# Patient Record
Sex: FEMALE | Race: WHITE | NOT HISPANIC OR LATINO | URBAN - METROPOLITAN AREA
[De-identification: names, ages, dates, MRNs, and addresses within clinical notes are randomized per-mention and may not be internally consistent; named-entity substitution may affect disease eponyms.]

---

## 2021-12-16 ENCOUNTER — INPATIENT HOSPITAL (OUTPATIENT)
Dept: URBAN - METROPOLITAN AREA HOSPITAL 76 | Facility: HOSPITAL | Age: 61
End: 2021-12-16
Payer: COMMERCIAL

## 2021-12-16 DIAGNOSIS — K80.50 CALCULUS OF BILE DUCT WITHOUT CHOLANGITIS OR CHOLECYSTITIS W: ICD-10-CM

## 2021-12-16 DIAGNOSIS — K59.00 CONSTIPATION, UNSPECIFIED: ICD-10-CM

## 2021-12-16 DIAGNOSIS — R10.9 UNSPECIFIED ABDOMINAL PAIN: ICD-10-CM

## 2021-12-16 DIAGNOSIS — R94.5 ABNORMAL RESULTS OF LIVER FUNCTION STUDIES: ICD-10-CM

## 2021-12-16 DIAGNOSIS — R11.2 NAUSEA WITH VOMITING, UNSPECIFIED: ICD-10-CM

## 2021-12-16 PROCEDURE — 99254 IP/OBS CNSLTJ NEW/EST MOD 60: CPT | Mod: 25 | Performed by: NURSE PRACTITIONER

## 2021-12-16 PROCEDURE — 43274 ERCP DUCT STENT PLACEMENT: CPT | Performed by: INTERNAL MEDICINE

## 2021-12-16 PROCEDURE — 43264 ERCP REMOVE DUCT CALCULI: CPT | Mod: 59 | Performed by: INTERNAL MEDICINE

## 2021-12-17 ENCOUNTER — INPATIENT HOSPITAL (OUTPATIENT)
Dept: URBAN - METROPOLITAN AREA HOSPITAL 76 | Facility: HOSPITAL | Age: 61
End: 2021-12-17
Payer: COMMERCIAL

## 2021-12-17 DIAGNOSIS — K80.50 CALCULUS OF BILE DUCT WITHOUT CHOLANGITIS OR CHOLECYSTITIS W: ICD-10-CM

## 2021-12-17 DIAGNOSIS — K85.10 BILIARY ACUTE PANCREATITIS WITHOUT NECROSIS OR INFECTION: ICD-10-CM

## 2021-12-17 DIAGNOSIS — R10.9 UNSPECIFIED ABDOMINAL PAIN: ICD-10-CM

## 2021-12-17 DIAGNOSIS — K59.00 CONSTIPATION, UNSPECIFIED: ICD-10-CM

## 2021-12-17 DIAGNOSIS — R11.2 NAUSEA WITH VOMITING, UNSPECIFIED: ICD-10-CM

## 2021-12-17 DIAGNOSIS — R94.5 ABNORMAL RESULTS OF LIVER FUNCTION STUDIES: ICD-10-CM

## 2021-12-17 PROCEDURE — 99232 SBSQ HOSP IP/OBS MODERATE 35: CPT | Performed by: NURSE PRACTITIONER

## 2022-04-27 ENCOUNTER — ON CAMPUS - OUTPATIENT (OUTPATIENT)
Dept: URBAN - METROPOLITAN AREA HOSPITAL 77 | Facility: HOSPITAL | Age: 62
End: 2022-04-27
Payer: COMMERCIAL

## 2022-04-27 DIAGNOSIS — K80.50 CALCULUS OF BILE DUCT WITHOUT CHOLANGITIS OR CHOLECYSTITIS W: ICD-10-CM

## 2022-04-27 DIAGNOSIS — Z46.89 ENCOUNTER FOR FITTING AND ADJUSTMENT OF OTHER SPECIFIED DEVI: ICD-10-CM

## 2022-04-27 DIAGNOSIS — Z96.89 PRESENCE OF OTHER SPECIFIED FUNCTIONAL IMPLANTS: ICD-10-CM

## 2022-04-27 PROCEDURE — 43264 ERCP REMOVE DUCT CALCULI: CPT | Performed by: INTERNAL MEDICINE

## 2022-04-27 PROCEDURE — 43275 ERCP REMOVE FORGN BODY DUCT: CPT | Performed by: INTERNAL MEDICINE

## 2024-09-04 ENCOUNTER — TRANSCRIBE ORDERS (OUTPATIENT)
Dept: ADMINISTRATIVE | Facility: HOSPITAL | Age: 64
End: 2024-09-04
Payer: COMMERCIAL

## 2024-09-04 DIAGNOSIS — R91.8 LUNG MASS: Primary | ICD-10-CM

## 2024-09-12 DIAGNOSIS — R91.1 LUNG NODULE SEEN ON IMAGING STUDY: Primary | ICD-10-CM

## 2024-09-12 DIAGNOSIS — R59.0 HILAR LYMPHADENOPATHY: ICD-10-CM

## 2024-09-17 ENCOUNTER — TELEPHONE (OUTPATIENT)
Dept: SURGERY | Facility: CLINIC | Age: 64
End: 2024-09-17
Payer: COMMERCIAL

## 2024-09-24 ENCOUNTER — PATIENT OUTREACH (OUTPATIENT)
Dept: ONCOLOGY | Facility: CLINIC | Age: 64
End: 2024-09-24
Payer: COMMERCIAL

## 2024-09-24 ENCOUNTER — PREP FOR SURGERY (OUTPATIENT)
Dept: OTHER | Facility: HOSPITAL | Age: 64
End: 2024-09-24
Payer: COMMERCIAL

## 2024-09-24 ENCOUNTER — OFFICE VISIT (OUTPATIENT)
Dept: SURGERY | Facility: CLINIC | Age: 64
End: 2024-09-24
Payer: COMMERCIAL

## 2024-09-24 VITALS
BODY MASS INDEX: 20.73 KG/M2 | SYSTOLIC BLOOD PRESSURE: 131 MMHG | HEART RATE: 78 BPM | OXYGEN SATURATION: 96 % | WEIGHT: 140 LBS | HEIGHT: 69 IN | DIASTOLIC BLOOD PRESSURE: 74 MMHG

## 2024-09-24 DIAGNOSIS — R91.8 LUNG MASS: Primary | ICD-10-CM

## 2024-09-24 RX ORDER — ATORVASTATIN CALCIUM 40 MG/1
40 TABLET, FILM COATED ORAL DAILY
COMMUNITY
Start: 2024-09-17 | End: 2024-10-17

## 2024-09-24 RX ORDER — CLOPIDOGREL BISULFATE 75 MG/1
75 TABLET ORAL DAILY
COMMUNITY
Start: 2024-09-17 | End: 2024-10-17

## 2024-09-24 RX ORDER — CARVEDILOL 3.12 MG/1
3.12 TABLET ORAL
COMMUNITY
Start: 2024-08-06

## 2024-09-24 RX ORDER — ISOSORBIDE MONONITRATE 30 MG/1
30 TABLET, EXTENDED RELEASE ORAL DAILY
COMMUNITY
Start: 2024-09-17 | End: 2024-10-17

## 2024-09-24 RX ORDER — NITROGLYCERIN 0.4 MG/1
0.4 TABLET SUBLINGUAL
COMMUNITY

## 2024-09-24 RX ORDER — PSEUDOEPHEDRINE HCL 30 MG
100 TABLET ORAL
COMMUNITY

## 2024-09-24 RX ORDER — ASPIRIN 81 MG/1
81 TABLET, CHEWABLE ORAL DAILY
COMMUNITY
Start: 2024-09-17 | End: 2024-10-17

## 2024-09-24 RX ORDER — ALBUTEROL SULFATE 90 UG/1
2 INHALANT RESPIRATORY (INHALATION)
COMMUNITY

## 2024-09-24 NOTE — H&P (VIEW-ONLY)
THORACIC SURGERY CLINIC CONSULT NOTE    REASON FOR CONSULT: Right upper lobe lung mass, multiple PET avid upper lobe nodules and hilar lymphadenopathy concerning for malignancy    REFERRING PROVIDER: Dr. Delgado    Subjective   HISTORY OF PRESENTING ILLNESS:   Kandi Fuentes is a 64 y.o. female who has significant medical problems as mentioned in the medical chart.     History of Present Illness  She had a CT scan of the chest in July 2024 which noted right upper lobe mass concerning for malignancy.  Subsequent PET CT scan on 8/16/2024 reported lobulated mass in the posterior segment of the right upper lobe with maximal SUV of 16.4.  There was focal increased uptake in the right hilum with SUV of 4.2 concerning for malignant right hilar lymphadenopathy.  Unfortunately, she had an MI that required PCI to the RCA.  She was initiated on dual antiplatelet therapy.  Due to financial issues, navigational bronchoscopy and biopsy could not be performed.  She was referred to thoracic surgery for further evaluation.    FAMILY HISTORY  Her sister and brother both passed away at the age of 59 from lung cancer. They were both smokers. Her mother passed away from a massive heart attack and she had breast cancer.    No past medical history on file.    Past Surgical History:   Procedure Laterality Date    CHOLECYSTECTOMY         No family history on file.    Social History     Socioeconomic History    Marital status:    Tobacco Use    Smoking status: Every Day     Current packs/day: 1.00     Types: Cigarettes   Substance and Sexual Activity    Alcohol use: Not Currently    Drug use: Never       No current outpatient medications on file.     Allergies   Allergen Reactions    Codeine Hives    Sulfa Antibiotics Hives             Objective    OBJECTIVE:     VITAL SIGNS:  There were no vitals taken for this visit.    PHYSICAL EXAM:  Normal appearance.   Head is normocephalic.   Nose appears normal.   No obvious deformity of  the mouth and throat.  Conjunctivae normal.   Heart rate and rhythm is normal.  Pulmonary effort is normal.   Moving all 4 extremities.  Extremities warm.  No focal deficit present.   He is alert and oriented to person, place, and time.     LAB RESULTS:  I have reviewed all the available laboratory results in the chart.    RESULTS REVIEW:  I have reviewed the patient's all relevant laboratory and imaging findings.     Results  Imaging  Tumor in the right upper lobe of the lung. Multiple lymph nodes along the windpipe, one of which is enlarged.    ASSESSMENT & PLAN:  Kandi Fuentes is a 64 y.o. female with significant medical conditions as mentioned above presented to my clinic.    Diagnosis: Right upper lobe mass with hilar lymphadenopathy concerning for primary lung malignancy with cami metastasis.    Assessment & Plan  1. Stage III Lung Cancer.  The presence of a tumor in the right upper lobe of the lung, along with multiple nodules in the right upper lobe and lymph node involvement, suggests a diagnosis of at least stage III lung cancer. A brain MRI will be conducted to evaluate for brain metastasis.  A biopsy of the lung tumor is planned to confirm the diagnosis and guide treatment. Given her recent heart attack and fragile lung condition, surgery is not a viable option. She will likely receive definitive chemoradiotherapy followed by immunotherapy. A referral to medical and radiation oncology has been made for further management.     2. Medication Management.  She has been advised to continue taking aspirin but to discontinue Plavix (clopidogrel) to reduce the risk of bleeding during the biopsy. A consultation with her cardiologist will be arranged to confirm this plan.    I discussed the patients findings and my recommendations with the patient. The patient was given adequate time to ask questions and all questions were answered to patient satisfaction. Thank you for this consult and allowing us to  participate in the care of your patient.      Serafin Choudhary MD  Thoracic Surgeon  [facility]        Dictated utilizing Dragon dictation    I spent 60 minutes caring for Kandi on this date of service. This time includes time spent by me in the following activities:preparing for the visit, reviewing tests, obtaining and/or reviewing a separately obtained history, performing a medically appropriate examination and/or evaluation , counseling and educating the patient/family/caregiver, ordering medications, tests, or procedures, referring and communicating with other health care professionals , documenting information in the medical record, independently interpreting results and communicating that information with the patient/family/caregiver, and care coordination and more than half the time was spent in direct face to face evaluation and decision making.     Patient or patient representative verbalized consent for the use of Ambient Listening during the visit with  Serafin Choudhary MD for chart documentation. 9/27/2024  10:20 EDT

## 2024-09-24 NOTE — H&P (VIEW-ONLY)
THORACIC SURGERY CLINIC CONSULT NOTE    REASON FOR CONSULT: Right upper lobe lung mass, multiple PET avid upper lobe nodules and hilar lymphadenopathy concerning for malignancy    REFERRING PROVIDER: Dr. Delgado    Subjective   HISTORY OF PRESENTING ILLNESS:   Kandi Fuentes is a 64 y.o. female who has significant medical problems as mentioned in the medical chart.     History of Present Illness  She had a CT scan of the chest in July 2024 which noted right upper lobe mass concerning for malignancy.  Subsequent PET CT scan on 8/16/2024 reported lobulated mass in the posterior segment of the right upper lobe with maximal SUV of 16.4.  There was focal increased uptake in the right hilum with SUV of 4.2 concerning for malignant right hilar lymphadenopathy.  Unfortunately, she had an MI that required PCI to the RCA.  She was initiated on dual antiplatelet therapy.  Due to financial issues, navigational bronchoscopy and biopsy could not be performed.  She was referred to thoracic surgery for further evaluation.    FAMILY HISTORY  Her sister and brother both passed away at the age of 59 from lung cancer. They were both smokers. Her mother passed away from a massive heart attack and she had breast cancer.    No past medical history on file.    Past Surgical History:   Procedure Laterality Date    CHOLECYSTECTOMY         No family history on file.    Social History     Socioeconomic History    Marital status:    Tobacco Use    Smoking status: Every Day     Current packs/day: 1.00     Types: Cigarettes   Substance and Sexual Activity    Alcohol use: Not Currently    Drug use: Never       No current outpatient medications on file.     Allergies   Allergen Reactions    Codeine Hives    Sulfa Antibiotics Hives             Objective    OBJECTIVE:     VITAL SIGNS:  There were no vitals taken for this visit.    PHYSICAL EXAM:  Normal appearance.   Head is normocephalic.   Nose appears normal.   No obvious deformity of  the mouth and throat.  Conjunctivae normal.   Heart rate and rhythm is normal.  Pulmonary effort is normal.   Moving all 4 extremities.  Extremities warm.  No focal deficit present.   He is alert and oriented to person, place, and time.     LAB RESULTS:  I have reviewed all the available laboratory results in the chart.    RESULTS REVIEW:  I have reviewed the patient's all relevant laboratory and imaging findings.     Results  Imaging  Tumor in the right upper lobe of the lung. Multiple lymph nodes along the windpipe, one of which is enlarged.    ASSESSMENT & PLAN:  Kandi Fuentes is a 64 y.o. female with significant medical conditions as mentioned above presented to my clinic.    Diagnosis: Right upper lobe mass with hilar lymphadenopathy concerning for primary lung malignancy with cami metastasis.    Assessment & Plan  1. Stage III Lung Cancer.  The presence of a tumor in the right upper lobe of the lung, along with multiple nodules in the right upper lobe and lymph node involvement, suggests a diagnosis of at least stage III lung cancer. A brain MRI will be conducted to evaluate for brain metastasis.  A biopsy of the lung tumor is planned to confirm the diagnosis and guide treatment. Given her recent heart attack and fragile lung condition, surgery is not a viable option. She will likely receive definitive chemoradiotherapy followed by immunotherapy. A referral to medical and radiation oncology has been made for further management.     2. Medication Management.  She has been advised to continue taking aspirin but to discontinue Plavix (clopidogrel) to reduce the risk of bleeding during the biopsy. A consultation with her cardiologist will be arranged to confirm this plan.    I discussed the patients findings and my recommendations with the patient. The patient was given adequate time to ask questions and all questions were answered to patient satisfaction. Thank you for this consult and allowing us to  participate in the care of your patient.      Serafin Choudhary MD  Thoracic Surgeon  [facility] [place]        Dictated utilizing Dragon dictation    I spent 60 minutes caring for Kandi on this date of service. This time includes time spent by me in the following activities:preparing for the visit, reviewing tests, obtaining and/or reviewing a separately obtained history, performing a medically appropriate examination and/or evaluation , counseling and educating the patient/family/caregiver, ordering medications, tests, or procedures, referring and communicating with other health care professionals , documenting information in the medical record, independently interpreting results and communicating that information with the patient/family/caregiver, and care coordination and more than half the time was spent in direct face to face evaluation and decision making.     Patient or patient representative verbalized consent for the use of Ambient Listening during the visit with  Serafin Choudhary MD for chart documentation. 9/27/2024  10:20 EDT

## 2024-09-25 ENCOUNTER — PATIENT OUTREACH (OUTPATIENT)
Dept: ONCOLOGY | Facility: CLINIC | Age: 64
End: 2024-09-25
Payer: COMMERCIAL

## 2024-09-25 ENCOUNTER — TELEPHONE (OUTPATIENT)
Dept: SURGERY | Facility: CLINIC | Age: 64
End: 2024-09-25
Payer: COMMERCIAL

## 2024-09-26 NOTE — PROGRESS NOTES
"                         HEMATOLOGY ONCOLOGY OUTPATIENT CONSULTATION       Patient name: Kandi Fuentes  : 1960  MRN: [id number]  Primary Care Physician: Provider, No Known  Referring Physician: Provider, No Known  Reason For Consult: Limited stage small cell lung cancer.    History of Present Illness:  Patient is a 64 y.o.     10/2/2024: In the office for the first time to stage and treat small cell lung cancer of the right lung.  She reported that between July and 2024 she was seen at the hospital with dyspnea and some chest pains.  She was found to have evidence of coronary artery disease and underwent percutaneous angioplasty and stenting of the affected coronary vessels.  In the process, however, a nodule in the right lung was identified.  Further investigations led to the identification of a 4.4 cm lobulated tumor in the posterior right upper lobe communicating with the right middle lobe concerning for malignancy.  Evidence of severe emphysema was present, as well.  Eventually she had a navigational bronchoscopy and pathology reported small cell carcinoma on the biopsies.  A PET scan and MRI did not reveal any suggestion of metastatic disease.  At the time of this visit she is feeling somewhat better.  Her breathing has improved.  She still has some precordial discomfort that she describes is related to the stents \"that I can still feel\".  She has been eating reasonably well and has not lost much weight.  She is also been afebrile.  No diarrhea or dysuria.  On exam she appears chronically ill but is not in distress.  No jaundice.  The lungs are diminished bilaterally in a significant fashion.  The heart is regular.  The abdomen is flat and soft.  No palpable tumors.  No edema.  Independently reviewed all the imaging studies and discussed with her.  Treatment with concurrent chemotherapy and radiation followed by immunotherapy is reasonable.  Discussed with her the regimen of concurrent " chemotherapy and radiation and in detail discussed with her side effects and potential complications of both treatments.  Ideally we should begin on October 7 if it is at all possible.  I have communicated with Dr. Choudhary and with Dr. Abarca.    Past Medical History:   Diagnosis Date    COPD (chronic obstructive pulmonary disease)     Coronary artery disease     Elevated cholesterol     Heart attack     Hypertension      Past Surgical History:   Procedure Laterality Date    BACK SURGERY  2004    bone spur on sciatica    BRONCHOSCOPY WITH ION ROBOTIC ASSIST N/A 9/27/2024    Procedure: BRONCHOSCOPY WITH ION ROBOT, fine needle aspiration and tissue biopsy right upper lobe mass, endobronchial ultrasound with fine needle aspiration lymph nodes (Level 10R);  Surgeon: Serafin Choudhary MD;  Location: [facility] ENDOSCOPY;  Service: Robotics - Pulmonary;  Laterality: N/A;  post: lung mass with lympadenopathy    CHOLECYSTECTOMY  2021    CORONARY ANGIOPLASTY WITH STENT PLACEMENT  07/2024    MOLE REMOVAL  1994    forehead    SKIN LESION EXCISION Right 1994    breast       Current Outpatient Medications:     Acetaminophen (Tylenol) 325 MG capsule, Take  by mouth., Disp: , Rfl:     albuterol sulfate  (90 Base) MCG/ACT inhaler, Inhale 2 puffs., Disp: , Rfl:     aspirin 81 MG chewable tablet, Chew 1 tablet Daily., Disp: , Rfl:     atorvastatin (LIPITOR) 40 MG tablet, Take 1 tablet by mouth Daily., Disp: , Rfl:     carvedilol (COREG) 3.125 MG tablet, Take 1 tablet by mouth., Disp: , Rfl:     clopidogrel (PLAVIX) 75 MG tablet, Take 1 tablet by mouth Daily., Disp: , Rfl:     isosorbide mononitrate (IMDUR) 30 MG 24 hr tablet, Take 1 tablet by mouth Daily., Disp: , Rfl:     docusate sodium 100 MG capsule, Take 1 capsule by mouth. (Patient not taking: Reported on 10/2/2024), Disp: , Rfl:     nitroglycerin (NITROSTAT) 0.4 MG SL tablet, Place 1 tablet under the tongue. (Patient not taking: Reported on 10/2/2024), Disp: , Rfl:     Allergies    Allergen Reactions    Morphine Hives and Shortness Of Breath    Codeine Hives    Sulfa Antibiotics Hives     Family History   Problem Relation Age of Onset    Breast cancer Mother 62    Heart attack Mother     Lung cancer Sister     Throat cancer Sister     Lung cancer Brother      Cancer-related family history includes Breast cancer (age of onset: 62) in her mother; Lung cancer in her brother and sister; Throat cancer in her sister.    Social History     Tobacco Use    Smoking status: Every Day     Current packs/day: 1.00     Average packs/day: 1 pack/day for 54.8 years (54.8 ttl pk-yrs)     Types: Cigarettes     Start date: 1970     Passive exposure: Current    Smokeless tobacco: Never   Vaping Use    Vaping status: Never Used   Substance Use Topics    Alcohol use: Never    Drug use: Never     Social History     Social History Narrative    Not on file     ROS:   Review of Systems   Constitutional:  Positive for fatigue. Negative for activity change, appetite change, chills, diaphoresis, fever and unexpected weight change.   HENT:  Negative for congestion, dental problem, drooling, ear discharge, ear pain, facial swelling, hearing loss, mouth sores, nosebleeds, postnasal drip, rhinorrhea, sinus pressure, sinus pain, sneezing, sore throat, tinnitus, trouble swallowing and voice change.    Eyes:  Negative for photophobia, pain, discharge, redness, itching and visual disturbance.   Respiratory:  Positive for cough and shortness of breath. Negative for apnea, choking, chest tightness, wheezing and stridor.    Cardiovascular:  Positive for chest pain. Negative for palpitations and leg swelling.   Gastrointestinal:  Negative for abdominal distention, abdominal pain, anal bleeding, blood in stool, constipation, diarrhea, nausea, rectal pain and vomiting.   Endocrine: Negative for cold intolerance, heat intolerance, polydipsia and polyuria.   Genitourinary:  Negative for decreased urine volume, difficulty urinating,  "dysuria, flank pain, frequency, genital sores, hematuria and urgency.   Musculoskeletal:  Negative for arthralgias, back pain, gait problem, joint swelling, myalgias, neck pain and neck stiffness.   Skin:  Negative for color change, pallor and rash.   Neurological:  Negative for dizziness, tremors, seizures, syncope, facial asymmetry, speech difficulty, weakness, light-headedness, numbness and headaches.   Hematological:  Negative for adenopathy. Does not bruise/bleed easily.   Psychiatric/Behavioral:  Negative for agitation, behavioral problems, confusion, decreased concentration, hallucinations, self-injury, sleep disturbance and suicidal ideas. The patient is not nervous/anxious.        Objective:    Vital Signs:  Vitals:    10/02/24 0918   BP: 137/79   Pulse: 70   SpO2: 96%   Weight: 65.2 kg (143 lb 12.8 oz)   Height: 170.2 cm (67\")   PainSc: 0-No pain     Body mass index is 22.52 kg/m².    ECOG  (1) Restricted in physically strenuous activity, ambulatory and able to do work of light nature    Physical Exam:   Physical Exam  Constitutional:       General: She is not in acute distress.     Appearance: She is ill-appearing. She is not toxic-appearing or diaphoretic.      Comments: Well-built, slender and well oriented woman.  She appears chronically ill but is not in acute distress.   HENT:      Head: Normocephalic and atraumatic.      Right Ear: External ear normal.      Left Ear: External ear normal.      Nose: Nose normal.      Mouth/Throat:      Mouth: Mucous membranes are moist.      Pharynx: Oropharynx is clear. No oropharyngeal exudate or posterior oropharyngeal erythema.   Eyes:      General: No scleral icterus.        Right eye: No discharge.         Left eye: No discharge.      Conjunctiva/sclera: Conjunctivae normal.      Pupils: Pupils are equal, round, and reactive to light.   Cardiovascular:      Rate and Rhythm: Normal rate and regular rhythm.      Pulses: Normal pulses.      Heart sounds: No murmur " heard.     No friction rub. No gallop.   Pulmonary:      Effort: No respiratory distress.      Breath sounds: No stridor. No wheezing, rhonchi or rales.      Comments: Breath sounds markedly diminished bilaterally.  Abdominal:      General: Abdomen is flat. Bowel sounds are normal. There is no distension.      Palpations: Abdomen is soft. There is no mass.      Tenderness: There is no abdominal tenderness. There is no right CVA tenderness, left CVA tenderness, guarding or rebound.      Hernia: No hernia is present.   Musculoskeletal:         General: No tenderness, deformity or signs of injury.      Cervical back: No rigidity.      Right lower leg: No edema.      Left lower leg: No edema.   Lymphadenopathy:      Cervical: No cervical adenopathy.   Skin:     Coloration: Skin is not jaundiced or pale.      Findings: No bruising, lesion or rash.   Neurological:      General: No focal deficit present.      Mental Status: She is alert and oriented to person, place, and time.      Cranial Nerves: No cranial nerve deficit.   Psychiatric:         Mood and Affect: Mood normal.         Behavior: Behavior normal.         Thought Content: Thought content normal.         Judgment: Judgment normal.     NATHALIE Delgado MD performed the physical exam on 10/2/2024 as documented above.    Lab Results - Last 18 Months   Lab Units 09/27/24  0940   WBC 10*3/mm3 10.13   HEMOGLOBIN g/dL 14.1   HEMATOCRIT % 44.7   PLATELETS 10*3/mm3 218   MCV fL 102.1*     Lab Results - Last 18 Months   Lab Units 09/27/24  0940   INR  1.02   APTT seconds 25.5     Lab Results   Component Value Date    PTT 25.5 09/27/2024    INR 1.02 09/27/2024     Assessment & Plan     1.  Limited stage small cell lung cancer: I have reviewed independently the images and the reports of the CT scans, PET scan and MRI of the brain.  Discussed with her.  Discussed with her the nature of small cell lung cancer and its cause.  Discussed with her the importance of smoking  cessation and the treatment.  Offered treatment with concurrent chemotherapy and radiation and discussed side effects and potential complications.  Discussed the role in this setting of immunotherapy and explained the difference between chemotherapy and immunotherapy.  Communicated with Dr. Whiting and with Dr. Abarca, her surgeon and radiation oncologist.  2.  Coronary artery disease: Has recently undergone percutaneous angioplasty.  On antiplatelet agents at this time.  3.  Chronic obstructive pulmonary disease  4.  Cigarette smoker: Discussed with her the importance of cessation.  The role of cigarette smoke in the ignacio of lung cancer with described.  The importance of cessation and the benefit of cessation was discussed as well.  5.  Reviewed all records including notes from surgery and from cardiology.  Reviewed the records from [facility] including all imaging studies and the reports.  Reviewed all laboratory exams.  6.  See me in approximately 3 weeks.  Ideally begin treatment on October 7, 2024.

## 2024-09-27 ENCOUNTER — ANESTHESIA EVENT (OUTPATIENT)
Dept: GASTROENTEROLOGY | Facility: HOSPITAL | Age: 64
End: 2024-09-27
Payer: COMMERCIAL

## 2024-09-27 ENCOUNTER — APPOINTMENT (OUTPATIENT)
Dept: GENERAL RADIOLOGY | Facility: HOSPITAL | Age: 64
End: 2024-09-27
Payer: COMMERCIAL

## 2024-09-27 ENCOUNTER — HOSPITAL ENCOUNTER (OUTPATIENT)
Dept: CT IMAGING | Facility: HOSPITAL | Age: 64
Discharge: HOME | End: 2024-09-27
Payer: COMMERCIAL

## 2024-09-27 ENCOUNTER — HOSPITAL ENCOUNTER (OUTPATIENT)
Dept: PET IMAGING | Facility: HOSPITAL | Age: 64
Discharge: HOME | End: 2024-09-27
Payer: COMMERCIAL

## 2024-09-27 ENCOUNTER — PATIENT ROUNDING (BHMG ONLY) (OUTPATIENT)
Dept: SURGERY | Facility: CLINIC | Age: 64
End: 2024-09-27
Payer: COMMERCIAL

## 2024-09-27 ENCOUNTER — HOSPITAL ENCOUNTER (OUTPATIENT)
Facility: HOSPITAL | Age: 64
Setting detail: HOSPITAL OUTPATIENT SURGERY
Discharge: HOME | End: 2024-09-27
Attending: SURGERY | Admitting: SURGERY
Payer: COMMERCIAL

## 2024-09-27 ENCOUNTER — ANESTHESIA (OUTPATIENT)
Dept: GASTROENTEROLOGY | Facility: HOSPITAL | Age: 64
End: 2024-09-27
Payer: COMMERCIAL

## 2024-09-27 ENCOUNTER — LAB (OUTPATIENT)
Dept: LAB | Facility: HOSPITAL | Age: 64
End: 2024-09-27
Payer: COMMERCIAL

## 2024-09-27 VITALS
OXYGEN SATURATION: 96 % | HEART RATE: 75 BPM | HEIGHT: 67 IN | WEIGHT: 142.8 LBS | BODY MASS INDEX: 22.41 KG/M2 | DIASTOLIC BLOOD PRESSURE: 60 MMHG | SYSTOLIC BLOOD PRESSURE: 119 MMHG | TEMPERATURE: 97.6 F | RESPIRATION RATE: 14 BRPM

## 2024-09-27 DIAGNOSIS — R91.8 LUNG MASS: ICD-10-CM

## 2024-09-27 LAB
APTT PPP: 25.5 SECONDS (ref 24–31)
BASOPHILS # BLD AUTO: 0.03 10*3/MM3 (ref 0–0.2)
BASOPHILS NFR BLD AUTO: 0.3 % (ref 0–1.5)
DEPRECATED RDW RBC AUTO: 47.5 FL (ref 37–54)
EOSINOPHIL # BLD AUTO: 0.03 10*3/MM3 (ref 0–0.4)
EOSINOPHIL NFR BLD AUTO: 0.3 % (ref 0.3–6.2)
ERYTHROCYTE [DISTWIDTH] IN BLOOD BY AUTOMATED COUNT: 12.7 % (ref 12.3–15.4)
HCT VFR BLD AUTO: 44.7 % (ref 34–46.6)
HGB BLD-MCNC: 14.1 G/DL (ref 12–15.9)
IMM GRANULOCYTES # BLD AUTO: 0.01 10*3/MM3 (ref 0–0.05)
IMM GRANULOCYTES NFR BLD AUTO: 0.1 % (ref 0–0.5)
INR PPP: 1.02 (ref 0.93–1.1)
LYMPHOCYTES # BLD AUTO: 5.15 10*3/MM3 (ref 0.7–3.1)
LYMPHOCYTES NFR BLD AUTO: 50.8 % (ref 19.6–45.3)
MCH RBC QN AUTO: 32.2 PG (ref 26.6–33)
MCHC RBC AUTO-ENTMCNC: 31.5 G/DL (ref 31.5–35.7)
MCV RBC AUTO: 102.1 FL (ref 79–97)
MONOCYTES # BLD AUTO: 0.74 10*3/MM3 (ref 0.1–0.9)
MONOCYTES NFR BLD AUTO: 7.3 % (ref 5–12)
NEUTROPHILS NFR BLD AUTO: 4.17 10*3/MM3 (ref 1.7–7)
NEUTROPHILS NFR BLD AUTO: 41.2 % (ref 42.7–76)
NRBC BLD AUTO-RTO: 0 /100 WBC (ref 0–0.2)
PLATELET # BLD AUTO: 218 10*3/MM3 (ref 140–450)
PMV BLD AUTO: 9.6 FL (ref 6–12)
PROTHROMBIN TIME: 11.1 SECONDS (ref 9.6–11.7)
RBC # BLD AUTO: 4.38 10*6/MM3 (ref 3.77–5.28)
WBC NRBC COR # BLD AUTO: 10.13 10*3/MM3 (ref 3.4–10.8)

## 2024-09-27 PROCEDURE — 88342 IMHCHEM/IMCYTCHM 1ST ANTB: CPT | Performed by: SURGERY

## 2024-09-27 PROCEDURE — 88108 CYTOPATH CONCENTRATE TECH: CPT | Performed by: SURGERY

## 2024-09-27 PROCEDURE — 94761 N-INVAS EAR/PLS OXIMETRY MLT: CPT

## 2024-09-27 PROCEDURE — 85730 THROMBOPLASTIN TIME PARTIAL: CPT

## 2024-09-27 PROCEDURE — 94664 DEMO&/EVAL PT USE INHALER: CPT

## 2024-09-27 PROCEDURE — 71045 X-RAY EXAM CHEST 1 VIEW: CPT

## 2024-09-27 PROCEDURE — 25010000002 DEXAMETHASONE PER 1 MG: Performed by: NURSE ANESTHETIST, CERTIFIED REGISTERED

## 2024-09-27 PROCEDURE — 94799 UNLISTED PULMONARY SVC/PX: CPT

## 2024-09-27 PROCEDURE — 94640 AIRWAY INHALATION TREATMENT: CPT

## 2024-09-27 PROCEDURE — 25810000003 SODIUM CHLORIDE 0.9 % SOLUTION 250 ML FLEX CONT: Performed by: NURSE ANESTHETIST, CERTIFIED REGISTERED

## 2024-09-27 PROCEDURE — 85025 COMPLETE CBC W/AUTO DIFF WBC: CPT | Performed by: INTERNAL MEDICINE

## 2024-09-27 PROCEDURE — 25010000002 SUGAMMADEX 200 MG/2ML SOLUTION: Performed by: NURSE ANESTHETIST, CERTIFIED REGISTERED

## 2024-09-27 PROCEDURE — 25010000002 ONDANSETRON PER 1 MG: Performed by: NURSE ANESTHETIST, CERTIFIED REGISTERED

## 2024-09-27 PROCEDURE — 25010000002 SUCCINYLCHOLINE PER 20 MG: Performed by: NURSE ANESTHETIST, CERTIFIED REGISTERED

## 2024-09-27 PROCEDURE — 25810000003 SODIUM CHLORIDE 0.9 % SOLUTION: Performed by: SURGERY

## 2024-09-27 PROCEDURE — 85610 PROTHROMBIN TIME: CPT

## 2024-09-27 PROCEDURE — 25010000002 PROPOFOL 1000 MG/100ML EMULSION: Performed by: NURSE ANESTHETIST, CERTIFIED REGISTERED

## 2024-09-27 PROCEDURE — 36415 COLL VENOUS BLD VENIPUNCTURE: CPT | Performed by: INTERNAL MEDICINE

## 2024-09-27 PROCEDURE — 25010000002 PHENYLEPHRINE 10 MG/ML SOLUTION 5 ML VIAL: Performed by: NURSE ANESTHETIST, CERTIFIED REGISTERED

## 2024-09-27 PROCEDURE — 76000 FLUOROSCOPY <1 HR PHYS/QHP: CPT

## 2024-09-27 PROCEDURE — 88333 PATH CONSLTJ SURG CYTO XM 1: CPT | Performed by: SURGERY

## 2024-09-27 PROCEDURE — 88341 IMHCHEM/IMCYTCHM EA ADD ANTB: CPT | Performed by: SURGERY

## 2024-09-27 PROCEDURE — 88305 TISSUE EXAM BY PATHOLOGIST: CPT | Performed by: SURGERY

## 2024-09-27 PROCEDURE — 71250 CT THORAX DX C-: CPT

## 2024-09-27 RX ORDER — EPHEDRINE SULFATE 5 MG/ML
INJECTION INTRAVENOUS AS NEEDED
Status: DISCONTINUED | OUTPATIENT
Start: 2024-09-27 | End: 2024-09-27 | Stop reason: SURG

## 2024-09-27 RX ORDER — SODIUM CHLORIDE 9 MG/ML
50 INJECTION, SOLUTION INTRAVENOUS CONTINUOUS
Status: DISCONTINUED | OUTPATIENT
Start: 2024-09-27 | End: 2024-09-27 | Stop reason: HOSPADM

## 2024-09-27 RX ORDER — LABETALOL HYDROCHLORIDE 5 MG/ML
5 INJECTION, SOLUTION INTRAVENOUS
Status: DISCONTINUED | OUTPATIENT
Start: 2024-09-27 | End: 2024-09-27 | Stop reason: HOSPADM

## 2024-09-27 RX ORDER — IPRATROPIUM BROMIDE AND ALBUTEROL SULFATE 2.5; .5 MG/3ML; MG/3ML
3 SOLUTION RESPIRATORY (INHALATION) ONCE AS NEEDED
Status: DISCONTINUED | OUTPATIENT
Start: 2024-09-27 | End: 2024-09-27 | Stop reason: HOSPADM

## 2024-09-27 RX ORDER — SUCCINYLCHOLINE CHLORIDE 20 MG/ML
INJECTION INTRAMUSCULAR; INTRAVENOUS AS NEEDED
Status: DISCONTINUED | OUTPATIENT
Start: 2024-09-27 | End: 2024-09-27 | Stop reason: SURG

## 2024-09-27 RX ORDER — PHENYLEPHRINE HCL IN 0.9% NACL 1 MG/10 ML
SYRINGE (ML) INTRAVENOUS AS NEEDED
Status: DISCONTINUED | OUTPATIENT
Start: 2024-09-27 | End: 2024-09-27 | Stop reason: SURG

## 2024-09-27 RX ORDER — ROCURONIUM BROMIDE 10 MG/ML
INJECTION, SOLUTION INTRAVENOUS AS NEEDED
Status: DISCONTINUED | OUTPATIENT
Start: 2024-09-27 | End: 2024-09-27 | Stop reason: SURG

## 2024-09-27 RX ORDER — DIPHENHYDRAMINE HYDROCHLORIDE 50 MG/ML
12.5 INJECTION INTRAMUSCULAR; INTRAVENOUS
Status: DISCONTINUED | OUTPATIENT
Start: 2024-09-27 | End: 2024-09-27 | Stop reason: HOSPADM

## 2024-09-27 RX ORDER — MEPERIDINE HYDROCHLORIDE 25 MG/ML
12.5 INJECTION INTRAMUSCULAR; INTRAVENOUS; SUBCUTANEOUS
Status: DISCONTINUED | OUTPATIENT
Start: 2024-09-27 | End: 2024-09-27 | Stop reason: HOSPADM

## 2024-09-27 RX ORDER — LIDOCAINE 50 MG/G
OINTMENT TOPICAL AS NEEDED
Status: DISCONTINUED | OUTPATIENT
Start: 2024-09-27 | End: 2024-09-27 | Stop reason: HOSPADM

## 2024-09-27 RX ORDER — ONDANSETRON 2 MG/ML
INJECTION INTRAMUSCULAR; INTRAVENOUS AS NEEDED
Status: DISCONTINUED | OUTPATIENT
Start: 2024-09-27 | End: 2024-09-27 | Stop reason: SURG

## 2024-09-27 RX ORDER — DEXAMETHASONE SODIUM PHOSPHATE 4 MG/ML
INJECTION, SOLUTION INTRA-ARTICULAR; INTRALESIONAL; INTRAMUSCULAR; INTRAVENOUS; SOFT TISSUE AS NEEDED
Status: DISCONTINUED | OUTPATIENT
Start: 2024-09-27 | End: 2024-09-27 | Stop reason: SURG

## 2024-09-27 RX ORDER — LIDOCAINE HYDROCHLORIDE 20 MG/ML
INJECTION, SOLUTION INFILTRATION; PERINEURAL AS NEEDED
Status: DISCONTINUED | OUTPATIENT
Start: 2024-09-27 | End: 2024-09-27 | Stop reason: SURG

## 2024-09-27 RX ORDER — HYDRALAZINE HYDROCHLORIDE 20 MG/ML
5 INJECTION INTRAMUSCULAR; INTRAVENOUS
Status: DISCONTINUED | OUTPATIENT
Start: 2024-09-27 | End: 2024-09-27 | Stop reason: HOSPADM

## 2024-09-27 RX ORDER — IPRATROPIUM BROMIDE AND ALBUTEROL SULFATE 2.5; .5 MG/3ML; MG/3ML
3 SOLUTION RESPIRATORY (INHALATION)
Status: DISCONTINUED | OUTPATIENT
Start: 2024-09-27 | End: 2024-09-27 | Stop reason: HOSPADM

## 2024-09-27 RX ORDER — ONDANSETRON 2 MG/ML
4 INJECTION INTRAMUSCULAR; INTRAVENOUS ONCE AS NEEDED
Status: DISCONTINUED | OUTPATIENT
Start: 2024-09-27 | End: 2024-09-27 | Stop reason: HOSPADM

## 2024-09-27 RX ORDER — PROPOFOL 10 MG/ML
INJECTION, EMULSION INTRAVENOUS AS NEEDED
Status: DISCONTINUED | OUTPATIENT
Start: 2024-09-27 | End: 2024-09-27 | Stop reason: SURG

## 2024-09-27 RX ADMIN — EPHEDRINE SULFATE 10 MG: 5 INJECTION INTRAVENOUS at 14:27

## 2024-09-27 RX ADMIN — Medication 150 MCG: at 14:27

## 2024-09-27 RX ADMIN — LIDOCAINE HYDROCHLORIDE 100 MG: 20 INJECTION, SOLUTION INFILTRATION; PERINEURAL at 14:24

## 2024-09-27 RX ADMIN — Medication 200 MCG: at 14:30

## 2024-09-27 RX ADMIN — EPHEDRINE SULFATE 15 MG: 5 INJECTION INTRAVENOUS at 14:31

## 2024-09-27 RX ADMIN — SUGAMMADEX 200 MG: 100 INJECTION, SOLUTION INTRAVENOUS at 15:11

## 2024-09-27 RX ADMIN — ROCURONIUM BROMIDE 35 MG: 10 INJECTION, SOLUTION INTRAVENOUS at 14:29

## 2024-09-27 RX ADMIN — SUGAMMADEX 200 MG: 100 INJECTION, SOLUTION INTRAVENOUS at 15:27

## 2024-09-27 RX ADMIN — DEXAMETHASONE SODIUM PHOSPHATE 4 MG: 4 INJECTION, SOLUTION INTRAMUSCULAR; INTRAVENOUS at 14:35

## 2024-09-27 RX ADMIN — SUGAMMADEX 200 MG: 100 INJECTION, SOLUTION INTRAVENOUS at 15:15

## 2024-09-27 RX ADMIN — PHENYLEPHRINE HYDROCHLORIDE 0.5 MCG/KG/MIN: 10 INJECTION INTRAVENOUS at 14:23

## 2024-09-27 RX ADMIN — SODIUM CHLORIDE 50 ML/HR: 9 INJECTION, SOLUTION INTRAVENOUS at 11:25

## 2024-09-27 RX ADMIN — PROPOFOL INJECTABLE EMULSION 140 MCG/KG/MIN: 10 INJECTION, EMULSION INTRAVENOUS at 14:28

## 2024-09-27 RX ADMIN — ROCURONIUM BROMIDE 5 MG: 10 INJECTION, SOLUTION INTRAVENOUS at 14:25

## 2024-09-27 RX ADMIN — ONDANSETRON 4 MG: 2 INJECTION INTRAMUSCULAR; INTRAVENOUS at 14:35

## 2024-09-27 RX ADMIN — IPRATROPIUM BROMIDE AND ALBUTEROL SULFATE 3 ML: .5; 3 SOLUTION RESPIRATORY (INHALATION) at 10:56

## 2024-09-27 RX ADMIN — PROPOFOL INJECTABLE EMULSION 170 MG: 10 INJECTION, EMULSION INTRAVENOUS at 14:24

## 2024-09-27 RX ADMIN — SUCCINYLCHOLINE CHLORIDE 140 MG: 20 INJECTION, SOLUTION INTRAMUSCULAR; INTRAVENOUS at 14:25

## 2024-09-27 NOTE — DISCHARGE INSTRUCTIONS
Endoscopic ultrasound and Robotic bronchoscopy with fine needle aspiration    Samples (biopsies) of the lymph nodes are taken from inside the lungs and sent for diagnostic testing.    Final results of your biopsy take up to 5 business days to process; your endoscopic physician will contact you with your results.    EBUS and robotic bronchoscopy is recommended in the following instances:  To diagnose different types of lung disorders (such as sarcoidosis or tuberculosis)  To diagnose or 'stage' cancer   To investigate enlarged lymph  nodes in the chest    Due to effects of sedation, do not drive or operate heavy machinery for 24 hours.    Avoid heavy lifting (>10 lbs.) or strenuous activity for 48 hours.    Continue to avoid non-steroidal anti-inflammatory medications (NSAIDS) for 5-7 days after the procedure unless told otherwise by your endoscopic and primary care physicians.    Some patients have a temporary sore throat after the procedure; this is a normal finding and over-the-counter lozenges help soothe symptoms.    You may resume normal diet once you are discharged.     Avoid red-colored foods for 24 hours.     You may resume your blood thinner medications (if applicable) as directed by your endoscopic and primary care physicians.    It is normal to have a very small amount of blood-tinged sputum immediately after the procedure. This should resolve within 3-4 days.    Although complications are rare, there is a small risk of pain, collapsed lung, bleeding and infection. Contact your endoscopic physician if you have these signs or symptoms (Dr. Choudhary):  Temperature greater than 102°F  Worsening pain not relieved by medications  Go to your nearest emergency room is you experience:  Shaking, chills or a temperature over 102°F.    New, sudden difficulty breathing.    New pain when taking a deep breath.    New, sudden chest pain.  Worsening cough that produces large amounts of blood.    Resume Plavix on 9/29/2024

## 2024-09-30 ENCOUNTER — HOSPITAL ENCOUNTER (OUTPATIENT)
Dept: MRI IMAGING | Facility: HOSPITAL | Age: 64
Discharge: HOME | End: 2024-09-30
Admitting: SURGERY
Payer: COMMERCIAL

## 2024-09-30 DIAGNOSIS — R91.8 LUNG MASS: ICD-10-CM

## 2024-09-30 PROCEDURE — A9579 GAD-BASE MR CONTRAST NOS,1ML: HCPCS | Performed by: SURGERY

## 2024-09-30 PROCEDURE — 25010000002 GADOTERIDOL PER 1 ML: Performed by: SURGERY

## 2024-09-30 PROCEDURE — 70553 MRI BRAIN STEM W/O & W/DYE: CPT

## 2024-09-30 RX ADMIN — GADOTERIDOL 13 ML: 279.3 INJECTION, SOLUTION INTRAVENOUS at 15:24

## 2024-09-30 NOTE — OP NOTE
Operative Note     Date of procedure: 9/29/2024     Patient name: Kandi Fuentes  MRN: [id number]    Pre OP diagnosis:    Lung mass    Post OP diagnosis:  Patient Active Problem List   Diagnosis    Lung mass       Procedure performed:   Flexible bronchoscopy.  Navigational bronchoscopy with ION robot with C-arm.  Interpretation of fluoroscopy images.  Radial endobronchial ultrasound.  Fine-needle aspiration of the right upper lobe mass.  Cryo biopsy of the the right upper lobe mass using 1.1 mm ERBECRYO®  cryoprobe.  Right upper lobe segment bronchoalveolar lavage.  Endobronchial ultrasound for mediastinal staging.  Fine-needle aspiration of level 10R lymph node.    ION Synoptic report:  Date of radiological diagnosis: 7/4/2024  Lesions biopsied: Single  Tumor size: 40 mm  Location: Right Upper Lobe  Peripheral 1/3: No  Appearance: Solid  PET avidity: Yes  Bronchial sign: Yes  Prior biopsy: No  Radial EBUS: Concentric  Tools utilized: 23-Gauge Needle, Cryobiopsy, BAL  NITISH result: Positive for lesional cells, likely malignant cells with neuroendocrine differentiation  EBUS performed: Yes    Indications:   Kandi Fuentes is a 64 y.o. female who has significant medical problems as mentioned in the medical chart.      She had a CT scan of the chest in July 2024 which noted right upper lobe mass concerning for malignancy.  Subsequent PET CT scan on 8/16/2024 reported lobulated mass in the posterior segment of the right upper lobe with maximal SUV of 16.4.  There was focal increased uptake in the right hilum with SUV of 4.2 concerning for malignant right hilar lymphadenopathy.  Unfortunately, she had an MI that required PCI to the RCA.  She was initiated on dual antiplatelet therapy.  Due to financial issues, navigational bronchoscopy and biopsy could not be performed.  She was referred to thoracic surgery for further evaluation.     Her sister and brother both passed away at the age of 59 from lung cancer. They were  both smokers. Her mother passed away from a massive heart attack and she had breast cancer    The presence of a mass in the right upper lobe of the lung, along with multiple nodules in the right upper lobe and lymph node involvement, suggests a diagnosis of at least stage III lung cancer. A brain MRI will be conducted to evaluate for brain metastasis.  A biopsy of the lung tumor is planned to confirm the diagnosis and guide treatment. Given her recent heart attack and fragile lung condition, surgery is not a viable option. She will likely receive definitive chemoradiotherapy followed by immunotherapy. A referral to medical and radiation oncology has been made for further management.        Surgeon: Serafin Choudhary MD     Assistants: No qualified assistant was available for this procedure.      Anesthesia: General endotracheal anesthesia    ASA Class: 3    Procedure Details   On 9/29/2024, the patient was brought to the operating room and placed in the supine position on the operating room table. Following an uneventful induction of general anesthesia, patient was intubated with a single endotracheal tube without incident.  Antibiotic for surgical prophylaxis was not indicated due to the nature of the procedure.  Prior to beginning the operation, a time-out was conducted with all members of surgical team present. The patient was identified as Kandi Fuentes, the procedure and the correct site were verified.     I began by performing flexible bronchoscopy.  A flexible adult bronchoscope was advanced through the endotracheal tube.  A complete examination of the distal trachea and bilateral mainstem and lobar bronchi and all segmental bronchial orifices was performed.  The patient has normal endobronchial anatomy.  All the tracheobronchial tree had moderate mucus secretion.  There was no blood, endoluminal lesions or other abnormal findings.  The bronchoscope was removed.    Prior to the procedure, the patient CT scan  was integrated into the [employer]Point interface that generated the patient's 3D airway tree.  The target nodule was identified and its anatomical borders were mapped.  A path to the target nodule was generated through the [employer]Point interface.  After the plan was finalized, the patient image and plan guide was transferred to the [employer] robotic platform.  Using the Ion's controller, the 3.5 mm robotic catheter was advanced with the Ion's vision probe and navigated to the target along the preplanned path.  The catheter was parked next to the target lesion.  The Ion vision probe was removed and radial EBUS was used to confirm the presence of the target lesion.  The radial EBUS was removed and under fluoroscopic guidance, a 23 gauge Ion’s Flexision needle was passed into the robotic catheter.  The needle was advanced into the target lesion and the location of the needle was confirmed with the C-arm. Multiple passes of the needle were made into the target lesion and the aspirate was sent for Rapid on Site Evaluation (NITISH). The needle was removed. I then performed cryo biopsy using 1.1 mm ERBECRYO®  cryoprobe through the working channel in the same location. The bronchioloalveolar lavage was performed in the segmental bronchi.  The aspirate was sent for cytology.  The robotic catheter was removed and an adult flexible bronchoscope was inserted.  There was no pooling of blood into the bronchial tree.  All tracheobronchial tree were cleared from secretions.    The flexible bronchoscope with the Olympus endobronchial ultrasound was inserted.  The level 10R lymph node was identified that measured 10 mm.  Using 21-gauge needle endobronchial Olympus needle, transbronchial FNA was performed.  Multiple passes with the needle were performed. The Olympus endobronchial ultrasound was removed and adult bronchoscope was inserted. There was no pooling of blood into the bronchial tree.  All tracheobronchial tree were cleared from  secretions.    The patient was awakened from anesthesia, was extubated without incident, and was transported to the Post Anesthesia Care Unit in stable condition.    Findings:  Using 23-gauge needle, fine-needle aspiration of the right upper lobe mass was performed.  Cryo biopsy of the right upper lobe mass was performed using 1.1 mm ERBECRYO®  cryoprobe.  Bronchoalveolar lavage of the right upper lobe segment was performed.  The pathologist reported positive for lesional cells, likely malignant cells with neuroendocrine differentiation  10 mm level 10R lymph node was identified and sampled.  No evidence of significant active bleeding at the end of the procedure.    Estimated Blood Loss:  Minimal           Drains: None                 Specimens:   ID Type Source Tests Collected by Time   A (Not marked as sent) : FNA RUL Mass Fine Needle Aspirate Lung, Right Upper Lobe FINE NEEDLE ASPIRATION Serafin Choudhary MD 9/27/2024 1433   B (Not marked as sent) :  Tissue Lung, Right Upper Lobe TISSUE PATHOLOGY EXAM Serafin Choudhary MD 9/27/2024 1438   C (Not marked as sent) : BAL RUL in carbowax Lavage Lung, Right Upper Lobe NON-GYNECOLOGIC CYTOLOGY Serafin Choudhary MD 9/27/2024 1507   D (Not marked as sent) : FNA Level 10R lymph nodes Fine Needle Aspirate Lung, R FINE NEEDLE ASPIRATION Serafin Choudhary MD 9/27/2024 1509              Implants: None           Complications: None           Disposition: PACU - hemodynamically stable.           Condition: Stable     Serafin Choudhary MD   Thoracic Surgeon  [facility]

## 2024-10-01 LAB
LAB AP CASE REPORT: NORMAL
LAB AP CLINICAL INFORMATION: NORMAL
LAB AP DIAGNOSIS COMMENT: NORMAL
Lab: NORMAL
PATH REPORT.FINAL DX SPEC: NORMAL
PATH REPORT.GROSS SPEC: NORMAL

## 2024-10-01 NOTE — PROGRESS NOTES
[facility] RADIATION ONCOLOGY  CONSULTATION NOTE    NAME: Kandi Fuentes  YOB: 1960  MRN #: [id number]  DATE OF SERVICE: 10/2/2024  REFERRING PROVIDER: Provider, No Known   PRIMARY CARE PROVIDER: Provider, No Known    CHIEF COMPLAINT:  RUL lung cancer    DIAGNOSIS:    Encounter Diagnosis   Name Primary?    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 female with history of CAD, chronic CHF, recent MI, severe emphysema and now a history of limited stage small cell carcinoma, stage Stage IIIB (cT3, cN2, cM0) of the right upper lobe. She presents to discuss the role of radiation therapy in the management of her disease.    7/4/2024 the patient was admitted to hospital for symptoms of shortness of breath.  While admitted she was identified to have acute coronary artery disease with acute myocardial infarction requiring heart catheterization.  During this admission a CT of the chest was performed.  Multiple right upper lobe nodules were noted on imaging.  Further workup was recommended.    8/16/2024 PET/CT was performed imaging showed a lobulated mass in the posterior segment of the right upper lobe with increased SUV avidity.  The right hilum was also identified to have PET avid adenopathy worrisome for metastatic disease.  There were no findings of distant metastases.    9/24/2024 the patient met with Dr. Choudhary.  He recommended biopsying the PET avid lesions in the right lung.  He also recommended completion of the patient's workup with an MRI of the brain.    9/27/2024 ION guided robotic bronchoscopy was performed.  Pathology showed small cell lung  cancer.    9/30/2024 MRI brain was performed which showed no evidence of metastatic disease.    The patient was referred for discussion of treatment options with radiation oncology and medical oncology.    Today the patient reports no current chest pain. She is worried about her heart given her recent episode. She does not require oxygen but has noticed increased shortness of breath. She is here to discuss her diagnosis and review treatment options.     PFTs:  Per Dr. Hein's note from 9/13/2024: 8/2024 PFTs significant for severe COPD with FEV1 of 34%, RV of 260%, DLCO 43%.    IMAGING     MRI Brain With & Without Contrast  Facility: [facility]  Result Date: 9/30/2024  1.Findings compatible with chronic microvascular ischemic change. 2.No diffusion restriction is identified to suggest acute infarct. 3.No abnormal contrast enhancement is identified. Electronically Signed: William Vang MD  9/30/2024 5:47 PM EDT  Workstation ID: [id number]    CT Chest Without Contrast Diagnostic  Facility: [facility]  Result Date: 9/30/2024  Impression: 1. Lobulated 4.4 cm mass in posterior right upper lobe communicating with right upper lobe posterior segmental bronchus concerning for malignancy, correlate with bronchoscopy/biopsy findings. 2. Multiple areas of fissural based nodularity in the right upper lobe along major fissure concerning for pleural metastatic involvement, mildly increased from prior study. 3. Severe emphysema. 4. Additional chronic findings above. Electronically Signed: J Carlos Schmid MD  9/30/2024 10:29 AM EDT  Workstation ID: [id number]    XR Chest 1 View  Facility: [facility]  Result Date: 9/27/2024  Impression: 1. No evidence pneumothorax. 2. Lobular 4 cm right upper lobe mass. 3. Emphysema Electronically Signed: Tom Jones MD  9/27/2024 3:55 PM EDT  Workstation ID: [id number]    PET/CT Skull Base to Mid Thigh  Facility: [facility]  Result Date: 8/16/2024  Impression: 1.   "Lobulated mass posterior segment right upper lobe with maximum SUV of 16.4 concerning for malignancy.  Focal increased radionucleotide activity right hilum of 4.2 concerning for malignant right hilar adenopathy. 2.  No evidence of to suggest distant metastatic disease or bony metastatic disease.  Electronically signed by Clement Darden on 8/19/2024 at 522 EST.    CT Chest Without Contrast  Facility: [facility]  Result Date: 7/4/2024  Impression: 1.  Mucous plugging throughout the posterior segment of the right upper lobe with peripheral nodular airspace opacities in this location.  There are also multiple additional pulmonary nodules that are subpleural along the right major fissure in the right upper lobe measures up to 11 mm in size.  Electronically signed by Gomez Johnson on 7/5/2024 at 637 EST      PATHOLOGY       Tissue Pathology Exam  Facility: [facility]  Date: 9/27/2024  Final Diagnosis   Mass, right upper lobe, biopsies:    Small cell carcinoma, see comment   Electronically signed by Raghav Rosas MD on 10/1/2024 at 1551   Comment    Immunohistochemical stains were performed with valid controls and are reported as follows: The tumor is positive for pancytokeratin AE 1/3, TTF-1, synaptophysin and CD56.  The tumor is negative for chromogranin.  The histomorphology and immunohistochemical staining pattern support the diagnosis of small cell carcinoma.  KENDELL           LABS     HEMATOLOGY:  WBC   Date Value Ref Range Status   09/27/2024 10.13 3.40 - 10.80 10*3/mm3 Final     RBC   Date Value Ref Range Status   09/27/2024 4.38 3.77 - 5.28 10*6/mm3 Final     Hemoglobin   Date Value Ref Range Status   09/27/2024 14.1 12.0 - 15.9 g/dL Final     Hematocrit   Date Value Ref Range Status   09/27/2024 44.7 34.0 - 46.6 % Final     Platelets   Date Value Ref Range Status   09/27/2024 218 140 - 450 10*3/mm3 Final     CHEMISTRY:  No results found for: \"GLUCOSE\", \"BUN\", \"CREATININE\", \"EGFRIFNONA\", " "\"EGFRIFAFRI\", \"BCR\", \"K\", \"CO2\", \"CALCIUM\", \"PROTENTOTREF\", \"ALBUMIN\", \"LABIL2\", \"BILIRUBIN\", \"AST\", \"ALT\"    PROBLEM LIST     Patient Active Problem List   Diagnosis    Lung mass    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        CURRENT MEDICATIONS     Current Outpatient Medications   Medication Instructions    Acetaminophen (Tylenol) 325 MG capsule Oral    albuterol sulfate  (90 Base) MCG/ACT inhaler 2 puffs, Inhalation    aspirin 81 mg, Oral, Daily    atorvastatin (LIPITOR) 40 mg, Oral, Daily    carvedilol (COREG) 3.125 mg, Oral    clopidogrel (PLAVIX) 75 mg, Oral, Daily    docusate sodium 100 mg, Oral    isosorbide mononitrate (IMDUR) 30 mg, Oral, Daily    nitroglycerin (NITROSTAT) 0.4 mg, Sublingual        ALLERGIES     Allergies   Allergen Reactions    Morphine Hives and Shortness Of Breath    Codeine Hives    Sulfa Antibiotics Hives         FAMILY HISTORY     Family History   Problem Relation Age of Onset    Breast cancer Mother     Heart attack Mother     Lung cancer Sister     Lung cancer Brother         SOCIAL HISTORY     Social History     Tobacco Use    Smoking status: Every Day     Current packs/day: 1.00     Types: Cigarettes     Passive exposure: Current    Smokeless tobacco: Never   Vaping Use    Vaping status: Never Used   Substance Use Topics    Alcohol use: Not Currently    Drug use: Never        REVIEW OF SYSTEMS     Review of Systems   Constitutional:  Positive for activity change.   Respiratory:  Positive for shortness of breath.    Allergic/Immunologic: Positive for environmental allergies.   Psychiatric/Behavioral:  The patient is nervous/anxious.       Vitals:    10/02/24 0821   BP: 135/75   Pulse: 65   Resp: 24   SpO2: 97%      Physical Exam  Constitutional:       General: She is not in acute distress.  HENT:      Head: Normocephalic and atraumatic.   Pulmonary:      Effort: Pulmonary effort is normal. No respiratory distress.   Neurological:      Mental Status: She is alert " and oriented to person, place, and time. Mental status is at baseline.   Psychiatric:         Mood and Affect: Mood normal.         Behavior: Behavior normal.       ECOG:  Restricted in physically strenuous activity but ambulatory and able to carry out work of a light or sedentary nature, e.g., light house work, office work = 1      ASSESSMENT AND PLAN     ASSESSMENT:      Kandi Fuentes is a 64 y.o. female with history of CAD, chronic CHF, recent MI, severe emphysema and now a history of limited stage small cell carcinoma, stage Stage IIIB (cT3, cN2, cM0) of the right upper lobe. She presents to discuss the role of radiation therapy in the management of her disease.    Diagnoses and all orders for this visit:    1. 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 (Primary)       PLAN:      Orders:  - CT simulation ordered  - Coordinate start of chemoradiation    We reviewed the patient's workup and diagnosis.  We discussed the finding of limited stage small cell lung cancer.  We discussed treatment options for limited stage small cell lung cancer.  We discussed the role of chemotherapy and radiation therapy.  We spent more time discussing how radiation therapy is planned and delivered.  We discussed potential acute late side effects of treatment.  The patient was able to ask questions and have them answered to her apparent satisfaction.  We did discuss the potential risks of cardiac toxicity in regards to radiation therapy.  We discussed additional potential risks of lung toxicity.  The patient understands the potential risks and is agreeable for chemoradiation therapy.  CT simulation will be scheduled at her earliest convenience and the start of treatment will be coordinated with medical oncology.  She is encouraged to reach out with questions or concerns prior to her next appointment.      I spent 60 minutes caring for Kandi on this date of service. This time includes time spent by me in the following  activities:preparing for the visit, reviewing tests, obtaining and/or reviewing a separately obtained history, performing a medically appropriate examination and/or evaluation , counseling and educating the patient/family/caregiver, ordering medications, tests, or procedures, referring and communicating with other health care professionals , documenting information in the medical record, independently interpreting results and communicating that information with the patient/family/caregiver, and care coordination    FOLLOW UP     No follow-ups on file.     CC: Provider, No Known Provider, No Known

## 2024-10-02 ENCOUNTER — PREP FOR SURGERY (OUTPATIENT)
Dept: OTHER | Facility: HOSPITAL | Age: 64
End: 2024-10-02
Payer: COMMERCIAL

## 2024-10-02 ENCOUNTER — CONSULT (OUTPATIENT)
Dept: RADIATION ONCOLOGY | Facility: HOSPITAL | Age: 64
End: 2024-10-02
Payer: COMMERCIAL

## 2024-10-02 ENCOUNTER — CONSULT (OUTPATIENT)
Dept: ONCOLOGY | Facility: CLINIC | Age: 64
End: 2024-10-02
Payer: COMMERCIAL

## 2024-10-02 ENCOUNTER — HOSPITAL ENCOUNTER (OUTPATIENT)
Dept: RADIATION ONCOLOGY | Facility: HOSPITAL | Age: 64
Setting detail: RADIATION/ONCOLOGY SERIES
End: 2024-10-02
Payer: COMMERCIAL

## 2024-10-02 ENCOUNTER — APPOINTMENT (OUTPATIENT)
Dept: LAB | Facility: HOSPITAL | Age: 64
End: 2024-10-02
Payer: COMMERCIAL

## 2024-10-02 ENCOUNTER — TELEPHONE (OUTPATIENT)
Dept: SURGERY | Facility: CLINIC | Age: 64
End: 2024-10-02
Payer: COMMERCIAL

## 2024-10-02 VITALS
DIASTOLIC BLOOD PRESSURE: 75 MMHG | BODY MASS INDEX: 22.4 KG/M2 | RESPIRATION RATE: 24 BRPM | SYSTOLIC BLOOD PRESSURE: 135 MMHG | OXYGEN SATURATION: 97 % | WEIGHT: 143 LBS | HEART RATE: 65 BPM

## 2024-10-02 VITALS
BODY MASS INDEX: 22.57 KG/M2 | DIASTOLIC BLOOD PRESSURE: 79 MMHG | OXYGEN SATURATION: 96 % | SYSTOLIC BLOOD PRESSURE: 137 MMHG | HEART RATE: 70 BPM | HEIGHT: 67 IN | WEIGHT: 143.8 LBS

## 2024-10-02 DIAGNOSIS — R91.8 LUNG MASS: Primary | ICD-10-CM

## 2024-10-02 DIAGNOSIS — Z45.2 ENCOUNTER FOR INSERTION OF VENOUS ACCESS PORT: ICD-10-CM

## 2024-10-02 DIAGNOSIS — C34.11 SMALL CELL CARCINOMA OF UPPER LOBE OF RIGHT LUNG: Primary | ICD-10-CM

## 2024-10-02 LAB
ALBUMIN SERPL-MCNC: 4.2 G/DL (ref 3.5–5.2)
ALBUMIN/GLOB SERPL: 1.8 G/DL
ALP SERPL-CCNC: 70 U/L (ref 39–117)
ALT SERPL W P-5'-P-CCNC: 6 U/L (ref 1–33)
ANION GAP SERPL CALCULATED.3IONS-SCNC: 7.3 MMOL/L (ref 5–15)
AST SERPL-CCNC: 15 U/L (ref 1–32)
BASOPHILS # BLD AUTO: 0.02 10*3/MM3 (ref 0–0.2)
BASOPHILS NFR BLD AUTO: 0.2 % (ref 0–1.5)
BILIRUB SERPL-MCNC: 0.8 MG/DL (ref 0–1.2)
BUN SERPL-MCNC: 11 MG/DL (ref 8–23)
BUN/CREAT SERPL: 18.6 (ref 7–25)
CALCIUM SPEC-SCNC: 9.5 MG/DL (ref 8.6–10.5)
CHLORIDE SERPL-SCNC: 104 MMOL/L (ref 98–107)
CO2 SERPL-SCNC: 29.7 MMOL/L (ref 22–29)
CREAT SERPL-MCNC: 0.59 MG/DL (ref 0.57–1)
DEPRECATED RDW RBC AUTO: 47.9 FL (ref 37–54)
EGFRCR SERPLBLD CKD-EPI 2021: 101.4 ML/MIN/1.73
EOSINOPHIL # BLD AUTO: 0.02 10*3/MM3 (ref 0–0.4)
EOSINOPHIL NFR BLD AUTO: 0.2 % (ref 0.3–6.2)
ERYTHROCYTE [DISTWIDTH] IN BLOOD BY AUTOMATED COUNT: 12.9 % (ref 12.3–15.4)
GLOBULIN UR ELPH-MCNC: 2.4 GM/DL
GLUCOSE SERPL-MCNC: 105 MG/DL (ref 65–99)
HCT VFR BLD AUTO: 44.7 % (ref 34–46.6)
HGB BLD-MCNC: 14.4 G/DL (ref 12–15.9)
LYMPHOCYTES # BLD AUTO: 3.75 10*3/MM3 (ref 0.7–3.1)
LYMPHOCYTES NFR BLD AUTO: 36.7 % (ref 19.6–45.3)
MCH RBC QN AUTO: 32.4 PG (ref 26.6–33)
MCHC RBC AUTO-ENTMCNC: 32.2 G/DL (ref 31.5–35.7)
MCV RBC AUTO: 100.7 FL (ref 79–97)
MONOCYTES # BLD AUTO: 0.85 10*3/MM3 (ref 0.1–0.9)
MONOCYTES NFR BLD AUTO: 8.3 % (ref 5–12)
NEUTROPHILS NFR BLD AUTO: 5.58 10*3/MM3 (ref 1.7–7)
NEUTROPHILS NFR BLD AUTO: 54.6 % (ref 42.7–76)
PLATELET # BLD AUTO: 192 10*3/MM3 (ref 140–450)
PMV BLD AUTO: 9.6 FL (ref 6–12)
POTASSIUM SERPL-SCNC: 4 MMOL/L (ref 3.5–5.2)
PROT SERPL-MCNC: 6.6 G/DL (ref 6–8.5)
RBC # BLD AUTO: 4.44 10*6/MM3 (ref 3.77–5.28)
SODIUM SERPL-SCNC: 141 MMOL/L (ref 136–145)
WBC NRBC COR # BLD AUTO: 10.22 10*3/MM3 (ref 3.4–10.8)

## 2024-10-02 PROCEDURE — 80053 COMPREHEN METABOLIC PANEL: CPT | Performed by: INTERNAL MEDICINE

## 2024-10-02 PROCEDURE — G0463 HOSPITAL OUTPT CLINIC VISIT: HCPCS | Performed by: INTERNAL MEDICINE

## 2024-10-02 PROCEDURE — 36415 COLL VENOUS BLD VENIPUNCTURE: CPT | Performed by: INTERNAL MEDICINE

## 2024-10-02 PROCEDURE — 77263 THER RADIOLOGY TX PLNG CPLX: CPT | Performed by: INTERNAL MEDICINE

## 2024-10-02 PROCEDURE — 85025 COMPLETE CBC W/AUTO DIFF WBC: CPT | Performed by: INTERNAL MEDICINE

## 2024-10-02 NOTE — TELEPHONE ENCOUNTER
HAL FROM DR QUINONES'S OFFICE CALLED, SHE WANTED TO VERIFY WE GOT PT'S REFERRAL IN, STATED WE DID AND THAT HER CURRENT F/U IS THE SOONEST AVAILABLE, HAL STATED UNDERSTANDING AND WAS AGREEABLE

## 2024-10-03 LAB
LAB AP CASE REPORT: NORMAL
LAB AP CASE REPORT: NORMAL
LAB AP DIAGNOSIS COMMENT: NORMAL
LAB AP DIAGNOSIS COMMENT: NORMAL
LAB AP NON-GYN SPECIMEN ADEQUACY: NORMAL
PATH REPORT.FINAL DX SPEC: NORMAL
PATH REPORT.FINAL DX SPEC: NORMAL
PATH REPORT.GROSS SPEC: NORMAL
PATH REPORT.GROSS SPEC: NORMAL

## 2024-10-04 ENCOUNTER — HOSPITAL ENCOUNTER (OUTPATIENT)
Dept: CARDIOLOGY | Facility: HOSPITAL | Age: 64
Discharge: HOME | End: 2024-10-04
Admitting: SURGERY
Payer: COMMERCIAL

## 2024-10-04 PROCEDURE — 93005 ELECTROCARDIOGRAM TRACING: CPT

## 2024-10-07 ENCOUNTER — HOSPITAL ENCOUNTER (OUTPATIENT)
Facility: HOSPITAL | Age: 64
Setting detail: HOSPITAL OUTPATIENT SURGERY
Discharge: HOME | End: 2024-10-07
Attending: SURGERY | Admitting: SURGERY
Payer: COMMERCIAL

## 2024-10-07 ENCOUNTER — ANESTHESIA EVENT (OUTPATIENT)
Dept: PERIOP | Facility: HOSPITAL | Age: 64
End: 2024-10-07
Payer: COMMERCIAL

## 2024-10-07 ENCOUNTER — HOSPITAL ENCOUNTER (OUTPATIENT)
Dept: GENERAL RADIOLOGY | Facility: HOSPITAL | Age: 64
Discharge: HOME | End: 2024-10-07
Payer: COMMERCIAL

## 2024-10-07 ENCOUNTER — APPOINTMENT (OUTPATIENT)
Dept: GENERAL RADIOLOGY | Facility: HOSPITAL | Age: 64
End: 2024-10-07
Payer: COMMERCIAL

## 2024-10-07 ENCOUNTER — ANESTHESIA (OUTPATIENT)
Dept: PERIOP | Facility: HOSPITAL | Age: 64
End: 2024-10-07
Payer: COMMERCIAL

## 2024-10-07 VITALS
OXYGEN SATURATION: 96 % | BODY MASS INDEX: 22.44 KG/M2 | HEIGHT: 67 IN | WEIGHT: 143 LBS | RESPIRATION RATE: 23 BRPM | HEART RATE: 68 BPM | SYSTOLIC BLOOD PRESSURE: 125 MMHG | DIASTOLIC BLOOD PRESSURE: 68 MMHG | TEMPERATURE: 97.5 F

## 2024-10-07 DIAGNOSIS — R91.8 LUNG MASS: ICD-10-CM

## 2024-10-07 PROCEDURE — 25010000002 PHENYLEPHRINE 10 MG/ML SOLUTION: Performed by: NURSE ANESTHETIST, CERTIFIED REGISTERED

## 2024-10-07 PROCEDURE — 77001 FLUOROGUIDE FOR VEIN DEVICE: CPT | Performed by: SURGERY

## 2024-10-07 PROCEDURE — 25010000002 LIDOCAINE 1 % SOLUTION: Performed by: SURGERY

## 2024-10-07 PROCEDURE — 25010000002 LIDOCAINE PF 2% 2 % SOLUTION: Performed by: NURSE ANESTHETIST, CERTIFIED REGISTERED

## 2024-10-07 PROCEDURE — 25010000002 HEPARIN (PORCINE) PER 1000 UNITS: Performed by: SURGERY

## 2024-10-07 PROCEDURE — 25010000002 PROPOFOL 10 MG/ML EMULSION: Performed by: NURSE ANESTHETIST, CERTIFIED REGISTERED

## 2024-10-07 PROCEDURE — 25810000003 LACTATED RINGERS PER 1000 ML: Performed by: SURGERY

## 2024-10-07 PROCEDURE — 76000 FLUOROSCOPY <1 HR PHYS/QHP: CPT

## 2024-10-07 PROCEDURE — 25010000002 CEFAZOLIN PER 500 MG: Performed by: SURGERY

## 2024-10-07 PROCEDURE — 77001 FLUOROGUIDE FOR VEIN DEVICE: CPT

## 2024-10-07 PROCEDURE — 76937 US GUIDE VASCULAR ACCESS: CPT | Performed by: SURGERY

## 2024-10-07 PROCEDURE — C1788 PORT, INDWELLING, IMP: HCPCS | Performed by: SURGERY

## 2024-10-07 PROCEDURE — 25010000002 FENTANYL CITRATE (PF) 100 MCG/2ML SOLUTION: Performed by: NURSE ANESTHETIST, CERTIFIED REGISTERED

## 2024-10-07 PROCEDURE — 36561 INSERT TUNNELED CV CATH: CPT | Performed by: SURGERY

## 2024-10-07 DEVICE — POWERPORT CLEARVUE ISP IMPLANTABLE PORT WITH ATTACHABLE 8F POLYURETHANE OPEN-ENDED SINGLE-LUMEN VENOUS CATHETER PROCEDURAL KIT
Type: IMPLANTABLE DEVICE | Site: CHEST | Status: FUNCTIONAL
Brand: POWERPORT CLEARVUE

## 2024-10-07 RX ORDER — DROPERIDOL 2.5 MG/ML
0.62 INJECTION, SOLUTION INTRAMUSCULAR; INTRAVENOUS ONCE AS NEEDED
Status: DISCONTINUED | OUTPATIENT
Start: 2024-10-07 | End: 2024-10-07 | Stop reason: HOSPADM

## 2024-10-07 RX ORDER — IPRATROPIUM BROMIDE AND ALBUTEROL SULFATE 2.5; .5 MG/3ML; MG/3ML
3 SOLUTION RESPIRATORY (INHALATION) ONCE AS NEEDED
Status: DISCONTINUED | OUTPATIENT
Start: 2024-10-07 | End: 2024-10-07 | Stop reason: HOSPADM

## 2024-10-07 RX ORDER — SODIUM CHLORIDE, SODIUM LACTATE, POTASSIUM CHLORIDE, CALCIUM CHLORIDE 600; 310; 30; 20 MG/100ML; MG/100ML; MG/100ML; MG/100ML
20 INJECTION, SOLUTION INTRAVENOUS ONCE
Status: COMPLETED | OUTPATIENT
Start: 2024-10-07 | End: 2024-10-07

## 2024-10-07 RX ORDER — SODIUM CHLORIDE 0.9 % (FLUSH) 0.9 %
10 SYRINGE (ML) INJECTION AS NEEDED
Status: DISCONTINUED | OUTPATIENT
Start: 2024-10-07 | End: 2024-10-07 | Stop reason: HOSPADM

## 2024-10-07 RX ORDER — PHENYLEPHRINE HYDROCHLORIDE 10 MG/ML
INJECTION INTRAVENOUS AS NEEDED
Status: DISCONTINUED | OUTPATIENT
Start: 2024-10-07 | End: 2024-10-07 | Stop reason: SURG

## 2024-10-07 RX ORDER — ACETAMINOPHEN 325 MG/1
650 TABLET ORAL ONCE AS NEEDED
Status: DISCONTINUED | OUTPATIENT
Start: 2024-10-07 | End: 2024-10-07 | Stop reason: HOSPADM

## 2024-10-07 RX ORDER — HYDRALAZINE HYDROCHLORIDE 20 MG/ML
5 INJECTION INTRAMUSCULAR; INTRAVENOUS
Status: DISCONTINUED | OUTPATIENT
Start: 2024-10-07 | End: 2024-10-07 | Stop reason: HOSPADM

## 2024-10-07 RX ORDER — TRAMADOL HYDROCHLORIDE 50 MG/1
50 TABLET ORAL EVERY 8 HOURS PRN
Qty: 20 TABLET | Refills: 0 | Status: SHIPPED | OUTPATIENT
Start: 2024-10-07 | End: 2024-10-14

## 2024-10-07 RX ORDER — FLUMAZENIL 0.1 MG/ML
0.2 INJECTION INTRAVENOUS AS NEEDED
Status: DISCONTINUED | OUTPATIENT
Start: 2024-10-07 | End: 2024-10-07 | Stop reason: HOSPADM

## 2024-10-07 RX ORDER — EPHEDRINE SULFATE 5 MG/ML
5 INJECTION INTRAVENOUS ONCE AS NEEDED
Status: DISCONTINUED | OUTPATIENT
Start: 2024-10-07 | End: 2024-10-07 | Stop reason: HOSPADM

## 2024-10-07 RX ORDER — ONDANSETRON 2 MG/ML
4 INJECTION INTRAMUSCULAR; INTRAVENOUS ONCE AS NEEDED
Status: DISCONTINUED | OUTPATIENT
Start: 2024-10-07 | End: 2024-10-07 | Stop reason: HOSPADM

## 2024-10-07 RX ORDER — LIDOCAINE HYDROCHLORIDE 10 MG/ML
INJECTION, SOLUTION INFILTRATION; PERINEURAL AS NEEDED
Status: DISCONTINUED | OUTPATIENT
Start: 2024-10-07 | End: 2024-10-07 | Stop reason: HOSPADM

## 2024-10-07 RX ORDER — PROPOFOL 10 MG/ML
VIAL (ML) INTRAVENOUS AS NEEDED
Status: DISCONTINUED | OUTPATIENT
Start: 2024-10-07 | End: 2024-10-07 | Stop reason: SURG

## 2024-10-07 RX ORDER — LIDOCAINE HYDROCHLORIDE 10 MG/ML
0.5 INJECTION, SOLUTION EPIDURAL; INFILTRATION; INTRACAUDAL; PERINEURAL ONCE AS NEEDED
Status: DISCONTINUED | OUTPATIENT
Start: 2024-10-07 | End: 2024-10-07 | Stop reason: HOSPADM

## 2024-10-07 RX ORDER — LIDOCAINE HYDROCHLORIDE 20 MG/ML
INJECTION, SOLUTION EPIDURAL; INFILTRATION; INTRACAUDAL; PERINEURAL AS NEEDED
Status: DISCONTINUED | OUTPATIENT
Start: 2024-10-07 | End: 2024-10-07 | Stop reason: SURG

## 2024-10-07 RX ORDER — FENTANYL CITRATE 50 UG/ML
INJECTION, SOLUTION INTRAMUSCULAR; INTRAVENOUS AS NEEDED
Status: DISCONTINUED | OUTPATIENT
Start: 2024-10-07 | End: 2024-10-07 | Stop reason: SURG

## 2024-10-07 RX ORDER — LABETALOL HYDROCHLORIDE 5 MG/ML
5 INJECTION, SOLUTION INTRAVENOUS
Status: DISCONTINUED | OUTPATIENT
Start: 2024-10-07 | End: 2024-10-07 | Stop reason: HOSPADM

## 2024-10-07 RX ORDER — NALOXONE HCL 0.4 MG/ML
0.4 VIAL (ML) INJECTION AS NEEDED
Status: DISCONTINUED | OUTPATIENT
Start: 2024-10-07 | End: 2024-10-07 | Stop reason: HOSPADM

## 2024-10-07 RX ORDER — HEPARIN SODIUM 5000 [USP'U]/ML
INJECTION, SOLUTION INTRAVENOUS; SUBCUTANEOUS AS NEEDED
Status: DISCONTINUED | OUTPATIENT
Start: 2024-10-07 | End: 2024-10-07 | Stop reason: HOSPADM

## 2024-10-07 RX ADMIN — SODIUM CHLORIDE 2000 MG: 900 INJECTION INTRAVENOUS at 11:09

## 2024-10-07 RX ADMIN — FENTANYL CITRATE 25 MCG: 50 INJECTION, SOLUTION INTRAMUSCULAR; INTRAVENOUS at 11:17

## 2024-10-07 RX ADMIN — PHENYLEPHRINE HYDROCHLORIDE 100 MCG: 10 INJECTION INTRAVENOUS at 11:23

## 2024-10-07 RX ADMIN — FENTANYL CITRATE 25 MCG: 50 INJECTION, SOLUTION INTRAMUSCULAR; INTRAVENOUS at 11:21

## 2024-10-07 RX ADMIN — PROPOFOL INJECTABLE EMULSION 100 MCG/KG/MIN: 10 INJECTION, EMULSION INTRAVENOUS at 11:22

## 2024-10-07 RX ADMIN — PHENYLEPHRINE HYDROCHLORIDE 100 MCG: 10 INJECTION INTRAVENOUS at 11:29

## 2024-10-07 RX ADMIN — SODIUM CHLORIDE, POTASSIUM CHLORIDE, SODIUM LACTATE AND CALCIUM CHLORIDE 20 ML/HR: 600; 310; 30; 20 INJECTION, SOLUTION INTRAVENOUS at 10:57

## 2024-10-07 RX ADMIN — PROPOFOL INJECTABLE EMULSION 30 MG: 10 INJECTION, EMULSION INTRAVENOUS at 11:21

## 2024-10-07 RX ADMIN — LIDOCAINE HYDROCHLORIDE 60 MG: 20 INJECTION, SOLUTION EPIDURAL; INFILTRATION; INTRACAUDAL; PERINEURAL at 11:21

## 2024-10-07 NOTE — ANESTHESIA PREPROCEDURE EVALUATION
Anesthesia Evaluation     NPO Solid Status: > 8 hours  NPO Liquid Status: > 8 hours           Airway   Mallampati: II  TM distance: >3 FB  Neck ROM: full  No difficulty expected  Dental - normal exam     Pulmonary - normal exam   (+) a smoker Former, cigarettes, lung cancer, COPD moderate,  Cardiovascular - normal exam    (+) hypertension, past MI  >12 months, CAD, hyperlipidemia      Neuro/Psych  GI/Hepatic/Renal/Endo      Musculoskeletal     Abdominal  - normal exam    Bowel sounds: normal.   Substance History      OB/GYN          Other      history of cancer                      Anesthesia Plan    ASA 4     MAC   total IV anesthesia  intravenous induction     Anesthetic plan, risks, benefits, and alternatives have been provided, discussed and informed consent has been obtained with: patient.  Pre-procedure education provided  Plan discussed with CRNA.        CODE STATUS:

## 2024-10-07 NOTE — DISCHARGE INSTRUCTIONS
Discharge Instructions    1. Activity:  Climb stairs as tolerated  May drive car tomorrow.  Walk at least 3-4 times a day    2. Nutrition:  Resume previous diet  Eat well balanced meals    3. Incision (wound) Care:  May shower after discharge.  Wash around incision area with soap and water daily.  No lotions or creams on incision area.  Take temperature daily for the first week after discharge.     4. When to call for Medical Advise:  Fever greater than 101 degrees  Unusual or severe pain  Difficulty breathing  Incision changes (redness, swelling, or increased drainage)  Any questions or problems    5. Medication Instructions:  Take Pain medications as directed to stay comfortable.   Laxative of choice if needed for constipation.    6. Medication and dosages:  See discharge medication instruction form  Resume Plavix on 10/10/2024.

## 2024-10-07 NOTE — OP NOTE
OPERATIVE NOTE     Date of procedure: 10/7/2024     Patient name: Kandi Fuentes  MRN: [id number]    Pre OP diagnosis:    Lung mass    Post OP diagnosis:  Same as above    Procedure performed:   Ultrasound-guided cannulation of right internal jugular vein.  8 Fr implantable vascular access device placement.  Interpretation of fluoroscopy    Indications:   Kandi Fuentes is a 64 y.o. female who has significant medical problems as mentioned in the medical chart.      She had a CT scan of the chest in July 2024 which noted right upper lobe mass concerning for malignancy.  Subsequent PET CT scan on 8/16/2024 reported lobulated mass in the posterior segment of the right upper lobe with maximal SUV of 16.4.  There was focal increased uptake in the right hilum with SUV of 4.2 concerning for malignant right hilar lymphadenopathy.  Unfortunately, she had an MI that required PCI to the RCA.  She was initiated on dual antiplatelet therapy.  Due to financial issues, navigational bronchoscopy and biopsy could not be performed.  She was referred to thoracic surgery for further evaluation.     Her sister and brother both passed away at the age of 59 from lung cancer. They were both smokers. Her mother passed away from a massive heart attack and she had breast cancer     The presence of a mass in the right upper lobe of the lung, along with multiple nodules in the right upper lobe and lymph node involvement, suggests a diagnosis of at least stage III lung cancer. A brain MRI will be conducted to evaluate for brain metastasis.  On 9/27/2024, she underwent ION robotic navigational bronchoscopy and fine-needle aspiration of the right upper lobe mass.  The biopsy confirmed small cell lung cancer.  Given her recent heart attack and fragile lung condition, surgery was not a viable option.  She was seen by radiation medical oncologist.  She required Mediport for systemic treatment.    I explained to the patient the risks involved with  Mediport placement. The overall complication rate has been reported up to 7 to 12% in literature. There is a risk of infection related to the port venous system (1.6 to 27%), pneumothorax (1.5 to 6%), catheter related thrombosis (5 to 18%), mechanical complication related to catheter (4%) such as malpositioning of the catheter, catheter migration and fracture, hematoma of the chest wall (8%) and upper extremity venous thrombosis (1.8%).  I also explained to the patient that there is less than 1% risk of death related to any invasive procedure that may require general anesthesia. The patient understood the risk and signed the consent for the above procedure.     Surgeon: Serafin Choudhary MD     Assistants: No qualified assistant available for this procedure.    Anesthesia: Monitored Local Anesthesia with Sedation    ASA Class: 3    Procedure Details   On 10/7/2024, the patient was brought to the operating room and placed in the supine position on the operating room table.  The procedure was performed under monitored anesthesia care with sedation managed by anesthesiologist. Pneumatic compression devices were placed on the lower extremities. The patient received intravenous antibiotic prophylaxis prior to incision.  The anterior chest and neck were prepped and draped in the usual sterile fashion. Prior to beginning the operation, a time-out was conducted with all members of surgical team present. The patient was identified as Kandi Fuentes, the procedure and the correct site were verified.      Using ultrasound guidance, the right internal jugular vein was identified and cannulated with an 18-gauge needle. Guidewire was passed.  0.25% Marcaine was injected into the incision site.  An approximately 3 cm incision was made 2 fingerbreadths below the clavicle.  The dissection was carried down to the fascia and a subcutaneous pocket was constructed using blunt dissection to big enough for the PowerPort. The catheter and  port were connected and flushed.  The port was secured to the underlying fascia with three 2-0 silk sutures. A small puncture was made at the internal jugular vein puncture site. A tunneler was used to connect the infraclavicular port site to the internal jugular puncture site.  The placement of the guidewire was confirmed with fluoroscopy. The introducer trocar with the dilator was inserted over the wire via Seldinger technique.  The catheter tip was cut at the level of the carinal bifurcation with fluoroscopy guidance. The catheter was then placed into the trocar and the outer sheath of the trocar was removed.  The positioning was confirmed with fluoroscopy and the catheter tip was approximately at the SVC/ right atrial junction. I was able to draw venous blood and flush without any resistance. The port was flushed with heparinized saline and the incisions were closed in multiple layers with Vicryl and Monocryl in a standard fashion.  Dermabond was applied as dressing.     The sponge, needle, and instrument counts were correct at the end of the operation.  The patient was awakened from anesthesia and was transported to the Post Anesthesia Care Unit in stable condition.    Findings:  Normal anatomic finding.  The tip of the catheter parked at the junction of the superior vena cava and right atrium.    Estimated Blood Loss:  minimal           Drains: None                 Specimens: None           Implants: 8 Fr implantable vascular access device placement.           Complications: None           Disposition: PACU - hemodynamically stable.           Condition: Stable     Serafin Choudhary MD   Thoracic Surgeon  [facility]

## 2024-10-08 ENCOUNTER — TELEPHONE (OUTPATIENT)
Dept: ONCOLOGY | Facility: CLINIC | Age: 64
End: 2024-10-08
Payer: COMMERCIAL

## 2024-10-08 ENCOUNTER — HOSPITAL ENCOUNTER (OUTPATIENT)
Dept: RADIATION ONCOLOGY | Facility: HOSPITAL | Age: 64
Discharge: HOME | End: 2024-10-08

## 2024-10-08 PROCEDURE — 77334 RADIATION TREATMENT AID(S): CPT | Performed by: INTERNAL MEDICINE

## 2024-10-08 PROCEDURE — 77470 SPECIAL RADIATION TREATMENT: CPT | Performed by: INTERNAL MEDICINE

## 2024-10-08 NOTE — TELEPHONE ENCOUNTER
Caller: Kandi Fuentes    Relationship to patient: Self    Best call back number: [phone]    Chief complaint:     Type of visit: INFUSION     Requested date: 12PM OR AFTER, CALL TO R/S     If rescheduling, when is the original appointment: 10/14/2024, 10/15/2024 AND 10/16/2024    Additional notes:

## 2024-10-08 NOTE — ANESTHESIA POSTPROCEDURE EVALUATION
Patient: Kandi Fuentes    Procedure Summary       Date: 10/07/24 Room / Location: [facility] OR 10 / [facility] MAIN OR    Anesthesia Start: 1113 Anesthesia Stop: 1152    Procedure: INSERTION VENOUS ACCESS DEVICE (Right: Chest) Diagnosis:       Lung mass      (Lung mass [R91.8])    Surgeons: Serafin Choudhary MD Provider: Tito Barrios MD    Anesthesia Type: MAC ASA Status: 4            Anesthesia Type: MAC    Vitals  Vitals Value Taken Time   /68 10/07/24 1233   Temp 97.5 °F (36.4 °C) 10/07/24 1233   Pulse 68 10/07/24 1233   Resp 23 10/07/24 1233   SpO2 96 % 10/07/24 1233           Post Anesthesia Care and Evaluation    Patient location during evaluation: PACU  Patient participation: complete - patient participated  Level of consciousness: awake  Pain scale: See nurse's notes for pain score.  Pain management: adequate    Airway patency: patent  Anesthetic complications: No anesthetic complications  PONV Status: none  Cardiovascular status: acceptable  Respiratory status: acceptable and spontaneous ventilation  Hydration status: acceptable    Comments: Patient seen and examined postoperatively; vital signs stable; SpO2 greater than or equal to 90%; cardiopulmonary status stable; nausea/vomiting adequately controlled; pain adequately controlled; no apparent anesthesia complications; patient discharged from anesthesia care when discharge criteria were met

## 2024-10-09 NOTE — TELEPHONE ENCOUNTER
LVM FOR PATIENT THAT WE CAN'T MOVE INFUSION APPTS DOWN ANY LATER BUT GOING FORWARD WE CAN TRY AND GET THEM AROUND 11AM.

## 2024-10-10 ENCOUNTER — OFFICE VISIT (OUTPATIENT)
Dept: PHARMACY | Facility: HOSPITAL | Age: 64
End: 2024-10-10
Payer: COMMERCIAL

## 2024-10-10 ENCOUNTER — LAB (OUTPATIENT)
Dept: LAB | Facility: HOSPITAL | Age: 64
End: 2024-10-10
Payer: COMMERCIAL

## 2024-10-10 ENCOUNTER — CLINICAL SUPPORT (OUTPATIENT)
Dept: ONCOLOGY | Facility: CLINIC | Age: 64
End: 2024-10-10
Payer: COMMERCIAL

## 2024-10-10 DIAGNOSIS — R91.8 LUNG MASS: ICD-10-CM

## 2024-10-10 DIAGNOSIS — C34.11 SMALL CELL CARCINOMA OF UPPER LOBE OF RIGHT LUNG: Primary | ICD-10-CM

## 2024-10-10 LAB
ALBUMIN SERPL-MCNC: 4.3 G/DL (ref 3.5–5.2)
ALBUMIN/GLOB SERPL: 1.6 G/DL
ALP SERPL-CCNC: 79 U/L (ref 39–117)
ALT SERPL W P-5'-P-CCNC: 5 U/L (ref 1–33)
ANION GAP SERPL CALCULATED.3IONS-SCNC: 8.5 MMOL/L (ref 5–15)
AST SERPL-CCNC: 18 U/L (ref 1–32)
BASOPHILS # BLD AUTO: 0.01 10*3/MM3 (ref 0–0.2)
BASOPHILS NFR BLD AUTO: 0.1 % (ref 0–1.5)
BILIRUB SERPL-MCNC: 0.7 MG/DL (ref 0–1.2)
BUN SERPL-MCNC: 7 MG/DL (ref 8–23)
BUN/CREAT SERPL: 10.9 (ref 7–25)
CALCIUM SPEC-SCNC: 10 MG/DL (ref 8.6–10.5)
CHLORIDE SERPL-SCNC: 99 MMOL/L (ref 98–107)
CO2 SERPL-SCNC: 31.5 MMOL/L (ref 22–29)
CREAT SERPL-MCNC: 0.64 MG/DL (ref 0.57–1)
DEPRECATED RDW RBC AUTO: 48.9 FL (ref 37–54)
EGFRCR SERPLBLD CKD-EPI 2021: 99.4 ML/MIN/1.73
EOSINOPHIL # BLD AUTO: 0.03 10*3/MM3 (ref 0–0.4)
EOSINOPHIL NFR BLD AUTO: 0.3 % (ref 0.3–6.2)
ERYTHROCYTE [DISTWIDTH] IN BLOOD BY AUTOMATED COUNT: 13.2 % (ref 12.3–15.4)
GLOBULIN UR ELPH-MCNC: 2.7 GM/DL
GLUCOSE SERPL-MCNC: 105 MG/DL (ref 65–99)
HCT VFR BLD AUTO: 48.1 % (ref 34–46.6)
HGB BLD-MCNC: 14.8 G/DL (ref 12–15.9)
LYMPHOCYTES # BLD AUTO: 3.66 10*3/MM3 (ref 0.7–3.1)
LYMPHOCYTES NFR BLD AUTO: 34.5 % (ref 19.6–45.3)
MCH RBC QN AUTO: 31.6 PG (ref 26.6–33)
MCHC RBC AUTO-ENTMCNC: 30.8 G/DL (ref 31.5–35.7)
MCV RBC AUTO: 102.6 FL (ref 79–97)
MONOCYTES # BLD AUTO: 0.76 10*3/MM3 (ref 0.1–0.9)
MONOCYTES NFR BLD AUTO: 7.2 % (ref 5–12)
NEUTROPHILS NFR BLD AUTO: 57.9 % (ref 42.7–76)
NEUTROPHILS NFR BLD AUTO: 6.16 10*3/MM3 (ref 1.7–7)
PLATELET # BLD AUTO: 238 10*3/MM3 (ref 140–450)
PMV BLD AUTO: 9.7 FL (ref 6–12)
POTASSIUM SERPL-SCNC: 4.2 MMOL/L (ref 3.5–5.2)
PROT SERPL-MCNC: 7 G/DL (ref 6–8.5)
QT INTERVAL: 452 MS
QTC INTERVAL: 514 MS
RBC # BLD AUTO: 4.69 10*6/MM3 (ref 3.77–5.28)
SODIUM SERPL-SCNC: 139 MMOL/L (ref 136–145)
WBC NRBC COR # BLD AUTO: 10.62 10*3/MM3 (ref 3.4–10.8)

## 2024-10-10 PROCEDURE — 36415 COLL VENOUS BLD VENIPUNCTURE: CPT

## 2024-10-10 PROCEDURE — 80053 COMPREHEN METABOLIC PANEL: CPT | Performed by: INTERNAL MEDICINE

## 2024-10-10 PROCEDURE — 85025 COMPLETE CBC W/AUTO DIFF WBC: CPT

## 2024-10-10 RX ORDER — ONDANSETRON 8 MG/1
8 TABLET, FILM COATED ORAL 3 TIMES DAILY PRN
Qty: 30 TABLET | Refills: 5 | Status: SHIPPED | OUTPATIENT
Start: 2024-10-10

## 2024-10-10 RX ORDER — OLANZAPINE 5 MG/1
5 TABLET ORAL NIGHTLY
Qty: 4 TABLET | Refills: 3 | Status: SHIPPED | OUTPATIENT
Start: 2024-10-10

## 2024-10-10 NOTE — PROGRESS NOTES
OSW met with pt as part of the treatment preparation process.  Pt was accompanied by her friend, Florence.  Introductions made and contact information provided.  Reviewed supports and services available at [facility].  Pt is no longer working at [employer].  She has no income.  She has applied for social security disability and has a phone interview next week.  She is not sure about her health insurance status.  Discussed Medicaid and SNAP.  OSW to assist with application.  She has no transportation.  Multiple barriers exist which could affect treatment access and outcomes.  OSW is accessing all transportation resources available including [employer], [employer] volunteer program, [employer] [facility] (intake done today), and [facility] (application submitted today and message received that it will be reviewed no later than 10/31/24).  Pt friend, Florence, is not able to assist with transportation except on rare occasions.  No family available to assist.  Besides transportation, finances need to be addressed.  Application to [employer] sent in today requesting immediate assistance with prescriptions (at [facility]) and food.  Pt to provide copies of bills and OSW will forward to [employer] upon receipt.  OSW to provide pt with a list of [facility] food pantrys at next appt.  OSW to continue to seek resources and provide to pt.

## 2024-10-10 NOTE — PROGRESS NOTES
Pharmacy Patient Education Note          Kandi Fuentes is a 64 y.o. female being seen at [facility] by Dr. Delgado for a diagnosis of 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 with a plan to begin treatment with Carboplatin and Etoposide. Pharmacist reviewed regimen, as detailed below, with patient.     The discussion included the dose, route, and frequency of administration. Most common side and clinically significant effects were discussed including neutropenia, thrombocytopenia, fatigue, nausea/vomiting, loss of appetite, ototoxicity, hair loss/thinning, neuropathy, nephropathy, mucositis, flu-like symptoms, pneumonitis, and electrolyte changes.     Precautions reviewed include:   Infection prevention precautions were reviewed including hand washing, food safety and the avoidance of crowds/use of mask.   Bleeding precautions including the use of soft bristle toothbrush, electric razor and precautions against falls were discussed. Importance of appropriate hygiene and self-care for nausea, anorexia, and mucositis reviewed.  Anti-emetic prevention and treatment with palonosetron, dexamethasone,  ondansetron & olanzapine.    Patient was counseled on when to notify a provider including in the event of the following:  Signs and symptoms of infection, including temperature >100.4  Signs and symptoms of serious bleeding including blood in the urine or stool  Changes in urinary output  Frequent nausea/vomiting or diarrhea or severe abdominal pain  Development of mouth sores or ulcerations  The patient was provided with general information on when to call the office for adverse events.     Patient provided with handout regarding medications, and reviewed general plan for chemotherapy administration. Patient engaged in counseling throughout. Allowed time for patient to ask questions. Patient without any further questions at this time and verbalized understanding.    Althea Myles RPH  15:32  EDT

## 2024-10-11 ENCOUNTER — TELEPHONE (OUTPATIENT)
Dept: ONCOLOGY | Facility: CLINIC | Age: 64
End: 2024-10-11

## 2024-10-11 DIAGNOSIS — C34.11 SMALL CELL CARCINOMA OF UPPER LOBE OF RIGHT LUNG: Primary | ICD-10-CM

## 2024-10-11 PROCEDURE — 77300 RADIATION THERAPY DOSE PLAN: CPT | Performed by: INTERNAL MEDICINE

## 2024-10-11 PROCEDURE — 77293 RESPIRATOR MOTION MGMT SIMUL: CPT | Performed by: INTERNAL MEDICINE

## 2024-10-11 PROCEDURE — 77338 DESIGN MLC DEVICE FOR IMRT: CPT | Performed by: INTERNAL MEDICINE

## 2024-10-11 PROCEDURE — 77301 RADIOTHERAPY DOSE PLAN IMRT: CPT | Performed by: INTERNAL MEDICINE

## 2024-10-11 NOTE — TELEPHONE ENCOUNTER
Attempted to contact Lizet in radiation regarding [facility] paperwork, but she is gone for the day.

## 2024-10-11 NOTE — TELEPHONE ENCOUNTER
Caller: Gina Kandi    Relationship: Self    Best call back number: [phone]    What is the best time to reach you: ANY    Who are you requesting to speak with (clinical staff, provider,  specific staff member): CLINICAL    What was the call regarding: KANDI STATES SHE PICKED UP HER FMLA PAPERS YESTERDAY    SHE IS WANTING TO KNOW IF SHAHRAM FAXED THEM IN OR IF SHE NEEDS TO       PLEASE ADVISE     THIS IS TIME SENSATIVE

## 2024-10-14 ENCOUNTER — RADIATION ONCOLOGY WEEKLY ASSESSMENT (OUTPATIENT)
Dept: RADIATION ONCOLOGY | Facility: HOSPITAL | Age: 64
End: 2024-10-14
Payer: COMMERCIAL

## 2024-10-14 ENCOUNTER — HOSPITAL ENCOUNTER (OUTPATIENT)
Dept: ONCOLOGY | Facility: HOSPITAL | Age: 64
Discharge: HOME | End: 2024-10-14
Admitting: INTERNAL MEDICINE
Payer: COMMERCIAL

## 2024-10-14 ENCOUNTER — HOSPITAL ENCOUNTER (OUTPATIENT)
Dept: RADIATION ONCOLOGY | Facility: HOSPITAL | Age: 64
Discharge: HOME | End: 2024-10-14
Payer: COMMERCIAL

## 2024-10-14 VITALS
BODY MASS INDEX: 22.37 KG/M2 | HEIGHT: 67 IN | RESPIRATION RATE: 16 BRPM | DIASTOLIC BLOOD PRESSURE: 70 MMHG | WEIGHT: 142.5 LBS | TEMPERATURE: 97.5 F | SYSTOLIC BLOOD PRESSURE: 112 MMHG | OXYGEN SATURATION: 96 % | HEART RATE: 70 BPM

## 2024-10-14 VITALS
DIASTOLIC BLOOD PRESSURE: 70 MMHG | BODY MASS INDEX: 22.24 KG/M2 | SYSTOLIC BLOOD PRESSURE: 112 MMHG | WEIGHT: 142 LBS | TEMPERATURE: 97.5 F | OXYGEN SATURATION: 95 % | RESPIRATION RATE: 20 BRPM | HEART RATE: 90 BPM

## 2024-10-14 DIAGNOSIS — C34.11 SMALL CELL CARCINOMA OF UPPER LOBE OF RIGHT LUNG: Primary | ICD-10-CM

## 2024-10-14 DIAGNOSIS — R91.8 LUNG MASS: ICD-10-CM

## 2024-10-14 LAB
RAD ONC ARIA COURSE ID: NORMAL
RAD ONC ARIA COURSE LAST TREATMENT DATE: NORMAL
RAD ONC ARIA COURSE START DATE: NORMAL
RAD ONC ARIA COURSE TREATMENT ELAPSED DAYS: 0
RAD ONC ARIA FIRST TREATMENT DATE: NORMAL
RAD ONC ARIA PLAN FRACTIONS TREATED TO DATE: 1
RAD ONC ARIA PLAN ID: NORMAL
RAD ONC ARIA PLAN PRESCRIBED DOSE PER FRACTION: 2 GY
RAD ONC ARIA PLAN PRIMARY REFERENCE POINT: NORMAL
RAD ONC ARIA PLAN TOTAL FRACTIONS PRESCRIBED: 30
RAD ONC ARIA PLAN TOTAL PRESCRIBED DOSE: 6000 CGY
RAD ONC ARIA REFERENCE POINT DOSAGE GIVEN TO DATE: 2 GY
RAD ONC ARIA REFERENCE POINT ID: NORMAL
RAD ONC ARIA REFERENCE POINT SESSION DOSAGE GIVEN: 2 GY

## 2024-10-14 PROCEDURE — 77014 CHG CT GUIDANCE RADIATION THERAPY FLDS PLACEMENT: CPT | Performed by: INTERNAL MEDICINE

## 2024-10-14 PROCEDURE — FACE2FACE: Performed by: INTERNAL MEDICINE

## 2024-10-14 PROCEDURE — 96375 TX/PRO/DX INJ NEW DRUG ADDON: CPT

## 2024-10-14 PROCEDURE — 96417 CHEMO IV INFUS EACH ADDL SEQ: CPT

## 2024-10-14 PROCEDURE — 77427 RADIATION TX MANAGEMENT X5: CPT | Performed by: INTERNAL MEDICINE

## 2024-10-14 PROCEDURE — 77386: CPT | Performed by: INTERNAL MEDICINE

## 2024-10-14 PROCEDURE — 25010000002 CARBOPLATIN PER 50 MG: Performed by: INTERNAL MEDICINE

## 2024-10-14 PROCEDURE — 25010000002 ETOPOSIDE 500 MG/25ML SOLUTION 25 ML VIAL: Performed by: INTERNAL MEDICINE

## 2024-10-14 PROCEDURE — 25010000002 FOSAPREPITANT PER 1 MG: Performed by: INTERNAL MEDICINE

## 2024-10-14 PROCEDURE — 25810000003 SODIUM CHLORIDE 0.9 % SOLUTION 500 ML FLEX CONT: Performed by: INTERNAL MEDICINE

## 2024-10-14 PROCEDURE — 25010000002 PALONOSETRON 0.25 MG/5ML SOLUTION PREFILLED SYRINGE: Performed by: INTERNAL MEDICINE

## 2024-10-14 PROCEDURE — 96367 TX/PROPH/DG ADDL SEQ IV INF: CPT

## 2024-10-14 PROCEDURE — 25010000002 DEXAMETHASONE PER 1 MG: Performed by: INTERNAL MEDICINE

## 2024-10-14 PROCEDURE — 96413 CHEMO IV INFUSION 1 HR: CPT

## 2024-10-14 PROCEDURE — 25010000002 HEPARIN LOCK FLUSH PER 10 UNITS: Performed by: INTERNAL MEDICINE

## 2024-10-14 PROCEDURE — 25810000003 SODIUM CHLORIDE 0.9 % SOLUTION 250 ML FLEX CONT: Performed by: INTERNAL MEDICINE

## 2024-10-14 RX ORDER — PALONOSETRON 0.05 MG/ML
0.25 INJECTION, SOLUTION INTRAVENOUS ONCE
Status: COMPLETED | OUTPATIENT
Start: 2024-10-14 | End: 2024-10-14

## 2024-10-14 RX ORDER — SODIUM CHLORIDE 0.9 % (FLUSH) 0.9 %
10 SYRINGE (ML) INJECTION AS NEEDED
Status: DISCONTINUED | OUTPATIENT
Start: 2024-10-14 | End: 2024-10-15 | Stop reason: HOSPADM

## 2024-10-14 RX ORDER — DIPHENHYDRAMINE HYDROCHLORIDE 50 MG/ML
50 INJECTION INTRAMUSCULAR; INTRAVENOUS AS NEEDED
Status: CANCELLED | OUTPATIENT
Start: 2024-10-19

## 2024-10-14 RX ORDER — SODIUM CHLORIDE 9 MG/ML
20 INJECTION, SOLUTION INTRAVENOUS ONCE
Status: CANCELLED | OUTPATIENT
Start: 2024-10-19

## 2024-10-14 RX ORDER — PALONOSETRON 0.05 MG/ML
0.25 INJECTION, SOLUTION INTRAVENOUS ONCE
Status: CANCELLED | OUTPATIENT
Start: 2024-10-19

## 2024-10-14 RX ORDER — FAMOTIDINE 10 MG/ML
20 INJECTION, SOLUTION INTRAVENOUS AS NEEDED
Status: CANCELLED | OUTPATIENT
Start: 2024-10-19

## 2024-10-14 RX ORDER — LIDOCAINE/PRILOCAINE 2.5 %-2.5%
1 CREAM (GRAM) TOPICAL SEE ADMIN INSTRUCTIONS
Qty: 30 G | Refills: 3 | Status: SHIPPED | OUTPATIENT
Start: 2024-10-14

## 2024-10-14 RX ORDER — SODIUM CHLORIDE 9 MG/ML
20 INJECTION, SOLUTION INTRAVENOUS ONCE
Status: DISCONTINUED | OUTPATIENT
Start: 2024-10-14 | End: 2024-10-15 | Stop reason: HOSPADM

## 2024-10-14 RX ORDER — DEXAMETHASONE SODIUM PHOSPHATE 4 MG/ML
12 INJECTION, SOLUTION INTRA-ARTICULAR; INTRALESIONAL; INTRAMUSCULAR; INTRAVENOUS; SOFT TISSUE ONCE
Status: COMPLETED | OUTPATIENT
Start: 2024-10-14 | End: 2024-10-14

## 2024-10-14 RX ORDER — HEPARIN SODIUM (PORCINE) LOCK FLUSH IV SOLN 100 UNIT/ML 100 UNIT/ML
500 SOLUTION INTRAVENOUS AS NEEDED
Status: DISCONTINUED | OUTPATIENT
Start: 2024-10-14 | End: 2024-10-15 | Stop reason: HOSPADM

## 2024-10-14 RX ORDER — SODIUM CHLORIDE 0.9 % (FLUSH) 0.9 %
10 SYRINGE (ML) INJECTION AS NEEDED
Status: CANCELLED | OUTPATIENT
Start: 2024-10-14

## 2024-10-14 RX ORDER — HEPARIN SODIUM (PORCINE) LOCK FLUSH IV SOLN 100 UNIT/ML 100 UNIT/ML
500 SOLUTION INTRAVENOUS AS NEEDED
Status: CANCELLED | OUTPATIENT
Start: 2024-10-14

## 2024-10-14 RX ORDER — SODIUM CHLORIDE 9 MG/ML
20 INJECTION, SOLUTION INTRAVENOUS ONCE
Status: CANCELLED | OUTPATIENT
Start: 2024-10-15

## 2024-10-14 RX ORDER — SODIUM CHLORIDE 9 MG/ML
20 INJECTION, SOLUTION INTRAVENOUS ONCE
Status: CANCELLED | OUTPATIENT
Start: 2024-10-16

## 2024-10-14 RX ADMIN — ETOPOSIDE 180 MG: 20 INJECTION INTRAVENOUS at 11:40

## 2024-10-14 RX ADMIN — PALONOSETRON 0.25 MG: 0.25 INJECTION, SOLUTION INTRAVENOUS at 10:18

## 2024-10-14 RX ADMIN — Medication 10 ML: at 12:46

## 2024-10-14 RX ADMIN — Medication 500 UNITS: at 12:46

## 2024-10-14 RX ADMIN — CARBOPLATIN 540 MG: 10 INJECTION, SOLUTION INTRAVENOUS at 11:01

## 2024-10-14 RX ADMIN — FOSAPREPITANT 100 ML: 150 INJECTION, POWDER, LYOPHILIZED, FOR SOLUTION INTRAVENOUS at 10:19

## 2024-10-14 RX ADMIN — DEXAMETHASONE SODIUM PHOSPHATE 12 MG: 4 INJECTION, SOLUTION INTRAMUSCULAR; INTRAVENOUS at 10:15

## 2024-10-14 NOTE — PROGRESS NOTES
[facility] RADIATION ONCOLOGY  ON-TREATMENT VISIT NOTE    NAME: Kandi Fuentes  YOB: 1960  MRN #: [id number]  DATE OF SERVICE: 10/14/2024  PRESCRIBING PHYSICIAN: Tereso Abarca MD     DIAGNOSIS:      ICD-10-CM ICD-9-CM   1. 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  C34.11 162.3      RADIATION THERAPY VISIT:  Continue radiation therapy, Dosimetry plan remains acceptable, Films reviewed and remains acceptable, Pain assessed, Pain management planned, Radiation dose schedule reviewed and remains acceptable, Radiation technique remains acceptable, and Symptoms within expected range    Radiation Treatments       Active   Plans   FB RtLung   Most recent treatment: Dose planned: 200 cGy (fraction 1 on 10/14/2024)   Total: Dose planned: 6,000 cGy (30 fractions)   Elapsed Days: 0      Reference Points   RtLung   Most recent treatment: Dose given: 200 cGy (on 10/14/2024)   Total: Dose given: 200 cGy   Elapsed Days: 0                    [] Concurrent Chemo   Regimen:       PHYSICAL ASSESSMENT         Vitals:    10/14/24 1337   BP: 112/70   Pulse: 90   Resp: 20   Temp: 97.5 °F (36.4 °C)   SpO2: 95%      Wt Readings from Last 3 Encounters:   10/14/24 64.4 kg (142 lb)   10/14/24 64.6 kg (142 lb 8 oz)   10/02/24 64.9 kg (143 lb)     Restricted in physically strenuous activity but ambulatory and able to carry out work of a light or sedentary nature, e.g., light house work, office work = 1    We examined the relevant areas: yes  Findings are within the expected range for this stage of treatment: yes    ACTION ITEMS     Patient tolerating treatment well and as expected for this stage in their treatment and Continue radiation therapy as planned    Estimated Completion Date: 11/25/2024    Anticipatory guidance provided.    Referring to Dr. Mcmahon for breathing tightness at night.       Tereso Abarca MD   Radiation Oncology  [facility] [place]

## 2024-10-14 NOTE — PROGRESS NOTES
Pt here for C1 D1 carboplatin, etoposide. Using sterile technique, port accessed for treatment. Pt tolerated today's treatment well. AVS given and she verbalized understanding.

## 2024-10-15 ENCOUNTER — HOSPITAL ENCOUNTER (OUTPATIENT)
Dept: ONCOLOGY | Facility: HOSPITAL | Age: 64
Discharge: HOME | End: 2024-10-15
Admitting: INTERNAL MEDICINE
Payer: COMMERCIAL

## 2024-10-15 ENCOUNTER — HOSPITAL ENCOUNTER (OUTPATIENT)
Dept: RADIATION ONCOLOGY | Facility: HOSPITAL | Age: 64
Discharge: HOME | End: 2024-10-15

## 2024-10-15 VITALS
HEIGHT: 67 IN | DIASTOLIC BLOOD PRESSURE: 72 MMHG | SYSTOLIC BLOOD PRESSURE: 117 MMHG | BODY MASS INDEX: 22.19 KG/M2 | TEMPERATURE: 98 F | WEIGHT: 141.4 LBS | RESPIRATION RATE: 16 BRPM | OXYGEN SATURATION: 96 % | HEART RATE: 73 BPM

## 2024-10-15 DIAGNOSIS — C34.11 SMALL CELL CARCINOMA OF UPPER LOBE OF RIGHT LUNG: Primary | ICD-10-CM

## 2024-10-15 DIAGNOSIS — R91.8 LUNG MASS: ICD-10-CM

## 2024-10-15 LAB
RAD ONC ARIA COURSE ID: NORMAL
RAD ONC ARIA COURSE LAST TREATMENT DATE: NORMAL
RAD ONC ARIA COURSE START DATE: NORMAL
RAD ONC ARIA COURSE TREATMENT ELAPSED DAYS: 1
RAD ONC ARIA FIRST TREATMENT DATE: NORMAL
RAD ONC ARIA PLAN FRACTIONS TREATED TO DATE: 2
RAD ONC ARIA PLAN ID: NORMAL
RAD ONC ARIA PLAN PRESCRIBED DOSE PER FRACTION: 2 GY
RAD ONC ARIA PLAN PRIMARY REFERENCE POINT: NORMAL
RAD ONC ARIA PLAN TOTAL FRACTIONS PRESCRIBED: 30
RAD ONC ARIA PLAN TOTAL PRESCRIBED DOSE: 6000 CGY
RAD ONC ARIA REFERENCE POINT DOSAGE GIVEN TO DATE: 4 GY
RAD ONC ARIA REFERENCE POINT ID: NORMAL
RAD ONC ARIA REFERENCE POINT SESSION DOSAGE GIVEN: 2 GY

## 2024-10-15 PROCEDURE — 96413 CHEMO IV INFUSION 1 HR: CPT

## 2024-10-15 PROCEDURE — 25010000002 HEPARIN LOCK FLUSH PER 10 UNITS: Performed by: INTERNAL MEDICINE

## 2024-10-15 PROCEDURE — 25810000003 SODIUM CHLORIDE 0.9 % SOLUTION 500 ML FLEX CONT: Performed by: INTERNAL MEDICINE

## 2024-10-15 PROCEDURE — 25010000002 DEXAMETHASONE PER 1 MG: Performed by: INTERNAL MEDICINE

## 2024-10-15 PROCEDURE — 25010000002 ETOPOSIDE 500 MG/25ML SOLUTION 25 ML VIAL: Performed by: INTERNAL MEDICINE

## 2024-10-15 PROCEDURE — 96375 TX/PRO/DX INJ NEW DRUG ADDON: CPT

## 2024-10-15 PROCEDURE — 77386: CPT | Performed by: INTERNAL MEDICINE

## 2024-10-15 PROCEDURE — 77014 CHG CT GUIDANCE RADIATION THERAPY FLDS PLACEMENT: CPT | Performed by: INTERNAL MEDICINE

## 2024-10-15 RX ORDER — SODIUM CHLORIDE 0.9 % (FLUSH) 0.9 %
10 SYRINGE (ML) INJECTION AS NEEDED
Status: DISCONTINUED | OUTPATIENT
Start: 2024-10-15 | End: 2024-10-16 | Stop reason: HOSPADM

## 2024-10-15 RX ORDER — HEPARIN SODIUM (PORCINE) LOCK FLUSH IV SOLN 100 UNIT/ML 100 UNIT/ML
500 SOLUTION INTRAVENOUS AS NEEDED
Status: CANCELLED | OUTPATIENT
Start: 2024-10-15

## 2024-10-15 RX ORDER — SODIUM CHLORIDE 9 MG/ML
20 INJECTION, SOLUTION INTRAVENOUS ONCE
Status: DISCONTINUED | OUTPATIENT
Start: 2024-10-15 | End: 2024-10-16 | Stop reason: HOSPADM

## 2024-10-15 RX ORDER — DEXAMETHASONE SODIUM PHOSPHATE 4 MG/ML
12 INJECTION, SOLUTION INTRA-ARTICULAR; INTRALESIONAL; INTRAMUSCULAR; INTRAVENOUS; SOFT TISSUE ONCE
Status: COMPLETED | OUTPATIENT
Start: 2024-10-15 | End: 2024-10-15

## 2024-10-15 RX ORDER — HEPARIN SODIUM (PORCINE) LOCK FLUSH IV SOLN 100 UNIT/ML 100 UNIT/ML
500 SOLUTION INTRAVENOUS AS NEEDED
Status: DISCONTINUED | OUTPATIENT
Start: 2024-10-15 | End: 2024-10-16 | Stop reason: HOSPADM

## 2024-10-15 RX ORDER — SODIUM CHLORIDE 0.9 % (FLUSH) 0.9 %
10 SYRINGE (ML) INJECTION AS NEEDED
Status: CANCELLED | OUTPATIENT
Start: 2024-10-15

## 2024-10-15 RX ADMIN — Medication 10 ML: at 11:01

## 2024-10-15 RX ADMIN — DEXAMETHASONE SODIUM PHOSPHATE 12 MG: 4 INJECTION, SOLUTION INTRAMUSCULAR; INTRAVENOUS at 09:32

## 2024-10-15 RX ADMIN — ETOPOSIDE 180 MG: 20 INJECTION INTRAVENOUS at 09:49

## 2024-10-15 RX ADMIN — HEPARIN 500 UNITS: 100 SYRINGE at 11:01

## 2024-10-15 NOTE — PROGRESS NOTES
Pt is here for C1D2 , she reports after she took the olanzapine last night that she was walking into walls at 3 am this morning and had dizziness, she denied she had these symptoms prior to taking med. /72, HR 73 She reports symptoms have improved but she still present, reviewed with Dr Delgado and pt to hold olanzapine for remainder of treatment and will assess if needs lower dose prior to next cycle.  Reviewed with pt and v/u and agreement and pt states she has already set olanzapine away from other medications.  Treatment administered per MAR and pt tolerated well. Pt discharged and has next appt

## 2024-10-16 ENCOUNTER — HOSPITAL ENCOUNTER (OUTPATIENT)
Dept: RADIATION ONCOLOGY | Facility: HOSPITAL | Age: 64
Discharge: HOME | End: 2024-10-16

## 2024-10-16 ENCOUNTER — HOSPITAL ENCOUNTER (OUTPATIENT)
Dept: ONCOLOGY | Facility: HOSPITAL | Age: 64
Discharge: HOME | End: 2024-10-16
Admitting: INTERNAL MEDICINE
Payer: COMMERCIAL

## 2024-10-16 VITALS
HEIGHT: 67 IN | WEIGHT: 142 LBS | OXYGEN SATURATION: 93 % | DIASTOLIC BLOOD PRESSURE: 73 MMHG | HEART RATE: 93 BPM | BODY MASS INDEX: 22.29 KG/M2 | TEMPERATURE: 97.8 F | RESPIRATION RATE: 14 BRPM | SYSTOLIC BLOOD PRESSURE: 124 MMHG

## 2024-10-16 DIAGNOSIS — C34.11 SMALL CELL CARCINOMA OF UPPER LOBE OF RIGHT LUNG: Primary | ICD-10-CM

## 2024-10-16 DIAGNOSIS — R91.8 LUNG MASS: ICD-10-CM

## 2024-10-16 LAB
RAD ONC ARIA COURSE ID: NORMAL
RAD ONC ARIA COURSE LAST TREATMENT DATE: NORMAL
RAD ONC ARIA COURSE START DATE: NORMAL
RAD ONC ARIA COURSE TREATMENT ELAPSED DAYS: 2
RAD ONC ARIA FIRST TREATMENT DATE: NORMAL
RAD ONC ARIA PLAN FRACTIONS TREATED TO DATE: 3
RAD ONC ARIA PLAN ID: NORMAL
RAD ONC ARIA PLAN PRESCRIBED DOSE PER FRACTION: 2 GY
RAD ONC ARIA PLAN PRIMARY REFERENCE POINT: NORMAL
RAD ONC ARIA PLAN TOTAL FRACTIONS PRESCRIBED: 30
RAD ONC ARIA PLAN TOTAL PRESCRIBED DOSE: 6000 CGY
RAD ONC ARIA REFERENCE POINT DOSAGE GIVEN TO DATE: 6 GY
RAD ONC ARIA REFERENCE POINT ID: NORMAL
RAD ONC ARIA REFERENCE POINT SESSION DOSAGE GIVEN: 2 GY

## 2024-10-16 PROCEDURE — 25010000002 ETOPOSIDE 500 MG/25ML SOLUTION 25 ML VIAL: Performed by: INTERNAL MEDICINE

## 2024-10-16 PROCEDURE — 77386: CPT | Performed by: INTERNAL MEDICINE

## 2024-10-16 PROCEDURE — 25810000003 SODIUM CHLORIDE 0.9 % SOLUTION 500 ML FLEX CONT: Performed by: INTERNAL MEDICINE

## 2024-10-16 PROCEDURE — 25810000003 SODIUM CHLORIDE 0.9 % SOLUTION: Performed by: INTERNAL MEDICINE

## 2024-10-16 PROCEDURE — 96375 TX/PRO/DX INJ NEW DRUG ADDON: CPT

## 2024-10-16 PROCEDURE — 25010000002 HEPARIN LOCK FLUSH PER 10 UNITS: Performed by: INTERNAL MEDICINE

## 2024-10-16 PROCEDURE — 96413 CHEMO IV INFUSION 1 HR: CPT

## 2024-10-16 PROCEDURE — 77014 CHG CT GUIDANCE RADIATION THERAPY FLDS PLACEMENT: CPT | Performed by: INTERNAL MEDICINE

## 2024-10-16 PROCEDURE — 25010000002 DEXAMETHASONE PER 1 MG: Performed by: INTERNAL MEDICINE

## 2024-10-16 RX ORDER — SODIUM CHLORIDE 0.9 % (FLUSH) 0.9 %
10 SYRINGE (ML) INJECTION AS NEEDED
Status: DISCONTINUED | OUTPATIENT
Start: 2024-10-16 | End: 2024-10-17 | Stop reason: HOSPADM

## 2024-10-16 RX ORDER — HEPARIN SODIUM (PORCINE) LOCK FLUSH IV SOLN 100 UNIT/ML 100 UNIT/ML
500 SOLUTION INTRAVENOUS AS NEEDED
OUTPATIENT
Start: 2024-10-16

## 2024-10-16 RX ORDER — SODIUM CHLORIDE 0.9 % (FLUSH) 0.9 %
10 SYRINGE (ML) INJECTION AS NEEDED
OUTPATIENT
Start: 2024-10-16

## 2024-10-16 RX ORDER — DEXAMETHASONE SODIUM PHOSPHATE 4 MG/ML
12 INJECTION, SOLUTION INTRA-ARTICULAR; INTRALESIONAL; INTRAMUSCULAR; INTRAVENOUS; SOFT TISSUE ONCE
Status: COMPLETED | OUTPATIENT
Start: 2024-10-16 | End: 2024-10-16

## 2024-10-16 RX ORDER — SODIUM CHLORIDE 9 MG/ML
20 INJECTION, SOLUTION INTRAVENOUS ONCE
Status: COMPLETED | OUTPATIENT
Start: 2024-10-16 | End: 2024-10-16

## 2024-10-16 RX ORDER — HEPARIN SODIUM (PORCINE) LOCK FLUSH IV SOLN 100 UNIT/ML 100 UNIT/ML
500 SOLUTION INTRAVENOUS AS NEEDED
Status: DISCONTINUED | OUTPATIENT
Start: 2024-10-16 | End: 2024-10-17 | Stop reason: HOSPADM

## 2024-10-16 RX ADMIN — Medication 10 ML: at 10:58

## 2024-10-16 RX ADMIN — ETOPOSIDE 180 MG: 20 INJECTION INTRAVENOUS at 09:50

## 2024-10-16 RX ADMIN — SODIUM CHLORIDE 20 ML/HR: 9 INJECTION, SOLUTION INTRAVENOUS at 09:25

## 2024-10-16 RX ADMIN — DEXAMETHASONE SODIUM PHOSPHATE 12 MG: 4 INJECTION, SOLUTION INTRAMUSCULAR; INTRAVENOUS at 09:25

## 2024-10-16 RX ADMIN — Medication 500 UNITS: at 10:58

## 2024-10-16 NOTE — PROGRESS NOTES
Pt here for D3 tx.  No issues overnight besides not being able to sleep.  Held the olanzapine last night and did not have any dizziness.  Port accessed using sterile technique with positive blood return noted.  Tx given per MAR.  Pt tolerated well.  Pt d/c home with AVS given.

## 2024-10-17 ENCOUNTER — HOSPITAL ENCOUNTER (OUTPATIENT)
Dept: RADIATION ONCOLOGY | Facility: HOSPITAL | Age: 64
Discharge: HOME | End: 2024-10-17

## 2024-10-17 LAB
RAD ONC ARIA COURSE ID: NORMAL
RAD ONC ARIA COURSE LAST TREATMENT DATE: NORMAL
RAD ONC ARIA COURSE START DATE: NORMAL
RAD ONC ARIA COURSE TREATMENT ELAPSED DAYS: 3
RAD ONC ARIA FIRST TREATMENT DATE: NORMAL
RAD ONC ARIA PLAN FRACTIONS TREATED TO DATE: 4
RAD ONC ARIA PLAN ID: NORMAL
RAD ONC ARIA PLAN PRESCRIBED DOSE PER FRACTION: 2 GY
RAD ONC ARIA PLAN PRIMARY REFERENCE POINT: NORMAL
RAD ONC ARIA PLAN TOTAL FRACTIONS PRESCRIBED: 30
RAD ONC ARIA PLAN TOTAL PRESCRIBED DOSE: 6000 CGY
RAD ONC ARIA REFERENCE POINT DOSAGE GIVEN TO DATE: 8 GY
RAD ONC ARIA REFERENCE POINT ID: NORMAL
RAD ONC ARIA REFERENCE POINT SESSION DOSAGE GIVEN: 2 GY

## 2024-10-17 PROCEDURE — 77014 CHG CT GUIDANCE RADIATION THERAPY FLDS PLACEMENT: CPT | Performed by: INTERNAL MEDICINE

## 2024-10-17 PROCEDURE — 77386: CPT | Performed by: INTERNAL MEDICINE

## 2024-10-17 PROCEDURE — 77336 RADIATION PHYSICS CONSULT: CPT | Performed by: INTERNAL MEDICINE

## 2024-10-18 ENCOUNTER — HOSPITAL ENCOUNTER (OUTPATIENT)
Dept: RADIATION ONCOLOGY | Facility: HOSPITAL | Age: 64
Discharge: HOME | End: 2024-10-18

## 2024-10-18 LAB
RAD ONC ARIA COURSE ID: NORMAL
RAD ONC ARIA COURSE LAST TREATMENT DATE: NORMAL
RAD ONC ARIA COURSE START DATE: NORMAL
RAD ONC ARIA COURSE TREATMENT ELAPSED DAYS: 4
RAD ONC ARIA FIRST TREATMENT DATE: NORMAL
RAD ONC ARIA PLAN FRACTIONS TREATED TO DATE: 5
RAD ONC ARIA PLAN ID: NORMAL
RAD ONC ARIA PLAN PRESCRIBED DOSE PER FRACTION: 2 GY
RAD ONC ARIA PLAN PRIMARY REFERENCE POINT: NORMAL
RAD ONC ARIA PLAN TOTAL FRACTIONS PRESCRIBED: 30
RAD ONC ARIA PLAN TOTAL PRESCRIBED DOSE: 6000 CGY
RAD ONC ARIA REFERENCE POINT DOSAGE GIVEN TO DATE: 10 GY
RAD ONC ARIA REFERENCE POINT ID: NORMAL
RAD ONC ARIA REFERENCE POINT SESSION DOSAGE GIVEN: 2 GY

## 2024-10-18 PROCEDURE — 77386: CPT | Performed by: INTERNAL MEDICINE

## 2024-10-18 PROCEDURE — 77014 CHG CT GUIDANCE RADIATION THERAPY FLDS PLACEMENT: CPT | Performed by: INTERNAL MEDICINE

## 2024-10-18 RX ORDER — LIDOCAINE HYDROCHLORIDE 20 MG/ML
10 SOLUTION OROPHARYNGEAL AS NEEDED
Qty: 600 ML | Refills: 4 | Status: SHIPPED | OUTPATIENT
Start: 2024-10-18

## 2024-10-18 RX ORDER — SUCRALFATE ORAL 1 G/10ML
1 SUSPENSION ORAL 4 TIMES DAILY PRN
Qty: 600 ML | Refills: 4 | Status: SHIPPED | OUTPATIENT
Start: 2024-10-18

## 2024-10-21 ENCOUNTER — RADIATION ONCOLOGY WEEKLY ASSESSMENT (OUTPATIENT)
Dept: RADIATION ONCOLOGY | Facility: HOSPITAL | Age: 64
End: 2024-10-21
Payer: COMMERCIAL

## 2024-10-21 ENCOUNTER — HOSPITAL ENCOUNTER (OUTPATIENT)
Dept: RADIATION ONCOLOGY | Facility: HOSPITAL | Age: 64
Discharge: HOME | End: 2024-10-21
Payer: COMMERCIAL

## 2024-10-21 VITALS
DIASTOLIC BLOOD PRESSURE: 81 MMHG | WEIGHT: 138.8 LBS | BODY MASS INDEX: 21.79 KG/M2 | SYSTOLIC BLOOD PRESSURE: 143 MMHG | HEART RATE: 81 BPM | HEIGHT: 67 IN | OXYGEN SATURATION: 99 % | TEMPERATURE: 97.4 F | RESPIRATION RATE: 18 BRPM

## 2024-10-21 DIAGNOSIS — C34.11 SMALL CELL CARCINOMA OF UPPER LOBE OF RIGHT LUNG: Primary | ICD-10-CM

## 2024-10-21 LAB
RAD ONC ARIA COURSE ID: NORMAL
RAD ONC ARIA COURSE LAST TREATMENT DATE: NORMAL
RAD ONC ARIA COURSE START DATE: NORMAL
RAD ONC ARIA COURSE TREATMENT ELAPSED DAYS: 7
RAD ONC ARIA FIRST TREATMENT DATE: NORMAL
RAD ONC ARIA PLAN FRACTIONS TREATED TO DATE: 6
RAD ONC ARIA PLAN ID: NORMAL
RAD ONC ARIA PLAN PRESCRIBED DOSE PER FRACTION: 2 GY
RAD ONC ARIA PLAN PRIMARY REFERENCE POINT: NORMAL
RAD ONC ARIA PLAN TOTAL FRACTIONS PRESCRIBED: 30
RAD ONC ARIA PLAN TOTAL PRESCRIBED DOSE: 6000 CGY
RAD ONC ARIA REFERENCE POINT DOSAGE GIVEN TO DATE: 12 GY
RAD ONC ARIA REFERENCE POINT ID: NORMAL
RAD ONC ARIA REFERENCE POINT SESSION DOSAGE GIVEN: 2 GY

## 2024-10-21 PROCEDURE — 77386: CPT | Performed by: INTERNAL MEDICINE

## 2024-10-21 PROCEDURE — 77014 CHG CT GUIDANCE RADIATION THERAPY FLDS PLACEMENT: CPT | Performed by: INTERNAL MEDICINE

## 2024-10-21 PROCEDURE — 77427 RADIATION TX MANAGEMENT X5: CPT | Performed by: INTERNAL MEDICINE

## 2024-10-21 RX ORDER — BUDESONIDE AND FORMOTEROL FUMARATE DIHYDRATE 160; 4.5 UG/1; UG/1
2 AEROSOL RESPIRATORY (INHALATION)
Qty: 1 EACH | Refills: 12 | Status: SHIPPED | OUTPATIENT
Start: 2024-10-21

## 2024-10-21 RX ORDER — IPRATROPIUM BROMIDE AND ALBUTEROL SULFATE 2.5; .5 MG/3ML; MG/3ML
3 SOLUTION RESPIRATORY (INHALATION) 4 TIMES DAILY PRN
Qty: 120 ML | Refills: 5 | Status: SHIPPED | OUTPATIENT
Start: 2024-10-21

## 2024-10-21 NOTE — PROGRESS NOTES
"                         HEMATOLOGY ONCOLOGY OUTPATIENT FOLLOWUP       Patient name: Kandi Fuentes  : 1960  MRN: [id number]  Primary Care Physician: Provider, No Known  Referring Physician: Provider, No Known  Reason For Consult: Limited stage small cell lung cancer.    History of Present Illness:      10/2/2024: In the office for the first time to stage and treat small cell lung cancer of the right lung.  She reported that between July and 2024 she was seen at the hospital with dyspnea and some chest pains.  She was found to have evidence of coronary artery disease and underwent percutaneous angioplasty and stenting of the affected coronary vessels.  In the process, however, a nodule in the right lung was identified.  Further investigations led to the identification of a 4.4 cm lobulated tumor in the posterior right upper lobe communicating with the right middle lobe concerning for malignancy.  Evidence of severe emphysema was present, as well.  Eventually she had a navigational bronchoscopy and pathology reported small cell carcinoma on the biopsies.  A PET scan and MRI did not reveal any suggestion of metastatic disease.  At the time of this visit she is feeling somewhat better.  Her breathing has improved.  She still has some precordial discomfort that she describes is related to the stents \"that I can still feel\".  She has been eating reasonably well and has not lost much weight.  She is also been afebrile.  No diarrhea or dysuria.  On exam she appears chronically ill but is not in distress.  No jaundice.  The lungs are diminished bilaterally in a significant fashion.  The heart is regular.  The abdomen is flat and soft.  No palpable tumors.  No edema.  Independently reviewed all the imaging studies and discussed with her.  Treatment with concurrent chemotherapy and radiation followed by immunotherapy is reasonable.  Discussed with her the regimen of concurrent chemotherapy and radiation and in " detail discussed with her side effects and potential complications of both treatments.  Ideally we should begin on October 7 if it is at all possible.  I have communicated with Dr. Choudhary and with Dr. Abarca.    10/23/2024: Received the first cycle of chemotherapy without any difficulties. She feels about the same at this time and she has not had any new problems. Able to eat well and no nausea, vomiting or unintended weight loss. Denies chest pain and remains with the same degree of dyspnea. No abdominal pain or diarrhea. She continues to smoke at the same rate. On exam she is oriented and conversant. No jaundice. Poor dentition and no oral lesions. Respirations not labored Lungs diminished bilaterally. Heart regular. Abdomen soft and not tender. No edema. The laboratory exams were reviewed and discussed with her. To continue with the same treatment. She's to see me in 4 weeks.     Past Medical History:   Diagnosis Date    Cancer     COPD (chronic obstructive pulmonary disease)     Coronary artery disease     Elevated cholesterol     Heart attack     Hypertension     Lung cancer 2024    Tinnitus      Past Surgical History:   Procedure Laterality Date    BACK SURGERY  2004    bone spur on sciatica    BRONCHOSCOPY WITH ION ROBOTIC ASSIST N/A 9/27/2024    Procedure: BRONCHOSCOPY WITH ION ROBOT, fine needle aspiration and tissue biopsy right upper lobe mass, endobronchial ultrasound with fine needle aspiration lymph nodes (Level 10R);  Surgeon: Serafin Choudhary MD;  Location: [facility] ENDOSCOPY;  Service: Robotics - Pulmonary;  Laterality: N/A;  post: lung mass with lympadenopathy    CHOLECYSTECTOMY  2021    CORONARY ANGIOPLASTY WITH STENT PLACEMENT  07/2024    MOLE REMOVAL  1994    forehead    SKIN LESION EXCISION Right 1994    breast    VENOUS ACCESS DEVICE (PORT) INSERTION Right 10/7/2024    Procedure: INSERTION VENOUS ACCESS DEVICE;  Surgeon: Serafin Choudhary MD;  Location: [facility] MAIN OR;  Service: Thoracic;  Laterality:  Right;       Current Outpatient Medications:     Acetaminophen (Tylenol) 325 MG capsule, Take 650 mg by mouth As Needed., Disp: , Rfl:     albuterol sulfate  (90 Base) MCG/ACT inhaler, Inhale 2 puffs As Needed for Wheezing or Shortness of Air., Disp: , Rfl:     budesonide-formoterol (Breyna) 160-4.5 MCG/ACT inhaler, Inhale 2 puffs 2 (Two) Times a Day., Disp: 1 each, Rfl: 12    carvedilol (COREG) 3.125 MG tablet, Take 1 tablet by mouth As Needed., Disp: , Rfl:     ipratropium-albuterol (DUO-NEB) 0.5-2.5 mg/3 ml nebulizer, Take 3 mL by nebulization 4 (Four) Times a Day As Needed for Wheezing or Shortness of Air., Disp: 120 mL, Rfl: 5    Lidocaine Viscous HCl (XYLOCAINE) 2 % solution, Take 10 mL by mouth As Needed for Mild Pain (Take prior to meals up to 3-4 times per day to help with pain with swallowing)., Disp: 600 mL, Rfl: 4    lidocaine-prilocaine (EMLA) 2.5-2.5 % cream, Apply 1 Application topically to the appropriate area as directed See Admin Instructions. Apply one hour prior to port access, Disp: 30 g, Rfl: 3    OLANZapine (zyPREXA) 5 MG tablet, Take 1 tablet by mouth Every Night. Take nightly x 4 starting night of chemotherapy., Disp: 4 tablet, Rfl: 3    ondansetron (ZOFRAN) 8 MG tablet, Take 1 tablet by mouth 3 (Three) Times a Day As Needed for Nausea or Vomiting., Disp: 30 tablet, Rfl: 5    sucralfate (Carafate) 1 GM/10ML suspension, Take 10 mL by mouth 4 (Four) Times a Day As Needed (For pain and discomfort with swallowing)., Disp: 600 mL, Rfl: 4    docusate sodium 100 MG capsule, Take 1 capsule by mouth As Needed. (Patient not taking: Reported on 10/23/2024), Disp: , Rfl:     isosorbide mononitrate (IMDUR) 30 MG 24 hr tablet, Take 1 tablet by mouth Daily., Disp: , Rfl:     nitroglycerin (NITROSTAT) 0.4 MG SL tablet, Place 1 tablet under the tongue. (Patient not taking: Reported on 10/23/2024), Disp: , Rfl:   No current facility-administered medications for this visit.    Facility-Administered  Medications Ordered in Other Visits:     heparin injection 500 Units, 500 Units, Intravenous, PRN, Wilbert Delgado MD    sodium chloride 0.9 % flush 10 mL, 10 mL, Intravenous, PRN, Wilbert Delgado MD    Allergies   Allergen Reactions    Morphine Hives and Shortness Of Breath    Codeine Hives    Sulfa Antibiotics Hives     Family History   Problem Relation Age of Onset    Breast cancer Mother 62    Heart attack Mother     Lung cancer Sister     Throat cancer Sister     Lung cancer Brother      Cancer-related family history includes Breast cancer (age of onset: 62) in her mother; Lung cancer in her brother and sister; Throat cancer in her sister.    Social History     Tobacco Use    Smoking status: Every Day     Current packs/day: 1.00     Average packs/day: 1 pack/day for 54.8 years (54.8 ttl pk-yrs)     Types: Cigarettes     Start date: 1970     Passive exposure: Current    Smokeless tobacco: Never   Vaping Use    Vaping status: Never Used   Substance Use Topics    Alcohol use: Never    Drug use: Never     Social History     Social History Narrative    Not on file     ROS:   Review of Systems   Constitutional:  Positive for fatigue. Negative for activity change, appetite change, chills, diaphoresis, fever and unexpected weight change.   HENT:  Negative for congestion, dental problem, drooling, ear discharge, ear pain, facial swelling, hearing loss, mouth sores, nosebleeds, postnasal drip, rhinorrhea, sinus pressure, sinus pain, sneezing, sore throat, tinnitus, trouble swallowing and voice change.    Eyes:  Negative for photophobia, pain, discharge, redness, itching and visual disturbance.   Respiratory:  Positive for cough and shortness of breath. Negative for apnea, choking, chest tightness, wheezing and stridor.    Cardiovascular:  Positive for chest pain. Negative for palpitations and leg swelling.   Gastrointestinal:  Negative for abdominal distention, abdominal pain, anal bleeding, blood in stool,  "constipation, diarrhea, nausea, rectal pain and vomiting.   Endocrine: Negative for cold intolerance, heat intolerance, polydipsia and polyuria.   Genitourinary:  Negative for decreased urine volume, difficulty urinating, dysuria, flank pain, frequency, genital sores, hematuria and urgency.   Musculoskeletal:  Negative for arthralgias, back pain, gait problem, joint swelling, myalgias, neck pain and neck stiffness.   Skin:  Negative for color change, pallor and rash.   Neurological:  Negative for dizziness, tremors, seizures, syncope, facial asymmetry, speech difficulty, weakness, light-headedness, numbness and headaches.   Hematological:  Negative for adenopathy. Does not bruise/bleed easily.   Psychiatric/Behavioral:  Negative for agitation, behavioral problems, confusion, decreased concentration, hallucinations, self-injury, sleep disturbance and suicidal ideas. The patient is not nervous/anxious.      Objective:    Vital Signs:  Vitals:    10/23/24 1017   BP: 106/63   Pulse: 90   Resp: 17   Temp: 97.8 °F (36.6 °C)   SpO2: 96%   Weight: 63.5 kg (140 lb)   Height: 170.2 cm (67\")   PainSc: 0-No pain     Body mass index is 21.93 kg/m².    ECOG  (1) Restricted in physically strenuous activity, ambulatory and able to do work of light nature    Physical Exam:   Physical Exam  Constitutional:       General: She is not in acute distress.     Appearance: She is ill-appearing. She is not toxic-appearing or diaphoretic.      Comments: Well-built, slender and well oriented woman.  She appears chronically ill but is not in acute distress.   HENT:      Head: Normocephalic and atraumatic.      Right Ear: External ear normal.      Left Ear: External ear normal.      Nose: Nose normal.      Mouth/Throat:      Mouth: Mucous membranes are moist.      Pharynx: Oropharynx is clear. No oropharyngeal exudate or posterior oropharyngeal erythema.   Eyes:      General: No scleral icterus.        Right eye: No discharge.         Left eye: " No discharge.      Conjunctiva/sclera: Conjunctivae normal.      Pupils: Pupils are equal, round, and reactive to light.   Cardiovascular:      Rate and Rhythm: Normal rate and regular rhythm.      Pulses: Normal pulses.      Heart sounds: No murmur heard.     No friction rub. No gallop.   Pulmonary:      Effort: No respiratory distress.      Breath sounds: No stridor. No wheezing, rhonchi or rales.      Comments: Breath sounds markedly diminished bilaterally.  Abdominal:      General: Abdomen is flat. Bowel sounds are normal. There is no distension.      Palpations: Abdomen is soft. There is no mass.      Tenderness: There is no abdominal tenderness. There is no right CVA tenderness, left CVA tenderness, guarding or rebound.      Hernia: No hernia is present.   Musculoskeletal:         General: No tenderness, deformity or signs of injury.      Cervical back: No rigidity.      Right lower leg: No edema.      Left lower leg: No edema.   Lymphadenopathy:      Cervical: No cervical adenopathy.   Skin:     Coloration: Skin is not jaundiced or pale.      Findings: No bruising, lesion or rash.   Neurological:      General: No focal deficit present.      Mental Status: She is alert and oriented to person, place, and time.      Cranial Nerves: No cranial nerve deficit.   Psychiatric:         Mood and Affect: Mood normal.         Behavior: Behavior normal.         Thought Content: Thought content normal.         Judgment: Judgment normal.     NATHALIE Delgado MD performed the physical exam on 10/23/2024 as documented above.     Lab Results - Last 18 Months   Lab Units 10/23/24  0944 10/10/24  1355 10/02/24  1047   WBC 10*3/mm3 3.16* 10.62 10.22   HEMOGLOBIN g/dL 12.6 14.8 14.4   HEMATOCRIT % 40.0 48.1* 44.7   PLATELETS 10*3/mm3 83* 238 192   MCV fL 101.0* 102.6* 100.7*     Lab Results - Last 18 Months   Lab Units 09/27/24  0940   INR  1.02   APTT seconds 25.5     Lab Results   Component Value Date    PTT 25.5 09/27/2024     INR 1.02 09/27/2024     Assessment & Plan     1.  Limited stage small cell lung cancer: Limited stage small cell carcinoma ([id number]) of the right lung. Started concurrent chemotherapy and radiation.   2.  Coronary artery disease: Has recently undergone percutaneous angioplasty.  On antiplatelet agents at this time.  3.  Chronic obstructive pulmonary disease  4.  Cigarette smoker: Discussed with her again.   5.  Reviewed the notes from Radiation Oncology. Reviewed all the laboratory exams. Discussed with her.   6.  She is to see me in 4 weeks. Continue with the same treatment.     Wilbert Delgado MD on 10/23/2024 at 12:35 PM.

## 2024-10-21 NOTE — PROGRESS NOTES
[facility] RADIATION ONCOLOGY  ON-TREATMENT VISIT NOTE    NAME: Kandi Fuentes  YOB: 1960  MRN #: [id number]  DATE OF SERVICE: 10/21/2024  PRESCRIBING PHYSICIAN: Tereso Abarca MD     DIAGNOSIS:      ICD-10-CM ICD-9-CM   1. 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  C34.11 162.3      RADIATION THERAPY VISIT:  Continue radiation therapy, Dosimetry plan remains acceptable, Films reviewed and remains acceptable, Pain assessed, Pain management planned, Radiation dose schedule reviewed and remains acceptable, Radiation technique remains acceptable, and Symptoms within expected range    Radiation Treatments       Active   Plans   FB RtLung   Most recent treatment: Dose planned: 200 cGy (fraction 6 on 10/21/2024)   Total: Dose planned: 6,000 cGy (30 fractions)   Elapsed Days: 7      Reference Points   RtLung   Most recent treatment: Dose given: 200 cGy (on 10/21/2024)   Total: Dose given: 1,200 cGy   Elapsed Days: 7                    [x] Concurrent Chemo   Regimen:  Carboplatin/ Etoposide     PHYSICAL ASSESSMENT         Vitals:    10/21/24 1237   BP: 143/81   Pulse: 81   Resp: 18   Temp: 97.4 °F (36.3 °C)   SpO2: 99%      Wt Readings from Last 3 Encounters:   10/21/24 63 kg (138 lb 12.8 oz)   10/16/24 64.4 kg (142 lb)   10/15/24 64.1 kg (141 lb 6.4 oz)     Restricted in physically strenuous activity but ambulatory and able to carry out work of a light or sedentary nature, e.g., light house work, office work = 1    We examined the relevant areas: yes  Findings are within the expected range for this stage of treatment: yes    ACTION ITEMS     Patient tolerating treatment well and as expected for this stage in their treatment and Continue radiation therapy as planned    Estimated Completion Date: 11/25/2024    Anticipatory guidance provided.    Referring to Dr. Mcmahon for breathing tightness at night.     Starting viscous lidocaine and carafate.       Tereso Abarca MD   Radiation  Oncology  [facility]

## 2024-10-22 ENCOUNTER — HOSPITAL ENCOUNTER (OUTPATIENT)
Dept: RADIATION ONCOLOGY | Facility: HOSPITAL | Age: 64
Discharge: HOME | End: 2024-10-22

## 2024-10-22 LAB
RAD ONC ARIA COURSE ID: NORMAL
RAD ONC ARIA COURSE LAST TREATMENT DATE: NORMAL
RAD ONC ARIA COURSE START DATE: NORMAL
RAD ONC ARIA COURSE TREATMENT ELAPSED DAYS: 8
RAD ONC ARIA FIRST TREATMENT DATE: NORMAL
RAD ONC ARIA PLAN FRACTIONS TREATED TO DATE: 7
RAD ONC ARIA PLAN ID: NORMAL
RAD ONC ARIA PLAN PRESCRIBED DOSE PER FRACTION: 2 GY
RAD ONC ARIA PLAN PRIMARY REFERENCE POINT: NORMAL
RAD ONC ARIA PLAN TOTAL FRACTIONS PRESCRIBED: 30
RAD ONC ARIA PLAN TOTAL PRESCRIBED DOSE: 6000 CGY
RAD ONC ARIA REFERENCE POINT DOSAGE GIVEN TO DATE: 14 GY
RAD ONC ARIA REFERENCE POINT ID: NORMAL
RAD ONC ARIA REFERENCE POINT SESSION DOSAGE GIVEN: 2 GY

## 2024-10-22 PROCEDURE — 77014 CHG CT GUIDANCE RADIATION THERAPY FLDS PLACEMENT: CPT | Performed by: INTERNAL MEDICINE

## 2024-10-22 PROCEDURE — 77386: CPT | Performed by: INTERNAL MEDICINE

## 2024-10-23 ENCOUNTER — APPOINTMENT (OUTPATIENT)
Dept: LAB | Facility: HOSPITAL | Age: 64
End: 2024-10-23
Payer: COMMERCIAL

## 2024-10-23 ENCOUNTER — OFFICE VISIT (OUTPATIENT)
Dept: ONCOLOGY | Facility: CLINIC | Age: 64
End: 2024-10-23
Payer: COMMERCIAL

## 2024-10-23 ENCOUNTER — HOSPITAL ENCOUNTER (OUTPATIENT)
Dept: RADIATION ONCOLOGY | Facility: HOSPITAL | Age: 64
Discharge: HOME | End: 2024-10-23

## 2024-10-23 ENCOUNTER — HOSPITAL ENCOUNTER (OUTPATIENT)
Dept: ONCOLOGY | Facility: HOSPITAL | Age: 64
Discharge: HOME | End: 2024-10-23
Admitting: INTERNAL MEDICINE
Payer: COMMERCIAL

## 2024-10-23 VITALS
DIASTOLIC BLOOD PRESSURE: 63 MMHG | HEART RATE: 90 BPM | BODY MASS INDEX: 21.97 KG/M2 | OXYGEN SATURATION: 96 % | SYSTOLIC BLOOD PRESSURE: 106 MMHG | HEIGHT: 67 IN | WEIGHT: 140 LBS | TEMPERATURE: 97.8 F | RESPIRATION RATE: 17 BRPM

## 2024-10-23 DIAGNOSIS — C34.11 SMALL CELL CARCINOMA OF UPPER LOBE OF RIGHT LUNG: Primary | ICD-10-CM

## 2024-10-23 DIAGNOSIS — R91.8 LUNG MASS: ICD-10-CM

## 2024-10-23 LAB
BASOPHILS # BLD AUTO: 0 10*3/MM3 (ref 0–0.2)
BASOPHILS NFR BLD AUTO: 0 % (ref 0–1.5)
DEPRECATED RDW RBC AUTO: 44.9 FL (ref 37–54)
EOSINOPHIL # BLD AUTO: 0.05 10*3/MM3 (ref 0–0.4)
EOSINOPHIL NFR BLD AUTO: 1.6 % (ref 0.3–6.2)
ERYTHROCYTE [DISTWIDTH] IN BLOOD BY AUTOMATED COUNT: 12.2 % (ref 12.3–15.4)
HCT VFR BLD AUTO: 40 % (ref 34–46.6)
HGB BLD-MCNC: 12.6 G/DL (ref 12–15.9)
HOLD SPECIMEN: NORMAL
LYMPHOCYTES # BLD AUTO: 2.21 10*3/MM3 (ref 0.7–3.1)
LYMPHOCYTES NFR BLD AUTO: 69.9 % (ref 19.6–45.3)
MCH RBC QN AUTO: 31.8 PG (ref 26.6–33)
MCHC RBC AUTO-ENTMCNC: 31.5 G/DL (ref 31.5–35.7)
MCV RBC AUTO: 101 FL (ref 79–97)
MONOCYTES # BLD AUTO: 0.02 10*3/MM3 (ref 0.1–0.9)
MONOCYTES NFR BLD AUTO: 0.6 % (ref 5–12)
NEUTROPHILS NFR BLD AUTO: 0.88 10*3/MM3 (ref 1.7–7)
NEUTROPHILS NFR BLD AUTO: 27.9 % (ref 42.7–76)
PLATELET # BLD AUTO: 83 10*3/MM3 (ref 140–450)
PMV BLD AUTO: 11 FL (ref 6–12)
RAD ONC ARIA COURSE ID: NORMAL
RAD ONC ARIA COURSE LAST TREATMENT DATE: NORMAL
RAD ONC ARIA COURSE START DATE: NORMAL
RAD ONC ARIA COURSE TREATMENT ELAPSED DAYS: 9
RAD ONC ARIA FIRST TREATMENT DATE: NORMAL
RAD ONC ARIA PLAN FRACTIONS TREATED TO DATE: 8
RAD ONC ARIA PLAN ID: NORMAL
RAD ONC ARIA PLAN PRESCRIBED DOSE PER FRACTION: 2 GY
RAD ONC ARIA PLAN PRIMARY REFERENCE POINT: NORMAL
RAD ONC ARIA PLAN TOTAL FRACTIONS PRESCRIBED: 30
RAD ONC ARIA PLAN TOTAL PRESCRIBED DOSE: 6000 CGY
RAD ONC ARIA REFERENCE POINT DOSAGE GIVEN TO DATE: 16 GY
RAD ONC ARIA REFERENCE POINT ID: NORMAL
RAD ONC ARIA REFERENCE POINT SESSION DOSAGE GIVEN: 2 GY
RBC # BLD AUTO: 3.96 10*6/MM3 (ref 3.77–5.28)
WBC NRBC COR # BLD AUTO: 3.16 10*3/MM3 (ref 3.4–10.8)

## 2024-10-23 PROCEDURE — 36591 DRAW BLOOD OFF VENOUS DEVICE: CPT

## 2024-10-23 PROCEDURE — 77386: CPT | Performed by: INTERNAL MEDICINE

## 2024-10-23 PROCEDURE — 85025 COMPLETE CBC W/AUTO DIFF WBC: CPT | Performed by: INTERNAL MEDICINE

## 2024-10-23 PROCEDURE — 77014 CHG CT GUIDANCE RADIATION THERAPY FLDS PLACEMENT: CPT | Performed by: INTERNAL MEDICINE

## 2024-10-23 PROCEDURE — 36415 COLL VENOUS BLD VENIPUNCTURE: CPT

## 2024-10-23 PROCEDURE — 96523 IRRIG DRUG DELIVERY DEVICE: CPT

## 2024-10-23 RX ORDER — SODIUM CHLORIDE 0.9 % (FLUSH) 0.9 %
10 SYRINGE (ML) INJECTION AS NEEDED
OUTPATIENT
Start: 2024-10-23

## 2024-10-23 RX ORDER — HEPARIN SODIUM (PORCINE) LOCK FLUSH IV SOLN 100 UNIT/ML 100 UNIT/ML
500 SOLUTION INTRAVENOUS AS NEEDED
Status: DISCONTINUED | OUTPATIENT
Start: 2024-10-23 | End: 2024-10-24 | Stop reason: HOSPADM

## 2024-10-23 RX ORDER — SODIUM CHLORIDE 0.9 % (FLUSH) 0.9 %
10 SYRINGE (ML) INJECTION AS NEEDED
Status: DISCONTINUED | OUTPATIENT
Start: 2024-10-23 | End: 2024-10-24 | Stop reason: HOSPADM

## 2024-10-23 RX ORDER — HEPARIN SODIUM (PORCINE) LOCK FLUSH IV SOLN 100 UNIT/ML 100 UNIT/ML
500 SOLUTION INTRAVENOUS AS NEEDED
OUTPATIENT
Start: 2024-10-23

## 2024-10-23 NOTE — PROGRESS NOTES
Using sterile technique, port accessed for blood collection. Alves needle set malfunctioned after insertion, unable to waste 10 ml blood prior to collecting blood for labs. Port de-accessed and pt sent to lab for blood collection via venipuncture per her request.

## 2024-10-24 ENCOUNTER — HOSPITAL ENCOUNTER (OUTPATIENT)
Dept: RADIATION ONCOLOGY | Facility: HOSPITAL | Age: 64
Discharge: HOME | End: 2024-10-24

## 2024-10-24 LAB
RAD ONC ARIA COURSE ID: NORMAL
RAD ONC ARIA COURSE LAST TREATMENT DATE: NORMAL
RAD ONC ARIA COURSE START DATE: NORMAL
RAD ONC ARIA COURSE TREATMENT ELAPSED DAYS: 10
RAD ONC ARIA FIRST TREATMENT DATE: NORMAL
RAD ONC ARIA PLAN FRACTIONS TREATED TO DATE: 9
RAD ONC ARIA PLAN ID: NORMAL
RAD ONC ARIA PLAN PRESCRIBED DOSE PER FRACTION: 2 GY
RAD ONC ARIA PLAN PRIMARY REFERENCE POINT: NORMAL
RAD ONC ARIA PLAN TOTAL FRACTIONS PRESCRIBED: 30
RAD ONC ARIA PLAN TOTAL PRESCRIBED DOSE: 6000 CGY
RAD ONC ARIA REFERENCE POINT DOSAGE GIVEN TO DATE: 18 GY
RAD ONC ARIA REFERENCE POINT ID: NORMAL
RAD ONC ARIA REFERENCE POINT SESSION DOSAGE GIVEN: 2 GY

## 2024-10-24 PROCEDURE — 77386: CPT | Performed by: INTERNAL MEDICINE

## 2024-10-24 PROCEDURE — 77014 CHG CT GUIDANCE RADIATION THERAPY FLDS PLACEMENT: CPT | Performed by: INTERNAL MEDICINE

## 2024-10-24 PROCEDURE — 77336 RADIATION PHYSICS CONSULT: CPT | Performed by: INTERNAL MEDICINE

## 2024-10-25 ENCOUNTER — HOSPITAL ENCOUNTER (OUTPATIENT)
Dept: RADIATION ONCOLOGY | Facility: HOSPITAL | Age: 64
Discharge: HOME | End: 2024-10-25

## 2024-10-25 LAB
RAD ONC ARIA COURSE ID: NORMAL
RAD ONC ARIA COURSE LAST TREATMENT DATE: NORMAL
RAD ONC ARIA COURSE START DATE: NORMAL
RAD ONC ARIA COURSE TREATMENT ELAPSED DAYS: 11
RAD ONC ARIA FIRST TREATMENT DATE: NORMAL
RAD ONC ARIA PLAN FRACTIONS TREATED TO DATE: 10
RAD ONC ARIA PLAN ID: NORMAL
RAD ONC ARIA PLAN PRESCRIBED DOSE PER FRACTION: 2 GY
RAD ONC ARIA PLAN PRIMARY REFERENCE POINT: NORMAL
RAD ONC ARIA PLAN TOTAL FRACTIONS PRESCRIBED: 30
RAD ONC ARIA PLAN TOTAL PRESCRIBED DOSE: 6000 CGY
RAD ONC ARIA REFERENCE POINT DOSAGE GIVEN TO DATE: 20 GY
RAD ONC ARIA REFERENCE POINT ID: NORMAL
RAD ONC ARIA REFERENCE POINT SESSION DOSAGE GIVEN: 2 GY

## 2024-10-25 PROCEDURE — 77014 CHG CT GUIDANCE RADIATION THERAPY FLDS PLACEMENT: CPT | Performed by: INTERNAL MEDICINE

## 2024-10-25 PROCEDURE — 77386: CPT | Performed by: INTERNAL MEDICINE

## 2024-10-28 ENCOUNTER — RADIATION ONCOLOGY WEEKLY ASSESSMENT (OUTPATIENT)
Dept: RADIATION ONCOLOGY | Facility: HOSPITAL | Age: 64
End: 2024-10-28
Payer: COMMERCIAL

## 2024-10-28 ENCOUNTER — HOSPITAL ENCOUNTER (OUTPATIENT)
Dept: RADIATION ONCOLOGY | Facility: HOSPITAL | Age: 64
Discharge: HOME | End: 2024-10-28
Payer: COMMERCIAL

## 2024-10-28 VITALS
RESPIRATION RATE: 18 BRPM | TEMPERATURE: 97.4 F | OXYGEN SATURATION: 98 % | DIASTOLIC BLOOD PRESSURE: 59 MMHG | HEART RATE: 89 BPM | WEIGHT: 141.6 LBS | HEIGHT: 67 IN | SYSTOLIC BLOOD PRESSURE: 91 MMHG | BODY MASS INDEX: 22.22 KG/M2

## 2024-10-28 DIAGNOSIS — C34.11 SMALL CELL CARCINOMA OF UPPER LOBE OF RIGHT LUNG: Primary | ICD-10-CM

## 2024-10-28 LAB
RAD ONC ARIA COURSE ID: NORMAL
RAD ONC ARIA COURSE LAST TREATMENT DATE: NORMAL
RAD ONC ARIA COURSE START DATE: NORMAL
RAD ONC ARIA COURSE TREATMENT ELAPSED DAYS: 14
RAD ONC ARIA FIRST TREATMENT DATE: NORMAL
RAD ONC ARIA PLAN FRACTIONS TREATED TO DATE: 11
RAD ONC ARIA PLAN ID: NORMAL
RAD ONC ARIA PLAN PRESCRIBED DOSE PER FRACTION: 2 GY
RAD ONC ARIA PLAN PRIMARY REFERENCE POINT: NORMAL
RAD ONC ARIA PLAN TOTAL FRACTIONS PRESCRIBED: 30
RAD ONC ARIA PLAN TOTAL PRESCRIBED DOSE: 6000 CGY
RAD ONC ARIA REFERENCE POINT DOSAGE GIVEN TO DATE: 22 GY
RAD ONC ARIA REFERENCE POINT ID: NORMAL
RAD ONC ARIA REFERENCE POINT SESSION DOSAGE GIVEN: 2 GY

## 2024-10-28 PROCEDURE — 77386: CPT | Performed by: INTERNAL MEDICINE

## 2024-10-28 PROCEDURE — 77014 CHG CT GUIDANCE RADIATION THERAPY FLDS PLACEMENT: CPT | Performed by: INTERNAL MEDICINE

## 2024-10-28 PROCEDURE — 77427 RADIATION TX MANAGEMENT X5: CPT | Performed by: INTERNAL MEDICINE

## 2024-10-29 ENCOUNTER — HOSPITAL ENCOUNTER (OUTPATIENT)
Dept: RADIATION ONCOLOGY | Facility: HOSPITAL | Age: 64
Discharge: HOME | End: 2024-10-29

## 2024-10-29 DIAGNOSIS — C34.11 SMALL CELL CARCINOMA OF UPPER LOBE OF RIGHT LUNG: Primary | ICD-10-CM

## 2024-10-29 LAB
RAD ONC ARIA COURSE ID: NORMAL
RAD ONC ARIA COURSE LAST TREATMENT DATE: NORMAL
RAD ONC ARIA COURSE START DATE: NORMAL
RAD ONC ARIA COURSE TREATMENT ELAPSED DAYS: 15
RAD ONC ARIA FIRST TREATMENT DATE: NORMAL
RAD ONC ARIA PLAN FRACTIONS TREATED TO DATE: 12
RAD ONC ARIA PLAN ID: NORMAL
RAD ONC ARIA PLAN PRESCRIBED DOSE PER FRACTION: 2 GY
RAD ONC ARIA PLAN PRIMARY REFERENCE POINT: NORMAL
RAD ONC ARIA PLAN TOTAL FRACTIONS PRESCRIBED: 30
RAD ONC ARIA PLAN TOTAL PRESCRIBED DOSE: 6000 CGY
RAD ONC ARIA REFERENCE POINT DOSAGE GIVEN TO DATE: 24 GY
RAD ONC ARIA REFERENCE POINT ID: NORMAL
RAD ONC ARIA REFERENCE POINT SESSION DOSAGE GIVEN: 2 GY

## 2024-10-29 PROCEDURE — 77014 CHG CT GUIDANCE RADIATION THERAPY FLDS PLACEMENT: CPT | Performed by: INTERNAL MEDICINE

## 2024-10-29 PROCEDURE — 77386: CPT | Performed by: INTERNAL MEDICINE

## 2024-10-30 ENCOUNTER — HOSPITAL ENCOUNTER (OUTPATIENT)
Dept: RADIATION ONCOLOGY | Facility: HOSPITAL | Age: 64
Discharge: HOME | End: 2024-10-30

## 2024-10-30 ENCOUNTER — LAB (OUTPATIENT)
Dept: LAB | Facility: HOSPITAL | Age: 64
End: 2024-10-30
Payer: COMMERCIAL

## 2024-10-30 DIAGNOSIS — C34.11 SMALL CELL CARCINOMA OF UPPER LOBE OF RIGHT LUNG: Primary | ICD-10-CM

## 2024-10-30 LAB
BASOPHILS # BLD AUTO: 0.01 10*3/MM3 (ref 0–0.2)
BASOPHILS NFR BLD AUTO: 0.4 % (ref 0–1.5)
DEPRECATED RDW RBC AUTO: 43.6 FL (ref 37–54)
EOSINOPHIL # BLD AUTO: 0 10*3/MM3 (ref 0–0.4)
EOSINOPHIL NFR BLD AUTO: 0 % (ref 0.3–6.2)
ERYTHROCYTE [DISTWIDTH] IN BLOOD BY AUTOMATED COUNT: 12.2 % (ref 12.3–15.4)
HCT VFR BLD AUTO: 36.3 % (ref 34–46.6)
HGB BLD-MCNC: 11.4 G/DL (ref 12–15.9)
HOLD SPECIMEN: NORMAL
HOLD SPECIMEN: NORMAL
LYMPHOCYTES # BLD AUTO: 1.49 10*3/MM3 (ref 0.7–3.1)
LYMPHOCYTES NFR BLD AUTO: 65.6 % (ref 19.6–45.3)
MCH RBC QN AUTO: 31.5 PG (ref 26.6–33)
MCHC RBC AUTO-ENTMCNC: 31.4 G/DL (ref 31.5–35.7)
MCV RBC AUTO: 100.3 FL (ref 79–97)
MONOCYTES # BLD AUTO: 0.66 10*3/MM3 (ref 0.1–0.9)
MONOCYTES NFR BLD AUTO: 29.1 % (ref 5–12)
NEUTROPHILS NFR BLD AUTO: 0.11 10*3/MM3 (ref 1.7–7)
NEUTROPHILS NFR BLD AUTO: 4.9 % (ref 42.7–76)
PLATELET # BLD AUTO: 84 10*3/MM3 (ref 140–450)
PMV BLD AUTO: 10.3 FL (ref 6–12)
RAD ONC ARIA COURSE ID: NORMAL
RAD ONC ARIA COURSE LAST TREATMENT DATE: NORMAL
RAD ONC ARIA COURSE START DATE: NORMAL
RAD ONC ARIA COURSE TREATMENT ELAPSED DAYS: 16
RAD ONC ARIA FIRST TREATMENT DATE: NORMAL
RAD ONC ARIA PLAN FRACTIONS TREATED TO DATE: 13
RAD ONC ARIA PLAN ID: NORMAL
RAD ONC ARIA PLAN PRESCRIBED DOSE PER FRACTION: 2 GY
RAD ONC ARIA PLAN PRIMARY REFERENCE POINT: NORMAL
RAD ONC ARIA PLAN TOTAL FRACTIONS PRESCRIBED: 30
RAD ONC ARIA PLAN TOTAL PRESCRIBED DOSE: 6000 CGY
RAD ONC ARIA REFERENCE POINT DOSAGE GIVEN TO DATE: 26 GY
RAD ONC ARIA REFERENCE POINT ID: NORMAL
RAD ONC ARIA REFERENCE POINT SESSION DOSAGE GIVEN: 2 GY
RBC # BLD AUTO: 3.62 10*6/MM3 (ref 3.77–5.28)
WBC NRBC COR # BLD AUTO: 2.27 10*3/MM3 (ref 3.4–10.8)

## 2024-10-30 PROCEDURE — 36415 COLL VENOUS BLD VENIPUNCTURE: CPT

## 2024-10-30 PROCEDURE — 85025 COMPLETE CBC W/AUTO DIFF WBC: CPT

## 2024-10-30 PROCEDURE — 77014 CHG CT GUIDANCE RADIATION THERAPY FLDS PLACEMENT: CPT | Performed by: INTERNAL MEDICINE

## 2024-10-30 PROCEDURE — 77386: CPT | Performed by: INTERNAL MEDICINE

## 2024-10-31 ENCOUNTER — HOSPITAL ENCOUNTER (OUTPATIENT)
Dept: RADIATION ONCOLOGY | Facility: HOSPITAL | Age: 64
Discharge: HOME | End: 2024-10-31

## 2024-10-31 DIAGNOSIS — C34.11 SMALL CELL CARCINOMA OF UPPER LOBE OF RIGHT LUNG: Primary | ICD-10-CM

## 2024-10-31 LAB
RAD ONC ARIA COURSE ID: NORMAL
RAD ONC ARIA COURSE LAST TREATMENT DATE: NORMAL
RAD ONC ARIA COURSE START DATE: NORMAL
RAD ONC ARIA COURSE TREATMENT ELAPSED DAYS: 17
RAD ONC ARIA FIRST TREATMENT DATE: NORMAL
RAD ONC ARIA PLAN FRACTIONS TREATED TO DATE: 14
RAD ONC ARIA PLAN ID: NORMAL
RAD ONC ARIA PLAN PRESCRIBED DOSE PER FRACTION: 2 GY
RAD ONC ARIA PLAN PRIMARY REFERENCE POINT: NORMAL
RAD ONC ARIA PLAN TOTAL FRACTIONS PRESCRIBED: 30
RAD ONC ARIA PLAN TOTAL PRESCRIBED DOSE: 6000 CGY
RAD ONC ARIA REFERENCE POINT DOSAGE GIVEN TO DATE: 28 GY
RAD ONC ARIA REFERENCE POINT ID: NORMAL
RAD ONC ARIA REFERENCE POINT SESSION DOSAGE GIVEN: 2 GY

## 2024-10-31 PROCEDURE — 77386: CPT | Performed by: INTERNAL MEDICINE

## 2024-10-31 PROCEDURE — 77336 RADIATION PHYSICS CONSULT: CPT | Performed by: INTERNAL MEDICINE

## 2024-10-31 PROCEDURE — 77014 CHG CT GUIDANCE RADIATION THERAPY FLDS PLACEMENT: CPT | Performed by: INTERNAL MEDICINE

## 2024-11-01 ENCOUNTER — HOSPITAL ENCOUNTER (OUTPATIENT)
Dept: RADIATION ONCOLOGY | Facility: HOSPITAL | Age: 64
Discharge: HOME | End: 2024-11-01

## 2024-11-01 ENCOUNTER — HOSPITAL ENCOUNTER (OUTPATIENT)
Dept: RADIATION ONCOLOGY | Facility: HOSPITAL | Age: 64
Setting detail: RADIATION/ONCOLOGY SERIES
End: 2024-11-01
Payer: COMMERCIAL

## 2024-11-01 LAB
RAD ONC ARIA COURSE ID: NORMAL
RAD ONC ARIA COURSE LAST TREATMENT DATE: NORMAL
RAD ONC ARIA COURSE START DATE: NORMAL
RAD ONC ARIA COURSE TREATMENT ELAPSED DAYS: 18
RAD ONC ARIA FIRST TREATMENT DATE: NORMAL
RAD ONC ARIA PLAN FRACTIONS TREATED TO DATE: 15
RAD ONC ARIA PLAN ID: NORMAL
RAD ONC ARIA PLAN PRESCRIBED DOSE PER FRACTION: 2 GY
RAD ONC ARIA PLAN PRIMARY REFERENCE POINT: NORMAL
RAD ONC ARIA PLAN TOTAL FRACTIONS PRESCRIBED: 30
RAD ONC ARIA PLAN TOTAL PRESCRIBED DOSE: 6000 CGY
RAD ONC ARIA REFERENCE POINT DOSAGE GIVEN TO DATE: 30 GY
RAD ONC ARIA REFERENCE POINT ID: NORMAL
RAD ONC ARIA REFERENCE POINT SESSION DOSAGE GIVEN: 2 GY

## 2024-11-01 PROCEDURE — 77014 CHG CT GUIDANCE RADIATION THERAPY FLDS PLACEMENT: CPT | Performed by: INTERNAL MEDICINE

## 2024-11-01 PROCEDURE — 77386: CPT | Performed by: INTERNAL MEDICINE

## 2024-11-04 ENCOUNTER — HOSPITAL ENCOUNTER (OUTPATIENT)
Dept: RADIATION ONCOLOGY | Facility: HOSPITAL | Age: 64
Discharge: HOME | End: 2024-11-04
Payer: COMMERCIAL

## 2024-11-04 ENCOUNTER — HOSPITAL ENCOUNTER (OUTPATIENT)
Dept: ONCOLOGY | Facility: HOSPITAL | Age: 64
Discharge: HOME | End: 2024-11-04
Admitting: NURSE PRACTITIONER
Payer: COMMERCIAL

## 2024-11-04 VITALS
SYSTOLIC BLOOD PRESSURE: 109 MMHG | DIASTOLIC BLOOD PRESSURE: 67 MMHG | HEART RATE: 99 BPM | BODY MASS INDEX: 22.1 KG/M2 | TEMPERATURE: 97.3 F | HEIGHT: 67 IN | RESPIRATION RATE: 16 BRPM | WEIGHT: 140.8 LBS | OXYGEN SATURATION: 96 %

## 2024-11-04 DIAGNOSIS — C34.11 SMALL CELL CARCINOMA OF UPPER LOBE OF RIGHT LUNG: Primary | ICD-10-CM

## 2024-11-04 DIAGNOSIS — R91.8 LUNG MASS: ICD-10-CM

## 2024-11-04 LAB
ALBUMIN SERPL-MCNC: 3.8 G/DL (ref 3.5–5.2)
ALBUMIN/GLOB SERPL: 1.3 G/DL
ALP SERPL-CCNC: 86 U/L (ref 39–117)
ALT SERPL W P-5'-P-CCNC: 12 U/L (ref 1–33)
ANION GAP SERPL CALCULATED.3IONS-SCNC: 10 MMOL/L (ref 5–15)
AST SERPL-CCNC: 16 U/L (ref 1–32)
BASOPHILS # BLD AUTO: 0.02 10*3/MM3 (ref 0–0.2)
BASOPHILS NFR BLD AUTO: 0.3 % (ref 0–1.5)
BILIRUB SERPL-MCNC: 0.3 MG/DL (ref 0–1.2)
BUN SERPL-MCNC: 9 MG/DL (ref 8–23)
BUN/CREAT SERPL: 15.8 (ref 7–25)
CALCIUM SPEC-SCNC: 9.4 MG/DL (ref 8.6–10.5)
CHLORIDE SERPL-SCNC: 97 MMOL/L (ref 98–107)
CO2 SERPL-SCNC: 29 MMOL/L (ref 22–29)
CREAT SERPL-MCNC: 0.57 MG/DL (ref 0.57–1)
DEPRECATED RDW RBC AUTO: 45.2 FL (ref 37–54)
EGFRCR SERPLBLD CKD-EPI 2021: 102.3 ML/MIN/1.73
EOSINOPHIL # BLD AUTO: 0 10*3/MM3 (ref 0–0.4)
EOSINOPHIL NFR BLD AUTO: 0 % (ref 0.3–6.2)
ERYTHROCYTE [DISTWIDTH] IN BLOOD BY AUTOMATED COUNT: 12.7 % (ref 12.3–15.4)
GLOBULIN UR ELPH-MCNC: 3 GM/DL
GLUCOSE SERPL-MCNC: 111 MG/DL (ref 65–99)
HCT VFR BLD AUTO: 37.5 % (ref 34–46.6)
HGB BLD-MCNC: 11.4 G/DL (ref 12–15.9)
LYMPHOCYTES # BLD AUTO: 1.1 10*3/MM3 (ref 0.7–3.1)
LYMPHOCYTES NFR BLD AUTO: 13.9 % (ref 19.6–45.3)
MCH RBC QN AUTO: 30.6 PG (ref 26.6–33)
MCHC RBC AUTO-ENTMCNC: 30.4 G/DL (ref 31.5–35.7)
MCV RBC AUTO: 100.8 FL (ref 79–97)
MONOCYTES # BLD AUTO: 1.68 10*3/MM3 (ref 0.1–0.9)
MONOCYTES NFR BLD AUTO: 21.2 % (ref 5–12)
NEUTROPHILS NFR BLD AUTO: 5.13 10*3/MM3 (ref 1.7–7)
NEUTROPHILS NFR BLD AUTO: 64.6 % (ref 42.7–76)
PLATELET # BLD AUTO: 372 10*3/MM3 (ref 140–450)
PMV BLD AUTO: 8.9 FL (ref 6–12)
POTASSIUM SERPL-SCNC: 4.1 MMOL/L (ref 3.5–5.2)
PROT SERPL-MCNC: 6.8 G/DL (ref 6–8.5)
RAD ONC ARIA COURSE ID: NORMAL
RAD ONC ARIA COURSE LAST TREATMENT DATE: NORMAL
RAD ONC ARIA COURSE START DATE: NORMAL
RAD ONC ARIA COURSE TREATMENT ELAPSED DAYS: 21
RAD ONC ARIA FIRST TREATMENT DATE: NORMAL
RAD ONC ARIA PLAN FRACTIONS TREATED TO DATE: 16
RAD ONC ARIA PLAN ID: NORMAL
RAD ONC ARIA PLAN PRESCRIBED DOSE PER FRACTION: 2 GY
RAD ONC ARIA PLAN PRIMARY REFERENCE POINT: NORMAL
RAD ONC ARIA PLAN TOTAL FRACTIONS PRESCRIBED: 30
RAD ONC ARIA PLAN TOTAL PRESCRIBED DOSE: 6000 CGY
RAD ONC ARIA REFERENCE POINT DOSAGE GIVEN TO DATE: 32 GY
RAD ONC ARIA REFERENCE POINT ID: NORMAL
RAD ONC ARIA REFERENCE POINT SESSION DOSAGE GIVEN: 2 GY
RBC # BLD AUTO: 3.72 10*6/MM3 (ref 3.77–5.28)
SODIUM SERPL-SCNC: 136 MMOL/L (ref 136–145)
WBC NRBC COR # BLD AUTO: 7.93 10*3/MM3 (ref 3.4–10.8)

## 2024-11-04 PROCEDURE — 96413 CHEMO IV INFUSION 1 HR: CPT

## 2024-11-04 PROCEDURE — 96417 CHEMO IV INFUS EACH ADDL SEQ: CPT

## 2024-11-04 PROCEDURE — 25810000003 SODIUM CHLORIDE 0.9 % SOLUTION 500 ML FLEX CONT: Performed by: PHYSICIAN ASSISTANT

## 2024-11-04 PROCEDURE — 80053 COMPREHEN METABOLIC PANEL: CPT | Performed by: NURSE PRACTITIONER

## 2024-11-04 PROCEDURE — 25010000002 FOSAPREPITANT PER 1 MG: Performed by: PHYSICIAN ASSISTANT

## 2024-11-04 PROCEDURE — 96375 TX/PRO/DX INJ NEW DRUG ADDON: CPT

## 2024-11-04 PROCEDURE — 25010000002 ETOPOSIDE 500 MG/25ML SOLUTION 25 ML VIAL: Performed by: PHYSICIAN ASSISTANT

## 2024-11-04 PROCEDURE — 25010000002 CARBOPLATIN PER 50 MG: Performed by: PHYSICIAN ASSISTANT

## 2024-11-04 PROCEDURE — 77427 RADIATION TX MANAGEMENT X5: CPT | Performed by: INTERNAL MEDICINE

## 2024-11-04 PROCEDURE — 96367 TX/PROPH/DG ADDL SEQ IV INF: CPT

## 2024-11-04 PROCEDURE — 25010000002 DEXAMETHASONE PER 1 MG: Performed by: PHYSICIAN ASSISTANT

## 2024-11-04 PROCEDURE — 85025 COMPLETE CBC W/AUTO DIFF WBC: CPT | Performed by: NURSE PRACTITIONER

## 2024-11-04 PROCEDURE — 77014 CHG CT GUIDANCE RADIATION THERAPY FLDS PLACEMENT: CPT | Performed by: INTERNAL MEDICINE

## 2024-11-04 PROCEDURE — 25810000003 SODIUM CHLORIDE 0.9 % SOLUTION 250 ML FLEX CONT: Performed by: PHYSICIAN ASSISTANT

## 2024-11-04 PROCEDURE — 77386: CPT | Performed by: INTERNAL MEDICINE

## 2024-11-04 PROCEDURE — 25010000002 HEPARIN LOCK FLUSH PER 10 UNITS: Performed by: INTERNAL MEDICINE

## 2024-11-04 PROCEDURE — 25010000002 PALONOSETRON 0.25 MG/5ML SOLUTION PREFILLED SYRINGE: Performed by: PHYSICIAN ASSISTANT

## 2024-11-04 RX ORDER — DEXAMETHASONE SODIUM PHOSPHATE 4 MG/ML
12 INJECTION, SOLUTION INTRA-ARTICULAR; INTRALESIONAL; INTRAMUSCULAR; INTRAVENOUS; SOFT TISSUE ONCE
Status: COMPLETED | OUTPATIENT
Start: 2024-11-04 | End: 2024-11-04

## 2024-11-04 RX ORDER — DIPHENHYDRAMINE HYDROCHLORIDE 50 MG/ML
50 INJECTION INTRAMUSCULAR; INTRAVENOUS AS NEEDED
Status: CANCELLED | OUTPATIENT
Start: 2024-11-04

## 2024-11-04 RX ORDER — SODIUM CHLORIDE 9 MG/ML
20 INJECTION, SOLUTION INTRAVENOUS ONCE
Status: CANCELLED | OUTPATIENT
Start: 2024-11-06

## 2024-11-04 RX ORDER — SODIUM CHLORIDE 9 MG/ML
20 INJECTION, SOLUTION INTRAVENOUS ONCE
Status: DISCONTINUED | OUTPATIENT
Start: 2024-11-04 | End: 2024-11-05 | Stop reason: HOSPADM

## 2024-11-04 RX ORDER — SODIUM CHLORIDE 9 MG/ML
20 INJECTION, SOLUTION INTRAVENOUS ONCE
Status: CANCELLED | OUTPATIENT
Start: 2024-11-05

## 2024-11-04 RX ORDER — FAMOTIDINE 10 MG/ML
20 INJECTION, SOLUTION INTRAVENOUS AS NEEDED
Status: CANCELLED | OUTPATIENT
Start: 2024-11-04

## 2024-11-04 RX ORDER — DEXAMETHASONE SODIUM PHOSPHATE 4 MG/ML
12 INJECTION, SOLUTION INTRA-ARTICULAR; INTRALESIONAL; INTRAMUSCULAR; INTRAVENOUS; SOFT TISSUE ONCE
Status: CANCELLED
Start: 2024-11-05 | End: 2024-11-05

## 2024-11-04 RX ORDER — DEXAMETHASONE SODIUM PHOSPHATE 4 MG/ML
12 INJECTION, SOLUTION INTRA-ARTICULAR; INTRALESIONAL; INTRAMUSCULAR; INTRAVENOUS; SOFT TISSUE ONCE
Status: CANCELLED
Start: 2024-11-04 | End: 2024-11-04

## 2024-11-04 RX ORDER — HEPARIN SODIUM (PORCINE) LOCK FLUSH IV SOLN 100 UNIT/ML 100 UNIT/ML
500 SOLUTION INTRAVENOUS AS NEEDED
Status: CANCELLED | OUTPATIENT
Start: 2024-11-04

## 2024-11-04 RX ORDER — SODIUM CHLORIDE 0.9 % (FLUSH) 0.9 %
10 SYRINGE (ML) INJECTION AS NEEDED
Status: DISCONTINUED | OUTPATIENT
Start: 2024-11-04 | End: 2024-11-05 | Stop reason: HOSPADM

## 2024-11-04 RX ORDER — SODIUM CHLORIDE 9 MG/ML
20 INJECTION, SOLUTION INTRAVENOUS ONCE
Status: CANCELLED | OUTPATIENT
Start: 2024-11-04

## 2024-11-04 RX ORDER — FAMOTIDINE 10 MG/ML
20 INJECTION, SOLUTION INTRAVENOUS 2 TIMES DAILY
Status: DISCONTINUED | OUTPATIENT
Start: 2024-11-04 | End: 2024-11-05 | Stop reason: HOSPADM

## 2024-11-04 RX ORDER — PALONOSETRON 0.05 MG/ML
0.25 INJECTION, SOLUTION INTRAVENOUS ONCE
Status: CANCELLED | OUTPATIENT
Start: 2024-11-04

## 2024-11-04 RX ORDER — SODIUM CHLORIDE 0.9 % (FLUSH) 0.9 %
10 SYRINGE (ML) INJECTION AS NEEDED
Status: CANCELLED | OUTPATIENT
Start: 2024-11-04

## 2024-11-04 RX ORDER — PALONOSETRON 0.05 MG/ML
0.25 INJECTION, SOLUTION INTRAVENOUS ONCE
Status: COMPLETED | OUTPATIENT
Start: 2024-11-04 | End: 2024-11-04

## 2024-11-04 RX ORDER — HEPARIN SODIUM (PORCINE) LOCK FLUSH IV SOLN 100 UNIT/ML 100 UNIT/ML
500 SOLUTION INTRAVENOUS AS NEEDED
Status: DISCONTINUED | OUTPATIENT
Start: 2024-11-04 | End: 2024-11-05 | Stop reason: HOSPADM

## 2024-11-04 RX ORDER — DEXAMETHASONE SODIUM PHOSPHATE 4 MG/ML
12 INJECTION, SOLUTION INTRA-ARTICULAR; INTRALESIONAL; INTRAMUSCULAR; INTRAVENOUS; SOFT TISSUE ONCE
Status: CANCELLED
Start: 2024-11-06 | End: 2024-11-06

## 2024-11-04 RX ADMIN — FOSAPREPITANT 100 ML: 150 INJECTION, POWDER, LYOPHILIZED, FOR SOLUTION INTRAVENOUS at 11:31

## 2024-11-04 RX ADMIN — Medication 10 ML: at 14:01

## 2024-11-04 RX ADMIN — SODIUM CHLORIDE 180 MG: 0.9 INJECTION, SOLUTION INTRAVENOUS at 12:44

## 2024-11-04 RX ADMIN — DEXAMETHASONE SODIUM PHOSPHATE 12 MG: 4 INJECTION, SOLUTION INTRAMUSCULAR; INTRAVENOUS at 11:24

## 2024-11-04 RX ADMIN — FAMOTIDINE 20 MG: 10 INJECTION, SOLUTION INTRAVENOUS at 11:29

## 2024-11-04 RX ADMIN — CARBOPLATIN 540 MG: 10 INJECTION, SOLUTION INTRAVENOUS at 12:06

## 2024-11-04 RX ADMIN — HEPARIN 500 UNITS: 100 SYRINGE at 14:01

## 2024-11-04 RX ADMIN — PALONOSETRON 0.25 MG: 0.25 INJECTION, SOLUTION INTRAVENOUS at 11:26

## 2024-11-04 NOTE — PROGRESS NOTES
"This patient is here for C2D1 Carbo/Etop. This patient reports to having issue with eating and drinking along with epi gastric pain that radiated to the left flank. She reports that she had pain when breathing and increased SOA. She states these issues started Saturday and persisted into Sunday. She was finally able to eat a sandwich yesterday evening. She reports the pain has resolved today but has not attempted to eat anything but is able to drink just fine. She report that she \"felt that something was in the stomach causing the difficulty eating.\" She is not on any PPI per patient and did not  the carafate as prescribed due to cost. Only taking nausea medication at this time.  Was given a verbal order for Pepcid 20mg IV today. Advised Kelli PALMA, of the difficulty getting the Carafate due to cost.  I also spoke with pharmacy at [facility] regarding Carafate cost.  The oral suspension would be over $400 to fill however the oral tablets for a month supply would be $35 dollars.  Explained this to the patient and she reports she would not be able to afford the oral tablets as well.  Patient reports that she was on Metoclopramide for a short time follow gall bladder removal and asked if that could be refilled Advised Kelli of this information and waiting on response.  Patient also has numbness and tingling in finger and toes which have increased but not causing issues with ADL. Port accessed and flushed with good blood return noted. 10cc of blood wasted prior to specimen collection. Blood specimen obtained and sent to lab for processing per protocol.  Treatment given and tolerated well. Port flushed with saline and heparin left in for tomorrows treatment.  Patient had to leave as her ride had been waiting for 30 min.  Advised her that [facility] pharmacy would contact her if something was sent in. AVS given prior to discharge  "

## 2024-11-05 ENCOUNTER — HOSPITAL ENCOUNTER (OUTPATIENT)
Dept: RADIATION ONCOLOGY | Facility: HOSPITAL | Age: 64
Discharge: HOME | End: 2024-11-05

## 2024-11-05 ENCOUNTER — HOSPITAL ENCOUNTER (OUTPATIENT)
Dept: ONCOLOGY | Facility: HOSPITAL | Age: 64
Discharge: HOME | End: 2024-11-05
Admitting: PHYSICIAN ASSISTANT
Payer: COMMERCIAL

## 2024-11-05 VITALS
HEART RATE: 90 BPM | OXYGEN SATURATION: 95 % | BODY MASS INDEX: 22.27 KG/M2 | RESPIRATION RATE: 16 BRPM | DIASTOLIC BLOOD PRESSURE: 68 MMHG | SYSTOLIC BLOOD PRESSURE: 110 MMHG | TEMPERATURE: 97.8 F | WEIGHT: 141.9 LBS | HEIGHT: 67 IN

## 2024-11-05 DIAGNOSIS — R91.8 LUNG MASS: ICD-10-CM

## 2024-11-05 DIAGNOSIS — C34.11 SMALL CELL CARCINOMA OF UPPER LOBE OF RIGHT LUNG: Primary | ICD-10-CM

## 2024-11-05 LAB
RAD ONC ARIA COURSE ID: NORMAL
RAD ONC ARIA COURSE LAST TREATMENT DATE: NORMAL
RAD ONC ARIA COURSE START DATE: NORMAL
RAD ONC ARIA COURSE TREATMENT ELAPSED DAYS: 22
RAD ONC ARIA FIRST TREATMENT DATE: NORMAL
RAD ONC ARIA PLAN FRACTIONS TREATED TO DATE: 17
RAD ONC ARIA PLAN ID: NORMAL
RAD ONC ARIA PLAN PRESCRIBED DOSE PER FRACTION: 2 GY
RAD ONC ARIA PLAN PRIMARY REFERENCE POINT: NORMAL
RAD ONC ARIA PLAN TOTAL FRACTIONS PRESCRIBED: 30
RAD ONC ARIA PLAN TOTAL PRESCRIBED DOSE: 6000 CGY
RAD ONC ARIA REFERENCE POINT DOSAGE GIVEN TO DATE: 34 GY
RAD ONC ARIA REFERENCE POINT ID: NORMAL
RAD ONC ARIA REFERENCE POINT SESSION DOSAGE GIVEN: 2 GY

## 2024-11-05 PROCEDURE — 25010000002 ETOPOSIDE 500 MG/25ML SOLUTION 25 ML VIAL: Performed by: PHYSICIAN ASSISTANT

## 2024-11-05 PROCEDURE — 77386: CPT | Performed by: INTERNAL MEDICINE

## 2024-11-05 PROCEDURE — 77014 CHG CT GUIDANCE RADIATION THERAPY FLDS PLACEMENT: CPT | Performed by: INTERNAL MEDICINE

## 2024-11-05 PROCEDURE — 96375 TX/PRO/DX INJ NEW DRUG ADDON: CPT

## 2024-11-05 PROCEDURE — 25010000002 DEXAMETHASONE PER 1 MG: Performed by: PHYSICIAN ASSISTANT

## 2024-11-05 PROCEDURE — 25810000003 SODIUM CHLORIDE 0.9 % SOLUTION 500 ML FLEX CONT: Performed by: PHYSICIAN ASSISTANT

## 2024-11-05 PROCEDURE — 25010000002 HEPARIN LOCK FLUSH PER 10 UNITS: Performed by: INTERNAL MEDICINE

## 2024-11-05 PROCEDURE — 96413 CHEMO IV INFUSION 1 HR: CPT

## 2024-11-05 RX ORDER — SODIUM CHLORIDE 0.9 % (FLUSH) 0.9 %
10 SYRINGE (ML) INJECTION AS NEEDED
Status: DISCONTINUED | OUTPATIENT
Start: 2024-11-05 | End: 2024-11-06 | Stop reason: HOSPADM

## 2024-11-05 RX ORDER — SODIUM CHLORIDE 9 MG/ML
20 INJECTION, SOLUTION INTRAVENOUS ONCE
Status: DISCONTINUED | OUTPATIENT
Start: 2024-11-05 | End: 2024-11-06 | Stop reason: HOSPADM

## 2024-11-05 RX ORDER — DEXAMETHASONE SODIUM PHOSPHATE 4 MG/ML
12 INJECTION, SOLUTION INTRA-ARTICULAR; INTRALESIONAL; INTRAMUSCULAR; INTRAVENOUS; SOFT TISSUE ONCE
Status: COMPLETED | OUTPATIENT
Start: 2024-11-05 | End: 2024-11-05

## 2024-11-05 RX ORDER — HEPARIN SODIUM (PORCINE) LOCK FLUSH IV SOLN 100 UNIT/ML 100 UNIT/ML
500 SOLUTION INTRAVENOUS AS NEEDED
Status: CANCELLED | OUTPATIENT
Start: 2024-11-05

## 2024-11-05 RX ORDER — HEPARIN SODIUM (PORCINE) LOCK FLUSH IV SOLN 100 UNIT/ML 100 UNIT/ML
500 SOLUTION INTRAVENOUS AS NEEDED
Status: DISCONTINUED | OUTPATIENT
Start: 2024-11-05 | End: 2024-11-06 | Stop reason: HOSPADM

## 2024-11-05 RX ORDER — SODIUM CHLORIDE 0.9 % (FLUSH) 0.9 %
10 SYRINGE (ML) INJECTION AS NEEDED
Status: CANCELLED | OUTPATIENT
Start: 2024-11-05

## 2024-11-05 RX ADMIN — HEPARIN 500 UNITS: 100 SYRINGE at 11:36

## 2024-11-05 RX ADMIN — Medication 10 ML: at 11:36

## 2024-11-05 RX ADMIN — SODIUM CHLORIDE 180 MG: 0.9 INJECTION, SOLUTION INTRAVENOUS at 10:25

## 2024-11-05 RX ADMIN — DEXAMETHASONE SODIUM PHOSPHATE 12 MG: 4 INJECTION, SOLUTION INTRAMUSCULAR; INTRAVENOUS at 10:01

## 2024-11-05 NOTE — PROGRESS NOTES
Pt here for Day 2 treatment.  Pt denies any issues overnight.  Port already accessed and assessed with positive blood return noted.  Tx given per MAR.  Pt tolerated well. Port left accessed for use tomorrow.  Pt d/c home.

## 2024-11-06 ENCOUNTER — HOSPITAL ENCOUNTER (OUTPATIENT)
Dept: RADIATION ONCOLOGY | Facility: HOSPITAL | Age: 64
Discharge: HOME | End: 2024-11-06

## 2024-11-06 ENCOUNTER — HOSPITAL ENCOUNTER (OUTPATIENT)
Dept: ONCOLOGY | Facility: HOSPITAL | Age: 64
Discharge: HOME | End: 2024-11-06
Admitting: PHYSICIAN ASSISTANT
Payer: COMMERCIAL

## 2024-11-06 VITALS
BODY MASS INDEX: 22.34 KG/M2 | WEIGHT: 142.3 LBS | HEIGHT: 67 IN | SYSTOLIC BLOOD PRESSURE: 127 MMHG | HEART RATE: 94 BPM | TEMPERATURE: 98.1 F | OXYGEN SATURATION: 95 % | RESPIRATION RATE: 16 BRPM | DIASTOLIC BLOOD PRESSURE: 77 MMHG

## 2024-11-06 DIAGNOSIS — C34.11 SMALL CELL CARCINOMA OF UPPER LOBE OF RIGHT LUNG: Primary | ICD-10-CM

## 2024-11-06 DIAGNOSIS — R91.8 LUNG MASS: ICD-10-CM

## 2024-11-06 LAB
RAD ONC ARIA COURSE ID: NORMAL
RAD ONC ARIA COURSE LAST TREATMENT DATE: NORMAL
RAD ONC ARIA COURSE START DATE: NORMAL
RAD ONC ARIA COURSE TREATMENT ELAPSED DAYS: 23
RAD ONC ARIA FIRST TREATMENT DATE: NORMAL
RAD ONC ARIA PLAN FRACTIONS TREATED TO DATE: 18
RAD ONC ARIA PLAN ID: NORMAL
RAD ONC ARIA PLAN PRESCRIBED DOSE PER FRACTION: 2 GY
RAD ONC ARIA PLAN PRIMARY REFERENCE POINT: NORMAL
RAD ONC ARIA PLAN TOTAL FRACTIONS PRESCRIBED: 30
RAD ONC ARIA PLAN TOTAL PRESCRIBED DOSE: 6000 CGY
RAD ONC ARIA REFERENCE POINT DOSAGE GIVEN TO DATE: 36 GY
RAD ONC ARIA REFERENCE POINT ID: NORMAL
RAD ONC ARIA REFERENCE POINT SESSION DOSAGE GIVEN: 2 GY

## 2024-11-06 PROCEDURE — 96413 CHEMO IV INFUSION 1 HR: CPT

## 2024-11-06 PROCEDURE — 25010000002 ETOPOSIDE 500 MG/25ML SOLUTION 25 ML VIAL: Performed by: PHYSICIAN ASSISTANT

## 2024-11-06 PROCEDURE — 77386: CPT | Performed by: INTERNAL MEDICINE

## 2024-11-06 PROCEDURE — 25010000002 DEXAMETHASONE PER 1 MG: Performed by: PHYSICIAN ASSISTANT

## 2024-11-06 PROCEDURE — 25810000003 SODIUM CHLORIDE 0.9 % SOLUTION 500 ML FLEX CONT: Performed by: PHYSICIAN ASSISTANT

## 2024-11-06 PROCEDURE — 77014 CHG CT GUIDANCE RADIATION THERAPY FLDS PLACEMENT: CPT | Performed by: INTERNAL MEDICINE

## 2024-11-06 PROCEDURE — 96375 TX/PRO/DX INJ NEW DRUG ADDON: CPT

## 2024-11-06 PROCEDURE — 25010000002 HEPARIN LOCK FLUSH PER 10 UNITS: Performed by: INTERNAL MEDICINE

## 2024-11-06 RX ORDER — HEPARIN SODIUM (PORCINE) LOCK FLUSH IV SOLN 100 UNIT/ML 100 UNIT/ML
500 SOLUTION INTRAVENOUS AS NEEDED
Status: DISCONTINUED | OUTPATIENT
Start: 2024-11-06 | End: 2024-11-07 | Stop reason: HOSPADM

## 2024-11-06 RX ORDER — HEPARIN SODIUM (PORCINE) LOCK FLUSH IV SOLN 100 UNIT/ML 100 UNIT/ML
500 SOLUTION INTRAVENOUS AS NEEDED
OUTPATIENT
Start: 2024-11-06

## 2024-11-06 RX ORDER — SODIUM CHLORIDE 0.9 % (FLUSH) 0.9 %
10 SYRINGE (ML) INJECTION AS NEEDED
Status: DISCONTINUED | OUTPATIENT
Start: 2024-11-06 | End: 2024-11-07 | Stop reason: HOSPADM

## 2024-11-06 RX ORDER — SODIUM CHLORIDE 9 MG/ML
20 INJECTION, SOLUTION INTRAVENOUS ONCE
Status: DISCONTINUED | OUTPATIENT
Start: 2024-11-06 | End: 2024-11-07 | Stop reason: HOSPADM

## 2024-11-06 RX ORDER — SODIUM CHLORIDE 0.9 % (FLUSH) 0.9 %
10 SYRINGE (ML) INJECTION AS NEEDED
OUTPATIENT
Start: 2024-11-06

## 2024-11-06 RX ORDER — DEXAMETHASONE SODIUM PHOSPHATE 4 MG/ML
12 INJECTION, SOLUTION INTRA-ARTICULAR; INTRALESIONAL; INTRAMUSCULAR; INTRAVENOUS; SOFT TISSUE ONCE
Status: COMPLETED | OUTPATIENT
Start: 2024-11-06 | End: 2024-11-06

## 2024-11-06 RX ADMIN — Medication 10 ML: at 12:15

## 2024-11-06 RX ADMIN — SODIUM CHLORIDE 180 MG: 0.9 INJECTION, SOLUTION INTRAVENOUS at 11:10

## 2024-11-06 RX ADMIN — DEXAMETHASONE SODIUM PHOSPHATE 12 MG: 4 INJECTION, SOLUTION INTRAMUSCULAR; INTRAVENOUS at 10:47

## 2024-11-06 RX ADMIN — HEPARIN 500 UNITS: 100 SYRINGE at 12:15

## 2024-11-07 ENCOUNTER — HOSPITAL ENCOUNTER (OUTPATIENT)
Dept: RADIATION ONCOLOGY | Facility: HOSPITAL | Age: 64
Discharge: HOME | End: 2024-11-07

## 2024-11-07 ENCOUNTER — RADIATION ONCOLOGY WEEKLY ASSESSMENT (OUTPATIENT)
Dept: RADIATION ONCOLOGY | Facility: HOSPITAL | Age: 64
End: 2024-11-07
Payer: COMMERCIAL

## 2024-11-07 VITALS — WEIGHT: 142.6 LBS | BODY MASS INDEX: 22.33 KG/M2

## 2024-11-07 DIAGNOSIS — C34.11 SMALL CELL CARCINOMA OF UPPER LOBE OF RIGHT LUNG: Primary | ICD-10-CM

## 2024-11-07 LAB
RAD ONC ARIA COURSE ID: NORMAL
RAD ONC ARIA COURSE LAST TREATMENT DATE: NORMAL
RAD ONC ARIA COURSE START DATE: NORMAL
RAD ONC ARIA COURSE TREATMENT ELAPSED DAYS: 24
RAD ONC ARIA FIRST TREATMENT DATE: NORMAL
RAD ONC ARIA PLAN FRACTIONS TREATED TO DATE: 19
RAD ONC ARIA PLAN ID: NORMAL
RAD ONC ARIA PLAN PRESCRIBED DOSE PER FRACTION: 2 GY
RAD ONC ARIA PLAN PRIMARY REFERENCE POINT: NORMAL
RAD ONC ARIA PLAN TOTAL FRACTIONS PRESCRIBED: 30
RAD ONC ARIA PLAN TOTAL PRESCRIBED DOSE: 6000 CGY
RAD ONC ARIA REFERENCE POINT DOSAGE GIVEN TO DATE: 38 GY
RAD ONC ARIA REFERENCE POINT ID: NORMAL
RAD ONC ARIA REFERENCE POINT SESSION DOSAGE GIVEN: 2 GY

## 2024-11-07 PROCEDURE — 77336 RADIATION PHYSICS CONSULT: CPT | Performed by: INTERNAL MEDICINE

## 2024-11-07 PROCEDURE — 77014 CHG CT GUIDANCE RADIATION THERAPY FLDS PLACEMENT: CPT | Performed by: INTERNAL MEDICINE

## 2024-11-07 PROCEDURE — 77386: CPT | Performed by: INTERNAL MEDICINE

## 2024-11-07 NOTE — PROGRESS NOTES
[facility] RADIATION ONCOLOGY  ON-TREATMENT VISIT NOTE    NAME: Kandi Fuentes  YOB: 1960  MRN #: [id number]  DATE OF SERVICE: 11/7/2024  PRESCRIBING PHYSICIAN: Tereso Abarca MD     DIAGNOSIS:      ICD-10-CM ICD-9-CM   1. 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  C34.11 162.3      RADIATION THERAPY VISIT:  Continue radiation therapy, Dosimetry plan remains acceptable, Films reviewed and remains acceptable, Pain assessed, Pain management planned, Radiation dose schedule reviewed and remains acceptable, Radiation technique remains acceptable, and Symptoms within expected range    Radiation Treatments       Active   Plans   FB RtLung   Most recent treatment: Dose planned: 200 cGy (fraction 19 on 11/7/2024)   Total: Dose planned: 6,000 cGy (30 fractions)   Elapsed Days: 24      Reference Points   RtLung   Most recent treatment: Dose given: 200 cGy (on 11/7/2024)   Total: Dose given: 3,800 cGy   Elapsed Days: 24                    [x] Concurrent Chemo   Regimen:  Carbo/Etoposide Q21D     PHYSICAL ASSESSMENT         There were no vitals filed for this visit.   Wt Readings from Last 3 Encounters:   11/07/24 64.7 kg (142 lb 9.6 oz)   11/06/24 64.5 kg (142 lb 4.8 oz)   11/05/24 64.4 kg (141 lb 14.4 oz)     Restricted in physically strenuous activity but ambulatory and able to carry out work of a light or sedentary nature, e.g., light house work, office work = 1    We examined the relevant areas: yes  Findings are within the expected range for this stage of treatment: yes    ACTION ITEMS     Patient tolerating treatment well and as expected for this stage in their treatment and Continue radiation therapy as planned    Estimated Completion Date: 11/25/2024     Anticipatory guidance provided.     Starting viscous lidocaine and carafate.      Discussed starting Senna-S for bowel symptoms.       Tereso Abarca MD   Radiation Oncology  [facility] [place]

## 2024-11-08 ENCOUNTER — HOSPITAL ENCOUNTER (OUTPATIENT)
Dept: RADIATION ONCOLOGY | Facility: HOSPITAL | Age: 64
Discharge: HOME | End: 2024-11-08

## 2024-11-08 LAB
RAD ONC ARIA COURSE ID: NORMAL
RAD ONC ARIA COURSE LAST TREATMENT DATE: NORMAL
RAD ONC ARIA COURSE START DATE: NORMAL
RAD ONC ARIA COURSE TREATMENT ELAPSED DAYS: 25
RAD ONC ARIA FIRST TREATMENT DATE: NORMAL
RAD ONC ARIA PLAN FRACTIONS TREATED TO DATE: 20
RAD ONC ARIA PLAN ID: NORMAL
RAD ONC ARIA PLAN PRESCRIBED DOSE PER FRACTION: 2 GY
RAD ONC ARIA PLAN PRIMARY REFERENCE POINT: NORMAL
RAD ONC ARIA PLAN TOTAL FRACTIONS PRESCRIBED: 30
RAD ONC ARIA PLAN TOTAL PRESCRIBED DOSE: 6000 CGY
RAD ONC ARIA REFERENCE POINT DOSAGE GIVEN TO DATE: 40 GY
RAD ONC ARIA REFERENCE POINT ID: NORMAL
RAD ONC ARIA REFERENCE POINT SESSION DOSAGE GIVEN: 2 GY

## 2024-11-08 PROCEDURE — 77386: CPT | Performed by: INTERNAL MEDICINE

## 2024-11-08 PROCEDURE — 77014 CHG CT GUIDANCE RADIATION THERAPY FLDS PLACEMENT: CPT | Performed by: INTERNAL MEDICINE

## 2024-11-11 ENCOUNTER — HOSPITAL ENCOUNTER (OUTPATIENT)
Dept: RADIATION ONCOLOGY | Facility: HOSPITAL | Age: 64
Discharge: HOME | End: 2024-11-11
Payer: COMMERCIAL

## 2024-11-11 ENCOUNTER — RADIATION ONCOLOGY WEEKLY ASSESSMENT (OUTPATIENT)
Dept: RADIATION ONCOLOGY | Facility: HOSPITAL | Age: 64
End: 2024-11-11
Payer: COMMERCIAL

## 2024-11-11 VITALS
RESPIRATION RATE: 18 BRPM | WEIGHT: 138.3 LBS | TEMPERATURE: 97.3 F | HEIGHT: 67 IN | HEART RATE: 96 BPM | DIASTOLIC BLOOD PRESSURE: 70 MMHG | OXYGEN SATURATION: 98 % | SYSTOLIC BLOOD PRESSURE: 104 MMHG | BODY MASS INDEX: 21.71 KG/M2

## 2024-11-11 DIAGNOSIS — C34.11 SMALL CELL CARCINOMA OF UPPER LOBE OF RIGHT LUNG: Primary | ICD-10-CM

## 2024-11-11 LAB
RAD ONC ARIA COURSE ID: NORMAL
RAD ONC ARIA COURSE LAST TREATMENT DATE: NORMAL
RAD ONC ARIA COURSE START DATE: NORMAL
RAD ONC ARIA COURSE TREATMENT ELAPSED DAYS: 28
RAD ONC ARIA FIRST TREATMENT DATE: NORMAL
RAD ONC ARIA PLAN FRACTIONS TREATED TO DATE: 21
RAD ONC ARIA PLAN ID: NORMAL
RAD ONC ARIA PLAN PRESCRIBED DOSE PER FRACTION: 2 GY
RAD ONC ARIA PLAN PRIMARY REFERENCE POINT: NORMAL
RAD ONC ARIA PLAN TOTAL FRACTIONS PRESCRIBED: 30
RAD ONC ARIA PLAN TOTAL PRESCRIBED DOSE: 6000 CGY
RAD ONC ARIA REFERENCE POINT DOSAGE GIVEN TO DATE: 42 GY
RAD ONC ARIA REFERENCE POINT ID: NORMAL
RAD ONC ARIA REFERENCE POINT SESSION DOSAGE GIVEN: 2 GY

## 2024-11-11 PROCEDURE — 77427 RADIATION TX MANAGEMENT X5: CPT | Performed by: INTERNAL MEDICINE

## 2024-11-11 PROCEDURE — 77386: CPT | Performed by: INTERNAL MEDICINE

## 2024-11-11 PROCEDURE — FACE2FACE: Performed by: INTERNAL MEDICINE

## 2024-11-11 PROCEDURE — 77014 CHG CT GUIDANCE RADIATION THERAPY FLDS PLACEMENT: CPT | Performed by: INTERNAL MEDICINE

## 2024-11-11 NOTE — PROGRESS NOTES
[facility] RADIATION ONCOLOGY  ON-TREATMENT VISIT NOTE    NAME: Kandi Fuentes  YOB: 1960  MRN #: [id number]  DATE OF SERVICE: 11/11/2024  PRESCRIBING PHYSICIAN: Tereso Abarca MD     DIAGNOSIS:      ICD-10-CM ICD-9-CM   1. 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  C34.11 162.3      RADIATION THERAPY VISIT:  Continue radiation therapy, Dosimetry plan remains acceptable, Films reviewed and remains acceptable, Pain assessed, Pain management planned, Radiation dose schedule reviewed and remains acceptable, Radiation technique remains acceptable, and Symptoms within expected range    Radiation Treatments       Active   Plans   FB RtLung   Most recent treatment: Dose planned: 200 cGy (fraction 21 on 11/11/2024)   Total: Dose planned: 6,000 cGy (30 fractions)   Elapsed Days: 28      Reference Points   RtLung   Most recent treatment: Dose given: 200 cGy (on 11/11/2024)   Total: Dose given: 4,200 cGy   Elapsed Days: 28                    [x] Concurrent Chemo   Regimen:  Carbo/Etoposide Q21D     PHYSICAL ASSESSMENT         Vitals:    11/11/24 1253   BP: 104/70   Pulse: 96   Resp: 18   Temp: 97.3 °F (36.3 °C)   SpO2: 98%      Wt Readings from Last 3 Encounters:   11/11/24 62.7 kg (138 lb 4.8 oz)   11/07/24 64.7 kg (142 lb 9.6 oz)   11/06/24 64.5 kg (142 lb 4.8 oz)     Restricted in physically strenuous activity but ambulatory and able to carry out work of a light or sedentary nature, e.g., light house work, office work = 1    We examined the relevant areas: yes  Findings are within the expected range for this stage of treatment: yes    ACTION ITEMS     Patient tolerating treatment well and as expected for this stage in their treatment and Continue radiation therapy as planned    Estimated Completion Date: 11/25/2024     Anticipatory guidance provided.     Starting viscous lidocaine and carafate.      Discussed starting Senna-S for bowel symptoms.     Discussed adding cortisone cream  for folliculitis.       Tereso Abarca MD   Radiation Oncology  [facility]

## 2024-11-12 ENCOUNTER — HOSPITAL ENCOUNTER (OUTPATIENT)
Dept: RADIATION ONCOLOGY | Facility: HOSPITAL | Age: 64
Discharge: HOME | End: 2024-11-12

## 2024-11-12 LAB
RAD ONC ARIA COURSE ID: NORMAL
RAD ONC ARIA COURSE LAST TREATMENT DATE: NORMAL
RAD ONC ARIA COURSE START DATE: NORMAL
RAD ONC ARIA COURSE TREATMENT ELAPSED DAYS: 29
RAD ONC ARIA FIRST TREATMENT DATE: NORMAL
RAD ONC ARIA PLAN FRACTIONS TREATED TO DATE: 22
RAD ONC ARIA PLAN ID: NORMAL
RAD ONC ARIA PLAN PRESCRIBED DOSE PER FRACTION: 2 GY
RAD ONC ARIA PLAN PRIMARY REFERENCE POINT: NORMAL
RAD ONC ARIA PLAN TOTAL FRACTIONS PRESCRIBED: 30
RAD ONC ARIA PLAN TOTAL PRESCRIBED DOSE: 6000 CGY
RAD ONC ARIA REFERENCE POINT DOSAGE GIVEN TO DATE: 44 GY
RAD ONC ARIA REFERENCE POINT ID: NORMAL
RAD ONC ARIA REFERENCE POINT SESSION DOSAGE GIVEN: 2 GY

## 2024-11-12 PROCEDURE — 77014 CHG CT GUIDANCE RADIATION THERAPY FLDS PLACEMENT: CPT | Performed by: INTERNAL MEDICINE

## 2024-11-12 PROCEDURE — 77386: CPT | Performed by: INTERNAL MEDICINE

## 2024-11-13 ENCOUNTER — HOSPITAL ENCOUNTER (OUTPATIENT)
Dept: RADIATION ONCOLOGY | Facility: HOSPITAL | Age: 64
Discharge: HOME | End: 2024-11-13

## 2024-11-13 LAB
RAD ONC ARIA COURSE ID: NORMAL
RAD ONC ARIA COURSE LAST TREATMENT DATE: NORMAL
RAD ONC ARIA COURSE START DATE: NORMAL
RAD ONC ARIA COURSE TREATMENT ELAPSED DAYS: 30
RAD ONC ARIA FIRST TREATMENT DATE: NORMAL
RAD ONC ARIA PLAN FRACTIONS TREATED TO DATE: 23
RAD ONC ARIA PLAN ID: NORMAL
RAD ONC ARIA PLAN PRESCRIBED DOSE PER FRACTION: 2 GY
RAD ONC ARIA PLAN PRIMARY REFERENCE POINT: NORMAL
RAD ONC ARIA PLAN TOTAL FRACTIONS PRESCRIBED: 30
RAD ONC ARIA PLAN TOTAL PRESCRIBED DOSE: 6000 CGY
RAD ONC ARIA REFERENCE POINT DOSAGE GIVEN TO DATE: 46 GY
RAD ONC ARIA REFERENCE POINT ID: NORMAL
RAD ONC ARIA REFERENCE POINT SESSION DOSAGE GIVEN: 2 GY

## 2024-11-13 PROCEDURE — 77386: CPT | Performed by: INTERNAL MEDICINE

## 2024-11-13 PROCEDURE — 77014 CHG CT GUIDANCE RADIATION THERAPY FLDS PLACEMENT: CPT | Performed by: INTERNAL MEDICINE

## 2024-11-14 ENCOUNTER — HOSPITAL ENCOUNTER (OUTPATIENT)
Dept: RADIATION ONCOLOGY | Facility: HOSPITAL | Age: 64
Discharge: HOME | End: 2024-11-14

## 2024-11-14 LAB
RAD ONC ARIA COURSE ID: NORMAL
RAD ONC ARIA COURSE LAST TREATMENT DATE: NORMAL
RAD ONC ARIA COURSE START DATE: NORMAL
RAD ONC ARIA COURSE TREATMENT ELAPSED DAYS: 31
RAD ONC ARIA FIRST TREATMENT DATE: NORMAL
RAD ONC ARIA PLAN FRACTIONS TREATED TO DATE: 24
RAD ONC ARIA PLAN ID: NORMAL
RAD ONC ARIA PLAN PRESCRIBED DOSE PER FRACTION: 2 GY
RAD ONC ARIA PLAN PRIMARY REFERENCE POINT: NORMAL
RAD ONC ARIA PLAN TOTAL FRACTIONS PRESCRIBED: 30
RAD ONC ARIA PLAN TOTAL PRESCRIBED DOSE: 6000 CGY
RAD ONC ARIA REFERENCE POINT DOSAGE GIVEN TO DATE: 48 GY
RAD ONC ARIA REFERENCE POINT ID: NORMAL
RAD ONC ARIA REFERENCE POINT SESSION DOSAGE GIVEN: 2 GY

## 2024-11-14 PROCEDURE — 77336 RADIATION PHYSICS CONSULT: CPT | Performed by: INTERNAL MEDICINE

## 2024-11-14 PROCEDURE — 77014 CHG CT GUIDANCE RADIATION THERAPY FLDS PLACEMENT: CPT | Performed by: INTERNAL MEDICINE

## 2024-11-14 PROCEDURE — 77386: CPT | Performed by: INTERNAL MEDICINE

## 2024-11-15 ENCOUNTER — HOSPITAL ENCOUNTER (OUTPATIENT)
Dept: RADIATION ONCOLOGY | Facility: HOSPITAL | Age: 64
Discharge: HOME | End: 2024-11-15

## 2024-11-15 DIAGNOSIS — L58.0 ACUTE RADIATION DERMATITIS: Primary | ICD-10-CM

## 2024-11-15 DIAGNOSIS — C34.11 SMALL CELL CARCINOMA OF UPPER LOBE OF RIGHT LUNG: ICD-10-CM

## 2024-11-15 LAB
RAD ONC ARIA COURSE ID: NORMAL
RAD ONC ARIA COURSE LAST TREATMENT DATE: NORMAL
RAD ONC ARIA COURSE START DATE: NORMAL
RAD ONC ARIA COURSE TREATMENT ELAPSED DAYS: 32
RAD ONC ARIA FIRST TREATMENT DATE: NORMAL
RAD ONC ARIA PLAN FRACTIONS TREATED TO DATE: 25
RAD ONC ARIA PLAN ID: NORMAL
RAD ONC ARIA PLAN PRESCRIBED DOSE PER FRACTION: 2 GY
RAD ONC ARIA PLAN PRIMARY REFERENCE POINT: NORMAL
RAD ONC ARIA PLAN TOTAL FRACTIONS PRESCRIBED: 30
RAD ONC ARIA PLAN TOTAL PRESCRIBED DOSE: 6000 CGY
RAD ONC ARIA REFERENCE POINT DOSAGE GIVEN TO DATE: 50 GY
RAD ONC ARIA REFERENCE POINT ID: NORMAL
RAD ONC ARIA REFERENCE POINT SESSION DOSAGE GIVEN: 2 GY

## 2024-11-15 PROCEDURE — 77386: CPT | Performed by: INTERNAL MEDICINE

## 2024-11-15 PROCEDURE — 77014 CHG CT GUIDANCE RADIATION THERAPY FLDS PLACEMENT: CPT | Performed by: INTERNAL MEDICINE

## 2024-11-15 RX ORDER — MOMETASONE FUROATE 1 MG/G
OINTMENT TOPICAL
Qty: 50 G | Refills: 2 | Status: SHIPPED | OUTPATIENT
Start: 2024-11-15

## 2024-11-18 ENCOUNTER — RADIATION ONCOLOGY WEEKLY ASSESSMENT (OUTPATIENT)
Dept: RADIATION ONCOLOGY | Facility: HOSPITAL | Age: 64
End: 2024-11-18
Payer: COMMERCIAL

## 2024-11-18 ENCOUNTER — HOSPITAL ENCOUNTER (OUTPATIENT)
Dept: RADIATION ONCOLOGY | Facility: HOSPITAL | Age: 64
Discharge: HOME | End: 2024-11-18
Payer: COMMERCIAL

## 2024-11-18 VITALS
TEMPERATURE: 97.2 F | HEART RATE: 95 BPM | BODY MASS INDEX: 20.94 KG/M2 | HEIGHT: 67 IN | RESPIRATION RATE: 18 BRPM | OXYGEN SATURATION: 98 % | WEIGHT: 133.4 LBS | SYSTOLIC BLOOD PRESSURE: 102 MMHG | DIASTOLIC BLOOD PRESSURE: 67 MMHG

## 2024-11-18 DIAGNOSIS — C34.11 SMALL CELL CARCINOMA OF UPPER LOBE OF RIGHT LUNG: Primary | ICD-10-CM

## 2024-11-18 LAB
RAD ONC ARIA COURSE ID: NORMAL
RAD ONC ARIA COURSE LAST TREATMENT DATE: NORMAL
RAD ONC ARIA COURSE START DATE: NORMAL
RAD ONC ARIA COURSE TREATMENT ELAPSED DAYS: 35
RAD ONC ARIA FIRST TREATMENT DATE: NORMAL
RAD ONC ARIA PLAN FRACTIONS TREATED TO DATE: 26
RAD ONC ARIA PLAN ID: NORMAL
RAD ONC ARIA PLAN PRESCRIBED DOSE PER FRACTION: 2 GY
RAD ONC ARIA PLAN PRIMARY REFERENCE POINT: NORMAL
RAD ONC ARIA PLAN TOTAL FRACTIONS PRESCRIBED: 30
RAD ONC ARIA PLAN TOTAL PRESCRIBED DOSE: 6000 CGY
RAD ONC ARIA REFERENCE POINT DOSAGE GIVEN TO DATE: 52 GY
RAD ONC ARIA REFERENCE POINT ID: NORMAL
RAD ONC ARIA REFERENCE POINT SESSION DOSAGE GIVEN: 2 GY

## 2024-11-18 PROCEDURE — 77014 CHG CT GUIDANCE RADIATION THERAPY FLDS PLACEMENT: CPT | Performed by: INTERNAL MEDICINE

## 2024-11-18 PROCEDURE — 77386: CPT | Performed by: INTERNAL MEDICINE

## 2024-11-18 PROCEDURE — 77427 RADIATION TX MANAGEMENT X5: CPT | Performed by: INTERNAL MEDICINE

## 2024-11-18 RX ORDER — OXYCODONE HCL 5 MG/5 ML
5 SOLUTION, ORAL ORAL EVERY 4 HOURS PRN
Qty: 300 ML | Refills: 0 | Status: SHIPPED | OUTPATIENT
Start: 2024-11-18

## 2024-11-18 NOTE — PROGRESS NOTES
[facility] RADIATION ONCOLOGY  ON-TREATMENT VISIT NOTE    NAME: Kandi Fuentes  YOB: 1960  MRN #: [id number]  DATE OF SERVICE: 11/18/2024  PRESCRIBING PHYSICIAN: Tereso Abarca MD     DIAGNOSIS:      ICD-10-CM ICD-9-CM   1. 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  C34.11 162.3      RADIATION THERAPY VISIT:  Continue radiation therapy, Dosimetry plan remains acceptable, Films reviewed and remains acceptable, Pain assessed, Pain management planned, Radiation dose schedule reviewed and remains acceptable, Radiation technique remains acceptable, and Symptoms within expected range    Radiation Treatments       Active   Plans   FB RtLung   Most recent treatment: Dose planned: 200 cGy (fraction 26 on 11/18/2024)   Total: Dose planned: 6,000 cGy (30 fractions)   Elapsed Days: 35      Reference Points   RtLung   Most recent treatment: Dose given: 200 cGy (on 11/18/2024)   Total: Dose given: 5,200 cGy   Elapsed Days: 35                    [x] Concurrent Chemo   Regimen:  Carbo/Etoposide Q21D     PHYSICAL ASSESSMENT         Vitals:    11/18/24 1241   BP: 102/67   Pulse: 95   Resp: 18   Temp: 97.2 °F (36.2 °C)   SpO2: 98%      Wt Readings from Last 3 Encounters:   11/18/24 60.5 kg (133 lb 6.4 oz)   11/11/24 62.7 kg (138 lb 4.8 oz)   11/07/24 64.7 kg (142 lb 9.6 oz)     Ambulatory and capable of all selfcare but unable to carry out any work activities; up and about more than 50% of waking hours = 2    We examined the relevant areas: yes  Findings are within the expected range for this stage of treatment: yes    ACTION ITEMS     Patient tolerating treatment well and as expected for this stage in their treatment and Continue radiation therapy as planned    Estimated Completion Date: 11/25/2024     Anticipatory guidance provided.     Starting viscous lidocaine and carafate.      Discussed starting Senna-S for bowel symptoms.     Discussed adding cortisone cream for folliculitis.    Patient  losing weight to severe esophagitis. Patient has allergies to codeine. Discussed starting liquid oxycodone at low doses. Discussed starting with 1-2 mg more frequently and if she tolerates then she can increase to 5 mg every 4-6 hours.        Tereso Abarca MD   Radiation Oncology  [facility]

## 2024-11-19 ENCOUNTER — HOSPITAL ENCOUNTER (OUTPATIENT)
Dept: RADIATION ONCOLOGY | Facility: HOSPITAL | Age: 64
Discharge: HOME | End: 2024-11-19

## 2024-11-19 LAB
RAD ONC ARIA COURSE ID: NORMAL
RAD ONC ARIA COURSE LAST TREATMENT DATE: NORMAL
RAD ONC ARIA COURSE START DATE: NORMAL
RAD ONC ARIA COURSE TREATMENT ELAPSED DAYS: 36
RAD ONC ARIA FIRST TREATMENT DATE: NORMAL
RAD ONC ARIA PLAN FRACTIONS TREATED TO DATE: 27
RAD ONC ARIA PLAN ID: NORMAL
RAD ONC ARIA PLAN PRESCRIBED DOSE PER FRACTION: 2 GY
RAD ONC ARIA PLAN PRIMARY REFERENCE POINT: NORMAL
RAD ONC ARIA PLAN TOTAL FRACTIONS PRESCRIBED: 30
RAD ONC ARIA PLAN TOTAL PRESCRIBED DOSE: 6000 CGY
RAD ONC ARIA REFERENCE POINT DOSAGE GIVEN TO DATE: 54 GY
RAD ONC ARIA REFERENCE POINT ID: NORMAL
RAD ONC ARIA REFERENCE POINT SESSION DOSAGE GIVEN: 2 GY

## 2024-11-19 PROCEDURE — 77014 CHG CT GUIDANCE RADIATION THERAPY FLDS PLACEMENT: CPT | Performed by: INTERNAL MEDICINE

## 2024-11-19 PROCEDURE — 77386: CPT | Performed by: INTERNAL MEDICINE

## 2024-11-20 ENCOUNTER — RADIATION ONCOLOGY WEEKLY ASSESSMENT (OUTPATIENT)
Dept: RADIATION ONCOLOGY | Facility: HOSPITAL | Age: 64
End: 2024-11-20
Payer: COMMERCIAL

## 2024-11-20 ENCOUNTER — HOSPITAL ENCOUNTER (OUTPATIENT)
Dept: RADIATION ONCOLOGY | Facility: HOSPITAL | Age: 64
Discharge: HOME | End: 2024-11-20

## 2024-11-20 VITALS
RESPIRATION RATE: 18 BRPM | TEMPERATURE: 97.3 F | BODY MASS INDEX: 21.09 KG/M2 | OXYGEN SATURATION: 99 % | DIASTOLIC BLOOD PRESSURE: 73 MMHG | SYSTOLIC BLOOD PRESSURE: 114 MMHG | WEIGHT: 134.4 LBS | HEART RATE: 107 BPM | HEIGHT: 67 IN

## 2024-11-20 DIAGNOSIS — C34.11 SMALL CELL CARCINOMA OF UPPER LOBE OF RIGHT LUNG: Primary | ICD-10-CM

## 2024-11-20 LAB
RAD ONC ARIA COURSE ID: NORMAL
RAD ONC ARIA COURSE LAST TREATMENT DATE: NORMAL
RAD ONC ARIA COURSE START DATE: NORMAL
RAD ONC ARIA COURSE TREATMENT ELAPSED DAYS: 37
RAD ONC ARIA FIRST TREATMENT DATE: NORMAL
RAD ONC ARIA PLAN FRACTIONS TREATED TO DATE: 28
RAD ONC ARIA PLAN ID: NORMAL
RAD ONC ARIA PLAN PRESCRIBED DOSE PER FRACTION: 2 GY
RAD ONC ARIA PLAN PRIMARY REFERENCE POINT: NORMAL
RAD ONC ARIA PLAN TOTAL FRACTIONS PRESCRIBED: 30
RAD ONC ARIA PLAN TOTAL PRESCRIBED DOSE: 6000 CGY
RAD ONC ARIA REFERENCE POINT DOSAGE GIVEN TO DATE: 56 GY
RAD ONC ARIA REFERENCE POINT ID: NORMAL
RAD ONC ARIA REFERENCE POINT SESSION DOSAGE GIVEN: 2 GY

## 2024-11-20 PROCEDURE — 77386: CPT | Performed by: INTERNAL MEDICINE

## 2024-11-20 PROCEDURE — 77014 CHG CT GUIDANCE RADIATION THERAPY FLDS PLACEMENT: CPT | Performed by: INTERNAL MEDICINE

## 2024-11-20 NOTE — PROGRESS NOTES
[facility] RADIATION ONCOLOGY  FOLLOW-UP NOTE    NAME: Kandi Fuentes  YOB: 1960  MRN #: [id number]  DATE OF SERVICE: 11/26/2024  REFERRING PROVIDER: Provider, No Known   PRIMARY CARE PROVIDER: Provider, No Known    CHIEF COMPLAINT:  1Wk F/U + Weight Check    DIAGNOSIS:   Encounter Diagnosis   Name Primary?    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    INTERVAL HISTORY     Kandi Fuentes is a 64 y.o. female who was last seen in our office on 11/22/2024 upon completion of radiation therapy treatment.    The patient was encouraged to follow-up due to pain with swallowing at the end of treatment. The patient reports her pain is still present. She expresses concern it may be cardiac in nature. She has reached out to her cardiology team.     The patient's weight has been stable. He skin reaction is improving.     IMAGING     XR Chest Post CVA Port  Result Date: 10/7/2024  Impression: 1. Placement of a right-sided port. The tip of the catheter terminates at the junction of the right brachiocephalic vein and superior vena cava. There is no pneumothorax. 2. Right upper lobe lung mass consistent with known malignancy. 3. Emphysema. Electronically Signed: Gilson Teran MD  10/7/2024 12:33 PM EDT  Workstation ID: [id number]    MRI Brain With & Without Contrast  Result Date: 9/30/2024  1.Findings compatible with chronic microvascular ischemic change. 2.No diffusion restriction is identified to suggest acute infarct. 3.No abnormal contrast enhancement is identified. Electronically Signed: William Vang MD  9/30/2024 5:47 PM EDT  Workstation ID: [id number]    CT Chest Without Contrast Diagnostic  Result Date: 9/30/2024  Impression: 1. Lobulated 4.4 cm mass in posterior right upper lobe communicating with right upper lobe posterior segmental bronchus concerning for malignancy, correlate with  bronchoscopy/biopsy findings. 2. Multiple areas of fissural based nodularity in the right upper lobe along major fissure concerning for pleural metastatic involvement, mildly increased from prior study. 3. Severe emphysema. 4. Additional chronic findings above. Electronically Signed: J Carlos Schmid MD  9/30/2024 10:29 AM EDT  Workstation ID: [id number]    XR Chest 1 View  Result Date: 9/27/2024  Impression: 1. No evidence pneumothorax. 2. Lobular 4 cm right upper lobe mass. 3. Emphysema Electronically Signed: Tom Jones MD  9/27/2024 3:55 PM EDT  Workstation ID: [id number]    PATHOLOGY     No recent pathology to review.    LABS     HEMATOLOGY:  WBC   Date Value Ref Range Status   11/27/2024 6.43 3.40 - 10.80 10*3/mm3 Final     RBC   Date Value Ref Range Status   11/27/2024 2.86 (L) 3.77 - 5.28 10*6/mm3 Final     Hemoglobin   Date Value Ref Range Status   11/27/2024 9.2 (L) 12.0 - 15.9 g/dL Final     Hematocrit   Date Value Ref Range Status   11/27/2024 29.0 (L) 34.0 - 46.6 % Final     Platelets   Date Value Ref Range Status   11/27/2024 380 140 - 450 10*3/mm3 Final     CHEMISTRY:  Lab Results   Component Value Date    GLUCOSE 105 (H) 11/27/2024    BUN 17 11/27/2024    CREATININE 0.60 11/27/2024    BCR 31.5 (H) 11/27/2024    K 4.2 11/27/2024    CO2 28.2 11/27/2024    CALCIUM 9.6 11/27/2024    ALBUMIN 3.6 11/27/2024    AST 14 11/27/2024    ALT 7 11/27/2024     PROBLEM LIST     Patient Active Problem List   Diagnosis    Lung mass    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    Closed nondisplaced fracture of lateral malleolus of left fibula    Fracture of lateral malleolus      CURRENT MEDICATIONS     Current Outpatient Medications   Medication Instructions    albuterol sulfate  (90 Base) MCG/ACT inhaler 2 puffs, As Needed    budesonide-formoterol (Breyna) 160-4.5 MCG/ACT inhaler 2 puffs, Inhalation, 2 Times Daily - RT    carvedilol (COREG) 3.125 mg, As Needed    clopidogrel (PLAVIX) 75 mg, Daily     docusate sodium 100 mg, As Needed    ipratropium-albuterol (DUO-NEB) 0.5-2.5 mg/3 ml nebulizer 3 mL, Nebulization, 4 Times Daily PRN    isosorbide mononitrate (IMDUR) 30 mg, Oral, Daily    Lidocaine Viscous HCl (XYLOCAINE) 2 % solution 10 mL, Oral, As Needed    lidocaine-prilocaine (EMLA) 2.5-2.5 % cream 1 Application, Topical, See Admin Instructions, Apply one hour prior to port access    mometasone (ELOCON) 0.1 % ointment Apply to affected skin of chest and back 2-3 times daily as needed for itching from radiation treatments.    nitroglycerin (NITROSTAT) 0.4 mg    OLANZapine (ZYPREXA) 5 mg, Oral, Nightly, Take nightly x 4 starting night of chemotherapy.    ondansetron (ZOFRAN) 8 mg, Oral, 3 Times Daily PRN    oxyCODONE (ROXICODONE) 5 mg, Oral, Every 4 Hours PRN    sucralfate (CARAFATE) 1 g, Oral, 4 Times Daily PRN    Tylenol 650 mg, As Needed      ALLERGIES     Allergies   Allergen Reactions    Morphine Hives and Shortness Of Breath    Codeine Hives    Sulfa Antibiotics Hives       REVIEW OF SYSTEMS     Review of Systems   Constitutional:  Positive for activity change and fatigue.   HENT:  Positive for trouble swallowing.    Skin:  Positive for rash.        Vitals:    11/26/24 1219   BP: 122/66   Pulse: 110   Resp: 18   Temp: 98.2 °F (36.8 °C)   SpO2: 98%          11/26/24  1219   Weight: 61.2 kg (135 lb)     Wt Readings from Last 3 Encounters:   11/20/24 61 kg (134 lb 6.4 oz)   11/18/24 60.5 kg (133 lb 6.4 oz)   11/11/24 62.7 kg (138 lb 4.8 oz)       Physical Exam  Constitutional:       General: She is not in acute distress.  HENT:      Head: Normocephalic and atraumatic.   Pulmonary:      Effort: Pulmonary effort is normal. No respiratory distress.   Chest:      Comments: Improving radiation dermatitis over the upper chest wall.   Neurological:      Mental Status: She is alert and oriented to person, place, and time. Mental status is at baseline.   Psychiatric:         Mood and Affect: Mood normal.          Behavior: Behavior normal.        ECOG:  Ambulatory and capable of all selfcare but unable to carry out any work activities; up and about more than 50% of waking hours = 2      ASSESSMENT AND PLAN     ASSESSMENT:      Kandi Fuentes is a 64 y.o. female with history of CAD, chronic CHF, recent MI, severe emphysema and now a history of limited stage small cell carcinoma, stage Stage IIIB (cT3, cN2, cM0) of the right upper lobe. She completed chemoradiation therapy on 11/22/2024, 6000 cGy in 30 fractions to right lung. She returns for routine follow-up.     Diagnoses and all orders for this visit:    1. 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 She continues to struggle with chest pain. She is unsure if its heart or esophagus. We discussed the possibility of esophagus pain/esophagitis due to recent completion of chemoradiation. Patient is contacting her cardiologist to confirm her pain is not chest/heart related. Plan for follow-up at time of next chemo visit or sooner as clinically indicated.       I spent 20 minutes caring for Kandi on this date of service. This time includes time spent by me in the following activities:preparing for the visit, reviewing tests, obtaining and/or reviewing a separately obtained history, performing a medically appropriate examination and/or evaluation , counseling and educating the patient/family/caregiver, documenting information in the medical record, and independently interpreting results and communicating that information with the patient/family/caregiver    FOLLOW UP     No follow-ups on file.     CC: Provider, No Known Provider, No Known     Calm/Appropriate

## 2024-11-21 ENCOUNTER — HOSPITAL ENCOUNTER (OUTPATIENT)
Dept: RADIATION ONCOLOGY | Facility: HOSPITAL | Age: 64
Discharge: HOME | End: 2024-11-21

## 2024-11-21 LAB
RAD ONC ARIA COURSE ID: NORMAL
RAD ONC ARIA COURSE LAST TREATMENT DATE: NORMAL
RAD ONC ARIA COURSE START DATE: NORMAL
RAD ONC ARIA COURSE TREATMENT ELAPSED DAYS: 38
RAD ONC ARIA FIRST TREATMENT DATE: NORMAL
RAD ONC ARIA PLAN FRACTIONS TREATED TO DATE: 29
RAD ONC ARIA PLAN ID: NORMAL
RAD ONC ARIA PLAN PRESCRIBED DOSE PER FRACTION: 2 GY
RAD ONC ARIA PLAN PRIMARY REFERENCE POINT: NORMAL
RAD ONC ARIA PLAN TOTAL FRACTIONS PRESCRIBED: 30
RAD ONC ARIA PLAN TOTAL PRESCRIBED DOSE: 6000 CGY
RAD ONC ARIA REFERENCE POINT DOSAGE GIVEN TO DATE: 58 GY
RAD ONC ARIA REFERENCE POINT ID: NORMAL
RAD ONC ARIA REFERENCE POINT SESSION DOSAGE GIVEN: 2 GY

## 2024-11-21 PROCEDURE — 77336 RADIATION PHYSICS CONSULT: CPT | Performed by: INTERNAL MEDICINE

## 2024-11-21 PROCEDURE — 77386: CPT | Performed by: INTERNAL MEDICINE

## 2024-11-21 PROCEDURE — 77014 CHG CT GUIDANCE RADIATION THERAPY FLDS PLACEMENT: CPT | Performed by: INTERNAL MEDICINE

## 2024-11-22 ENCOUNTER — HOSPITAL ENCOUNTER (OUTPATIENT)
Dept: RADIATION ONCOLOGY | Facility: HOSPITAL | Age: 64
Discharge: HOME | End: 2024-11-22
Payer: COMMERCIAL

## 2024-11-22 ENCOUNTER — RADIATION ONCOLOGY WEEKLY ASSESSMENT (OUTPATIENT)
Dept: RADIATION ONCOLOGY | Facility: HOSPITAL | Age: 64
End: 2024-11-22
Payer: COMMERCIAL

## 2024-11-22 DIAGNOSIS — C34.11 SMALL CELL CARCINOMA OF UPPER LOBE OF RIGHT LUNG: Primary | ICD-10-CM

## 2024-11-22 LAB
RAD ONC ARIA COURSE ID: NORMAL
RAD ONC ARIA COURSE LAST TREATMENT DATE: NORMAL
RAD ONC ARIA COURSE START DATE: NORMAL
RAD ONC ARIA COURSE TREATMENT ELAPSED DAYS: 39
RAD ONC ARIA FIRST TREATMENT DATE: NORMAL
RAD ONC ARIA PLAN FRACTIONS TREATED TO DATE: 30
RAD ONC ARIA PLAN ID: NORMAL
RAD ONC ARIA PLAN PRESCRIBED DOSE PER FRACTION: 2 GY
RAD ONC ARIA PLAN PRIMARY REFERENCE POINT: NORMAL
RAD ONC ARIA PLAN TOTAL FRACTIONS PRESCRIBED: 30
RAD ONC ARIA PLAN TOTAL PRESCRIBED DOSE: 6000 CGY
RAD ONC ARIA REFERENCE POINT DOSAGE GIVEN TO DATE: 60 GY
RAD ONC ARIA REFERENCE POINT ID: NORMAL
RAD ONC ARIA REFERENCE POINT SESSION DOSAGE GIVEN: 2 GY

## 2024-11-22 PROCEDURE — 77386: CPT | Performed by: INTERNAL MEDICINE

## 2024-11-22 PROCEDURE — 77014 CHG CT GUIDANCE RADIATION THERAPY FLDS PLACEMENT: CPT | Performed by: INTERNAL MEDICINE

## 2024-11-22 NOTE — PROGRESS NOTES
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 The summary of her treatment is as follows:    TREATMENT SITE:   Rt Lung START DATE:   10/14/2024   TOTAL DOSE (cGy):   6000 END DATE:   11/22/2024    DOSE/FRACTION:   200 ELAPSED DAYS:   39   TOTAL FACTIONS:   30 PHYSICIAN:    Dr. Tereso Abarca     Kandi is scheduled for a one-week follow-up appointment with Dr. Tereso Abarca on 11/26/2024 at 12:00 pm.  _______________________________________________________________________    The following instructions were provided to the patient and/or family in their printed after visit summary (AVS) as well as discussed in-person by the radiation oncology nurse or medical assistant. The patient and/or family had the opportunity to ask questions and verbalized their questions were adequately answered. Encouraged patient to contact our department with any questions or concerns.    RADIATION THERAPY DISCHARGE INSTRUCTIONS  Chest    CONGRATULATIONS! You completed 30 radiation treatments for treatment of your right lung cancer. These discharge instructions are important for you to follow until your one-month follow up appointment with your radiation oncologist. Please make sure to review these instructions and call the Radiation Oncology Department if you have any questions or concerns with symptoms you may experience. Thank you for trusting us with your cancer treatment!    DIET  Eat a regular, well balanced diet that is high in protein such as meat, eggs, cheese, and nut butters.  Drink 48 to 64 ounces of fluid daily.  Use nutritional supplements if you are not able to eat full meals.  Monitor your weight and report continued weight loss to your doctor.    MEDICATIONS  Use Tylenol as needed to decrease discomfort and irritation to treatment area.  Take pain medications only as prescribed.  Take a laxative or  stool softener as needed to prevent constipation due to pain medications.  (If applicable) Use Soila's Magic Mouthwash or other oral pain relief medication before meals and before taking medications as needed to ease the discomfort of swallowing.    SKIN CARE  Wash treated skin gently with your hands using a mild, non-drying soap such as Dove® or Aveeno® until skin returns to normal.  Pat skin dry - do not rub.  Keep treated skin moist with twice daily applications of Eucerin® or Aquaphor®.  Always protect your treated skin when outdoors by wearing protective clothing and applying sunscreen SPF 15 or higher at least 30 minutes before going outdoors and reapply frequently.    ACTIVITY  Fatigue is a normal side effect of radiation therapy and should improve over time.  Alternate rest and activity.  Exercise such as walking may help to improve your fatigue.    FOLLOW-UP  Continue follow-up appointments with all other doctors as necessary.  Call your radiation oncology doctor if you are concerned with any side effects you are experiencing.    WHEN TO CALL YOUR RADIATION ONCOLOGIST OR NURSE: [phone]  You have a fever of 100.4 OF or higher.  You have chills.  Your pain or discomfort is getting worse.  Your skin in the treatment area is getting more red or swollen, feels hard or hot, has a rash or blisters, and/or is itchy.  You see drainage (liquid) coming from your skin in the treatment area.  You see any new open areas (wounds) or changes to your skin.  You have any questions or concerns.  _______________________________________________________________________    Completed by: Jewels Ochoa MA, Radiation Oncology Medical Assistant on 11/22/24 at 09:32 EST

## 2024-11-22 NOTE — PATIENT INSTRUCTIONS
RADIATION THERAPY DISCHARGE INSTRUCTIONS  Chest    CONGRATULATIONS! You completed 30 radiation treatments for treatment of your right lung cancer. These discharge instructions are important for you to follow until your one-month follow up appointment with your radiation oncologist. Please make sure to review these instructions and call the Radiation Oncology Department if you have any questions or concerns with symptoms you may experience. Thank you for trusting us with your cancer treatment!    DIET  Eat a regular, well balanced diet that is high in protein such as meat, eggs, cheese, and nut butters.  Drink 48 to 64 ounces of fluid daily.  Use nutritional supplements if you are not able to eat full meals.  Monitor your weight and report continued weight loss to your doctor.    MEDICATIONS  Use Tylenol as needed to decrease discomfort and irritation to treatment area.  Take pain medications only as prescribed.  Take a laxative or stool softener as needed to prevent constipation due to pain medications.  (If applicable) Use Soila's Magic Mouthwash or other oral pain relief medication before meals and before taking medications as needed to ease the discomfort of swallowing.    SKIN CARE  Wash treated skin gently with your hands using a mild, non-drying soap such as Dove® or Aveeno® until skin returns to normal.  Pat skin dry - do not rub.  Keep treated skin moist with twice daily applications of Eucerin® or Aquaphor®.  Always protect your treated skin when outdoors by wearing protective clothing and applying sunscreen SPF 15 or higher at least 30 minutes before going outdoors and reapply frequently.    ACTIVITY  Fatigue is a normal side effect of radiation therapy and should improve over time.  Alternate rest and activity.  Exercise such as walking may help to improve your fatigue.    FOLLOW-UP  Continue follow-up appointments with all other doctors as necessary.  Call your radiation oncology doctor if you are  concerned with any side effects you are experiencing.    WHEN TO CALL YOUR RADIATION ONCOLOGIST OR NURSE: [phone]  You have a fever of 100.4 OF or higher.  You have chills.  Your pain or discomfort is getting worse.  Your skin in the treatment area is getting more red or swollen, feels hard or hot, has a rash or blisters, and/or is itchy.  You see drainage (liquid) coming from your skin in the treatment area.  You see any new open areas (wounds) or changes to your skin.  You have any questions or concerns.    _______________________________________________________________________    Completed by: Jewels Ochoa MA on 11/22/2024 at 09:31 EST

## 2024-11-25 ENCOUNTER — HOSPITAL ENCOUNTER (OUTPATIENT)
Dept: ONCOLOGY | Facility: HOSPITAL | Age: 64
Discharge: HOME | End: 2024-11-25
Admitting: INTERNAL MEDICINE
Payer: COMMERCIAL

## 2024-11-25 VITALS
TEMPERATURE: 97.8 F | RESPIRATION RATE: 16 BRPM | HEART RATE: 102 BPM | DIASTOLIC BLOOD PRESSURE: 67 MMHG | HEIGHT: 67 IN | BODY MASS INDEX: 21.35 KG/M2 | SYSTOLIC BLOOD PRESSURE: 101 MMHG | OXYGEN SATURATION: 95 % | WEIGHT: 136 LBS

## 2024-11-25 DIAGNOSIS — R91.8 LUNG MASS: ICD-10-CM

## 2024-11-25 DIAGNOSIS — C34.11 SMALL CELL CARCINOMA OF UPPER LOBE OF RIGHT LUNG: Primary | ICD-10-CM

## 2024-11-25 LAB
ALBUMIN SERPL-MCNC: 3.6 G/DL (ref 3.5–5.2)
ALBUMIN/GLOB SERPL: 1.3 G/DL
ALP SERPL-CCNC: 67 U/L (ref 39–117)
ALT SERPL W P-5'-P-CCNC: 11 U/L (ref 1–33)
ANION GAP SERPL CALCULATED.3IONS-SCNC: 10.6 MMOL/L (ref 5–15)
AST SERPL-CCNC: 15 U/L (ref 1–32)
BASOPHILS # BLD AUTO: 0.01 10*3/MM3 (ref 0–0.2)
BASOPHILS NFR BLD AUTO: 0.1 % (ref 0–1.5)
BILIRUB SERPL-MCNC: 0.2 MG/DL (ref 0–1.2)
BUN SERPL-MCNC: 9 MG/DL (ref 8–23)
BUN/CREAT SERPL: 16.4 (ref 7–25)
CALCIUM SPEC-SCNC: 9.4 MG/DL (ref 8.6–10.5)
CHLORIDE SERPL-SCNC: 98 MMOL/L (ref 98–107)
CO2 SERPL-SCNC: 29.4 MMOL/L (ref 22–29)
CREAT SERPL-MCNC: 0.55 MG/DL (ref 0.57–1)
DEPRECATED RDW RBC AUTO: 43.9 FL (ref 37–54)
EGFRCR SERPLBLD CKD-EPI 2021: 103.1 ML/MIN/1.73
EOSINOPHIL # BLD AUTO: 0 10*3/MM3 (ref 0–0.4)
EOSINOPHIL NFR BLD AUTO: 0 % (ref 0.3–6.2)
ERYTHROCYTE [DISTWIDTH] IN BLOOD BY AUTOMATED COUNT: 12.8 % (ref 12.3–15.4)
GLOBULIN UR ELPH-MCNC: 2.7 GM/DL
GLUCOSE SERPL-MCNC: 111 MG/DL (ref 65–99)
HCT VFR BLD AUTO: 29.9 % (ref 34–46.6)
HGB BLD-MCNC: 9.3 G/DL (ref 12–15.9)
LYMPHOCYTES # BLD AUTO: 0.73 10*3/MM3 (ref 0.7–3.1)
LYMPHOCYTES NFR BLD AUTO: 9.1 % (ref 19.6–45.3)
MCH RBC QN AUTO: 31.8 PG (ref 26.6–33)
MCHC RBC AUTO-ENTMCNC: 31.1 G/DL (ref 31.5–35.7)
MCV RBC AUTO: 102.4 FL (ref 79–97)
MONOCYTES # BLD AUTO: 1.3 10*3/MM3 (ref 0.1–0.9)
MONOCYTES NFR BLD AUTO: 16.1 % (ref 5–12)
NEUTROPHILS NFR BLD AUTO: 6.02 10*3/MM3 (ref 1.7–7)
NEUTROPHILS NFR BLD AUTO: 74.7 % (ref 42.7–76)
PLATELET # BLD AUTO: 237 10*3/MM3 (ref 140–450)
PMV BLD AUTO: 8.6 FL (ref 6–12)
POTASSIUM SERPL-SCNC: 3.2 MMOL/L (ref 3.5–5.2)
PROT SERPL-MCNC: 6.3 G/DL (ref 6–8.5)
RBC # BLD AUTO: 2.92 10*6/MM3 (ref 3.77–5.28)
SODIUM SERPL-SCNC: 138 MMOL/L (ref 136–145)
WBC NRBC COR # BLD AUTO: 8.06 10*3/MM3 (ref 3.4–10.8)

## 2024-11-25 PROCEDURE — 25010000002 ETOPOSIDE 500 MG/25ML SOLUTION 25 ML VIAL: Performed by: INTERNAL MEDICINE

## 2024-11-25 PROCEDURE — 25010000002 DEXAMETHASONE PER 1 MG: Performed by: INTERNAL MEDICINE

## 2024-11-25 PROCEDURE — 25810000003 SODIUM CHLORIDE 0.9 % SOLUTION 250 ML FLEX CONT: Performed by: INTERNAL MEDICINE

## 2024-11-25 PROCEDURE — 25810000003 SODIUM CHLORIDE 0.9 % SOLUTION 500 ML FLEX CONT: Performed by: INTERNAL MEDICINE

## 2024-11-25 PROCEDURE — 85025 COMPLETE CBC W/AUTO DIFF WBC: CPT | Performed by: INTERNAL MEDICINE

## 2024-11-25 PROCEDURE — 25010000002 HEPARIN LOCK FLUSH PER 10 UNITS: Performed by: INTERNAL MEDICINE

## 2024-11-25 PROCEDURE — 96413 CHEMO IV INFUSION 1 HR: CPT

## 2024-11-25 PROCEDURE — 80053 COMPREHEN METABOLIC PANEL: CPT | Performed by: INTERNAL MEDICINE

## 2024-11-25 PROCEDURE — 96417 CHEMO IV INFUS EACH ADDL SEQ: CPT

## 2024-11-25 PROCEDURE — 25010000002 FOSAPREPITANT PER 1 MG: Performed by: INTERNAL MEDICINE

## 2024-11-25 PROCEDURE — 96367 TX/PROPH/DG ADDL SEQ IV INF: CPT

## 2024-11-25 PROCEDURE — 96375 TX/PRO/DX INJ NEW DRUG ADDON: CPT

## 2024-11-25 PROCEDURE — 25010000002 CARBOPLATIN PER 50 MG: Performed by: INTERNAL MEDICINE

## 2024-11-25 PROCEDURE — 25010000002 PALONOSETRON 0.25 MG/5ML SOLUTION PREFILLED SYRINGE: Performed by: INTERNAL MEDICINE

## 2024-11-25 RX ORDER — SODIUM CHLORIDE 9 MG/ML
20 INJECTION, SOLUTION INTRAVENOUS ONCE
Status: CANCELLED | OUTPATIENT
Start: 2024-11-26

## 2024-11-25 RX ORDER — PALONOSETRON 0.05 MG/ML
0.25 INJECTION, SOLUTION INTRAVENOUS ONCE
Status: CANCELLED | OUTPATIENT
Start: 2024-11-25

## 2024-11-25 RX ORDER — FAMOTIDINE 10 MG/ML
20 INJECTION, SOLUTION INTRAVENOUS AS NEEDED
Status: CANCELLED | OUTPATIENT
Start: 2024-11-25

## 2024-11-25 RX ORDER — PALONOSETRON 0.05 MG/ML
0.25 INJECTION, SOLUTION INTRAVENOUS ONCE
Status: COMPLETED | OUTPATIENT
Start: 2024-11-25 | End: 2024-11-25

## 2024-11-25 RX ORDER — DEXAMETHASONE SODIUM PHOSPHATE 4 MG/ML
12 INJECTION, SOLUTION INTRA-ARTICULAR; INTRALESIONAL; INTRAMUSCULAR; INTRAVENOUS; SOFT TISSUE ONCE
Status: CANCELLED
Start: 2024-11-26 | End: 2024-11-26

## 2024-11-25 RX ORDER — DEXAMETHASONE SODIUM PHOSPHATE 4 MG/ML
12 INJECTION, SOLUTION INTRA-ARTICULAR; INTRALESIONAL; INTRAMUSCULAR; INTRAVENOUS; SOFT TISSUE ONCE
Status: COMPLETED | OUTPATIENT
Start: 2024-11-25 | End: 2024-11-25

## 2024-11-25 RX ORDER — SODIUM CHLORIDE 9 MG/ML
20 INJECTION, SOLUTION INTRAVENOUS ONCE
Status: DISCONTINUED | OUTPATIENT
Start: 2024-11-25 | End: 2024-11-26 | Stop reason: HOSPADM

## 2024-11-25 RX ORDER — SODIUM CHLORIDE 9 MG/ML
20 INJECTION, SOLUTION INTRAVENOUS ONCE
Status: CANCELLED | OUTPATIENT
Start: 2024-11-25

## 2024-11-25 RX ORDER — DEXAMETHASONE SODIUM PHOSPHATE 4 MG/ML
12 INJECTION, SOLUTION INTRA-ARTICULAR; INTRALESIONAL; INTRAMUSCULAR; INTRAVENOUS; SOFT TISSUE ONCE
Status: CANCELLED
Start: 2024-11-25 | End: 2024-11-25

## 2024-11-25 RX ORDER — SODIUM CHLORIDE 0.9 % (FLUSH) 0.9 %
10 SYRINGE (ML) INJECTION AS NEEDED
Status: CANCELLED | OUTPATIENT
Start: 2024-11-25

## 2024-11-25 RX ORDER — SODIUM CHLORIDE 9 MG/ML
20 INJECTION, SOLUTION INTRAVENOUS ONCE
Status: CANCELLED | OUTPATIENT
Start: 2024-11-27

## 2024-11-25 RX ORDER — POTASSIUM CHLORIDE 1500 MG/1
40 TABLET, EXTENDED RELEASE ORAL ONCE
Qty: 2 TABLET | Refills: 0 | Status: SHIPPED | OUTPATIENT
Start: 2024-11-25 | End: 2024-11-25

## 2024-11-25 RX ORDER — HEPARIN SODIUM (PORCINE) LOCK FLUSH IV SOLN 100 UNIT/ML 100 UNIT/ML
500 SOLUTION INTRAVENOUS AS NEEDED
Status: DISCONTINUED | OUTPATIENT
Start: 2024-11-25 | End: 2024-11-26 | Stop reason: HOSPADM

## 2024-11-25 RX ORDER — DIPHENHYDRAMINE HYDROCHLORIDE 50 MG/ML
50 INJECTION INTRAMUSCULAR; INTRAVENOUS AS NEEDED
Status: CANCELLED | OUTPATIENT
Start: 2024-11-25

## 2024-11-25 RX ORDER — SODIUM CHLORIDE 0.9 % (FLUSH) 0.9 %
10 SYRINGE (ML) INJECTION AS NEEDED
Status: DISCONTINUED | OUTPATIENT
Start: 2024-11-25 | End: 2024-11-26 | Stop reason: HOSPADM

## 2024-11-25 RX ORDER — DEXAMETHASONE SODIUM PHOSPHATE 4 MG/ML
12 INJECTION, SOLUTION INTRA-ARTICULAR; INTRALESIONAL; INTRAMUSCULAR; INTRAVENOUS; SOFT TISSUE ONCE
Status: CANCELLED
Start: 2024-11-27 | End: 2024-11-27

## 2024-11-25 RX ORDER — HYDROCORTISONE SODIUM SUCCINATE 100 MG/2ML
100 INJECTION INTRAMUSCULAR; INTRAVENOUS AS NEEDED
Status: CANCELLED | OUTPATIENT
Start: 2024-11-25

## 2024-11-25 RX ORDER — HEPARIN SODIUM (PORCINE) LOCK FLUSH IV SOLN 100 UNIT/ML 100 UNIT/ML
500 SOLUTION INTRAVENOUS AS NEEDED
Status: CANCELLED | OUTPATIENT
Start: 2024-11-25

## 2024-11-25 RX ADMIN — DEXAMETHASONE SODIUM PHOSPHATE 12 MG: 4 INJECTION INTRA-ARTICULAR; INTRALESIONAL; INTRAMUSCULAR; INTRAVENOUS; SOFT TISSUE at 10:51

## 2024-11-25 RX ADMIN — PALONOSETRON 0.25 MG: 0.25 INJECTION, SOLUTION INTRAVENOUS at 10:49

## 2024-11-25 RX ADMIN — CARBOPLATIN 530 MG: 10 INJECTION, SOLUTION INTRAVENOUS at 11:34

## 2024-11-25 RX ADMIN — Medication 10 ML: at 13:14

## 2024-11-25 RX ADMIN — SODIUM CHLORIDE 180 MG: 9 INJECTION, SOLUTION INTRAVENOUS at 12:07

## 2024-11-25 RX ADMIN — HEPARIN 500 UNITS: 100 SYRINGE at 13:14

## 2024-11-25 RX ADMIN — FOSAPREPITANT 100 ML: 150 INJECTION, POWDER, LYOPHILIZED, FOR SOLUTION INTRAVENOUS at 11:00

## 2024-11-26 ENCOUNTER — OFFICE VISIT (OUTPATIENT)
Dept: RADIATION ONCOLOGY | Facility: HOSPITAL | Age: 64
End: 2024-11-26
Payer: COMMERCIAL

## 2024-11-26 ENCOUNTER — HOSPITAL ENCOUNTER (OUTPATIENT)
Dept: ONCOLOGY | Facility: HOSPITAL | Age: 64
Discharge: HOME | End: 2024-11-26
Admitting: INTERNAL MEDICINE
Payer: COMMERCIAL

## 2024-11-26 VITALS
HEART RATE: 110 BPM | TEMPERATURE: 98.2 F | OXYGEN SATURATION: 98 % | RESPIRATION RATE: 18 BRPM | WEIGHT: 135 LBS | DIASTOLIC BLOOD PRESSURE: 66 MMHG | SYSTOLIC BLOOD PRESSURE: 122 MMHG | BODY MASS INDEX: 21.14 KG/M2

## 2024-11-26 VITALS
DIASTOLIC BLOOD PRESSURE: 66 MMHG | SYSTOLIC BLOOD PRESSURE: 122 MMHG | RESPIRATION RATE: 16 BRPM | HEART RATE: 110 BPM | BODY MASS INDEX: 21.28 KG/M2 | OXYGEN SATURATION: 98 % | WEIGHT: 135.6 LBS | HEIGHT: 67 IN | TEMPERATURE: 98.2 F

## 2024-11-26 DIAGNOSIS — C34.11 SMALL CELL CARCINOMA OF UPPER LOBE OF RIGHT LUNG: Primary | ICD-10-CM

## 2024-11-26 DIAGNOSIS — R91.8 LUNG MASS: ICD-10-CM

## 2024-11-26 PROCEDURE — 96375 TX/PRO/DX INJ NEW DRUG ADDON: CPT

## 2024-11-26 PROCEDURE — G0463 HOSPITAL OUTPT CLINIC VISIT: HCPCS | Performed by: INTERNAL MEDICINE

## 2024-11-26 PROCEDURE — 25010000002 DEXAMETHASONE PER 1 MG: Performed by: INTERNAL MEDICINE

## 2024-11-26 PROCEDURE — 96413 CHEMO IV INFUSION 1 HR: CPT

## 2024-11-26 PROCEDURE — 25810000003 SODIUM CHLORIDE 0.9 % SOLUTION 500 ML FLEX CONT: Performed by: INTERNAL MEDICINE

## 2024-11-26 PROCEDURE — 25010000002 HEPARIN LOCK FLUSH PER 10 UNITS: Performed by: INTERNAL MEDICINE

## 2024-11-26 PROCEDURE — 25010000002 ETOPOSIDE 500 MG/25ML SOLUTION 25 ML VIAL: Performed by: INTERNAL MEDICINE

## 2024-11-26 RX ORDER — HEPARIN SODIUM (PORCINE) LOCK FLUSH IV SOLN 100 UNIT/ML 100 UNIT/ML
500 SOLUTION INTRAVENOUS AS NEEDED
Status: CANCELLED | OUTPATIENT
Start: 2024-11-26

## 2024-11-26 RX ORDER — DEXAMETHASONE SODIUM PHOSPHATE 4 MG/ML
12 INJECTION, SOLUTION INTRA-ARTICULAR; INTRALESIONAL; INTRAMUSCULAR; INTRAVENOUS; SOFT TISSUE ONCE
Status: COMPLETED | OUTPATIENT
Start: 2024-11-26 | End: 2024-11-26

## 2024-11-26 RX ORDER — SODIUM CHLORIDE 9 MG/ML
20 INJECTION, SOLUTION INTRAVENOUS ONCE
Status: DISCONTINUED | OUTPATIENT
Start: 2024-11-26 | End: 2024-11-27 | Stop reason: HOSPADM

## 2024-11-26 RX ORDER — SODIUM CHLORIDE 0.9 % (FLUSH) 0.9 %
10 SYRINGE (ML) INJECTION AS NEEDED
Status: DISCONTINUED | OUTPATIENT
Start: 2024-11-26 | End: 2024-11-27 | Stop reason: HOSPADM

## 2024-11-26 RX ORDER — SODIUM CHLORIDE 0.9 % (FLUSH) 0.9 %
10 SYRINGE (ML) INJECTION AS NEEDED
Status: CANCELLED | OUTPATIENT
Start: 2024-11-26

## 2024-11-26 RX ORDER — HEPARIN SODIUM (PORCINE) LOCK FLUSH IV SOLN 100 UNIT/ML 100 UNIT/ML
500 SOLUTION INTRAVENOUS AS NEEDED
Status: DISCONTINUED | OUTPATIENT
Start: 2024-11-26 | End: 2024-11-27 | Stop reason: HOSPADM

## 2024-11-26 RX ADMIN — DEXAMETHASONE SODIUM PHOSPHATE 12 MG: 4 INJECTION INTRA-ARTICULAR; INTRALESIONAL; INTRAMUSCULAR; INTRAVENOUS; SOFT TISSUE at 10:22

## 2024-11-26 RX ADMIN — HEPARIN 500 UNITS: 100 SYRINGE at 11:32

## 2024-11-26 RX ADMIN — Medication 10 ML: at 11:32

## 2024-11-26 RX ADMIN — SODIUM CHLORIDE 180 MG: 9 INJECTION, SOLUTION INTRAVENOUS at 10:27

## 2024-11-26 NOTE — PROGRESS NOTES
"                         HEMATOLOGY ONCOLOGY OUTPATIENT FOLLOWUP       Patient name: Kandi Fuentes  : 1960  MRN: [id number]  Primary Care Physician: Provider, No Known  Referring Physician: Provider, No Known  Reason For Consult: Limited stage small cell lung cancer.    History of Present Illness:      10/2/2024: In the office for the first time to stage and treat small cell lung cancer of the right lung.  She reported that between July and 2024 she was seen at the hospital with dyspnea and some chest pains.  She was found to have evidence of coronary artery disease and underwent percutaneous angioplasty and stenting of the affected coronary vessels.  In the process, however, a nodule in the right lung was identified.  Further investigations led to the identification of a 4.4 cm lobulated tumor in the posterior right upper lobe communicating with the right middle lobe concerning for malignancy.  Evidence of severe emphysema was present, as well.  Eventually she had a navigational bronchoscopy and pathology reported small cell carcinoma on the biopsies.  A PET scan and MRI did not reveal any suggestion of metastatic disease.  At the time of this visit she is feeling somewhat better.  Her breathing has improved.  She still has some precordial discomfort that she describes is related to the stents \"that I can still feel\".  She has been eating reasonably well and has not lost much weight.  She is also been afebrile.  No diarrhea or dysuria.  On exam she appears chronically ill but is not in distress.  No jaundice.  The lungs are diminished bilaterally in a significant fashion.  The heart is regular.  The abdomen is flat and soft.  No palpable tumors.  No edema.  Independently reviewed all the imaging studies and discussed with her.  Treatment with concurrent chemotherapy and radiation followed by immunotherapy is reasonable.  Discussed with her the regimen of concurrent chemotherapy and radiation and in " detail discussed with her side effects and potential complications of both treatments.  Ideally we should begin on October 7 if it is at all possible.  I have communicated with Dr. Choudhary and with Dr. Abarca.    10/23/2024: Received the first cycle of chemotherapy without any difficulties. She feels about the same at this time and she has not had any new problems. Able to eat well and no nausea, vomiting or unintended weight loss. Denies chest pain and remains with the same degree of dyspnea. No abdominal pain or diarrhea. She continues to smoke at the same rate. On exam she is oriented and conversant. No jaundice. Poor dentition and no oral lesions. Respirations not labored Lungs diminished bilaterally. Heart regular. Abdomen soft and not tender. No edema. The laboratory exams were reviewed and discussed with her. To continue with the same treatment. She's to see me in 4 weeks.     11/27/2024: Tolerating the treatment with chemotherapy well.  She had chest pain and has been dyspneic since last evening.  She was seen in the emergency room at [facility], in [facility], where she spent several hours.  She was found to have an elevated D-dimer and was offered a CT scan.  However, she could not obtain the test because of difficulties with transportation.  She has been tolerating the chemotherapy well.  On exam today she is alert and conversant.  Oriented and in no distress.  There is no jaundice or pallor.  Poor dentition but no oral lesions.  Lungs markedly diminished bilaterally.  She is tachycardic.  Abdomen soft.  No edema.  To proceed with treatment.  To obtain a CT angiogram of the chest to ensure no pulmonary embolism, though I doubt it.  She is to see me in 3 weeks.    Past Medical History:   Diagnosis Date    Cancer     COPD (chronic obstructive pulmonary disease)     Coronary artery disease     Elevated cholesterol     Heart attack     Hypertension     Lung cancer 2024    Tinnitus      Past  Surgical History:   Procedure Laterality Date    BACK SURGERY  2004    bone spur on sciatica    BRONCHOSCOPY WITH ION ROBOTIC ASSIST N/A 9/27/2024    Procedure: BRONCHOSCOPY WITH ION ROBOT, fine needle aspiration and tissue biopsy right upper lobe mass, endobronchial ultrasound with fine needle aspiration lymph nodes (Level 10R);  Surgeon: Serafin Choudhary MD;  Location: [facility] ENDOSCOPY;  Service: Robotics - Pulmonary;  Laterality: N/A;  post: lung mass with lympadenopathy    CHOLECYSTECTOMY  2021    CORONARY ANGIOPLASTY WITH STENT PLACEMENT  07/2024    MOLE REMOVAL  1994    forehead    SKIN LESION EXCISION Right 1994    breast    VENOUS ACCESS DEVICE (PORT) INSERTION Right 10/7/2024    Procedure: INSERTION VENOUS ACCESS DEVICE;  Surgeon: Serafin Choudhary MD;  Location: [facility] MAIN OR;  Service: Thoracic;  Laterality: Right;       Current Outpatient Medications:     Acetaminophen (Tylenol) 325 MG capsule, Take 650 mg by mouth As Needed., Disp: , Rfl:     albuterol sulfate  (90 Base) MCG/ACT inhaler, Inhale 2 puffs As Needed for Wheezing or Shortness of Air., Disp: , Rfl:     budesonide-formoterol (Breyna) 160-4.5 MCG/ACT inhaler, Inhale 2 puffs 2 (Two) Times a Day., Disp: 1 each, Rfl: 12    carvedilol (COREG) 3.125 MG tablet, Take 1 tablet by mouth As Needed., Disp: , Rfl:     clopidogrel (PLAVIX) 75 MG tablet, Take 1 tablet by mouth Daily., Disp: , Rfl:     ipratropium-albuterol (DUO-NEB) 0.5-2.5 mg/3 ml nebulizer, Take 3 mL by nebulization 4 (Four) Times a Day As Needed for Wheezing or Shortness of Air., Disp: 120 mL, Rfl: 5    Lidocaine Viscous HCl (XYLOCAINE) 2 % solution, Take 10 mL by mouth As Needed for Mild Pain (Take prior to meals up to 3-4 times per day to help with pain with swallowing)., Disp: 600 mL, Rfl: 4    lidocaine-prilocaine (EMLA) 2.5-2.5 % cream, Apply 1 Application topically to the appropriate area as directed See Admin Instructions. Apply one hour prior to port access, Disp: 30 g, Rfl: 3     mometasone (ELOCON) 0.1 % ointment, Apply to affected skin of chest and back 2-3 times daily as needed for itching from radiation treatments., Disp: 50 g, Rfl: 2    OLANZapine (zyPREXA) 5 MG tablet, Take 1 tablet by mouth Every Night. Take nightly x 4 starting night of chemotherapy., Disp: 4 tablet, Rfl: 3    ondansetron (ZOFRAN) 8 MG tablet, Take 1 tablet by mouth 3 (Three) Times a Day As Needed for Nausea or Vomiting., Disp: 30 tablet, Rfl: 5    oxyCODONE (ROXICODONE) 5 MG/5ML solution, Take 5 mL by mouth Every 4 (Four) Hours As Needed for Moderate Pain (Pain with swallowing.)., Disp: 300 mL, Rfl: 0    sucralfate (Carafate) 1 GM/10ML suspension, Take 10 mL by mouth 4 (Four) Times a Day As Needed (For pain and discomfort with swallowing)., Disp: 600 mL, Rfl: 4    docusate sodium 100 MG capsule, Take 1 capsule by mouth As Needed. (Patient not taking: Reported on 11/27/2024), Disp: , Rfl:     isosorbide mononitrate (IMDUR) 30 MG 24 hr tablet, Take 1 tablet by mouth Daily., Disp: , Rfl:     nitroglycerin (NITROSTAT) 0.4 MG SL tablet, Place 1 tablet under the tongue. (Patient not taking: Reported on 11/27/2024), Disp: , Rfl:   No current facility-administered medications for this visit.    Allergies   Allergen Reactions    Morphine Hives and Shortness Of Breath    Codeine Hives    Sulfa Antibiotics Hives     Family History   Problem Relation Age of Onset    Breast cancer Mother 62    Heart attack Mother     Lung cancer Sister     Throat cancer Sister     Lung cancer Brother      Cancer-related family history includes Breast cancer (age of onset: 62) in her mother; Lung cancer in her brother and sister; Throat cancer in her sister.    Social History     Tobacco Use    Smoking status: Every Day     Current packs/day: 1.00     Average packs/day: 1 pack/day for 54.9 years (54.9 ttl pk-yrs)     Types: Cigarettes     Start date: 1970     Passive exposure: Current    Smokeless tobacco: Never   Vaping Use    Vaping status:  Never Used   Substance Use Topics    Alcohol use: Never    Drug use: Never     Social History     Social History Narrative    Not on file     ROS:   Review of Systems   Constitutional:  Positive for fatigue. Negative for activity change, appetite change, chills, diaphoresis, fever and unexpected weight change.   HENT:  Negative for congestion, dental problem, drooling, ear discharge, ear pain, facial swelling, hearing loss, mouth sores, nosebleeds, postnasal drip, rhinorrhea, sinus pressure, sinus pain, sneezing, sore throat, tinnitus, trouble swallowing and voice change.    Eyes:  Negative for photophobia, pain, discharge, redness, itching and visual disturbance.   Respiratory:  Positive for cough and shortness of breath. Negative for apnea, choking, chest tightness, wheezing and stridor.    Cardiovascular:  Positive for chest pain. Negative for palpitations and leg swelling.   Gastrointestinal:  Negative for abdominal distention, abdominal pain, anal bleeding, blood in stool, constipation, diarrhea, nausea, rectal pain and vomiting.   Endocrine: Negative for cold intolerance, heat intolerance, polydipsia and polyuria.   Genitourinary:  Negative for decreased urine volume, difficulty urinating, dysuria, flank pain, frequency, genital sores, hematuria and urgency.   Musculoskeletal:  Negative for arthralgias, back pain, gait problem, joint swelling, myalgias, neck pain and neck stiffness.   Skin:  Negative for color change, pallor and rash.   Neurological:  Negative for dizziness, tremors, seizures, syncope, facial asymmetry, speech difficulty, weakness, light-headedness, numbness and headaches.   Hematological:  Negative for adenopathy. Does not bruise/bleed easily.   Psychiatric/Behavioral:  Negative for agitation, behavioral problems, confusion, decreased concentration, hallucinations, self-injury, sleep disturbance and suicidal ideas. The patient is not nervous/anxious.      Objective:    Vital Signs:  Vitals:  "   11/27/24 0925   BP: 108/66   Pulse: 104   Resp: 14   Temp: 96.9 °F (36.1 °C)   SpO2: 96%   Weight: 61.5 kg (135 lb 9.6 oz)   Height: 170.2 cm (67\")   PainSc: 0-No pain     Body mass index is 21.24 kg/m².    ECOG  (1) Restricted in physically strenuous activity, ambulatory and able to do work of light nature    Physical Exam:   Physical Exam  Constitutional:       General: She is not in acute distress.     Appearance: She is ill-appearing. She is not toxic-appearing or diaphoretic.      Comments: Well-built, slender and well oriented woman.  She appears chronically ill but is not in acute distress.   HENT:      Head: Normocephalic and atraumatic.      Right Ear: External ear normal.      Left Ear: External ear normal.      Nose: Nose normal.      Mouth/Throat:      Mouth: Mucous membranes are moist.      Pharynx: Oropharynx is clear. No oropharyngeal exudate or posterior oropharyngeal erythema.   Eyes:      General: No scleral icterus.        Right eye: No discharge.         Left eye: No discharge.      Conjunctiva/sclera: Conjunctivae normal.      Pupils: Pupils are equal, round, and reactive to light.   Cardiovascular:      Rate and Rhythm: Normal rate and regular rhythm.      Pulses: Normal pulses.      Heart sounds: No murmur heard.     No friction rub. No gallop.   Pulmonary:      Effort: No respiratory distress.      Breath sounds: No stridor. No wheezing, rhonchi or rales.      Comments: Breath sounds markedly diminished bilaterally.  Abdominal:      General: Abdomen is flat. Bowel sounds are normal. There is no distension.      Palpations: Abdomen is soft. There is no mass.      Tenderness: There is no abdominal tenderness. There is no right CVA tenderness, left CVA tenderness, guarding or rebound.      Hernia: No hernia is present.   Musculoskeletal:         General: No tenderness, deformity or signs of injury.      Cervical back: No rigidity.      Right lower leg: No edema.      Left lower leg: No edema. "   Lymphadenopathy:      Cervical: No cervical adenopathy.   Skin:     Coloration: Skin is not jaundiced or pale.      Findings: No bruising, lesion or rash.   Neurological:      General: No focal deficit present.      Mental Status: She is alert and oriented to person, place, and time.      Cranial Nerves: No cranial nerve deficit.   Psychiatric:         Mood and Affect: Mood normal.         Behavior: Behavior normal.         Thought Content: Thought content normal.         Judgment: Judgment normal.     NATHALIE Delgado MD performed the physical exam on 11/27/2024 as documented above.    Lab Results - Last 18 Months   Lab Units 11/27/24  0915 11/25/24  0929 11/04/24  0929   WBC 10*3/mm3 6.43 8.06 7.93   HEMOGLOBIN g/dL 9.2* 9.3* 11.4*   HEMATOCRIT % 29.0* 29.9* 37.5   PLATELETS 10*3/mm3 380 237 372   MCV fL 101.4* 102.4* 100.8*     Lab Results - Last 18 Months   Lab Units 09/27/24  0940   INR  1.02   APTT seconds 25.5     Lab Results   Component Value Date    PTT 25.5 09/27/2024    INR 1.02 09/27/2024     Assessment & Plan     1.  Limited stage small cell lung cancer: ([id number]) of the right lung.  On concurrent chemotherapy with carboplatin/paclitaxel and radiation since October 11, 2024.  Tolerating the treatment well.  2.  Pulmonary embolism?:  I doubt it but she is dyspneic, tachycardic and was found to have an elevated D-dimer.  In light of this it is essential to obtain a CT scan with PE protocol.  3.  Coronary artery disease: Has recently undergone percutaneous angioplasty.  On antiplatelet agents at this time.  4.  Chronic obstructive pulmonary disease  5.  Cigarette smoker: Discussed with her again.  No change.  6.reviewed the notes from the emergency room.  Reviewed the laboratory exams.  Discussed with her.  7.continue with the same chemotherapy.  CT angiogram of the chest today.  See me in approximately 3 weeks.    Wilbert Delgado MD on 11/27/2024 at 9:53 AM.

## 2024-11-26 NOTE — ADDENDUM NOTE
Encounter addended by: Madison Awan RN on: 11/26/2024 12:08 PM   Actions taken: Chief Complaint modified

## 2024-11-27 ENCOUNTER — OFFICE VISIT (OUTPATIENT)
Dept: ONCOLOGY | Facility: CLINIC | Age: 64
End: 2024-11-27
Payer: MEDICAID

## 2024-11-27 ENCOUNTER — HOSPITAL ENCOUNTER (OUTPATIENT)
Dept: ONCOLOGY | Facility: HOSPITAL | Age: 64
Discharge: HOME | End: 2024-11-27
Payer: MEDICAID

## 2024-11-27 ENCOUNTER — HOSPITAL ENCOUNTER (OUTPATIENT)
Dept: CT IMAGING | Facility: HOSPITAL | Age: 64
Discharge: HOME | End: 2024-11-27
Admitting: INTERNAL MEDICINE
Payer: MEDICAID

## 2024-11-27 VITALS
WEIGHT: 135.6 LBS | RESPIRATION RATE: 14 BRPM | HEIGHT: 67 IN | HEART RATE: 104 BPM | BODY MASS INDEX: 21.28 KG/M2 | SYSTOLIC BLOOD PRESSURE: 108 MMHG | TEMPERATURE: 96.9 F | OXYGEN SATURATION: 96 % | DIASTOLIC BLOOD PRESSURE: 66 MMHG

## 2024-11-27 DIAGNOSIS — C34.11 SMALL CELL CARCINOMA OF UPPER LOBE OF RIGHT LUNG: Primary | ICD-10-CM

## 2024-11-27 DIAGNOSIS — R00.0 TACHYCARDIA: Primary | ICD-10-CM

## 2024-11-27 DIAGNOSIS — R79.89 ELEVATED D-DIMER: ICD-10-CM

## 2024-11-27 DIAGNOSIS — R07.9 CHEST PAIN, UNSPECIFIED TYPE: ICD-10-CM

## 2024-11-27 DIAGNOSIS — R91.8 LUNG MASS: ICD-10-CM

## 2024-11-27 DIAGNOSIS — C34.11 SMALL CELL CARCINOMA OF UPPER LOBE OF RIGHT LUNG: ICD-10-CM

## 2024-11-27 DIAGNOSIS — R00.0 TACHYCARDIA: ICD-10-CM

## 2024-11-27 PROBLEM — S82.63XA FRACTURE OF LATERAL MALLEOLUS: Status: ACTIVE | Noted: 2019-01-15

## 2024-11-27 PROBLEM — S82.65XA CLOSED NONDISPLACED FRACTURE OF LATERAL MALLEOLUS OF LEFT FIBULA: Status: ACTIVE | Noted: 2018-12-13

## 2024-11-27 LAB
ALBUMIN SERPL-MCNC: 3.6 G/DL (ref 3.5–5.2)
ALBUMIN/GLOB SERPL: 1.4 G/DL
ALP SERPL-CCNC: 52 U/L (ref 39–117)
ALT SERPL W P-5'-P-CCNC: 7 U/L (ref 1–33)
ANION GAP SERPL CALCULATED.3IONS-SCNC: 8.8 MMOL/L (ref 5–15)
AST SERPL-CCNC: 14 U/L (ref 1–32)
BASOPHILS # BLD AUTO: 0.01 10*3/MM3 (ref 0–0.2)
BASOPHILS NFR BLD AUTO: 0.2 % (ref 0–1.5)
BILIRUB SERPL-MCNC: 0.3 MG/DL (ref 0–1.2)
BUN SERPL-MCNC: 17 MG/DL (ref 8–23)
BUN/CREAT SERPL: 31.5 (ref 7–25)
CALCIUM SPEC-SCNC: 9.6 MG/DL (ref 8.6–10.5)
CHLORIDE SERPL-SCNC: 102 MMOL/L (ref 98–107)
CO2 SERPL-SCNC: 28.2 MMOL/L (ref 22–29)
CREAT BLDA-MCNC: 0.6 MG/DL (ref 0.6–1.3)
CREAT SERPL-MCNC: 0.54 MG/DL (ref 0.57–1)
DEPRECATED RDW RBC AUTO: 44.9 FL (ref 37–54)
EGFRCR SERPLBLD CKD-EPI 2021: 101 ML/MIN/1.73
EGFRCR SERPLBLD CKD-EPI 2021: 103.6 ML/MIN/1.73
EOSINOPHIL # BLD AUTO: 0 10*3/MM3 (ref 0–0.4)
EOSINOPHIL NFR BLD AUTO: 0 % (ref 0.3–6.2)
ERYTHROCYTE [DISTWIDTH] IN BLOOD BY AUTOMATED COUNT: 13.4 % (ref 12.3–15.4)
GLOBULIN UR ELPH-MCNC: 2.6 GM/DL
GLUCOSE SERPL-MCNC: 105 MG/DL (ref 65–99)
HCT VFR BLD AUTO: 29 % (ref 34–46.6)
HGB BLD-MCNC: 9.2 G/DL (ref 12–15.9)
LYMPHOCYTES # BLD AUTO: 0.23 10*3/MM3 (ref 0.7–3.1)
LYMPHOCYTES NFR BLD AUTO: 3.6 % (ref 19.6–45.3)
MCH RBC QN AUTO: 32.2 PG (ref 26.6–33)
MCHC RBC AUTO-ENTMCNC: 31.7 G/DL (ref 31.5–35.7)
MCV RBC AUTO: 101.4 FL (ref 79–97)
MONOCYTES # BLD AUTO: 1.08 10*3/MM3 (ref 0.1–0.9)
MONOCYTES NFR BLD AUTO: 16.8 % (ref 5–12)
NEUTROPHILS NFR BLD AUTO: 5.11 10*3/MM3 (ref 1.7–7)
NEUTROPHILS NFR BLD AUTO: 79.4 % (ref 42.7–76)
PLATELET # BLD AUTO: 380 10*3/MM3 (ref 140–450)
PMV BLD AUTO: 8.6 FL (ref 6–12)
POTASSIUM SERPL-SCNC: 4.2 MMOL/L (ref 3.5–5.2)
PROT SERPL-MCNC: 6.2 G/DL (ref 6–8.5)
RAD ONC ARIA COURSE END DATE: NORMAL
RAD ONC ARIA COURSE ID: NORMAL
RAD ONC ARIA COURSE LAST TREATMENT DATE: NORMAL
RAD ONC ARIA COURSE START DATE: NORMAL
RAD ONC ARIA COURSE TREATMENT ELAPSED DAYS: 39
RAD ONC ARIA FIRST TREATMENT DATE: NORMAL
RAD ONC ARIA PLAN FRACTIONS TREATED TO DATE: 30
RAD ONC ARIA PLAN ID: NORMAL
RAD ONC ARIA PLAN NAME: NORMAL
RAD ONC ARIA PLAN PRESCRIBED DOSE PER FRACTION: 2 GY
RAD ONC ARIA PLAN PRIMARY REFERENCE POINT: NORMAL
RAD ONC ARIA PLAN TOTAL FRACTIONS PRESCRIBED: 30
RAD ONC ARIA PLAN TOTAL PRESCRIBED DOSE: 6000 CGY
RAD ONC ARIA REFERENCE POINT DOSAGE GIVEN TO DATE: 60 GY
RAD ONC ARIA REFERENCE POINT ID: NORMAL
RBC # BLD AUTO: 2.86 10*6/MM3 (ref 3.77–5.28)
SODIUM SERPL-SCNC: 139 MMOL/L (ref 136–145)
WBC NRBC COR # BLD AUTO: 6.43 10*3/MM3 (ref 3.4–10.8)

## 2024-11-27 PROCEDURE — 25810000003 SODIUM CHLORIDE 0.9 % SOLUTION 500 ML FLEX CONT: Performed by: INTERNAL MEDICINE

## 2024-11-27 PROCEDURE — 96375 TX/PRO/DX INJ NEW DRUG ADDON: CPT

## 2024-11-27 PROCEDURE — 25510000001 IOPAMIDOL PER 1 ML: Performed by: INTERNAL MEDICINE

## 2024-11-27 PROCEDURE — 80053 COMPREHEN METABOLIC PANEL: CPT | Performed by: INTERNAL MEDICINE

## 2024-11-27 PROCEDURE — 82565 ASSAY OF CREATININE: CPT

## 2024-11-27 PROCEDURE — 25010000002 DEXAMETHASONE PER 1 MG: Performed by: INTERNAL MEDICINE

## 2024-11-27 PROCEDURE — 25010000002 ETOPOSIDE 500 MG/25ML SOLUTION 25 ML VIAL: Performed by: INTERNAL MEDICINE

## 2024-11-27 PROCEDURE — 25010000002 HEPARIN LOCK FLUSH PER 10 UNITS: Performed by: INTERNAL MEDICINE

## 2024-11-27 PROCEDURE — 85025 COMPLETE CBC W/AUTO DIFF WBC: CPT | Performed by: INTERNAL MEDICINE

## 2024-11-27 PROCEDURE — 71275 CT ANGIOGRAPHY CHEST: CPT

## 2024-11-27 PROCEDURE — 96413 CHEMO IV INFUSION 1 HR: CPT

## 2024-11-27 RX ORDER — SODIUM CHLORIDE 0.9 % (FLUSH) 0.9 %
10 SYRINGE (ML) INJECTION AS NEEDED
OUTPATIENT
Start: 2024-11-27

## 2024-11-27 RX ORDER — HEPARIN SODIUM (PORCINE) LOCK FLUSH IV SOLN 100 UNIT/ML 100 UNIT/ML
500 SOLUTION INTRAVENOUS AS NEEDED
Status: DISCONTINUED | OUTPATIENT
Start: 2024-11-27 | End: 2024-11-28 | Stop reason: HOSPADM

## 2024-11-27 RX ORDER — IOPAMIDOL 755 MG/ML
100 INJECTION, SOLUTION INTRAVASCULAR
Status: COMPLETED | OUTPATIENT
Start: 2024-11-27 | End: 2024-11-27

## 2024-11-27 RX ORDER — DEXAMETHASONE SODIUM PHOSPHATE 4 MG/ML
12 INJECTION, SOLUTION INTRA-ARTICULAR; INTRALESIONAL; INTRAMUSCULAR; INTRAVENOUS; SOFT TISSUE ONCE
Status: COMPLETED | OUTPATIENT
Start: 2024-11-27 | End: 2024-11-27

## 2024-11-27 RX ORDER — HEPARIN SODIUM (PORCINE) LOCK FLUSH IV SOLN 100 UNIT/ML 100 UNIT/ML
500 SOLUTION INTRAVENOUS AS NEEDED
OUTPATIENT
Start: 2024-11-27

## 2024-11-27 RX ORDER — CLOPIDOGREL BISULFATE 75 MG/1
75 TABLET ORAL DAILY
COMMUNITY
Start: 2024-10-22 | End: 2025-10-22

## 2024-11-27 RX ORDER — SODIUM CHLORIDE 9 MG/ML
20 INJECTION, SOLUTION INTRAVENOUS ONCE
Status: DISCONTINUED | OUTPATIENT
Start: 2024-11-27 | End: 2024-11-28 | Stop reason: HOSPADM

## 2024-11-27 RX ORDER — SODIUM CHLORIDE 0.9 % (FLUSH) 0.9 %
10 SYRINGE (ML) INJECTION AS NEEDED
Status: DISCONTINUED | OUTPATIENT
Start: 2024-11-27 | End: 2024-11-28 | Stop reason: HOSPADM

## 2024-11-27 RX ADMIN — IOPAMIDOL 100 ML: 755 INJECTION, SOLUTION INTRAVENOUS at 12:54

## 2024-11-27 RX ADMIN — DEXAMETHASONE SODIUM PHOSPHATE 12 MG: 4 INJECTION INTRA-ARTICULAR; INTRALESIONAL; INTRAMUSCULAR; INTRAVENOUS; SOFT TISSUE at 10:28

## 2024-11-27 RX ADMIN — HEPARIN 500 UNITS: 100 SYRINGE at 11:39

## 2024-11-27 RX ADMIN — Medication 10 ML: at 11:39

## 2024-11-27 RX ADMIN — SODIUM CHLORIDE 180 MG: 9 INJECTION, SOLUTION INTRAVENOUS at 10:31

## 2024-11-27 NOTE — PROGRESS NOTES
"Patient in clinic after MD appointment for C3 D3 Etoposide. Port already accessed, flushed, blood return noted and saline locked. Patient stated she is having trouble eating; every time she eats something \"she feels like her heart is going to pound out of her chest.\" Patient is able to drink water. I gave the patient Biolyte to try at home to help with electrolytes and hydration. Patient is okay for treatment. She will go to [facility] after infusion for a chest CT. Patient is aware. Patient tolerated treatment. Port needle removed. Per Yesenia, a  will call the patient next week to get her next cycle scheduled; patient was notified. Patient discharged to [facility].  "

## 2024-12-02 NOTE — PROGRESS NOTES
Radiation Treatment Summary Note      Patient Name: Kandi Fuentes  : 1960    Attending Provider: Tereso Abarca MD      Diagnosis:     ICD-10-CM ICD-9-CM   1. 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  C34.11 162.3       Radiation Start Date: 10/14/2024    Radiation Completion Date: 2024      Prescription:     Site: PTV Lung RUL  Laterality: Right  Total Dose: 6000 cGy  Dose per Fraction: 200 cGy  Total Fractions: 30  Daily or BID: Daily  Modality: Photon  Technique: IMRT/VMAT/Rapid Arc  Bolus: No    Final Delivered Dose Deviated From Initially Prescribed Dose: No    Concurrent Chemotherapy: Yes    Patient Tolerated Treatment Without Unexpected Side Effects/Complications: No, describe:  Patient had significant esophagitis towards the end of treatment leading to decreased PO intake and weight loss. Managed with pain medicine and counseling about strategies to maintain weight and calories despite symptoms.     ECOG: Restricted in physically strenuous activity but ambulatory and able to carry out work of a light or sedentary nature, e.g., light house work, office work = 1    Pain Management Plan: Opioid or other pain medication prescribed, managed by me    Follow-Up Plan:  1-2 weeks after finishing treatment.     Imaging Ordered for Follow-Up: Yes, describe: Per Med Onc, will need post chemo MRI brain and updated CT imaging        Tereso Abarca MD

## 2024-12-16 ENCOUNTER — HOSPITAL ENCOUNTER (OUTPATIENT)
Dept: ONCOLOGY | Facility: HOSPITAL | Age: 64
Discharge: HOME | End: 2024-12-16
Admitting: INTERNAL MEDICINE
Payer: MEDICAID

## 2024-12-16 VITALS
DIASTOLIC BLOOD PRESSURE: 63 MMHG | WEIGHT: 133.2 LBS | HEIGHT: 67 IN | BODY MASS INDEX: 20.91 KG/M2 | SYSTOLIC BLOOD PRESSURE: 145 MMHG | TEMPERATURE: 97.8 F | RESPIRATION RATE: 14 BRPM | HEART RATE: 113 BPM | OXYGEN SATURATION: 97 %

## 2024-12-16 DIAGNOSIS — E87.6 HYPOKALEMIA: Primary | ICD-10-CM

## 2024-12-16 DIAGNOSIS — C34.11 SMALL CELL CARCINOMA OF UPPER LOBE OF RIGHT LUNG: Primary | ICD-10-CM

## 2024-12-16 DIAGNOSIS — C34.11 SMALL CELL CARCINOMA OF UPPER LOBE OF RIGHT LUNG: ICD-10-CM

## 2024-12-16 DIAGNOSIS — R91.8 LUNG MASS: ICD-10-CM

## 2024-12-16 LAB
ALBUMIN SERPL-MCNC: 3.5 G/DL (ref 3.5–5.2)
ALBUMIN/GLOB SERPL: 1.7 G/DL
ALP BLD-CCNC: 71 U/L (ref 42–141)
ALP SERPL-CCNC: 70 U/L (ref 39–117)
ALT SERPL W P-5'-P-CCNC: 11 U/L (ref 1–33)
ANION GAP SERPL CALCULATED.3IONS-SCNC: 13.2 MMOL/L (ref 5–15)
AST SERPL-CCNC: 13 U/L (ref 1–32)
BASOPHILS # BLD AUTO: 0.01 10*3/MM3 (ref 0–0.2)
BASOPHILS NFR BLD AUTO: 0.2 % (ref 0–1.5)
BILIRUB SERPL-MCNC: 0.4 MG/DL (ref 0–1.2)
BUN BLDA-MCNC: 6 MG/DL (ref 7–22)
BUN SERPL-MCNC: 4 MG/DL (ref 8–23)
BUN/CREAT SERPL: 8.9 (ref 7–25)
CALCIUM BLD QL: 8.8 MG/DL (ref 8–10.3)
CALCIUM SPEC-SCNC: 8.8 MG/DL (ref 8.6–10.5)
CHLORIDE BLDA-SCNC: 95 MMOL/L (ref 98–108)
CHLORIDE SERPL-SCNC: 95 MMOL/L (ref 98–107)
CO2 BLDA-SCNC: 34 MMOL/L (ref 18–33)
CO2 SERPL-SCNC: 31.8 MMOL/L (ref 22–29)
CREAT BLDA-MCNC: 0.5 MG/DL (ref 0.6–1.2)
CREAT SERPL-MCNC: 0.45 MG/DL (ref 0.57–1)
DEPRECATED RDW RBC AUTO: 47.9 FL (ref 37–54)
EGFRCR SERPLBLD CKD-EPI 2021: 105.5 ML/MIN/1.73
EGFRCR SERPLBLD CKD-EPI 2021: 108.3 ML/MIN/1.73
EOSINOPHIL # BLD AUTO: 0 10*3/MM3 (ref 0–0.4)
EOSINOPHIL NFR BLD AUTO: 0 % (ref 0.3–6.2)
ERYTHROCYTE [DISTWIDTH] IN BLOOD BY AUTOMATED COUNT: 14 % (ref 12.3–15.4)
GLOBULIN UR ELPH-MCNC: 2.1 GM/DL
GLUCOSE BLDC GLUCOMTR-MCNC: 115 MG/DL (ref 73–118)
GLUCOSE SERPL-MCNC: 111 MG/DL (ref 65–99)
HCT VFR BLD AUTO: 22.9 % (ref 34–46.6)
HGB BLD-MCNC: 7 G/DL (ref 12–15.9)
LYMPHOCYTES # BLD AUTO: 0.79 10*3/MM3 (ref 0.7–3.1)
LYMPHOCYTES NFR BLD AUTO: 16.5 % (ref 19.6–45.3)
MCH RBC QN AUTO: 31.8 PG (ref 26.6–33)
MCHC RBC AUTO-ENTMCNC: 30.6 G/DL (ref 31.5–35.7)
MCV RBC AUTO: 104.1 FL (ref 79–97)
MONOCYTES # BLD AUTO: 1.31 10*3/MM3 (ref 0.1–0.9)
MONOCYTES NFR BLD AUTO: 27.4 % (ref 5–12)
NEUTROPHILS NFR BLD AUTO: 2.67 10*3/MM3 (ref 1.7–7)
NEUTROPHILS NFR BLD AUTO: 55.9 % (ref 42.7–76)
PLATELET # BLD AUTO: 146 10*3/MM3 (ref 140–450)
PMV BLD AUTO: 9.6 FL (ref 6–12)
POC ALBUMIN: 2.9 G/L (ref 3.3–5.5)
POC ALT (SGPT): 8 U/L (ref 10–47)
POC AST (SGOT): 17 U/L (ref 11–38)
POC TOTAL BILIRUBIN: 0.6 MG/DL (ref 0.2–1.6)
POC TOTAL PROTEIN: 6 G/DL (ref 6.4–8.1)
POTASSIUM BLDA-SCNC: 2 MMOL/L (ref 3.6–5.1)
POTASSIUM SERPL-SCNC: 2.4 MMOL/L (ref 3.5–5.2)
PROT SERPL-MCNC: 5.6 G/DL (ref 6–8.5)
RBC # BLD AUTO: 2.2 10*6/MM3 (ref 3.77–5.28)
SODIUM BLD-SCNC: 136 MMOL/L (ref 128–145)
SODIUM SERPL-SCNC: 140 MMOL/L (ref 136–145)
WBC NRBC COR # BLD AUTO: 4.78 10*3/MM3 (ref 3.4–10.8)

## 2024-12-16 PROCEDURE — 96367 TX/PROPH/DG ADDL SEQ IV INF: CPT

## 2024-12-16 PROCEDURE — 96375 TX/PRO/DX INJ NEW DRUG ADDON: CPT

## 2024-12-16 PROCEDURE — 25010000002 HEPARIN LOCK FLUSH PER 10 UNITS: Performed by: INTERNAL MEDICINE

## 2024-12-16 PROCEDURE — 25010000002 FOSAPREPITANT PER 1 MG: Performed by: INTERNAL MEDICINE

## 2024-12-16 PROCEDURE — 25810000003 SODIUM CHLORIDE 0.9 % SOLUTION 250 ML FLEX CONT: Performed by: INTERNAL MEDICINE

## 2024-12-16 PROCEDURE — 96417 CHEMO IV INFUS EACH ADDL SEQ: CPT

## 2024-12-16 PROCEDURE — 80053 COMPREHEN METABOLIC PANEL: CPT

## 2024-12-16 PROCEDURE — 25010000002 DEXAMETHASONE PER 1 MG: Performed by: INTERNAL MEDICINE

## 2024-12-16 PROCEDURE — 25010000002 PALONOSETRON PER 25 MCG: Performed by: INTERNAL MEDICINE

## 2024-12-16 PROCEDURE — 80053 COMPREHEN METABOLIC PANEL: CPT | Performed by: INTERNAL MEDICINE

## 2024-12-16 PROCEDURE — 96368 THER/DIAG CONCURRENT INF: CPT

## 2024-12-16 PROCEDURE — 25010000002 CARBOPLATIN PER 50 MG: Performed by: INTERNAL MEDICINE

## 2024-12-16 PROCEDURE — 25010000002 ETOPOSIDE 1 GM/50ML SOLUTION 50 ML VIAL: Performed by: INTERNAL MEDICINE

## 2024-12-16 PROCEDURE — 25810000003 SODIUM CHLORIDE 0.9 % SOLUTION 500 ML FLEX CONT: Performed by: INTERNAL MEDICINE

## 2024-12-16 PROCEDURE — 96413 CHEMO IV INFUSION 1 HR: CPT

## 2024-12-16 PROCEDURE — 25010000002 POTASSIUM CHLORIDE 10 MEQ/100ML SOLUTION: Performed by: INTERNAL MEDICINE

## 2024-12-16 PROCEDURE — 85025 COMPLETE CBC W/AUTO DIFF WBC: CPT | Performed by: INTERNAL MEDICINE

## 2024-12-16 RX ORDER — DEXAMETHASONE SODIUM PHOSPHATE 4 MG/ML
12 INJECTION, SOLUTION INTRA-ARTICULAR; INTRALESIONAL; INTRAMUSCULAR; INTRAVENOUS; SOFT TISSUE ONCE
Status: CANCELLED
Start: 2024-12-18 | End: 2024-12-18

## 2024-12-16 RX ORDER — POTASSIUM CHLORIDE 7.45 MG/ML
10 INJECTION INTRAVENOUS ONCE
Status: COMPLETED | OUTPATIENT
Start: 2024-12-16 | End: 2024-12-16

## 2024-12-16 RX ORDER — SODIUM CHLORIDE 9 MG/ML
20 INJECTION, SOLUTION INTRAVENOUS ONCE
Status: CANCELLED | OUTPATIENT
Start: 2024-12-18

## 2024-12-16 RX ORDER — DIPHENHYDRAMINE HYDROCHLORIDE 50 MG/ML
50 INJECTION INTRAMUSCULAR; INTRAVENOUS AS NEEDED
Status: CANCELLED | OUTPATIENT
Start: 2024-12-16

## 2024-12-16 RX ORDER — SODIUM CHLORIDE 0.9 % (FLUSH) 0.9 %
10 SYRINGE (ML) INJECTION AS NEEDED
Status: CANCELLED | OUTPATIENT
Start: 2024-12-16

## 2024-12-16 RX ORDER — SODIUM CHLORIDE 9 MG/ML
20 INJECTION, SOLUTION INTRAVENOUS ONCE
Status: DISCONTINUED | OUTPATIENT
Start: 2024-12-16 | End: 2024-12-17 | Stop reason: HOSPADM

## 2024-12-16 RX ORDER — SODIUM CHLORIDE 9 MG/ML
20 INJECTION, SOLUTION INTRAVENOUS ONCE
Status: CANCELLED | OUTPATIENT
Start: 2024-12-17

## 2024-12-16 RX ORDER — PALONOSETRON 0.05 MG/ML
0.25 INJECTION, SOLUTION INTRAVENOUS ONCE
Status: CANCELLED | OUTPATIENT
Start: 2024-12-16

## 2024-12-16 RX ORDER — FAMOTIDINE 10 MG/ML
20 INJECTION, SOLUTION INTRAVENOUS AS NEEDED
Status: CANCELLED | OUTPATIENT
Start: 2024-12-16

## 2024-12-16 RX ORDER — SODIUM CHLORIDE 9 MG/ML
20 INJECTION, SOLUTION INTRAVENOUS ONCE
Status: CANCELLED | OUTPATIENT
Start: 2024-12-16

## 2024-12-16 RX ORDER — HEPARIN SODIUM (PORCINE) LOCK FLUSH IV SOLN 100 UNIT/ML 100 UNIT/ML
500 SOLUTION INTRAVENOUS AS NEEDED
Status: DISCONTINUED | OUTPATIENT
Start: 2024-12-16 | End: 2024-12-17 | Stop reason: HOSPADM

## 2024-12-16 RX ORDER — HEPARIN SODIUM (PORCINE) LOCK FLUSH IV SOLN 100 UNIT/ML 100 UNIT/ML
500 SOLUTION INTRAVENOUS AS NEEDED
Status: CANCELLED | OUTPATIENT
Start: 2024-12-16

## 2024-12-16 RX ORDER — DEXAMETHASONE SODIUM PHOSPHATE 4 MG/ML
12 INJECTION, SOLUTION INTRA-ARTICULAR; INTRALESIONAL; INTRAMUSCULAR; INTRAVENOUS; SOFT TISSUE ONCE
Status: CANCELLED
Start: 2024-12-16 | End: 2024-12-16

## 2024-12-16 RX ORDER — SODIUM CHLORIDE 0.9 % (FLUSH) 0.9 %
10 SYRINGE (ML) INJECTION AS NEEDED
Status: DISCONTINUED | OUTPATIENT
Start: 2024-12-16 | End: 2024-12-17 | Stop reason: HOSPADM

## 2024-12-16 RX ORDER — PALONOSETRON 0.05 MG/ML
0.25 INJECTION, SOLUTION INTRAVENOUS ONCE
Status: COMPLETED | OUTPATIENT
Start: 2024-12-16 | End: 2024-12-16

## 2024-12-16 RX ORDER — DEXAMETHASONE SODIUM PHOSPHATE 4 MG/ML
12 INJECTION, SOLUTION INTRA-ARTICULAR; INTRALESIONAL; INTRAMUSCULAR; INTRAVENOUS; SOFT TISSUE ONCE
Status: CANCELLED
Start: 2024-12-17 | End: 2024-12-17

## 2024-12-16 RX ORDER — DEXAMETHASONE SODIUM PHOSPHATE 4 MG/ML
12 INJECTION, SOLUTION INTRA-ARTICULAR; INTRALESIONAL; INTRAMUSCULAR; INTRAVENOUS; SOFT TISSUE ONCE
Status: COMPLETED | OUTPATIENT
Start: 2024-12-16 | End: 2024-12-16

## 2024-12-16 RX ORDER — HYDROCORTISONE SODIUM SUCCINATE 100 MG/2ML
100 INJECTION INTRAMUSCULAR; INTRAVENOUS AS NEEDED
Status: CANCELLED | OUTPATIENT
Start: 2024-12-16

## 2024-12-16 RX ADMIN — PALONOSETRON HYDROCHLORIDE 0.25 MG: 0.25 INJECTION INTRAVENOUS at 13:06

## 2024-12-16 RX ADMIN — FOSAPREPITANT 100 ML: 150 INJECTION, POWDER, LYOPHILIZED, FOR SOLUTION INTRAVENOUS at 13:17

## 2024-12-16 RX ADMIN — SODIUM CHLORIDE 180 MG: 9 INJECTION, SOLUTION INTRAVENOUS at 14:29

## 2024-12-16 RX ADMIN — Medication 10 ML: at 16:34

## 2024-12-16 RX ADMIN — CARBOPLATIN 520 MG: 10 INJECTION, SOLUTION INTRAVENOUS at 13:54

## 2024-12-16 RX ADMIN — POTASSIUM CHLORIDE 10 MEQ: 10 INJECTION, SOLUTION INTRAVENOUS at 15:38

## 2024-12-16 RX ADMIN — POTASSIUM CHLORIDE 10 MEQ: 10 INJECTION, SOLUTION INTRAVENOUS at 14:28

## 2024-12-16 RX ADMIN — DEXAMETHASONE SODIUM PHOSPHATE 12 MG: 4 INJECTION INTRA-ARTICULAR; INTRALESIONAL; INTRAMUSCULAR; INTRAVENOUS; SOFT TISSUE at 13:09

## 2024-12-16 RX ADMIN — HEPARIN 500 UNITS: 100 SYRINGE at 16:34

## 2024-12-16 NOTE — PROGRESS NOTES
Per Dr. Delgado, patient to receive KCL 20 mEq IV tomorrow in addition to the KCL 20 mEq that she received today. Order entered.

## 2024-12-16 NOTE — PROGRESS NOTES
Patient here for infusion. Port accessed and flushed with good blood return noted. 10cc of blood wasted prior to specimen collection. Blood specimen obtained and sent to lab for processing per protocol.  Port flushed with saline and heparin. Patient wanted to stay accessed for treatment tomorrow.     Dr. Delgado sent: Patient is here for C4D1 carboplatin and etoposide lung (3 days total in treatment). Patient stated in the last 2 weeks anything she eats that is solid or has dairy in it catches in her throat and come right back up. Patient stated she is mainly doing water and chicken broth type liquids. Patient stated her eyes are more blurry on and off within the last 2 weeks and she is having trouble sleeping-she stated she will sleep for about an hour and then wake up coughing and have trouble getting back to sleep and stated this is new in the last couple weeks. Patient also stated her constipation is bad-at one point she went 9 days without a bowel movement and took almost a whole bottle of senokot-she said that is the longest she has gone between bowl movements-its normally 5-7 days. she also stated she has been getting these dry patches on her arms and hands mainly-they do not itch or have any redness-just a white dry patch. she also did have a CT done for PE protocol on 11/27. Her hemoglobin on her CBC was 7 today. Her potassium on the DRAKE was 2.0-I did send a STAT repeat CMP to the hospital to double check.how would you like to proceed today? her plan does need to be signed if she is ok.     Dr. Delgado responded: let's see what the potassium shows. she needs local lubrication fro those patches. let's add mirlax and senokot to her regimen. I've signed the plan. Ok to start treatment while waiting on potassium. Patient stated she cannot take the miralax due to the texture. Dr. Delgado advised to try with different fluids or to try metamucil. Dr. Delgado wanted CBC rechecked on one of the other days  patient is here this week.  Dr. Delgado sent STAT potassium of 2.4. Dr. Delgado notified patient cannot take potassium pills-they get stuck. Dr. Delgado stated ok to give some potassium today and some with treatment tomorrow-let's give her 20 and 20 and I'm sending the prescription to her pharmacy (liquid potassium). Marco Antonio added on to add the potassium for tomorrow. Patient verbalized understanding. Patient tolerated treatment and was discharged.

## 2024-12-17 ENCOUNTER — OFFICE VISIT (OUTPATIENT)
Dept: RADIATION ONCOLOGY | Facility: HOSPITAL | Age: 64
End: 2024-12-17
Payer: MEDICAID

## 2024-12-17 ENCOUNTER — HOSPITAL ENCOUNTER (OUTPATIENT)
Dept: ONCOLOGY | Facility: HOSPITAL | Age: 64
Discharge: HOME | End: 2024-12-17
Admitting: INTERNAL MEDICINE
Payer: MEDICAID

## 2024-12-17 VITALS
SYSTOLIC BLOOD PRESSURE: 101 MMHG | TEMPERATURE: 97.3 F | HEIGHT: 67 IN | RESPIRATION RATE: 16 BRPM | OXYGEN SATURATION: 97 % | BODY MASS INDEX: 21.19 KG/M2 | HEART RATE: 88 BPM | DIASTOLIC BLOOD PRESSURE: 64 MMHG | WEIGHT: 135 LBS

## 2024-12-17 VITALS
OXYGEN SATURATION: 94 % | HEIGHT: 67 IN | TEMPERATURE: 97.2 F | DIASTOLIC BLOOD PRESSURE: 67 MMHG | HEART RATE: 107 BPM | BODY MASS INDEX: 21.22 KG/M2 | RESPIRATION RATE: 20 BRPM | SYSTOLIC BLOOD PRESSURE: 126 MMHG | WEIGHT: 135.2 LBS

## 2024-12-17 DIAGNOSIS — C34.11 SMALL CELL CARCINOMA OF UPPER LOBE OF RIGHT LUNG: Primary | ICD-10-CM

## 2024-12-17 DIAGNOSIS — R91.8 LUNG MASS: ICD-10-CM

## 2024-12-17 DIAGNOSIS — E87.6 HYPOKALEMIA: ICD-10-CM

## 2024-12-17 PROCEDURE — 25010000002 DEXAMETHASONE PER 1 MG: Performed by: INTERNAL MEDICINE

## 2024-12-17 PROCEDURE — G0463 HOSPITAL OUTPT CLINIC VISIT: HCPCS | Performed by: INTERNAL MEDICINE

## 2024-12-17 PROCEDURE — 25010000002 POTASSIUM CHLORIDE 10 MEQ/100ML SOLUTION: Performed by: INTERNAL MEDICINE

## 2024-12-17 PROCEDURE — 96375 TX/PRO/DX INJ NEW DRUG ADDON: CPT

## 2024-12-17 PROCEDURE — 96413 CHEMO IV INFUSION 1 HR: CPT

## 2024-12-17 PROCEDURE — 25010000002 ETOPOSIDE 1 GM/50ML SOLUTION 50 ML VIAL: Performed by: INTERNAL MEDICINE

## 2024-12-17 PROCEDURE — 25810000003 SODIUM CHLORIDE 0.9 % SOLUTION 500 ML FLEX CONT: Performed by: INTERNAL MEDICINE

## 2024-12-17 PROCEDURE — 25010000002 HEPARIN LOCK FLUSH PER 10 UNITS: Performed by: INTERNAL MEDICINE

## 2024-12-17 PROCEDURE — 96368 THER/DIAG CONCURRENT INF: CPT

## 2024-12-17 RX ORDER — POTASSIUM CHLORIDE 20MEQ/15ML
20 LIQUID (ML) ORAL DAILY
Qty: 450 ML | Refills: 5 | Status: SHIPPED | OUTPATIENT
Start: 2024-12-17

## 2024-12-17 RX ORDER — HEPARIN SODIUM (PORCINE) LOCK FLUSH IV SOLN 100 UNIT/ML 100 UNIT/ML
500 SOLUTION INTRAVENOUS AS NEEDED
Status: DISCONTINUED | OUTPATIENT
Start: 2024-12-17 | End: 2024-12-18 | Stop reason: HOSPADM

## 2024-12-17 RX ORDER — SODIUM CHLORIDE 9 MG/ML
20 INJECTION, SOLUTION INTRAVENOUS ONCE
Status: DISCONTINUED | OUTPATIENT
Start: 2024-12-17 | End: 2024-12-18 | Stop reason: HOSPADM

## 2024-12-17 RX ORDER — DEXAMETHASONE SODIUM PHOSPHATE 4 MG/ML
12 INJECTION, SOLUTION INTRA-ARTICULAR; INTRALESIONAL; INTRAMUSCULAR; INTRAVENOUS; SOFT TISSUE ONCE
Status: COMPLETED | OUTPATIENT
Start: 2024-12-17 | End: 2024-12-17

## 2024-12-17 RX ORDER — HEPARIN SODIUM (PORCINE) LOCK FLUSH IV SOLN 100 UNIT/ML 100 UNIT/ML
500 SOLUTION INTRAVENOUS AS NEEDED
Status: CANCELLED | OUTPATIENT
Start: 2024-12-17

## 2024-12-17 RX ORDER — SODIUM CHLORIDE 0.9 % (FLUSH) 0.9 %
10 SYRINGE (ML) INJECTION AS NEEDED
Status: DISCONTINUED | OUTPATIENT
Start: 2024-12-17 | End: 2024-12-18 | Stop reason: HOSPADM

## 2024-12-17 RX ORDER — POTASSIUM CHLORIDE 7.45 MG/ML
20 INJECTION INTRAVENOUS ONCE
Status: COMPLETED | OUTPATIENT
Start: 2024-12-17 | End: 2024-12-17

## 2024-12-17 RX ORDER — SODIUM CHLORIDE 0.9 % (FLUSH) 0.9 %
10 SYRINGE (ML) INJECTION AS NEEDED
Status: CANCELLED | OUTPATIENT
Start: 2024-12-17

## 2024-12-17 RX ADMIN — DEXAMETHASONE SODIUM PHOSPHATE 12 MG: 4 INJECTION INTRA-ARTICULAR; INTRALESIONAL; INTRAMUSCULAR; INTRAVENOUS; SOFT TISSUE at 11:56

## 2024-12-17 RX ADMIN — SODIUM CHLORIDE 180 MG: 9 INJECTION, SOLUTION INTRAVENOUS at 12:12

## 2024-12-17 RX ADMIN — HEPARIN 500 UNITS: 100 SYRINGE at 14:10

## 2024-12-17 RX ADMIN — POTASSIUM CHLORIDE 20 MEQ: 10 INJECTION, SOLUTION INTRAVENOUS at 11:47

## 2024-12-17 RX ADMIN — Medication 10 ML: at 14:10

## 2024-12-17 NOTE — PROGRESS NOTES
[facility] RADIATION ONCOLOGY  FOLLOW-UP NOTE    NAME: Kandi Fuentes  YOB: 1960  MRN #: [id number]  DATE OF SERVICE: 12/18/2024  REFERRING PROVIDER: Provider, No Known   PRIMARY CARE PROVIDER: Provider, No Known    CHIEF COMPLAINT:  3Wk F/U    DIAGNOSIS:   Encounter Diagnosis   Name Primary?    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    INTERVAL HISTORY     Kandi Fuentes is a 64 y.o. female who was last seen in our office on 11/26/2024. The plan was to follow up in 2-3 weeks for a toxicity check.    She follows with Dr. Delgado, and was last seen on 11/27/2024. Their plan is to continue Carboplatin/Etoposide, have CT angiogram chest, and follow up in approximately 3 weeks.    IMAGING     CT Angiogram Chest Pulmonary Embolism  Result Date: 11/27/2024  No findings of pulmonary embolus. Near complete resolution of the abnormalities in the posterior right upper lobe. Small nodules are seen along the major fissure superiorly. The site of previous nodules in the posterior right upper lobe now has mild linear opacity. There is a remaining enlarged right hilar lymph node which may be reactive or neoplastic. Severe emphysema. Electronically Signed: Rosalva Wilcox MD  11/27/2024 1:10 PM EST  Workstation ID: [id number]    XR Chest Post CVA Port  Result Date: 10/7/2024  Impression: 1. Placement of a right-sided port. The tip of the catheter terminates at the junction of the right brachiocephalic vein and superior vena cava. There is no pneumothorax. 2. Right upper lobe lung mass consistent with known malignancy. 3. Emphysema. Electronically Signed: Gilson Teran MD  10/7/2024 12:33 PM EDT  Workstation ID: [id number]    MRI Brain With & Without Contrast  Result Date: 9/30/2024  1.Findings compatible with chronic microvascular ischemic change. 2.No diffusion restriction is identified to suggest  acute infarct. 3.No abnormal contrast enhancement is identified. Electronically Signed: William Vang MD  9/30/2024 5:47 PM EDT  Workstation ID: [id number]    CT Chest Without Contrast Diagnostic  Result Date: 9/30/2024  Impression: 1. Lobulated 4.4 cm mass in posterior right upper lobe communicating with right upper lobe posterior segmental bronchus concerning for malignancy, correlate with bronchoscopy/biopsy findings. 2. Multiple areas of fissural based nodularity in the right upper lobe along major fissure concerning for pleural metastatic involvement, mildly increased from prior study. 3. Severe emphysema. 4. Additional chronic findings above. Electronically Signed: J Carlos Schmid MD  9/30/2024 10:29 AM EDT  Workstation ID: [id number]    XR Chest 1 View  Result Date: 9/27/2024  Impression: 1. No evidence pneumothorax. 2. Lobular 4 cm right upper lobe mass. 3. Emphysema Electronically Signed: Tom Jones MD  9/27/2024 3:55 PM EDT  Workstation ID: [id number]    PATHOLOGY     No recent pathology to review.    LABS     HEMATOLOGY:  WBC   Date Value Ref Range Status   12/18/2024 6.31 3.40 - 10.80 10*3/mm3 Final     RBC   Date Value Ref Range Status   12/18/2024 2.21 (L) 3.77 - 5.28 10*6/mm3 Final     Hemoglobin   Date Value Ref Range Status   12/18/2024 7.1 (C) 12.0 - 15.9 g/dL Final     Hematocrit   Date Value Ref Range Status   12/18/2024 23.1 (C) 34.0 - 46.6 % Final     Platelets   Date Value Ref Range Status   12/18/2024 223 140 - 450 10*3/mm3 Final     CHEMISTRY:  Lab Results   Component Value Date    GLUCOSE 111 (H) 12/16/2024    BUN 4 (L) 12/16/2024    CREATININE 0.50 (L) 12/16/2024    BCR 8.9 12/16/2024    K 3.6 12/18/2024    CO2 31.8 (H) 12/16/2024    CALCIUM 8.8 12/16/2024    ALBUMIN 3.5 12/16/2024    AST 13 12/16/2024    ALT 11 12/16/2024     PROBLEM LIST     Patient Active Problem List   Diagnosis    Lung mass    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    Closed nondisplaced fracture of lateral  malleolus of left fibula    Fracture of lateral malleolus    Hypokalemia      CURRENT MEDICATIONS     Current Outpatient Medications   Medication Instructions    albuterol sulfate  (90 Base) MCG/ACT inhaler 2 puffs, As Needed    budesonide-formoterol (Breyna) 160-4.5 MCG/ACT inhaler 2 puffs, Inhalation, 2 Times Daily - RT    carvedilol (COREG) 3.125 mg, As Needed    clopidogrel (PLAVIX) 75 mg, Daily    docusate sodium 100 mg, As Needed    ipratropium-albuterol (DUO-NEB) 0.5-2.5 mg/3 ml nebulizer 3 mL, Nebulization, 4 Times Daily PRN    isosorbide mononitrate (IMDUR) 30 mg, Oral, Daily    Lidocaine Viscous HCl (XYLOCAINE) 2 % solution 10 mL, Oral, As Needed    lidocaine-prilocaine (EMLA) 2.5-2.5 % cream 1 Application, Topical, See Admin Instructions, Apply one hour prior to port access    mometasone (ELOCON) 0.1 % ointment Apply to affected skin of chest and back 2-3 times daily as needed for itching from radiation treatments.    nitroglycerin (NITROSTAT) 0.4 mg    OLANZapine (ZYPREXA) 5 mg, Oral, Nightly, Take nightly x 4 starting night of chemotherapy.    ondansetron (ZOFRAN) 8 mg, Oral, 3 Times Daily PRN    oxyCODONE (ROXICODONE) 5 mg, Oral, Every 4 Hours PRN    potassium chloride (KAYCIEL) 20 mEq/15 mL solution 20 mEq, Oral, Daily    sucralfate (CARAFATE) 1 g, Oral, 4 Times Daily PRN    Tylenol 650 mg, As Needed      ALLERGIES     Allergies   Allergen Reactions    Morphine Hives and Shortness Of Breath    Codeine Hives    Sulfa Antibiotics Hives       REVIEW OF SYSTEMS     Review of Systems   Constitutional:  Positive for activity change, fatigue and unexpected weight change.   HENT:  Positive for trouble swallowing.         Vitals:    12/17/24 1025   BP: 126/67   Pulse: 107   Resp: 20   Temp: 97.2 °F (36.2 °C)   SpO2: 94%      Physical Exam  Constitutional:       General: She is not in acute distress.  HENT:      Head: Normocephalic and atraumatic.   Pulmonary:      Effort: Pulmonary effort is normal. No  respiratory distress.   Neurological:      Mental Status: She is alert and oriented to person, place, and time. Mental status is at baseline.   Psychiatric:         Mood and Affect: Mood normal.         Behavior: Behavior normal.        ECOG:  Restricted in physically strenuous activity but ambulatory and able to carry out work of a light or sedentary nature, e.g., light house work, office work = 1      ASSESSMENT AND PLAN     ASSESSMENT:      Kandi Fuentes is a 64 y.o. female with history of CAD, chronic CHF, recent MI, severe emphysema and now a history of limited stage small cell carcinoma, stage Stage IIIB (cT3, cN2, cM0) of the right upper lobe. She completed chemoradiation therapy on 11/22/2024, 6000 cGy in 30 fractions to right lung. She returns for routine follow-up.     Diagnoses and all orders for this visit:    1. 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  -Imaging will be scheduled by Dr. Delgado's office  - Will discuss merits of PCI when patient returns    We reviewed the patient's posttreatment recovery.  Her swallowing is causing less pain and discomfort.  She still has some food that gets stuck in her mouth.  She also gets nauseous and feels that chemo may be contributing.  She is almost done with chemotherapy and is working on improving her oral hydration and nutrition intake.    We discussed plans assessing treatment response with posttreatment imaging.  She will be due for an MRI of the brain and CT chest abdomen pelvis after completing her chemotherapy.  We will follow-up with the patient after imaging to discuss options for neck steps in management.  The patient expressed understanding.  She is encouraged to reach out with questions or concerns prior to her next appointment.    I spent 20 minutes caring for Kandi on this date of service. This time includes  time spent by me in the following activities:preparing for the visit, reviewing tests, obtaining and/or reviewing a separately obtained history, performing a medically appropriate examination and/or evaluation , counseling and educating the patient/family/caregiver, documenting information in the medical record, and independently interpreting results and communicating that information with the patient/family/caregiver    FOLLOW UP     No follow-ups on file.     CC: Provider, No Known Provider, No Known

## 2024-12-17 NOTE — PROGRESS NOTES
Patient is here for C4D2 etoposide and potassium today. Port had positive blood return noted prior to treatment starting. Patient stated no changes from yesterday and she was able to eat a banana and some of a baked potato. Patient did mention some tremors.     JUANCHO and MD sent:  Patient stated that when radiation and chemo started she started to get tremors or shaking in her hands-left worse than right-it gets worse after treatments. she did not mention this yesterday and stated she has not mentioned it to anyone but thought she should today. the tremors are constant and all the time. She is just etoposide today she got carboplatin and etoposide yesterday. I just wanted to make sure we were ok to proceed with treatment today. She aslo stated she would no be able to get the liquid potassium due to cost. She is seeing Dr. Delgado tomorrow and stated she would discuss with him.     Angi DAWKINS replied: You can go ahead with treatment and I will review her chart to see if any additional recs. Potassium stopped for a few minutes to give the decadron.  Patient tolerated treatment and was discharged with port still accessed for treatment tomorrow.

## 2024-12-18 ENCOUNTER — TELEPHONE (OUTPATIENT)
Dept: ONCOLOGY | Facility: CLINIC | Age: 64
End: 2024-12-18

## 2024-12-18 ENCOUNTER — PATIENT OUTREACH (OUTPATIENT)
Dept: ONCOLOGY | Facility: CLINIC | Age: 64
End: 2024-12-18
Payer: MEDICAID

## 2024-12-18 ENCOUNTER — APPOINTMENT (OUTPATIENT)
Dept: ONCOLOGY | Facility: HOSPITAL | Age: 64
End: 2024-12-18
Payer: MEDICAID

## 2024-12-18 ENCOUNTER — OFFICE VISIT (OUTPATIENT)
Dept: ONCOLOGY | Facility: CLINIC | Age: 64
End: 2024-12-18
Payer: MEDICAID

## 2024-12-18 ENCOUNTER — HOSPITAL ENCOUNTER (OUTPATIENT)
Dept: ONCOLOGY | Facility: HOSPITAL | Age: 64
Discharge: HOME | End: 2024-12-18
Admitting: INTERNAL MEDICINE
Payer: MEDICAID

## 2024-12-18 VITALS
WEIGHT: 138 LBS | HEIGHT: 67 IN | RESPIRATION RATE: 16 BRPM | DIASTOLIC BLOOD PRESSURE: 70 MMHG | BODY MASS INDEX: 21.66 KG/M2 | SYSTOLIC BLOOD PRESSURE: 106 MMHG | TEMPERATURE: 98.1 F | OXYGEN SATURATION: 95 % | HEART RATE: 107 BPM

## 2024-12-18 VITALS
OXYGEN SATURATION: 95 % | WEIGHT: 138 LBS | TEMPERATURE: 98.1 F | HEART RATE: 107 BPM | SYSTOLIC BLOOD PRESSURE: 106 MMHG | HEIGHT: 67 IN | BODY MASS INDEX: 21.66 KG/M2 | DIASTOLIC BLOOD PRESSURE: 70 MMHG | RESPIRATION RATE: 16 BRPM

## 2024-12-18 DIAGNOSIS — C34.11 SMALL CELL CARCINOMA OF UPPER LOBE OF RIGHT LUNG: Primary | ICD-10-CM

## 2024-12-18 DIAGNOSIS — R91.8 LUNG MASS: ICD-10-CM

## 2024-12-18 DIAGNOSIS — E87.6 HYPOKALEMIA: ICD-10-CM

## 2024-12-18 LAB
BASOPHILS # BLD AUTO: 0.01 10*3/MM3 (ref 0–0.2)
BASOPHILS NFR BLD AUTO: 0.2 % (ref 0–1.5)
DEPRECATED RDW RBC AUTO: 50.4 FL (ref 37–54)
EOSINOPHIL # BLD AUTO: 0 10*3/MM3 (ref 0–0.4)
EOSINOPHIL NFR BLD AUTO: 0 % (ref 0.3–6.2)
ERYTHROCYTE [DISTWIDTH] IN BLOOD BY AUTOMATED COUNT: 14.9 % (ref 12.3–15.4)
HCT VFR BLD AUTO: 23.1 % (ref 34–46.6)
HGB BLD-MCNC: 7.1 G/DL (ref 12–15.9)
LYMPHOCYTES # BLD AUTO: 0.3 10*3/MM3 (ref 0.7–3.1)
LYMPHOCYTES NFR BLD AUTO: 4.8 % (ref 19.6–45.3)
MCH RBC QN AUTO: 32.1 PG (ref 26.6–33)
MCHC RBC AUTO-ENTMCNC: 30.7 G/DL (ref 31.5–35.7)
MCV RBC AUTO: 104.5 FL (ref 79–97)
MONOCYTES # BLD AUTO: 0.85 10*3/MM3 (ref 0.1–0.9)
MONOCYTES NFR BLD AUTO: 13.5 % (ref 5–12)
NEUTROPHILS NFR BLD AUTO: 5.15 10*3/MM3 (ref 1.7–7)
NEUTROPHILS NFR BLD AUTO: 81.5 % (ref 42.7–76)
PLATELET # BLD AUTO: 223 10*3/MM3 (ref 140–450)
PMV BLD AUTO: 9.6 FL (ref 6–12)
POTASSIUM SERPL-SCNC: 3.6 MMOL/L (ref 3.5–5.2)
RBC # BLD AUTO: 2.21 10*6/MM3 (ref 3.77–5.28)
WBC NRBC COR # BLD AUTO: 6.31 10*3/MM3 (ref 3.4–10.8)

## 2024-12-18 PROCEDURE — 25010000002 ETOPOSIDE 1 GM/50ML SOLUTION 50 ML VIAL: Performed by: INTERNAL MEDICINE

## 2024-12-18 PROCEDURE — 96413 CHEMO IV INFUSION 1 HR: CPT

## 2024-12-18 PROCEDURE — 25010000002 DEXAMETHASONE PER 1 MG: Performed by: INTERNAL MEDICINE

## 2024-12-18 PROCEDURE — 85025 COMPLETE CBC W/AUTO DIFF WBC: CPT | Performed by: INTERNAL MEDICINE

## 2024-12-18 PROCEDURE — 96375 TX/PRO/DX INJ NEW DRUG ADDON: CPT

## 2024-12-18 PROCEDURE — 84132 ASSAY OF SERUM POTASSIUM: CPT | Performed by: NURSE PRACTITIONER

## 2024-12-18 PROCEDURE — 25810000003 SODIUM CHLORIDE 0.9 % SOLUTION 500 ML FLEX CONT: Performed by: INTERNAL MEDICINE

## 2024-12-18 PROCEDURE — 25010000002 HEPARIN LOCK FLUSH PER 10 UNITS: Performed by: INTERNAL MEDICINE

## 2024-12-18 RX ORDER — SODIUM CHLORIDE 0.9 % (FLUSH) 0.9 %
10 SYRINGE (ML) INJECTION AS NEEDED
Status: DISCONTINUED | OUTPATIENT
Start: 2024-12-18 | End: 2024-12-19 | Stop reason: HOSPADM

## 2024-12-18 RX ORDER — HEPARIN SODIUM (PORCINE) LOCK FLUSH IV SOLN 100 UNIT/ML 100 UNIT/ML
500 SOLUTION INTRAVENOUS AS NEEDED
Status: CANCELLED | OUTPATIENT
Start: 2024-12-18

## 2024-12-18 RX ORDER — DEXAMETHASONE SODIUM PHOSPHATE 4 MG/ML
12 INJECTION, SOLUTION INTRA-ARTICULAR; INTRALESIONAL; INTRAMUSCULAR; INTRAVENOUS; SOFT TISSUE ONCE
Status: COMPLETED | OUTPATIENT
Start: 2024-12-18 | End: 2024-12-18

## 2024-12-18 RX ORDER — SODIUM CHLORIDE 9 MG/ML
20 INJECTION, SOLUTION INTRAVENOUS ONCE
Status: DISCONTINUED | OUTPATIENT
Start: 2024-12-18 | End: 2024-12-19 | Stop reason: HOSPADM

## 2024-12-18 RX ORDER — SODIUM CHLORIDE 0.9 % (FLUSH) 0.9 %
10 SYRINGE (ML) INJECTION AS NEEDED
Status: CANCELLED | OUTPATIENT
Start: 2024-12-18

## 2024-12-18 RX ORDER — HEPARIN SODIUM (PORCINE) LOCK FLUSH IV SOLN 100 UNIT/ML 100 UNIT/ML
500 SOLUTION INTRAVENOUS AS NEEDED
Status: DISCONTINUED | OUTPATIENT
Start: 2024-12-18 | End: 2024-12-19 | Stop reason: HOSPADM

## 2024-12-18 RX ADMIN — Medication 10 ML: at 12:37

## 2024-12-18 RX ADMIN — DEXAMETHASONE SODIUM PHOSPHATE 12 MG: 4 INJECTION INTRA-ARTICULAR; INTRALESIONAL; INTRAMUSCULAR; INTRAVENOUS; SOFT TISSUE at 11:24

## 2024-12-18 RX ADMIN — HEPARIN 500 UNITS: 100 SYRINGE at 12:37

## 2024-12-18 RX ADMIN — SODIUM CHLORIDE 180 MG: 9 INJECTION, SOLUTION INTRAVENOUS at 11:30

## 2024-12-18 NOTE — TELEPHONE ENCOUNTER
Caller: Kandi Fuentes    Relationship: Self    Best call back number: [phone]    Who are you requesting to speak with (clinical staff, provider,  specific staff member): scheduling    Do you know the name of the person who called: patient    What was the call regarding: pt needs to reschedule 12/18's appts due to transportation issues

## 2024-12-18 NOTE — PROGRESS NOTES
Dr. Delgado in to see pt. Hemoglobin 7.1. Pt to return this Friday 12/20/24 for CBC recheck per Dr. Delgado. If hemoglobin less than 7 transfuse 1 unit of PRBC's. Pt informed and verbalized understanding. Appt made.

## 2024-12-18 NOTE — SIGNIFICANT NOTE
Patient called and reports that she can't be here today for infusion and Dr. Delgado appt. Message send to Dr. Delgado and staff. (we weren't able to find her a ride and she is unable to get here any other way. We (me and Florence) gave her a few other options but she said that won't work. I explained how important this last infusion is. Nothing we said would work for her today. She also reports that she is having hand tremors that started with chemo and has progressively gotten worse over the last 2 weeks, also horrible fatigue that she said she really can't walk a lot.) Dr. Delgado will call her. Patient aware and Will get her rescheduled to see Dr. Delgado when she can get a ride in.

## 2024-12-20 ENCOUNTER — HOSPITAL ENCOUNTER (OUTPATIENT)
Dept: ONCOLOGY | Facility: HOSPITAL | Age: 64
Discharge: HOME | End: 2024-12-20
Payer: MEDICAID

## 2024-12-20 DIAGNOSIS — R91.8 LUNG MASS: ICD-10-CM

## 2024-12-20 DIAGNOSIS — C34.11 SMALL CELL CARCINOMA OF UPPER LOBE OF RIGHT LUNG: Primary | ICD-10-CM

## 2024-12-20 LAB
BASOPHILS # BLD AUTO: 0 10*3/MM3 (ref 0–0.2)
BASOPHILS NFR BLD AUTO: 0 % (ref 0–1.5)
DEPRECATED RDW RBC AUTO: 53.4 FL (ref 37–54)
EOSINOPHIL # BLD AUTO: 0 10*3/MM3 (ref 0–0.4)
EOSINOPHIL NFR BLD AUTO: 0 % (ref 0.3–6.2)
ERYTHROCYTE [DISTWIDTH] IN BLOOD BY AUTOMATED COUNT: 15.1 % (ref 12.3–15.4)
HCT VFR BLD AUTO: 24.8 % (ref 34–46.6)
HGB BLD-MCNC: 7.6 G/DL (ref 12–15.9)
LYMPHOCYTES # BLD AUTO: 0.71 10*3/MM3 (ref 0.7–3.1)
LYMPHOCYTES NFR BLD AUTO: 10.4 % (ref 19.6–45.3)
MCH RBC QN AUTO: 32.1 PG (ref 26.6–33)
MCHC RBC AUTO-ENTMCNC: 30.6 G/DL (ref 31.5–35.7)
MCV RBC AUTO: 104.6 FL (ref 79–97)
MONOCYTES # BLD AUTO: 0.01 10*3/MM3 (ref 0.1–0.9)
MONOCYTES NFR BLD AUTO: 0.1 % (ref 5–12)
NEUTROPHILS NFR BLD AUTO: 6.12 10*3/MM3 (ref 1.7–7)
NEUTROPHILS NFR BLD AUTO: 89.5 % (ref 42.7–76)
PLATELET # BLD AUTO: 248 10*3/MM3 (ref 140–450)
PMV BLD AUTO: 9.3 FL (ref 6–12)
RBC # BLD AUTO: 2.37 10*6/MM3 (ref 3.77–5.28)
WBC NRBC COR # BLD AUTO: 6.84 10*3/MM3 (ref 3.4–10.8)

## 2024-12-20 PROCEDURE — 36591 DRAW BLOOD OFF VENOUS DEVICE: CPT

## 2024-12-20 PROCEDURE — 25010000002 HEPARIN LOCK FLUSH PER 10 UNITS: Performed by: INTERNAL MEDICINE

## 2024-12-20 PROCEDURE — 85025 COMPLETE CBC W/AUTO DIFF WBC: CPT | Performed by: INTERNAL MEDICINE

## 2024-12-20 RX ORDER — SODIUM CHLORIDE 0.9 % (FLUSH) 0.9 %
10 SYRINGE (ML) INJECTION AS NEEDED
OUTPATIENT
Start: 2024-12-20

## 2024-12-20 RX ORDER — HEPARIN SODIUM (PORCINE) LOCK FLUSH IV SOLN 100 UNIT/ML 100 UNIT/ML
500 SOLUTION INTRAVENOUS AS NEEDED
Status: DISCONTINUED | OUTPATIENT
Start: 2024-12-20 | End: 2024-12-21 | Stop reason: HOSPADM

## 2024-12-20 RX ORDER — SODIUM CHLORIDE 0.9 % (FLUSH) 0.9 %
10 SYRINGE (ML) INJECTION AS NEEDED
Status: DISCONTINUED | OUTPATIENT
Start: 2024-12-20 | End: 2024-12-21 | Stop reason: HOSPADM

## 2024-12-20 RX ORDER — HEPARIN SODIUM (PORCINE) LOCK FLUSH IV SOLN 100 UNIT/ML 100 UNIT/ML
500 SOLUTION INTRAVENOUS AS NEEDED
OUTPATIENT
Start: 2024-12-20

## 2024-12-20 RX ADMIN — Medication 10 ML: at 11:49

## 2024-12-20 RX ADMIN — HEPARIN 500 UNITS: 100 SYRINGE at 11:49

## 2024-12-20 NOTE — PROGRESS NOTES
Pt to port chair for CBC from port. Port accessed and flushed with good blood return noted. 10cc of blood wasted prior to specimen collection. Blood specimen obtained and sent to lab for processing per protocol.  Port flushed with saline and heparin prior to needle removal. Pt complaining of generalized weakness, fatigue and Shortness of air with exertion. Reviewed pt's complaints and todays CBC results with Dr. Delgado. Pt to return to clinic for CBC on 12/27. Pt informed and showed v/u. Pt discharged home.

## 2024-12-27 ENCOUNTER — APPOINTMENT (OUTPATIENT)
Dept: CT IMAGING | Facility: HOSPITAL | Age: 64
End: 2024-12-27
Payer: MEDICAID

## 2024-12-27 ENCOUNTER — HOSPITAL ENCOUNTER (INPATIENT)
Facility: HOSPITAL | Age: 64
LOS: 4 days | Discharge: HOME | End: 2024-12-31
Attending: INTERNAL MEDICINE | Admitting: INTERNAL MEDICINE
Payer: MEDICAID

## 2024-12-27 DIAGNOSIS — D72.819 LEUKOPENIA, UNSPECIFIED TYPE: ICD-10-CM

## 2024-12-27 DIAGNOSIS — E87.6 HYPOKALEMIA: ICD-10-CM

## 2024-12-27 DIAGNOSIS — S09.90XA CLOSED HEAD INJURY, INITIAL ENCOUNTER: ICD-10-CM
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
## 2024-12-27 DIAGNOSIS — D64.9 ANEMIA, UNSPECIFIED TYPE: Primary | ICD-10-CM

## 2024-12-27 DIAGNOSIS — D69.6 THROMBOCYTOPENIA: ICD-10-CM

## 2024-12-27 PROBLEM — D61.810 PANCYTOPENIA DUE TO CHEMOTHERAPY: Status: ACTIVE | Noted: 2024-12-27

## 2024-12-27 LAB
ABO GROUP BLD: NORMAL
ALBUMIN SERPL-MCNC: 3.6 G/DL (ref 3.5–5.2)
ALBUMIN/GLOB SERPL: 1.5 G/DL
ALP SERPL-CCNC: 72 U/L (ref 39–117)
ALT SERPL W P-5'-P-CCNC: 14 U/L (ref 1–33)
ANION GAP SERPL CALCULATED.3IONS-SCNC: 9.7 MMOL/L (ref 5–15)
ANISOCYTOSIS BLD QL: ABNORMAL
APTT PPP: 25.4 SECONDS (ref 22.7–35.4)
AST SERPL-CCNC: 15 U/L (ref 1–32)
BACTERIA UR QL AUTO: ABNORMAL /HPF
BILIRUB SERPL-MCNC: 0.8 MG/DL (ref 0–1.2)
BILIRUB UR QL STRIP: NEGATIVE
BLD GP AB SCN SERPL QL: NEGATIVE
BUN SERPL-MCNC: 5 MG/DL (ref 8–23)
BUN/CREAT SERPL: 11.4 (ref 7–25)
BURR CELLS BLD QL SMEAR: ABNORMAL
C3 FRG RBC-MCNC: ABNORMAL
CALCIUM SPEC-SCNC: 8.9 MG/DL (ref 8.6–10.5)
CHLORIDE SERPL-SCNC: 91 MMOL/L (ref 98–107)
CLARITY UR: CLEAR
CO2 SERPL-SCNC: 32.3 MMOL/L (ref 22–29)
COLOR UR: YELLOW
CREAT SERPL-MCNC: 0.44 MG/DL (ref 0.57–1)
DEPRECATED RDW RBC AUTO: 49.3 FL (ref 37–54)
EGFRCR SERPLBLD CKD-EPI 2021: 108.8 ML/MIN/1.73
ELLIPTOCYTES BLD QL SMEAR: ABNORMAL
EOSINOPHIL NFR BLD MANUAL: 1 % (ref 0.3–6.2)
ERYTHROCYTE [DISTWIDTH] IN BLOOD BY AUTOMATED COUNT: 14.1 % (ref 12.3–15.4)
GLOBULIN UR ELPH-MCNC: 2.4 GM/DL
GLUCOSE SERPL-MCNC: 112 MG/DL (ref 65–99)
GLUCOSE UR STRIP-MCNC: NEGATIVE MG/DL
HCT VFR BLD AUTO: 14.4 % (ref 34–46.6)
HGB BLD-MCNC: 4.8 G/DL (ref 12–15.9)
HGB UR QL STRIP.AUTO: ABNORMAL
HOLD SPECIMEN: NORMAL
HOLD SPECIMEN: NORMAL
HYALINE CASTS UR QL AUTO: ABNORMAL /LPF
INR PPP: 1.11 (ref 0.9–1.1)
KETONES UR QL STRIP: NEGATIVE
LEUKOCYTE ESTERASE UR QL STRIP.AUTO: NEGATIVE
LYMPHOCYTES # BLD MANUAL: 0.37 10*3/MM3 (ref 0.7–3.1)
LYMPHOCYTES NFR BLD MANUAL: 2 % (ref 5–12)
MAGNESIUM SERPL-MCNC: 1.6 MG/DL (ref 1.6–2.4)
MCH RBC QN AUTO: 32.9 PG (ref 26.6–33)
MCHC RBC AUTO-ENTMCNC: 33.3 G/DL (ref 31.5–35.7)
MCV RBC AUTO: 98.6 FL (ref 79–97)
MONOCYTES # BLD: 0.01 10*3/MM3 (ref 0.1–0.9)
MYELOCYTES NFR BLD MANUAL: 1 % (ref 0–0)
NEUTROPHILS # BLD AUTO: 0.04 10*3/MM3 (ref 1.7–7)
NEUTROPHILS NFR BLD MANUAL: 10 % (ref 42.7–76)
NITRITE UR QL STRIP: NEGATIVE
PATHOLOGY REVIEW: YES
PH UR STRIP.AUTO: 6 [PH] (ref 5–8)
PLAT MORPH BLD: NORMAL
PLATELET # BLD AUTO: 8 10*3/MM3 (ref 140–450)
PMV BLD AUTO: 10.8 FL (ref 6–12)
POIKILOCYTOSIS BLD QL SMEAR: ABNORMAL
POTASSIUM SERPL-SCNC: 2.6 MMOL/L (ref 3.5–5.2)
PROT SERPL-MCNC: 6 G/DL (ref 6–8.5)
PROT UR QL STRIP: NEGATIVE
PROTHROMBIN TIME: 14.5 SECONDS (ref 11.7–14.2)
RBC # BLD AUTO: 1.46 10*6/MM3 (ref 3.77–5.28)
RBC # UR STRIP: ABNORMAL /HPF
REF LAB TEST METHOD: ABNORMAL
RH BLD: POSITIVE
SCAN SLIDE: NORMAL
SODIUM SERPL-SCNC: 133 MMOL/L (ref 136–145)
SP GR UR STRIP: 1.01 (ref 1–1.03)
SQUAMOUS #/AREA URNS HPF: ABNORMAL /HPF
T&S EXPIRATION DATE: NORMAL
UROBILINOGEN UR QL STRIP: ABNORMAL
VARIANT LYMPHS NFR BLD MANUAL: 78 % (ref 19.6–45.3)
VARIANT LYMPHS NFR BLD MANUAL: 8 % (ref 0–5)
WBC # UR STRIP: ABNORMAL /HPF
WBC MORPH BLD: NORMAL
WBC NRBC COR # BLD AUTO: 0.43 10*3/MM3 (ref 3.4–10.8)
WHOLE BLOOD HOLD COAG: NORMAL
WHOLE BLOOD HOLD SPECIMEN: NORMAL

## 2024-12-27 PROCEDURE — 36430 TRANSFUSION BLD/BLD COMPNT: CPT

## 2024-12-27 PROCEDURE — 25010000002 ONDANSETRON PER 1 MG: Performed by: INTERNAL MEDICINE

## 2024-12-27 PROCEDURE — 36415 COLL VENOUS BLD VENIPUNCTURE: CPT

## 2024-12-27 PROCEDURE — 85610 PROTHROMBIN TIME: CPT

## 2024-12-27 PROCEDURE — 85730 THROMBOPLASTIN TIME PARTIAL: CPT

## 2024-12-27 PROCEDURE — 99285 EMERGENCY DEPT VISIT HI MDM: CPT

## 2024-12-27 PROCEDURE — 25010000002 PROCHLORPERAZINE 10 MG/2ML SOLUTION: Performed by: NURSE PRACTITIONER

## 2024-12-27 PROCEDURE — 86901 BLOOD TYPING SEROLOGIC RH(D): CPT

## 2024-12-27 PROCEDURE — 86900 BLOOD TYPING SEROLOGIC ABO: CPT

## 2024-12-27 PROCEDURE — P9035 PLATELET PHERES LEUKOREDUCED: HCPCS

## 2024-12-27 PROCEDURE — 70450 CT HEAD/BRAIN W/O DYE: CPT

## 2024-12-27 PROCEDURE — P9040 RBC LEUKOREDUCED IRRADIATED: HCPCS

## 2024-12-27 PROCEDURE — 85025 COMPLETE CBC W/AUTO DIFF WBC: CPT

## 2024-12-27 PROCEDURE — 83735 ASSAY OF MAGNESIUM: CPT

## 2024-12-27 PROCEDURE — 86923 COMPATIBILITY TEST ELECTRIC: CPT

## 2024-12-27 PROCEDURE — 72125 CT NECK SPINE W/O DYE: CPT

## 2024-12-27 PROCEDURE — 93005 ELECTROCARDIOGRAM TRACING: CPT | Performed by: INTERNAL MEDICINE

## 2024-12-27 PROCEDURE — 86850 RBC ANTIBODY SCREEN: CPT

## 2024-12-27 PROCEDURE — 93005 ELECTROCARDIOGRAM TRACING: CPT

## 2024-12-27 PROCEDURE — 80053 COMPREHEN METABOLIC PANEL: CPT

## 2024-12-27 PROCEDURE — 85007 BL SMEAR W/DIFF WBC COUNT: CPT

## 2024-12-27 PROCEDURE — P9100 PATHOGEN TEST FOR PLATELETS: HCPCS

## 2024-12-27 PROCEDURE — 81001 URINALYSIS AUTO W/SCOPE: CPT

## 2024-12-27 PROCEDURE — P9016 RBC LEUKOCYTES REDUCED: HCPCS

## 2024-12-27 RX ORDER — CARVEDILOL 3.12 MG/1
3.12 TABLET ORAL 2 TIMES DAILY WITH MEALS
Status: DISCONTINUED | OUTPATIENT
Start: 2024-12-27 | End: 2024-12-31 | Stop reason: HOSPADM

## 2024-12-27 RX ORDER — ATORVASTATIN CALCIUM 40 MG/1
40 TABLET, FILM COATED ORAL DAILY
COMMUNITY

## 2024-12-27 RX ORDER — POTASSIUM CHLORIDE 1500 MG/1
20 TABLET, EXTENDED RELEASE ORAL DAILY
Status: DISCONTINUED | OUTPATIENT
Start: 2024-12-28 | End: 2024-12-29

## 2024-12-27 RX ORDER — AMOXICILLIN 250 MG
2 CAPSULE ORAL 2 TIMES DAILY
Status: DISCONTINUED | OUTPATIENT
Start: 2024-12-27 | End: 2024-12-31 | Stop reason: HOSPADM

## 2024-12-27 RX ORDER — POLYETHYLENE GLYCOL 3350 17 G/17G
17 POWDER, FOR SOLUTION ORAL DAILY PRN
Status: DISCONTINUED | OUTPATIENT
Start: 2024-12-27 | End: 2024-12-31 | Stop reason: HOSPADM

## 2024-12-27 RX ORDER — ASPIRIN 81 MG/1
81 TABLET, CHEWABLE ORAL DAILY
COMMUNITY

## 2024-12-27 RX ORDER — NALOXONE HCL 0.4 MG/ML
0.4 VIAL (ML) INJECTION
Status: DISCONTINUED | OUTPATIENT
Start: 2024-12-27 | End: 2024-12-31 | Stop reason: HOSPADM

## 2024-12-27 RX ORDER — SODIUM CHLORIDE 0.9 % (FLUSH) 0.9 %
10 SYRINGE (ML) INJECTION EVERY 12 HOURS SCHEDULED
Status: DISCONTINUED | OUTPATIENT
Start: 2024-12-27 | End: 2024-12-31 | Stop reason: HOSPADM

## 2024-12-27 RX ORDER — ISOSORBIDE MONONITRATE 30 MG/1
30 TABLET, EXTENDED RELEASE ORAL DAILY
COMMUNITY

## 2024-12-27 RX ORDER — SODIUM CHLORIDE 0.9 % (FLUSH) 0.9 %
10 SYRINGE (ML) INJECTION AS NEEDED
Status: DISCONTINUED | OUTPATIENT
Start: 2024-12-27 | End: 2024-12-31 | Stop reason: HOSPADM

## 2024-12-27 RX ORDER — ACETAMINOPHEN 650 MG/1
650 SUPPOSITORY RECTAL EVERY 4 HOURS PRN
Status: DISCONTINUED | OUTPATIENT
Start: 2024-12-27 | End: 2024-12-31 | Stop reason: HOSPADM

## 2024-12-27 RX ORDER — PROCHLORPERAZINE EDISYLATE 5 MG/ML
5 INJECTION INTRAMUSCULAR; INTRAVENOUS ONCE
Status: COMPLETED | OUTPATIENT
Start: 2024-12-28 | End: 2024-12-27

## 2024-12-27 RX ORDER — ONDANSETRON 2 MG/ML
4 INJECTION INTRAMUSCULAR; INTRAVENOUS EVERY 6 HOURS PRN
Status: DISCONTINUED | OUTPATIENT
Start: 2024-12-27 | End: 2024-12-31 | Stop reason: HOSPADM

## 2024-12-27 RX ORDER — OMEPRAZOLE 40 MG/1
40 CAPSULE, DELAYED RELEASE ORAL DAILY
Status: ON HOLD | COMMUNITY
End: 2025-01-11

## 2024-12-27 RX ORDER — ACETAMINOPHEN 325 MG/1
650 TABLET ORAL EVERY 4 HOURS PRN
Status: DISCONTINUED | OUTPATIENT
Start: 2024-12-27 | End: 2024-12-31 | Stop reason: HOSPADM

## 2024-12-27 RX ORDER — POTASSIUM CHLORIDE 1500 MG/1
40 TABLET, EXTENDED RELEASE ORAL ONCE
Status: COMPLETED | OUTPATIENT
Start: 2024-12-27 | End: 2024-12-27

## 2024-12-27 RX ORDER — BISACODYL 10 MG
10 SUPPOSITORY, RECTAL RECTAL DAILY PRN
Status: DISCONTINUED | OUTPATIENT
Start: 2024-12-27 | End: 2024-12-31 | Stop reason: HOSPADM

## 2024-12-27 RX ORDER — ACETAMINOPHEN 160 MG/5ML
650 SOLUTION ORAL EVERY 4 HOURS PRN
Status: DISCONTINUED | OUTPATIENT
Start: 2024-12-27 | End: 2024-12-31 | Stop reason: HOSPADM

## 2024-12-27 RX ORDER — SUCRALFATE 1 G/1
1 TABLET ORAL
Status: DISCONTINUED | OUTPATIENT
Start: 2024-12-28 | End: 2024-12-30

## 2024-12-27 RX ORDER — OXYCODONE HYDROCHLORIDE 5 MG/1
5 TABLET ORAL EVERY 4 HOURS PRN
Status: DISCONTINUED | OUTPATIENT
Start: 2024-12-27 | End: 2024-12-31 | Stop reason: HOSPADM

## 2024-12-27 RX ORDER — SODIUM CHLORIDE 9 MG/ML
40 INJECTION, SOLUTION INTRAVENOUS AS NEEDED
Status: DISCONTINUED | OUTPATIENT
Start: 2024-12-27 | End: 2024-12-31 | Stop reason: HOSPADM

## 2024-12-27 RX ORDER — BUDESONIDE AND FORMOTEROL FUMARATE DIHYDRATE 160; 4.5 UG/1; UG/1
2 AEROSOL RESPIRATORY (INHALATION)
Status: DISCONTINUED | OUTPATIENT
Start: 2024-12-27 | End: 2024-12-31 | Stop reason: HOSPADM

## 2024-12-27 RX ORDER — LIDOCAINE 40 MG/G
1 CREAM TOPICAL ONCE
Status: COMPLETED | OUTPATIENT
Start: 2024-12-27 | End: 2024-12-27

## 2024-12-27 RX ORDER — BISACODYL 5 MG/1
5 TABLET, DELAYED RELEASE ORAL DAILY PRN
Status: DISCONTINUED | OUTPATIENT
Start: 2024-12-27 | End: 2024-12-31 | Stop reason: HOSPADM

## 2024-12-27 RX ADMIN — LIDOCAINE 4% 1 APPLICATION: 4 CREAM TOPICAL at 22:06

## 2024-12-27 RX ADMIN — CARVEDILOL 3.12 MG: 3.12 TABLET, FILM COATED ORAL at 20:45

## 2024-12-27 RX ADMIN — Medication 10 ML: at 20:47

## 2024-12-27 RX ADMIN — PROCHLORPERAZINE EDISYLATE 5 MG: 5 INJECTION INTRAMUSCULAR; INTRAVENOUS at 23:27

## 2024-12-27 RX ADMIN — ACETAMINOPHEN 650 MG: 325 TABLET, FILM COATED ORAL at 23:32

## 2024-12-27 RX ADMIN — POTASSIUM CHLORIDE 20 MEQ: 1500 TABLET, EXTENDED RELEASE ORAL at 16:28

## 2024-12-27 RX ADMIN — ONDANSETRON 4 MG: 2 INJECTION INTRAMUSCULAR; INTRAVENOUS at 20:51

## 2024-12-27 NOTE — ED NOTES
Pt became dizzy today and hit the back of her head. Positive LOC and takes blood thinners. 4/10 pain in head. Pt just completed chemotherapy for lung cancer. Had an MI in July and got a stent. Pt is A&Ox4 at this time. Hematoma to head.

## 2024-12-27 NOTE — H&P
[facility] Medicine Services  History & Physical    Patient Name: Kandi Fuentes  : 1960  MRN: [id number]  Primary Care Physician:  Provider, No Known  Date of admission: 2024  Date and Time of Service: 2024 at 1740    Subjective      Chief Complaint: Dizziness, fall, LOC    History of Present Illness: Kandi Fuentes is a 64 y.o. female with a CMH of SCLC, COPD, HTN, CAD who presented to [facility] on 2024 with complaints of dizziness and a fall. The patient states she has been feeling weak and dyspneic for a couple of weeks now, since prior to her most recent chemotherapy session for her lung cancer. She did recently follow up with her oncologist last week and labs drawn at that time indicated a low hemoglobin, although normal platelets and WBC. She received her most recent chemotherapy session on . Today she had been feeling more dizzy than before, and fell backwards, hitting her head and had a brief LOC. As such, she was brought to the ER for further evaluation. She denies any CP, abdominal pain, N/V/C/D. She does have a headache at the site of a hematoma from striking her head. Of note, the patient had a LHC in 2024 with placement of a ADIN and was started on Plavix at that time.       Review of Systems    10-point ROS negative except for above    Personal History     Past Medical History:   Diagnosis Date    Cancer     COPD (chronic obstructive pulmonary disease)     Coronary artery disease     Elevated cholesterol     Heart attack     Hypertension     Lung cancer     Tinnitus        Past Surgical History:   Procedure Laterality Date    BACK SURGERY      bone spur on sciatica    BRONCHOSCOPY WITH ION ROBOTIC ASSIST N/A 2024    Procedure: BRONCHOSCOPY WITH ION ROBOT, fine needle aspiration and tissue biopsy right upper lobe mass, endobronchial ultrasound with fine needle aspiration lymph nodes (Level 10R);  Surgeon: Serafin Choudhary MD;  Location:  [facility] ENDOSCOPY;  Service: Robotics - Pulmonary;  Laterality: N/A;  post: lung mass with lympadenopathy    CHOLECYSTECTOMY  2021    CORONARY ANGIOPLASTY WITH STENT PLACEMENT  07/2024    MOLE REMOVAL  1994    forehead    SKIN LESION EXCISION Right 1994    breast    VENOUS ACCESS DEVICE (PORT) INSERTION Right 10/7/2024    Procedure: INSERTION VENOUS ACCESS DEVICE;  Surgeon: Serafin Choudhary MD;  Location: [facility] MAIN OR;  Service: Thoracic;  Laterality: Right;       Family History: family history includes Breast cancer (age of onset: 62) in her mother; Heart attack in her mother; Lung cancer in her brother and sister; Throat cancer in her sister. Otherwise pertinent FHx was reviewed and not pertinent to current issue.    Social History:  reports that she has been smoking cigarettes. She started smoking about 55 years ago. She has a 55 pack-year smoking history. She has been exposed to tobacco smoke. She has never used smokeless tobacco. She reports that she does not drink alcohol and does not use drugs.    Home Medications:  Prior to Admission Medications       Prescriptions Last Dose Informant Patient Reported? Taking?    Acetaminophen (Tylenol) 325 MG capsule   Yes No    Take 650 mg by mouth As Needed.    albuterol sulfate  (90 Base) MCG/ACT inhaler   Yes No    Inhale 2 puffs As Needed for Wheezing or Shortness of Air.    budesonide-formoterol (Breyna) 160-4.5 MCG/ACT inhaler   No No    Inhale 2 puffs 2 (Two) Times a Day.    carvedilol (COREG) 3.125 MG tablet   Yes No    Take 1 tablet by mouth As Needed.    clopidogrel (PLAVIX) 75 MG tablet   Yes No    Take 1 tablet by mouth Daily.    docusate sodium 100 MG capsule   Yes No    Take 1 capsule by mouth As Needed.    Patient not taking:  Reported on 12/17/2024    ipratropium-albuterol (DUO-NEB) 0.5-2.5 mg/3 ml nebulizer   No No    Take 3 mL by nebulization 4 (Four) Times a Day As Needed for Wheezing or Shortness of Air.    isosorbide mononitrate (IMDUR) 30 MG 24 hr  tablet   Yes No    Take 1 tablet by mouth Daily.    Lidocaine Viscous HCl (XYLOCAINE) 2 % solution   No No    Take 10 mL by mouth As Needed for Mild Pain (Take prior to meals up to 3-4 times per day to help with pain with swallowing).    lidocaine-prilocaine (EMLA) 2.5-2.5 % cream   No No    Apply 1 Application topically to the appropriate area as directed See Admin Instructions. Apply one hour prior to port access    mometasone (ELOCON) 0.1 % ointment   No No    Apply to affected skin of chest and back 2-3 times daily as needed for itching from radiation treatments.    nitroglycerin (NITROSTAT) 0.4 MG SL tablet   Yes No    Place 1 tablet under the tongue.    Patient not taking:  Reported on 12/17/2024    OLANZapine (zyPREXA) 5 MG tablet   No No    Take 1 tablet by mouth Every Night. Take nightly x 4 starting night of chemotherapy.    ondansetron (ZOFRAN) 8 MG tablet   No No    Take 1 tablet by mouth 3 (Three) Times a Day As Needed for Nausea or Vomiting.    oxyCODONE (ROXICODONE) 5 MG/5ML solution   No No    Take 5 mL by mouth Every 4 (Four) Hours As Needed for Moderate Pain (Pain with swallowing.).    potassium chloride (KAYCIEL) 20 mEq/15 mL solution   No No    Take 15 mL by mouth Daily.    sucralfate (Carafate) 1 GM/10ML suspension   No No    Take 10 mL by mouth 4 (Four) Times a Day As Needed (For pain and discomfort with swallowing).              Allergies:  Allergies   Allergen Reactions    Morphine Hives and Shortness Of Breath    Codeine Hives    Sulfa Antibiotics Hives       Objective      Vitals:   Temp:  [98.1 °F (36.7 °C)] 98.1 °F (36.7 °C)  Heart Rate:  [103-110] 104  Resp:  [17-20] 18  BP: (117-135)/(53-84) 135/55  Body mass index is 21.93 kg/m².  Physical Exam    General Appearance:  Alert, cooperative, no distress, appears stated age  Head:  Normocephalic, left posterior scalp hematoma  Eyes:  PERRL, conjunctiva/corneas clear, EOM's intact, fundi benign, both eyes  Ears:  Normal TM's and external ear  canals, both ears  Nose: Nares normal, septum midline, mucosa normal, no drainage or sinus tenderness  Throat: Lips, mucosa, and tongue normal; teeth and gums normal  Neck: Supple, symmetrical, trachea midline, no adenopathy, thyroid: not enlarged, symmetric, no tenderness/mass/nodules, no carotid bruit or JVD  Lungs:   Clear to auscultation bilaterally, respirations unlabored  Heart:  Regular rate and rhythm, S1, S2 normal, no murmur, rub or gallop  Abdomen:  Soft, non-tender, bowel sounds active all four quadrants,  no masses, no organomegaly  Extremities: Extremities normal, atraumatic, no cyanosis or edema  Pulses: 2+ and symmetric  Skin: Skin color, texture, turgor normal, no rashes or lesions  Neurologic: Normal    Diagnostic Data:  Lab Results (last 24 hours)       Procedure Component Value Units Date/Time    CBC & Differential [[id number]]  (Abnormal) Collected: 12/27/24 1616    Specimen: Blood from Arm, Left Updated: 12/27/24 1715    Narrative:      The following orders were created for panel order CBC & Differential.  Procedure                               Abnormality         Status                     ---------                               -----------         ------                     CBC Auto Differential[[id number]]        Abnormal            Final result               Scan Slide[[id number]]                                       Final result                 Please view results for these tests on the individual orders.    CBC Auto Differential [[id number]]  (Abnormal) Collected: 12/27/24 1616    Specimen: Blood from Arm, Left Updated: 12/27/24 1715     WBC 0.43 10*3/mm3      RBC 1.46 10*6/mm3      Hemoglobin 4.8 g/dL      Hematocrit 14.4 %      MCV 98.6 fL      MCH 32.9 pg      MCHC 33.3 g/dL      RDW 14.1 %      RDW-SD 49.3 fl      MPV 10.8 fL      Platelets 8 10*3/mm3     Narrative:      The previously reported component NRBC is no longer being reported. Previous result was 0.0 /100 WBC (Reference  Range: 0.0-0.2 /100 WBC) on 12/27/2024 at 1626 EST.    Scan Slide [[id number]] Collected: 12/27/24 1616    Specimen: Blood from Arm, Left Updated: 12/27/24 1715     Scan Slide --     Comment: See Manual Differential Results       Manual Differential [[id number]]  (Abnormal) Collected: 12/27/24 1616    Specimen: Blood from Arm, Left Updated: 12/27/24 1715     Neutrophil % 10.0 %      Lymphocyte % 78.0 %      Monocyte % 2.0 %      Eosinophil % 1.0 %      Myelocyte % 1.0 %      Atypical Lymphocyte % 8.0 %      Neutrophils Absolute 0.04 10*3/mm3      Lymphocytes Absolute 0.37 10*3/mm3      Monocytes Absolute 0.01 10*3/mm3      Anisocytosis Slight/1+     [place] Cells Slight/1+     Elliptocytes Slight/1+     Poikilocytes Slight/1+     RBC Fragments Slight/1+     WBC Morphology Normal     Platelet Morphology Normal    Magnesium [[id number]]  (Normal) Collected: 12/27/24 1410    Specimen: Blood Updated: 12/27/24 1620     Magnesium 1.6 mg/dL     Urinalysis, Microscopic Only - Urine, Clean Catch [[id number]]  (Abnormal) Collected: 12/27/24 1555    Specimen: Urine, Clean Catch Updated: 12/27/24 1606     RBC, UA 11-20 /HPF      WBC, UA 0-2 /HPF      Bacteria, UA None Seen /HPF      Squamous Epithelial Cells, UA 3-6 /HPF      Hyaline Casts, UA 0-2 /LPF      Methodology Automated Microscopy    Urinalysis With Microscopic If Indicated (No Culture) - Urine, Clean Catch [[id number]]  (Abnormal) Collected: 12/27/24 1555    Specimen: Urine, Clean Catch Updated: 12/27/24 1605     Color, UA Yellow     Appearance, UA Clear     pH, UA 6.0     Specific Gravity, UA 1.008     Glucose, UA Negative     Ketones, UA Negative     Bilirubin, UA Negative     Blood, UA Moderate (2+)     Protein, UA Negative     Leuk Esterase, UA Negative     Nitrite, UA Negative     Urobilinogen, UA 1.0 E.U./dL    Comprehensive Metabolic Panel [[id number]]  (Abnormal) Collected: 12/27/24 1410    Specimen: Blood Updated: 12/27/24 1604     Glucose 112 mg/dL      BUN 5 mg/dL       Creatinine 0.44 mg/dL      Sodium 133 mmol/L      Potassium 2.6 mmol/L      Chloride 91 mmol/L      CO2 32.3 mmol/L      Calcium 8.9 mg/dL      Total Protein 6.0 g/dL      Albumin 3.6 g/dL      ALT (SGPT) 14 U/L      AST (SGOT) 15 U/L      Alkaline Phosphatase 72 U/L      Total Bilirubin 0.8 mg/dL      Globulin 2.4 gm/dL      A/G Ratio 1.5 g/dL      BUN/Creatinine Ratio 11.4     Anion Gap 9.7 mmol/L      eGFR 108.8 mL/min/1.73     Narrative:      GFR Categories in Chronic Kidney Disease (CKD)      GFR Category          GFR (mL/min/1.73)    Interpretation  G1                     90 or greater         Normal or high (1)  G2                      60-89                Mild decrease (1)  G3a                   45-59                Mild to moderate decrease  G3b                   30-44                Moderate to severe decrease  G4                    15-29                Severe decrease  G5                    14 or less           Kidney failure          (1)In the absence of evidence of kidney disease, neither GFR category G1 or G2 fulfill the criteria for CKD.    eGFR calculation 2021 CKD-EPI creatinine equation, which does not include race as a factor    aPTT [[id number]]  (Normal) Collected: 12/27/24 1410    Specimen: Blood Updated: 12/27/24 1557     PTT 25.4 seconds     Protime-INR [[id number]]  (Abnormal) Collected: 12/27/24 1410    Specimen: Blood Updated: 12/27/24 1557     Protime 14.5 Seconds      INR 1.11    [place] Draw [[id number]] Collected: 12/27/24 1410    Specimen: Blood Updated: 12/27/24 1415    Narrative:      The following orders were created for panel order [place] Draw.  Procedure                               Abnormality         Status                     ---------                               -----------         ------                     Green Top (Gel)[[id number]]                                  Final result               Lavender Top[[id number]]                                     Final result                Gold Top - SST[[id number]]                                   Final result               Light Blue Top[[id number]]                                   Final result                 Please view results for these tests on the individual orders.    Green Top (Gel) [[id number]] Collected: 12/27/24 1410    Specimen: Blood Updated: 12/27/24 1415     Extra Tube Hold for add-ons.     Comment: Auto resulted.       Lavender Top [[id number]] Collected: 12/27/24 1410    Specimen: Blood Updated: 12/27/24 1415     Extra Tube hold for add-on     Comment: Auto resulted       Gold Top - SST [[id number]] Collected: 12/27/24 1410    Specimen: Blood Updated: 12/27/24 1415     Extra Tube Hold for add-ons.     Comment: Auto resulted.       Light Blue Top [[id number]] Collected: 12/27/24 1410    Specimen: Blood Updated: 12/27/24 1415     Extra Tube Hold for add-ons.     Comment: Auto resulted                Imaging Results (Last 24 Hours)       Procedure Component Value Units Date/Time    CT Head Without Contrast [[id number]] Collected: 12/27/24 1616     Updated: 12/27/24 1623    Narrative:      CT HEAD WO CONTRAST, CT CERVICAL SPINE WO CONTRAST    Date of Exam: 12/27/2024 4:05 PM EST    Indication: fall, LOC, on Plavix.    Comparison: MRI brain 9/30/2024. PET/CT 8/16/2024    Technique: Axial CT images were obtained of the head and cervical spine without contrast administration.  Coronal reconstructions were performed.  Automated exposure control and iterative reconstruction methods were used.      Findings:  CT head:  No large territory infarct.    There is no evidence of hemorrhage.  No mass effect, edema or midline shift    Mild periventricular and subcortical white matter hypodensities, nonspecific but most likely represents chronic small vessel ischemic changes.    No extra-axial fluid collection.    The ventricles are normal in size and configuration.    The visualized orbits are unremarkable.  The visualized paranasal sinuses and  mastoid air cells are clear.    Left posterior scalp hematoma.  No acute osseous abnormality.    CT cervical spine:  No acute fracture or traumatic malalignment.  Alignment: Straightening of the cervical spine, most likely positional or due to muscle strain. No subluxation.  Vertebral body height: Maintained  Disc spaces: Mild degenerative changes noted throughout the visualized spine.    Soft tissue: Extensive central lobar emphysema within the visualized lung apices. Partially visualized right internal jugular port. Otherwise the visualized soft tissues are unremarkable.  Other Bones:No acute abnormality.        Impression:      Impression:  1.No acute intracranial abnormality.  2.No acute fracture or traumatic malalignment of the cervical spine.  3.Left posterior scalp hematoma.        Electronically Signed: Merritt Dubois DO    12/27/2024 4:21 PM EST    Workstation ID: [id number]    CT Cervical Spine Without Contrast [[id number]] Collected: 12/27/24 1616     Updated: 12/27/24 1623    Narrative:      CT HEAD WO CONTRAST, CT CERVICAL SPINE WO CONTRAST    Date of Exam: 12/27/2024 4:05 PM EST    Indication: fall, LOC, on Plavix.    Comparison: MRI brain 9/30/2024. PET/CT 8/16/2024    Technique: Axial CT images were obtained of the head and cervical spine without contrast administration.  Coronal reconstructions were performed.  Automated exposure control and iterative reconstruction methods were used.      Findings:  CT head:  No large territory infarct.    There is no evidence of hemorrhage.  No mass effect, edema or midline shift    Mild periventricular and subcortical white matter hypodensities, nonspecific but most likely represents chronic small vessel ischemic changes.    No extra-axial fluid collection.    The ventricles are normal in size and configuration.    The visualized orbits are unremarkable.  The visualized paranasal sinuses and mastoid air cells are clear.    Left posterior scalp hematoma.  No  acute osseous abnormality.    CT cervical spine:  No acute fracture or traumatic malalignment.  Alignment: Straightening of the cervical spine, most likely positional or due to muscle strain. No subluxation.  Vertebral body height: Maintained  Disc spaces: Mild degenerative changes noted throughout the visualized spine.    Soft tissue: Extensive central lobar emphysema within the visualized lung apices. Partially visualized right internal jugular port. Otherwise the visualized soft tissues are unremarkable.  Other Bones:No acute abnormality.        Impression:      Impression:  1.No acute intracranial abnormality.  2.No acute fracture or traumatic malalignment of the cervical spine.  3.Left posterior scalp hematoma.        Electronically Signed: Merritt Dubois,     12/27/2024 4:21 PM EST    Workstation ID: [id number]              Assessment & Plan        This is a 64 y.o. female with:    Active and Resolved Problems  Active Hospital Problems    Diagnosis  POA    **Pancytopenia due to chemotherapy [D61.810]  Yes      Resolved Hospital Problems   No resolved problems to display.       Acute Pancytopenia 2/2 Chemotherapy  -Patient has severe pancytopenia likely induced by her chemotherapy  -Plan to transfuse PRBC and platelet with goal Hg > 7 and goal platelet count > 20  -Oncology consult  -no signs of active bleeding  -Will hold Plavix for now in the setting of severe anemia and thrombocytopenia    Limited Stage SCLC  -Currently active on chemotherapy which has likely induced her pancytopenia  -Plan for repeat scans as an outpatient next month  -Oncology consult    CAD  -As above, holding Plavix in the setting of severe anemia and thrombocytopenia  -Start statin therapy    Acute hypokalemia  -Will replete per protocol    COPD  -Stable  -Continue inhalers        VTE Prophylaxis:  Mechanical VTE prophylaxis orders are signed & held.          The patient desires to be as follows:    CODE STATUS:         Full  Code      Admission Status:  I believe this patient meets Inpatient status.    Expected Length of Stay: Greater than 2 midnights    PDMP and Medication Dispenses via Sidebar reviewed and consistent with patient reported medications.    I discussed the patient's findings and my recommendations with patient.      Signature:     This document has been electronically signed by Claus Cao MD on December 27, 2024 17:41 [facility] Hospitalist Team

## 2024-12-27 NOTE — ED PROVIDER NOTES
Subjective   History of Present Illness  Chief complaint: Dizziness and fall today.      Context: Patient is a 64-year-old female who presents to the ER with complaint of dizziness and fall today.  Patient reports she had her last chemo treatment for lung cancer on 12/18, had blood work drawn on 12/20 and was told that her hemoglobin was low.  She was supposed to get a blood transfusion however they stated that they did not have blood.  Patient reports today she was getting up and started feeling dizzy and fell striking her head.  Patient reports she is on blood thinners.  Patient denies any chest pain, shortness of air or fever.  Patient reports Dr. Tereso Abarca is her oncologist.    PCP: None    LMP: Postmenopausal          Review of Systems   Constitutional:  Negative for fever.       Past Medical History:   Diagnosis Date    Cancer     COPD (chronic obstructive pulmonary disease)     Coronary artery disease     Elevated cholesterol     Heart attack     Hypertension     Lung cancer 2024    Tinnitus        Allergies   Allergen Reactions    Morphine Hives and Shortness Of Breath    Codeine Hives    Sulfa Antibiotics Hives       Past Surgical History:   Procedure Laterality Date    BACK SURGERY  2004    bone spur on sciatica    BRONCHOSCOPY WITH ION ROBOTIC ASSIST N/A 9/27/2024    Procedure: BRONCHOSCOPY WITH ION ROBOT, fine needle aspiration and tissue biopsy right upper lobe mass, endobronchial ultrasound with fine needle aspiration lymph nodes (Level 10R);  Surgeon: Serafin Choudhary MD;  Location: [facility] ENDOSCOPY;  Service: Robotics - Pulmonary;  Laterality: N/A;  post: lung mass with lympadenopathy    CHOLECYSTECTOMY  2021    CORONARY ANGIOPLASTY WITH STENT PLACEMENT  07/2024    MOLE REMOVAL  1994    forehead    SKIN LESION EXCISION Right 1994    breast    VENOUS ACCESS DEVICE (PORT) INSERTION Right 10/7/2024    Procedure: INSERTION VENOUS ACCESS DEVICE;  Surgeon: Serafin Choudhary MD;  Location: [facility] MAIN OR;   Service: Thoracic;  Laterality: Right;       Family History   Problem Relation Age of Onset    Breast cancer Mother 62    Heart attack Mother     Lung cancer Sister     Throat cancer Sister     Lung cancer Brother        Social History     Socioeconomic History    Marital status:    Tobacco Use    Smoking status: Every Day     Current packs/day: 1.00     Average packs/day: 1 pack/day for 55.0 years (55.0 ttl pk-yrs)     Types: Cigarettes     Start date: 1970     Passive exposure: Current    Smokeless tobacco: Never   Vaping Use    Vaping status: Never Used   Substance and Sexual Activity    Alcohol use: Never    Drug use: Never    Sexual activity: Defer           Objective   Physical Exam  Vitals and nursing note reviewed.   Constitutional:       Appearance: Normal appearance. She is ill-appearing.   HENT:      Head:      Comments: Large hematoma to left side of crown of head noted.     Right Ear: Tympanic membrane normal.      Left Ear: Tympanic membrane normal.      Nose: Nose normal.      Mouth/Throat:      Mouth: Mucous membranes are moist.   Eyes:      Extraocular Movements: Extraocular movements intact.      Pupils: Pupils are equal, round, and reactive to light.   Cardiovascular:      Rate and Rhythm: Normal rate and regular rhythm.      Pulses: Normal pulses.      Heart sounds: Normal heart sounds.   Pulmonary:      Effort: Pulmonary effort is normal.      Breath sounds: Examination of the right-upper field reveals decreased breath sounds. Examination of the left-upper field reveals decreased breath sounds. Examination of the right-lower field reveals decreased breath sounds. Examination of the left-lower field reveals decreased breath sounds. Decreased breath sounds present.      Comments: Decreased breath sounds noted bilaterally.  Musculoskeletal:      Cervical back: Normal range of motion. No tenderness.   Neurological:      Mental Status: She is alert.         Procedures           ED Course     "         BP 99/58   Pulse 105   Temp 98.2 °F (36.8 °C) (Oral)   Resp 26   Ht 170.2 cm (67\")   Wt 62.8 kg (138 lb 7.2 oz)   SpO2 92%   BMI 21.68 kg/m²   Labs Reviewed   COMPREHENSIVE METABOLIC PANEL - Abnormal; Notable for the following components:       Result Value    Glucose 112 (*)     BUN 5 (*)     Creatinine 0.44 (*)     Sodium 133 (*)     Potassium 2.6 (*)     Chloride 91 (*)     CO2 32.3 (*)     All other components within normal limits    Narrative:     GFR Categories in Chronic Kidney Disease (CKD)      GFR Category          GFR (mL/min/1.73)    Interpretation  G1                     90 or greater         Normal or high (1)  G2                      60-89                Mild decrease (1)  G3a                   45-59                Mild to moderate decrease  G3b                   30-44                Moderate to severe decrease  G4                    15-29                Severe decrease  G5                    14 or less           Kidney failure          (1)In the absence of evidence of kidney disease, neither GFR category G1 or G2 fulfill the criteria for CKD.    eGFR calculation 2021 CKD-EPI creatinine equation, which does not include race as a factor   PROTIME-INR - Abnormal; Notable for the following components:    Protime 14.5 (*)     INR 1.11 (*)     All other components within normal limits   URINALYSIS W/ MICROSCOPIC IF INDICATED (NO CULTURE) - Abnormal; Notable for the following components:    Blood, UA Moderate (2+) (*)     All other components within normal limits   CBC WITH AUTO DIFFERENTIAL - Abnormal; Notable for the following components:    WBC 0.43 (*)     RBC 1.46 (*)     Hemoglobin 4.8 (*)     Hematocrit 14.4 (*)     MCV 98.6 (*)     Platelets 8 (*)     All other components within normal limits    Narrative:     The previously reported component NRBC is no longer being reported. Previous result was 0.0 /100 WBC (Reference Range: 0.0-0.2 /100 WBC) on 12/27/2024 at 1626 EST. "   URINALYSIS, MICROSCOPIC ONLY - Abnormal; Notable for the following components:    RBC, UA 11-20 (*)     Squamous Epithelial Cells, UA 3-6 (*)     All other components within normal limits   MANUAL DIFFERENTIAL - Abnormal; Notable for the following components:    Neutrophil % 10.0 (*)     Lymphocyte % 78.0 (*)     Monocyte % 2.0 (*)     Myelocyte % 1.0 (*)     Atypical Lymphocyte % 8.0 (*)     Neutrophils Absolute 0.04 (*)     Lymphocytes Absolute 0.37 (*)     Monocytes Absolute 0.01 (*)     All other components within normal limits   APTT - Normal   MAGNESIUM - Normal   RAINBOW DRAW    Narrative:     The following orders were created for panel order [place] Draw.  Procedure                               Abnormality         Status                     ---------                               -----------         ------                     Green Top (Gel)[[id number]]                                  Final result               Lavender Top[[id number]]                                     Final result               Gold Top - SST[[id number]]                                   Final result               Light Blue Top[[id number]]                                   Final result                 Please view results for these tests on the individual orders.   SCAN SLIDE   PATH CONSULT REFLEX   BASIC METABOLIC PANEL   MAGNESIUM   PHOSPHORUS   CBC WITH AUTO DIFFERENTIAL   TYPE AND SCREEN   PREPARE RBC   PREPARE PLATELET PHERESIS   PATHOLOGY CONSULTATION   GREEN TOP   LAVENDER TOP   GOLD TOP - SST   LIGHT BLUE TOP   CBC AND DIFFERENTIAL    Narrative:     The following orders were created for panel order CBC & Differential.  Procedure                               Abnormality         Status                     ---------                               -----------         ------                     CBC Auto Differential[[id number]]        Abnormal            Edited Result - FINAL      Scan Slide[[id number]]                                        Edited Result - FINAL        Please view results for these tests on the individual orders.   CBC AND DIFFERENTIAL    Narrative:     The following orders were created for panel order CBC & Differential.  Procedure                               Abnormality         Status                     ---------                               -----------         ------                     CBC Auto Differential[[id number]]                                                         Please view results for these tests on the individual orders.     Medications   Potassium Replacement - Follow Nurse / BPA Driven Protocol (has no administration in time range)   budesonide-formoterol (SYMBICORT) 160-4.5 MCG/ACT inhaler 2 puff (2 puffs Inhalation Not Given 12/27/24 2115)   carvedilol (COREG) tablet 3.125 mg (3.125 mg Oral Given 12/27/24 2045)   oxyCODONE (ROXICODONE) immediate release tablet 5 mg (has no administration in time range)   potassium chloride (KLOR-CON M20) CR tablet 20 mEq (has no administration in time range)   sucralfate (CARAFATE) tablet 1 g (has no administration in time range)   sodium chloride 0.9 % flush 10 mL (10 mL Intravenous Given 12/27/24 2047)   sodium chloride 0.9 % flush 10 mL (has no administration in time range)   sodium chloride 0.9 % infusion 40 mL (has no administration in time range)   acetaminophen (TYLENOL) tablet 650 mg (650 mg Oral Given 12/27/24 2332)     Or   acetaminophen (TYLENOL) 160 MG/5ML oral solution 650 mg ( Oral Not Given:  See Alt 12/27/24 2332)     Or   acetaminophen (TYLENOL) suppository 650 mg ( Rectal Not Given:  See Alt 12/27/24 2332)   HYDROmorphone (DILAUDID) injection 1 mg (has no administration in time range)     And   naloxone (NARCAN) injection 0.4 mg (has no administration in time range)   sennosides-docusate (PERICOLACE) 8.6-50 MG per tablet 2 tablet (2 tablets Oral Not Given 12/27/24 2046)     And   polyethylene glycol (MIRALAX) packet 17 g (has no administration in time  range)     And   bisacodyl (DULCOLAX) EC tablet 5 mg (has no administration in time range)     And   bisacodyl (DULCOLAX) suppository 10 mg (has no administration in time range)   ondansetron (ZOFRAN) injection 4 mg (4 mg Intravenous Given 12/27/24 2051)   potassium chloride (KLOR-CON M20) CR tablet 40 mEq (20 mEq Oral Given 12/27/24 1628)   lidocaine (LMX) 4 % cream 1 Application (1 Application Topical Given 12/27/24 2206)   prochlorperazine (COMPAZINE) injection 5 mg (5 mg Intravenous Given 12/27/24 2327)     CT Head Without Contrast    Result Date: 12/27/2024  Impression: 1.No acute intracranial abnormality. 2.No acute fracture or traumatic malalignment of the cervical spine. 3.Left posterior scalp hematoma. Electronically Signed: Merritt Dubois,   12/27/2024 4:21 PM EST  Workstation ID: [id number]    CT Cervical Spine Without Contrast    Result Date: 12/27/2024  Impression: 1.No acute intracranial abnormality. 2.No acute fracture or traumatic malalignment of the cervical spine. 3.Left posterior scalp hematoma. Electronically Signed: Merritt Dubois,   12/27/2024 4:21 PM EST  Workstation ID: [id number]                                             Medical Decision Making  Radiology interpretation: CT head reviewed and interpreted by Lauri: No acute intracranial abnormality.  2.No acute fracture or traumatic malalignment of the cervical spine.  3.Left posterior scalp hematoma  Further interpretation by radiologist as above    Lab interpretation:  Labs all viewed by me and significant for: Hemoglobin 4.8, hematocrit 14.4, white count 0.43, platelet 8, BUN 5, creatinine 0.44, sodium 133, potassium 2.6, PTT 25.4, PT14 0.5, INR 1.11, mag 1.6.  UA-moderate blood, RBCs 11-20, squames 3-6.  Type and screen B positive negative antibody.    EKG viewed and interpreted by Dr. Carreon: Sinus tach, ventricular premature complex, probable left atrial enlargement, rate 110, QTc 485.  comparison: Dated 10/4/2024.  Sinus  rhythm, probable left atrial enlargement, probable LVH with secondary repull abnormality, rate 78, QTc 514.    IV was established and labs and scans were obtained to evaluate for infection, electrolyte imbalance, ICH, anemia.  Patient is a 64-year-old female who presents to the ER for above complaint.  On initial examination patient was resting comfortably in the bed, nontoxic in appearance with no signs and symptoms of distress. Physical examination revealed patient is pale in appearance, has large hematoma to the left side of the crown of the head from fall.  Patient has diminished breath sounds bilaterally, patient reports she just had her last chemo for lung cancer on 12/18.  Patient's labs revealed hemoglobin of 4.8, platelet count of 8, potassium of 2.6, white count of 0.43.  Potassium replacement was initiated.  Consulted oncology.  Spoke with Dr. Kaiser with oncology who suggested hospitalization, with packed red blood cells and platelets.  Ordered 2 units of PRBCs, and platelets.  Consulted hospitalist.  Spoke with Dr. Devlin, who agreed to admit patient and assume care.  On reexamination patient was resting comfortably in the bed, nontoxic in appearance with no signs and symptoms of distress.  Discussed findings, discussed decision to it administer 2 units of packed red blood cells and platelets, and decision to admit patient to the hospital.  Patient was agreeable to admission and plan of care.    Appropriate PPE worn during exam.  Discussed care with: Dr. Carreon, Dr. Kaiser with oncology, Dr. Devlin with hospitalist group.      Problems Addressed:  Anemia, unspecified type: complicated acute illness or injury  Closed head injury, initial encounter: complicated acute illness or injury  Fall, initial encounter: complicated acute illness or injury  Hypokalemia: complicated acute illness or injury  Leukopenia, unspecified type: complicated acute illness or injury  Thrombocytopenia: complicated acute illness or  injury    Amount and/or Complexity of Data Reviewed  Labs: ordered.  Radiology: ordered.    Risk  OTC drugs.  Decision regarding hospitalization.        Final diagnoses:   Anemia, unspecified type   Hypokalemia   Thrombocytopenia   Fall, initial encounter   Closed head injury, initial encounter   Leukopenia, unspecified type       ED Disposition  ED Disposition       ED Disposition   Decision to Admit    Condition   --    Comment   Level of Care: Oncology [30]   Diagnosis: Pancytopenia due to chemotherapy [[id number]]   Certification: I Certify That Inpatient Hospital Services Are Medically Necessary For Greater Than 2 Midnights                 No follow-up provider specified.       Medication List        ASK your doctor about these medications      isosorbide mononitrate 30 MG 24 hr tablet  Commonly known as: IMDUR  Ask about: Which instructions should I use?                 Alysa Jasso, APRN  12/28/24 0103

## 2024-12-27 NOTE — Clinical Note
Level of Care: Telemetry [5]   Admitting Physician: CLARISA HERNANDEZ [[id number]]   Attending Physician: CLARISA HERNANDEZ [[id number]]

## 2024-12-28 LAB
ANION GAP SERPL CALCULATED.3IONS-SCNC: 8.7 MMOL/L (ref 5–15)
BH BB BLOOD EXPIRATION DATE: NORMAL
BH BB BLOOD TYPE BARCODE: 5100
BH BB BLOOD TYPE BARCODE: 5100
BH BB BLOOD TYPE BARCODE: 6200
BH BB DISPENSE STATUS: NORMAL
BH BB PRODUCT CODE: NORMAL
BH BB UNIT NUMBER: NORMAL
BUN SERPL-MCNC: 9 MG/DL (ref 8–23)
BUN/CREAT SERPL: 20.9 (ref 7–25)
CALCIUM SPEC-SCNC: 9.1 MG/DL (ref 8.6–10.5)
CHLORIDE SERPL-SCNC: 96 MMOL/L (ref 98–107)
CO2 SERPL-SCNC: 33.3 MMOL/L (ref 22–29)
CREAT SERPL-MCNC: 0.43 MG/DL (ref 0.57–1)
CROSSMATCH INTERPRETATION: NORMAL
CROSSMATCH INTERPRETATION: NORMAL
DEPRECATED RDW RBC AUTO: 45.4 FL (ref 37–54)
EGFRCR SERPLBLD CKD-EPI 2021: 109.4 ML/MIN/1.73
ERYTHROCYTE [DISTWIDTH] IN BLOOD BY AUTOMATED COUNT: 14.2 % (ref 12.3–15.4)
GLUCOSE SERPL-MCNC: 94 MG/DL (ref 65–99)
HCT VFR BLD AUTO: 24.3 % (ref 34–46.6)
HGB BLD-MCNC: 8.3 G/DL (ref 12–15.9)
MAGNESIUM SERPL-MCNC: 1.7 MG/DL (ref 1.6–2.4)
MCH RBC QN AUTO: 31.3 PG (ref 26.6–33)
MCHC RBC AUTO-ENTMCNC: 34.2 G/DL (ref 31.5–35.7)
MCV RBC AUTO: 91.7 FL (ref 79–97)
NRBC BLD AUTO-RTO: 0 /100 WBC (ref 0–0.2)
PHOSPHATE SERPL-MCNC: 3 MG/DL (ref 2.5–4.5)
PLATELET # BLD AUTO: 38 10*3/MM3 (ref 140–450)
PMV BLD AUTO: 8.6 FL (ref 6–12)
POTASSIUM SERPL-SCNC: 2.8 MMOL/L (ref 3.5–5.2)
POTASSIUM SERPL-SCNC: 3.6 MMOL/L (ref 3.5–5.2)
QT INTERVAL: 358 MS
QTC INTERVAL: 485 MS
RBC # BLD AUTO: 2.65 10*6/MM3 (ref 3.77–5.28)
SODIUM SERPL-SCNC: 138 MMOL/L (ref 136–145)
UNIT  ABO: NORMAL
UNIT  RH: NORMAL
WBC NRBC COR # BLD AUTO: 0.35 10*3/MM3 (ref 3.4–10.8)

## 2024-12-28 PROCEDURE — 94799 UNLISTED PULMONARY SVC/PX: CPT

## 2024-12-28 PROCEDURE — 84132 ASSAY OF SERUM POTASSIUM: CPT | Performed by: INTERNAL MEDICINE

## 2024-12-28 PROCEDURE — P9016 RBC LEUKOCYTES REDUCED: HCPCS

## 2024-12-28 PROCEDURE — 84100 ASSAY OF PHOSPHORUS: CPT | Performed by: INTERNAL MEDICINE

## 2024-12-28 PROCEDURE — 85025 COMPLETE CBC W/AUTO DIFF WBC: CPT | Performed by: INTERNAL MEDICINE

## 2024-12-28 PROCEDURE — 25010000002 POTASSIUM CHLORIDE 10 MEQ/100ML SOLUTION: Performed by: INTERNAL MEDICINE

## 2024-12-28 PROCEDURE — 80048 BASIC METABOLIC PNL TOTAL CA: CPT | Performed by: INTERNAL MEDICINE

## 2024-12-28 PROCEDURE — 83735 ASSAY OF MAGNESIUM: CPT | Performed by: INTERNAL MEDICINE

## 2024-12-28 PROCEDURE — 86900 BLOOD TYPING SEROLOGIC ABO: CPT

## 2024-12-28 PROCEDURE — 99222 1ST HOSP IP/OBS MODERATE 55: CPT | Performed by: STUDENT IN AN ORGANIZED HEALTH CARE EDUCATION/TRAINING PROGRAM

## 2024-12-28 PROCEDURE — 94761 N-INVAS EAR/PLS OXIMETRY MLT: CPT

## 2024-12-28 PROCEDURE — 94640 AIRWAY INHALATION TREATMENT: CPT

## 2024-12-28 PROCEDURE — 36430 TRANSFUSION BLD/BLD COMPNT: CPT

## 2024-12-28 PROCEDURE — 25010000002 FILGRASTIM 300 MCG/0.5ML SOLUTION PREFILLED SYRINGE: Performed by: STUDENT IN AN ORGANIZED HEALTH CARE EDUCATION/TRAINING PROGRAM

## 2024-12-28 RX ORDER — POTASSIUM CHLORIDE 7.45 MG/ML
10 INJECTION INTRAVENOUS
Status: COMPLETED | OUTPATIENT
Start: 2024-12-28 | End: 2024-12-28

## 2024-12-28 RX ORDER — POTASSIUM CHLORIDE 7.45 MG/ML
10 INJECTION INTRAVENOUS
Status: COMPLETED | OUTPATIENT
Start: 2024-12-28 | End: 2024-12-29

## 2024-12-28 RX ORDER — PROCHLORPERAZINE EDISYLATE 5 MG/ML
10 INJECTION INTRAMUSCULAR; INTRAVENOUS EVERY 6 HOURS PRN
Status: DISCONTINUED | OUTPATIENT
Start: 2024-12-28 | End: 2024-12-31 | Stop reason: HOSPADM

## 2024-12-28 RX ORDER — POTASSIUM CHLORIDE 29.8 MG/ML
20 INJECTION INTRAVENOUS
Status: DISCONTINUED | OUTPATIENT
Start: 2024-12-28 | End: 2024-12-28

## 2024-12-28 RX ORDER — PANTOPRAZOLE SODIUM 40 MG/10ML
40 INJECTION, POWDER, LYOPHILIZED, FOR SOLUTION INTRAVENOUS DAILY
Status: DISCONTINUED | OUTPATIENT
Start: 2024-12-28 | End: 2024-12-30

## 2024-12-28 RX ADMIN — POTASSIUM CHLORIDE 10 MEQ: 7.46 INJECTION, SOLUTION INTRAVENOUS at 10:45

## 2024-12-28 RX ADMIN — POTASSIUM CHLORIDE 10 MEQ: 7.46 INJECTION, SOLUTION INTRAVENOUS at 23:39

## 2024-12-28 RX ADMIN — SUCRALFATE 1 G: 1 TABLET ORAL at 17:45

## 2024-12-28 RX ADMIN — SUCRALFATE 1 G: 1 TABLET ORAL at 08:31

## 2024-12-28 RX ADMIN — CARVEDILOL 3.12 MG: 3.12 TABLET, FILM COATED ORAL at 08:31

## 2024-12-28 RX ADMIN — CARVEDILOL 3.12 MG: 3.12 TABLET, FILM COATED ORAL at 17:45

## 2024-12-28 RX ADMIN — BUDESONIDE AND FORMOTEROL FUMARATE DIHYDRATE 2 PUFF: 160; 4.5 AEROSOL RESPIRATORY (INHALATION) at 08:16

## 2024-12-28 RX ADMIN — POTASSIUM CHLORIDE 20 MEQ: 1500 TABLET, EXTENDED RELEASE ORAL at 08:31

## 2024-12-28 RX ADMIN — POTASSIUM CHLORIDE 10 MEQ: 7.46 INJECTION, SOLUTION INTRAVENOUS at 08:31

## 2024-12-28 RX ADMIN — POTASSIUM CHLORIDE 10 MEQ: 7.46 INJECTION, SOLUTION INTRAVENOUS at 06:24

## 2024-12-28 RX ADMIN — POTASSIUM CHLORIDE 10 MEQ: 7.46 INJECTION, SOLUTION INTRAVENOUS at 07:28

## 2024-12-28 RX ADMIN — Medication 10 ML: at 21:34

## 2024-12-28 RX ADMIN — POTASSIUM CHLORIDE 10 MEQ: 7.46 INJECTION, SOLUTION INTRAVENOUS at 09:43

## 2024-12-28 RX ADMIN — SENNOSIDES AND DOCUSATE SODIUM 2 TABLET: 50; 8.6 TABLET ORAL at 08:31

## 2024-12-28 RX ADMIN — POTASSIUM CHLORIDE 10 MEQ: 7.46 INJECTION, SOLUTION INTRAVENOUS at 11:53

## 2024-12-28 RX ADMIN — PANTOPRAZOLE SODIUM 40 MG: 40 INJECTION, POWDER, FOR SOLUTION INTRAVENOUS at 13:00

## 2024-12-28 RX ADMIN — Medication 10 ML: at 08:32

## 2024-12-28 RX ADMIN — POTASSIUM CHLORIDE 10 MEQ: 7.46 INJECTION, SOLUTION INTRAVENOUS at 22:35

## 2024-12-28 RX ADMIN — POTASSIUM CHLORIDE 10 MEQ: 7.46 INJECTION, SOLUTION INTRAVENOUS at 21:34

## 2024-12-28 RX ADMIN — BUDESONIDE AND FORMOTEROL FUMARATE DIHYDRATE 2 PUFF: 160; 4.5 AEROSOL RESPIRATORY (INHALATION) at 20:20

## 2024-12-28 RX ADMIN — FILGRASTIM 300 MCG: 300 INJECTION, SOLUTION INTRAVENOUS; SUBCUTANEOUS at 16:32

## 2024-12-28 NOTE — PROGRESS NOTES
[facility] MEDICINE SERVICE  DAILY PROGRESS NOTE    NAME: Kandi uFentes  : 1960  MRN: [id number]      LOS: 1 day     PROVIDER OF SERVICE: Cassie Jiménez MD    Chief Complaint: Pancytopenia due to chemotherapy    Subjective:     Interval History:  History taken from: patient    No new complaint        Review of Systems:   Review of Systems   All other systems reviewed and are negative.      Objective:     Vital Signs  Temp:  [97.7 °F (36.5 °C)-99.6 °F (37.6 °C)] 99.6 °F (37.6 °C)  Heart Rate:  [] 108  Resp:  [16-26] 20  BP: ()/(41-84) 104/52   Body mass index is 21.68 kg/m².    Physical Exam  Physical Exam  Constitutional:       Appearance: Normal appearance.   HENT:      Head: Normocephalic and atraumatic.      Nose: Nose normal.      Mouth/Throat:      Mouth: Mucous membranes are moist.   Eyes:      Extraocular Movements: Extraocular movements intact.      Pupils: Pupils are equal, round, and reactive to light.   Cardiovascular:      Rate and Rhythm: Normal rate and regular rhythm.   Pulmonary:      Effort: Pulmonary effort is normal.      Breath sounds: Normal breath sounds.   Abdominal:      General: Abdomen is flat. Bowel sounds are normal.      Palpations: Abdomen is soft.   Musculoskeletal:         General: Normal range of motion.      Cervical back: Normal range of motion and neck supple.   Skin:     General: Skin is warm and dry.   Neurological:      General: No focal deficit present.      Mental Status: She is alert and oriented to person, place, and time.   Psychiatric:         Mood and Affect: Mood normal.         Behavior: Behavior normal.         Thought Content: Thought content normal.         Judgment: Judgment normal.         Current Medications:  Scheduled Meds:budesonide-formoterol, 2 puff, Inhalation, BID - RT  carvedilol, 3.125 mg, Oral, BID With Meals  pantoprazole, 40 mg, Intravenous, Daily  potassium chloride, 20 mEq, Oral, Daily  potassium chloride, 10 mEq,  Intravenous, Q1H  senna-docusate sodium, 2 tablet, Oral, BID  sodium chloride, 10 mL, Intravenous, Q12H  sucralfate, 1 g, Oral, TID AC      Continuous Infusions:   PRN Meds:.  acetaminophen **OR** acetaminophen **OR** acetaminophen    senna-docusate sodium **AND** polyethylene glycol **AND** bisacodyl **AND** bisacodyl    HYDROmorphone **AND** naloxone    ondansetron    oxyCODONE    Potassium Replacement - Follow Nurse / BPA Driven Protocol    prochlorperazine    sodium chloride    sodium chloride       Diagnostic Data    Results from last 7 days   Lab Units 12/28/24  0503 12/27/24  1616 12/27/24  1410   WBC 10*3/mm3 0.35*   < >  --    HEMOGLOBIN g/dL 8.3*   < >  --    HEMATOCRIT % 24.3*   < >  --    PLATELETS 10*3/mm3 38*   < >  --    GLUCOSE mg/dL 94  --  112*   CREATININE mg/dL 0.43*  --  0.44*   BUN mg/dL 9  --  5*   SODIUM mmol/L 138  --  133*   POTASSIUM mmol/L 2.8*  --  2.6*   AST (SGOT) U/L  --   --  15   ALT (SGPT) U/L  --   --  14   ALK PHOS U/L  --   --  72   BILIRUBIN mg/dL  --   --  0.8   ANION GAP mmol/L 8.7  --  9.7    < > = values in this interval not displayed.       CT Head Without Contrast    Result Date: 12/27/2024  Impression: 1.No acute intracranial abnormality. 2.No acute fracture or traumatic malalignment of the cervical spine. 3.Left posterior scalp hematoma. Electronically Signed: Merritt Dubois,   12/27/2024 4:21 PM EST  Workstation ID: [id number]    CT Cervical Spine Without Contrast    Result Date: 12/27/2024  Impression: 1.No acute intracranial abnormality. 2.No acute fracture or traumatic malalignment of the cervical spine. 3.Left posterior scalp hematoma. Electronically Signed: Merritt Dubois,   12/27/2024 4:21 PM EST  Workstation ID: [id number]       I reviewed the patient's new clinical results.    Assessment/Plan:     Active and Resolved Problems  Active Hospital Problems    Diagnosis  POA    **Pancytopenia due to chemotherapy [D61.810]  Yes      Resolved Hospital Problems    No resolved problems to display.       Acute Pancytopenia 2/2 Chemotherapy  -Patient has severe pancytopenia likely induced by her chemotherapy  -Hemoglobin upon presentation was 4.8 but has improved to 8.3 after transfusion of packed red blood cells.  - Platelet count has improved from 8000-38,000 status post transfusion of platelets.  - WBC with downward trend.  Patient is neutropenic.  May benefit from filgrastim.  Oncology managing.    Hypokalemia  - Replace potassium    Limited Stage SCLC  -Currently active on chemotherapy which has likely induced her pancytopenia       COPD exacerbation  -Continue bronchodilators as needed    VTE Prophylaxis:  Mechanical VTE prophylaxis orders are present.             Disposition Planning:     Barriers to Discharge: Pending clinical improvement  Anticipated Date of Discharge: 12/31/2024  Place of Discharge: Home      There are no questions and answers to display.       Signature: Electronically signed by Cassie Jiménez MD, 12/28/24, 12:07 EST.  [facility] Hospitalist Team

## 2024-12-28 NOTE — PLAN OF CARE
Goal Outcome Evaluation:  Plan of Care Reviewed With: patient        Progress: no change  Outcome Evaluation: patient recieved one unit of plasma in ED, 2 units PRBC overnight. Patient in bed with call light in reach, able to make needs known.

## 2024-12-28 NOTE — PLAN OF CARE
Problem: Adult Inpatient Plan of Care  Goal: Plan of Care Review  Outcome: Progressing  Flowsheets  Taken 12/28/2024 1551 by Gisella Mccullough RN  Progress: no change  Outcome Evaluation: Pt resting in bed after completion of electrolyte replacement. Commit to sit with patient regarding plan of care. Call light within reach. all questions and concerns have been addressed at this time.  Taken 12/28/2024 0421 by Chon Diop LPN  Plan of Care Reviewed With: patient  Goal: Patient-Specific Goal (Individualized)  Outcome: Progressing  Goal: Absence of Hospital-Acquired Illness or Injury  Outcome: Progressing  Intervention: Identify and Manage Fall Risk  Recent Flowsheet Documentation  Taken 12/28/2024 1401 by Gisella Mccullough RN  Safety Promotion/Fall Prevention: safety round/check completed  Taken 12/28/2024 0831 by Gisella Mccullough RN  Safety Promotion/Fall Prevention: toileting scheduled  Intervention: Prevent Skin Injury  Recent Flowsheet Documentation  Taken 12/28/2024 0831 by Gisella Mccullough RN  Skin Protection: incontinence pads utilized  Intervention: Prevent and Manage VTE (Venous Thromboembolism) Risk  Recent Flowsheet Documentation  Taken 12/28/2024 0831 by Gisella Mccullough RN  VTE Prevention/Management: SCDs (sequential compression devices) off  Goal: Optimal Comfort and Wellbeing  Outcome: Progressing  Intervention: Provide Person-Centered Care  Recent Flowsheet Documentation  Taken 12/28/2024 0831 by Gisella Mccullough RN  Trust Relationship/Rapport:   care explained   choices provided  Goal: Readiness for Transition of Care  Outcome: Progressing     Problem: Nausea and Vomiting  Goal: Nausea and Vomiting Relief  Outcome: Progressing  Intervention: Prevent and Manage Nausea and Vomiting  Recent Flowsheet Documentation  Taken 12/28/2024 0831 by Gisella Mccullough RN  Environmental Support: calm environment promoted     Problem: Comorbidity Management  Goal: Blood Pressure in Desired Range  Outcome:  Progressing  Intervention: Maintain Blood Pressure Management  Recent Flowsheet Documentation  Taken 12/28/2024 1401 by Gisella Mccullough RN  Medication Review/Management: medications reviewed     Problem: Skin Injury Risk Increased  Goal: Skin Health and Integrity  Outcome: Progressing  Intervention: Optimize Skin Protection  Recent Flowsheet Documentation  Taken 12/28/2024 0831 by Gisella Mccullough RN  Pressure Reduction Techniques: frequent weight shift encouraged  Pressure Reduction Devices: pressure-redistributing mattress utilized  Skin Protection: incontinence pads utilized     Problem: Fall Injury Risk  Goal: Absence of Fall and Fall-Related Injury  Outcome: Progressing  Intervention: Identify and Manage Contributors  Recent Flowsheet Documentation  Taken 12/28/2024 1401 by Gisella Mccullough RN  Medication Review/Management: medications reviewed  Intervention: Promote Injury-Free Environment  Recent Flowsheet Documentation  Taken 12/28/2024 1401 by Gisella Mccullough RN  Safety Promotion/Fall Prevention: safety round/check completed  Taken 12/28/2024 0831 by Gisella Mccullough RN  Safety Promotion/Fall Prevention: toileting scheduled   Goal Outcome Evaluation:           Progress: no change  Outcome Evaluation: Pt resting in bed after completion of electrolyte replacement. Commit to sit with patient regarding plan of care. Call light within reach. all questions and concerns have been addressed at this time.

## 2024-12-29 LAB
ANION GAP SERPL CALCULATED.3IONS-SCNC: 10.6 MMOL/L (ref 5–15)
ANISOCYTOSIS BLD QL: ABNORMAL
BUN SERPL-MCNC: 12 MG/DL (ref 8–23)
BUN/CREAT SERPL: 23.5 (ref 7–25)
CALCIUM SPEC-SCNC: 9.4 MG/DL (ref 8.6–10.5)
CHLORIDE SERPL-SCNC: 94 MMOL/L (ref 98–107)
CO2 SERPL-SCNC: 28.4 MMOL/L (ref 22–29)
CREAT SERPL-MCNC: 0.51 MG/DL (ref 0.57–1)
DEPRECATED RDW RBC AUTO: 50.6 FL (ref 37–54)
EGFRCR SERPLBLD CKD-EPI 2021: 105 ML/MIN/1.73
ELLIPTOCYTES BLD QL SMEAR: ABNORMAL
ERYTHROCYTE [DISTWIDTH] IN BLOOD BY AUTOMATED COUNT: 15.6 % (ref 12.3–15.4)
GLUCOSE SERPL-MCNC: 100 MG/DL (ref 65–99)
HCT VFR BLD AUTO: 23.8 % (ref 34–46.6)
HGB BLD-MCNC: 8.1 G/DL (ref 12–15.9)
LYMPHOCYTES # BLD MANUAL: 0.5 10*3/MM3 (ref 0.7–3.1)
LYMPHOCYTES NFR BLD MANUAL: 30 % (ref 5–12)
MAGNESIUM SERPL-MCNC: 1.5 MG/DL (ref 1.6–2.4)
MCH RBC QN AUTO: 31.3 PG (ref 26.6–33)
MCHC RBC AUTO-ENTMCNC: 34 G/DL (ref 31.5–35.7)
MCV RBC AUTO: 91.9 FL (ref 79–97)
MONOCYTES # BLD: 0.23 10*3/MM3 (ref 0.1–0.9)
NEUTROPHILS # BLD AUTO: 0.03 10*3/MM3 (ref 1.7–7)
NEUTROPHILS NFR BLD MANUAL: 3 % (ref 42.7–76)
NEUTS BAND NFR BLD MANUAL: 1 % (ref 0–5)
PHOSPHATE SERPL-MCNC: 1.8 MG/DL (ref 2.5–4.5)
PHOSPHATE SERPL-MCNC: 3.4 MG/DL (ref 2.5–4.5)
PLATELET # BLD AUTO: 30 10*3/MM3 (ref 140–450)
PMV BLD AUTO: 9.1 FL (ref 6–12)
POTASSIUM SERPL-SCNC: 3.2 MMOL/L (ref 3.5–5.2)
POTASSIUM SERPL-SCNC: 3.6 MMOL/L (ref 3.5–5.2)
RBC # BLD AUTO: 2.59 10*6/MM3 (ref 3.77–5.28)
SCAN SLIDE: NORMAL
SMALL PLATELETS BLD QL SMEAR: ABNORMAL
SODIUM SERPL-SCNC: 133 MMOL/L (ref 136–145)
VARIANT LYMPHS NFR BLD MANUAL: 3 % (ref 0–5)
VARIANT LYMPHS NFR BLD MANUAL: 63 % (ref 19.6–45.3)
WBC MORPH BLD: NORMAL
WBC NRBC COR # BLD AUTO: 0.76 10*3/MM3 (ref 3.4–10.8)

## 2024-12-29 PROCEDURE — 94799 UNLISTED PULMONARY SVC/PX: CPT

## 2024-12-29 PROCEDURE — 94664 DEMO&/EVAL PT USE INHALER: CPT

## 2024-12-29 PROCEDURE — 94761 N-INVAS EAR/PLS OXIMETRY MLT: CPT

## 2024-12-29 PROCEDURE — 83735 ASSAY OF MAGNESIUM: CPT | Performed by: INTERNAL MEDICINE

## 2024-12-29 PROCEDURE — 84132 ASSAY OF SERUM POTASSIUM: CPT | Performed by: INTERNAL MEDICINE

## 2024-12-29 PROCEDURE — 99232 SBSQ HOSP IP/OBS MODERATE 35: CPT | Performed by: STUDENT IN AN ORGANIZED HEALTH CARE EDUCATION/TRAINING PROGRAM

## 2024-12-29 PROCEDURE — 25010000002 ONDANSETRON PER 1 MG: Performed by: INTERNAL MEDICINE

## 2024-12-29 PROCEDURE — 25810000003 SODIUM CHLORIDE 0.9 % SOLUTION: Performed by: INTERNAL MEDICINE

## 2024-12-29 PROCEDURE — 25010000002 POTASSIUM CHLORIDE 10 MEQ/100ML SOLUTION: Performed by: INTERNAL MEDICINE

## 2024-12-29 PROCEDURE — 84100 ASSAY OF PHOSPHORUS: CPT | Performed by: INTERNAL MEDICINE

## 2024-12-29 PROCEDURE — 85025 COMPLETE CBC W/AUTO DIFF WBC: CPT | Performed by: INTERNAL MEDICINE

## 2024-12-29 PROCEDURE — 80048 BASIC METABOLIC PNL TOTAL CA: CPT | Performed by: INTERNAL MEDICINE

## 2024-12-29 PROCEDURE — 25010000002 FILGRASTIM 480 MCG/0.8ML SOLUTION PREFILLED SYRINGE: Performed by: STUDENT IN AN ORGANIZED HEALTH CARE EDUCATION/TRAINING PROGRAM

## 2024-12-29 PROCEDURE — 85007 BL SMEAR W/DIFF WBC COUNT: CPT | Performed by: INTERNAL MEDICINE

## 2024-12-29 RX ORDER — PROMETHAZINE HYDROCHLORIDE 25 MG/1
25 TABLET ORAL EVERY 6 HOURS PRN
Status: DISCONTINUED | OUTPATIENT
Start: 2024-12-29 | End: 2024-12-31 | Stop reason: HOSPADM

## 2024-12-29 RX ORDER — POTASSIUM CHLORIDE 7.45 MG/ML
10 INJECTION INTRAVENOUS
Status: COMPLETED | OUTPATIENT
Start: 2024-12-29 | End: 2024-12-30

## 2024-12-29 RX ORDER — LEVOFLOXACIN 750 MG/1
750 TABLET, FILM COATED ORAL
Status: DISCONTINUED | OUTPATIENT
Start: 2024-12-29 | End: 2024-12-31 | Stop reason: HOSPADM

## 2024-12-29 RX ORDER — FENTANYL/ROPIVACAINE/NS/PF 2-625MCG/1
15 PLASTIC BAG, INJECTION (ML) EPIDURAL ONCE
Status: COMPLETED | OUTPATIENT
Start: 2024-12-29 | End: 2024-12-29

## 2024-12-29 RX ADMIN — LEVOFLOXACIN 750 MG: 750 TABLET, FILM COATED ORAL at 11:52

## 2024-12-29 RX ADMIN — ACETAMINOPHEN 650 MG: 325 TABLET, FILM COATED ORAL at 08:38

## 2024-12-29 RX ADMIN — POTASSIUM CHLORIDE 10 MEQ: 7.46 INJECTION, SOLUTION INTRAVENOUS at 00:44

## 2024-12-29 RX ADMIN — FILGRASTIM 480 MCG: 480 INJECTION, SOLUTION INTRAVENOUS; SUBCUTANEOUS at 17:31

## 2024-12-29 RX ADMIN — Medication 10 ML: at 21:05

## 2024-12-29 RX ADMIN — Medication 10 ML: at 08:39

## 2024-12-29 RX ADMIN — BUDESONIDE AND FORMOTEROL FUMARATE DIHYDRATE 2 PUFF: 160; 4.5 AEROSOL RESPIRATORY (INHALATION) at 20:05

## 2024-12-29 RX ADMIN — POTASSIUM CHLORIDE 10 MEQ: 7.46 INJECTION, SOLUTION INTRAVENOUS at 20:39

## 2024-12-29 RX ADMIN — POTASSIUM CHLORIDE 10 MEQ: 7.46 INJECTION, SOLUTION INTRAVENOUS at 22:53

## 2024-12-29 RX ADMIN — ONDANSETRON 4 MG: 2 INJECTION INTRAMUSCULAR; INTRAVENOUS at 05:35

## 2024-12-29 RX ADMIN — POTASSIUM CHLORIDE 10 MEQ: 7.46 INJECTION, SOLUTION INTRAVENOUS at 23:51

## 2024-12-29 RX ADMIN — PANTOPRAZOLE SODIUM 40 MG: 40 INJECTION, POWDER, FOR SOLUTION INTRAVENOUS at 08:38

## 2024-12-29 RX ADMIN — ONDANSETRON 4 MG: 2 INJECTION INTRAMUSCULAR; INTRAVENOUS at 20:51

## 2024-12-29 RX ADMIN — POTASSIUM CHLORIDE 10 MEQ: 7.46 INJECTION, SOLUTION INTRAVENOUS at 21:50

## 2024-12-29 RX ADMIN — BUDESONIDE AND FORMOTEROL FUMARATE DIHYDRATE 2 PUFF: 160; 4.5 AEROSOL RESPIRATORY (INHALATION) at 10:48

## 2024-12-29 RX ADMIN — SODIUM CHLORIDE 15 MMOL: 9 INJECTION, SOLUTION INTRAVENOUS at 10:31

## 2024-12-29 RX ADMIN — SUCRALFATE 1 G: 1 TABLET ORAL at 11:51

## 2024-12-29 RX ADMIN — POTASSIUM CHLORIDE 20 MEQ: 1500 TABLET, EXTENDED RELEASE ORAL at 08:38

## 2024-12-29 NOTE — PLAN OF CARE
Problem: Adult Inpatient Plan of Care  Goal: Plan of Care Review  Outcome: Progressing  Flowsheets  Taken 12/29/2024 1440 by Gisella Mccullough RN  Outcome Evaluation: Pt recieved IV phosphorus per protocol. Pt c/o headache, managed with tylenol. Commit to sit with pt regarding plan of care. All questions and concerns have been addressed at this time.  Taken 12/29/2024 0414 by Chon Diop LPN  Progress: no change  Taken 12/28/2024 0421 by Chon Diop LPN  Plan of Care Reviewed With: patient  Goal: Patient-Specific Goal (Individualized)  Outcome: Progressing  Goal: Absence of Hospital-Acquired Illness or Injury  Outcome: Progressing  Intervention: Identify and Manage Fall Risk  Recent Flowsheet Documentation  Taken 12/29/2024 1418 by Gisella Mccullough RN  Safety Promotion/Fall Prevention: safety round/check completed  Taken 12/29/2024 1258 by Gisella Mccullough RN  Safety Promotion/Fall Prevention: safety round/check completed  Taken 12/29/2024 1031 by Gisella Mccullough RN  Safety Promotion/Fall Prevention: safety round/check completed  Taken 12/29/2024 0839 by Gisella Mccullough RN  Safety Promotion/Fall Prevention: safety round/check completed  Intervention: Prevent Skin Injury  Recent Flowsheet Documentation  Taken 12/29/2024 0839 by Gisella Mccullough RN  Skin Protection: incontinence pads utilized  Intervention: Prevent and Manage VTE (Venous Thromboembolism) Risk  Recent Flowsheet Documentation  Taken 12/29/2024 0839 by Gisella Mccullough RN  VTE Prevention/Management: SCDs (sequential compression devices) off  Goal: Optimal Comfort and Wellbeing  Outcome: Progressing  Intervention: Monitor Pain and Promote Comfort  Recent Flowsheet Documentation  Taken 12/29/2024 0839 by Gisella Mccullough RN  Pain Management Interventions: pain medication given  Intervention: Provide Person-Centered Care  Recent Flowsheet Documentation  Taken 12/29/2024 0839 by Gisella Mccullough RN  Trust Relationship/Rapport:   care explained   choices  provided  Goal: Readiness for Transition of Care  Outcome: Progressing     Problem: Nausea and Vomiting  Goal: Nausea and Vomiting Relief  Outcome: Progressing     Problem: Comorbidity Management  Goal: Blood Pressure in Desired Range  Outcome: Progressing  Intervention: Maintain Blood Pressure Management  Recent Flowsheet Documentation  Taken 12/29/2024 1418 by Gisella Mccullough RN  Medication Review/Management: medications reviewed  Taken 12/29/2024 1258 by Gisella Mccullough RN  Medication Review/Management: medications reviewed  Taken 12/29/2024 1031 by Gisella Mccullough RN  Medication Review/Management: medications reviewed  Taken 12/29/2024 0839 by Gisella Mccullough RN  Medication Review/Management: medications reviewed     Problem: Skin Injury Risk Increased  Goal: Skin Health and Integrity  Outcome: Progressing  Intervention: Optimize Skin Protection  Recent Flowsheet Documentation  Taken 12/29/2024 0839 by Gisella Mccullough RN  Pressure Reduction Techniques: frequent weight shift encouraged  Pressure Reduction Devices: pressure-redistributing mattress utilized  Skin Protection: incontinence pads utilized     Problem: Fall Injury Risk  Goal: Absence of Fall and Fall-Related Injury  Outcome: Progressing  Intervention: Identify and Manage Contributors  Recent Flowsheet Documentation  Taken 12/29/2024 1418 by Gisella Mccullough RN  Medication Review/Management: medications reviewed  Taken 12/29/2024 1258 by Gisella Mccullough RN  Medication Review/Management: medications reviewed  Taken 12/29/2024 1031 by Gisella Mccullough RN  Medication Review/Management: medications reviewed  Taken 12/29/2024 0839 by Gisella Mccullough RN  Medication Review/Management: medications reviewed  Intervention: Promote Injury-Free Environment  Recent Flowsheet Documentation  Taken 12/29/2024 1418 by Gisella Mccullough RN  Safety Promotion/Fall Prevention: safety round/check completed  Taken 12/29/2024 1258 by Gisella Mccullough RN  Safety Promotion/Fall  Prevention: safety round/check completed  Taken 12/29/2024 1031 by Gisella Mccullough, RN  Safety Promotion/Fall Prevention: safety round/check completed  Taken 12/29/2024 0839 by Gisella Mccullough, RN  Safety Promotion/Fall Prevention: safety round/check completed   Goal Outcome Evaluation:           Progress: no change  Outcome Evaluation: Pt recieved IV phosphorus per protocol. Pt c/o headache, managed with tylenol. Commit to sit with pt regarding plan of care. All questions and concerns have been addressed at this time.

## 2024-12-29 NOTE — PLAN OF CARE
Goal Outcome Evaluation:  Plan of Care Reviewed With: patient        Progress: no change  Outcome Evaluation: patient recieved more IV potassium per protocol. Patient in bed with call light in reach, able to make needs known.

## 2024-12-29 NOTE — PROGRESS NOTES
[facility] MEDICINE SERVICE  DAILY PROGRESS NOTE    NAME: Kandi Fuentes  : 1960  MRN: [id number]      LOS: 2 days     PROVIDER OF SERVICE: Cassie Jiménez MD    Chief Complaint: Pancytopenia due to chemotherapy    Subjective:     Interval History:  History taken from: patient    No new complaint        Review of Systems:   Review of Systems   All other systems reviewed and are negative.      Objective:     Vital Signs  Temp:  [98.7 °F (37.1 °C)-99.8 °F (37.7 °C)] 98.7 °F (37.1 °C)  Heart Rate:  [] 106  Resp:  [18-26] 20  BP: (106-128)/(56-84) 127/56   Body mass index is 21.68 kg/m².    Physical Exam  Physical Exam  Constitutional:       Appearance: Normal appearance.   HENT:      Head: Normocephalic and atraumatic.      Nose: Nose normal.      Mouth/Throat:      Mouth: Mucous membranes are moist.   Eyes:      Extraocular Movements: Extraocular movements intact.      Pupils: Pupils are equal, round, and reactive to light.   Cardiovascular:      Rate and Rhythm: Normal rate and regular rhythm.   Pulmonary:      Effort: Pulmonary effort is normal.      Breath sounds: Normal breath sounds.   Abdominal:      General: Abdomen is flat. Bowel sounds are normal.      Palpations: Abdomen is soft.   Musculoskeletal:         General: Normal range of motion.      Cervical back: Normal range of motion and neck supple.   Skin:     General: Skin is warm and dry.   Neurological:      General: No focal deficit present.      Mental Status: She is alert and oriented to person, place, and time.   Psychiatric:         Mood and Affect: Mood normal.         Behavior: Behavior normal.         Thought Content: Thought content normal.         Judgment: Judgment normal.         Current Medications:  Scheduled Meds:budesonide-formoterol, 2 puff, Inhalation, BID - RT  carvedilol, 3.125 mg, Oral, BID With Meals  filgrastim (NEUPOGEN) injection, 480 mcg, Subcutaneous, Q PM  levoFLOXacin, 750 mg, Oral, Q  AM  pantoprazole, 40 mg, Intravenous, Daily  potassium chloride, 20 mEq, Oral, Daily  senna-docusate sodium, 2 tablet, Oral, BID  sodium chloride, 10 mL, Intravenous, Q12H  sucralfate, 1 g, Oral, TID AC      Continuous Infusions:   PRN Meds:.  acetaminophen **OR** acetaminophen **OR** acetaminophen    senna-docusate sodium **AND** polyethylene glycol **AND** bisacodyl **AND** bisacodyl    HYDROmorphone **AND** naloxone    ondansetron    oxyCODONE    Phosphorus Replacement - Follow Nurse / BPA Driven Protocol    Potassium Replacement - Follow Nurse / BPA Driven Protocol    prochlorperazine    promethazine    sodium chloride    sodium chloride       Diagnostic Data    Results from last 7 days   Lab Units 12/29/24  0534 12/27/24  1616 12/27/24  1410   WBC 10*3/mm3 0.76*   < >  --    HEMOGLOBIN g/dL 8.1*   < >  --    HEMATOCRIT % 23.8*   < >  --    PLATELETS 10*3/mm3 30*   < >  --    GLUCOSE mg/dL 100*   < > 112*   CREATININE mg/dL 0.51*   < > 0.44*   BUN mg/dL 12   < > 5*   SODIUM mmol/L 133*   < > 133*   POTASSIUM mmol/L 3.6   < > 2.6*   AST (SGOT) U/L  --   --  15   ALT (SGPT) U/L  --   --  14   ALK PHOS U/L  --   --  72   BILIRUBIN mg/dL  --   --  0.8   ANION GAP mmol/L 10.6   < > 9.7    < > = values in this interval not displayed.       CT Head Without Contrast    Result Date: 12/27/2024  Impression: 1.No acute intracranial abnormality. 2.No acute fracture or traumatic malalignment of the cervical spine. 3.Left posterior scalp hematoma. Electronically Signed: Merritt Dubois DO  12/27/2024 4:21 PM EST  Workstation ID: [id number]    CT Cervical Spine Without Contrast    Result Date: 12/27/2024  Impression: 1.No acute intracranial abnormality. 2.No acute fracture or traumatic malalignment of the cervical spine. 3.Left posterior scalp hematoma. Electronically Signed: Merritt Dubois DO  12/27/2024 4:21 PM EST  Workstation ID: [id number]       I reviewed the patient's new clinical results.    Assessment/Plan:      Active and Resolved Problems  Active Hospital Problems    Diagnosis  POA    **Pancytopenia due to chemotherapy [D61.810]  Yes      Resolved Hospital Problems   No resolved problems to display.       Acute Pancytopenia 2/2 Chemotherapy  -Patient has severe pancytopenia likely induced by her chemotherapy  -Hemoglobin upon presentation was 4.8 but has improved to 8.3 after transfusion of packed red blood cells.  - Platelet count has improved from 8000-38,000 status post transfusion of platelets.  - WBC with downward trend.  Patient is neutropenic.  May benefit from filgrastim.  Oncology managing.    Hypokalemia  - Replace potassium    Limited Stage SCLC  -Currently active on chemotherapy which has likely induced her pancytopenia    Hypomagnesemia  - Replace magnesium    Hypophosphatemia  Replace phosphorus           COPD exacerbation  -Continue bronchodilators as needed    VTE Prophylaxis:  Mechanical VTE prophylaxis orders are present.             Disposition Planning:     Barriers to Discharge: Pending clinical improvement  Anticipated Date of Discharge: 12/31/2024  Place of Discharge: Home      There are no questions and answers to display.       Signature: Electronically signed by Cassie Jiménez MD, 12/29/24, 15:06 EST.  [facility] Hospitalist Team

## 2024-12-29 NOTE — CONSULTS
[facility]   Consult Note    Patient Name: Kandi Fuentes  : 1960  MRN: [id number]  Primary Care Physician:  Provider, No Known  Referring Physician: No Known Provider  Date of admission: 2024    Subjective   Subjective     Reason for Consult/ Chief Complaint: pancytopenia    HPI:  Kandi Fuentes is a 64 y.o. female with underlying medical history of limited stage SCLC, status post completion of concurrent chemoRT who presented to [facility] on 2024 with complaints of dizziness and a fall. She was seen at [facility] last week and labs drawn at that time indicated a low hemoglobin 7.6 and was not considered a candidate for RBC transfusion. WBC count and plt counts were WNL at that time. The patient reported having a fall in the kitchen, hitting her head and had a brief LOC. Thereafter she was brought to the ER for further evaluation.  She does have a headache at the site of a hematoma from striking her head. Of note, the patient had a LHC in 2024 with placement of a ADIN and was started on Plavix at that time.     A CT head and CT C-Spine was significant for .Left posterior scalp hematoma, otherwise no other acute findings were noted.    Patient was noted to have severe pancytopenia with ANC 0.04, hemoglobin 4.8 and platelet count of 8000.  She was admitted for further evaluation and management.    Pertinent cancer history summarized as follows:    Patient was initially seen and [facility] [place] in 2024 for further evaluation and management of small cell lung cancer of the right lung.  She reported that between July and 2024 she was seen at the hospital with dyspnea and some chest pains.  She was found to have evidence of coronary artery disease and underwent percutaneous angioplasty and stenting of the affected coronary vessels.  In the process, however, a nodule in the right lung was identified.  Further investigations led to the identification of a  4.4 cm lobulated tumor in the posterior right upper lobe communicating with the right middle lobe concerning for malignancy.  Eventually she had a navigational bronchoscopy and pathology reported small cell carcinoma on the biopsies.  A PET scan and MRI did not reveal any suggestion of metastatic disease.    10/23/2024: Received the first cycle of chemotherapy without any difficulties.    She was seen in the emergency room at [facility] in November 2024, in [facility], where she spent several hours.  She was found to have an elevated D-dimer and was offered a CT scan.  However, she could not obtain the test because of difficulties with transportation.      12/18/2024: Cycle 4-day 1 chemotherapy.  Feeling poorly. Weak and more dyspneic with exertion. Still smoking but less than before. More tremulous than before. Her appetite is poor.     Patient was planned for 4-week follow-up with repeat imaging and labs.    Interval history:  12/28/2024: Patient seen today for initial evaluation.  She reported that her dizziness and fatigue has somewhat improved since her admission.  Denied any other, acute complaints.  Has persistent left scalp hematoma which is tender to palpation.  She denied any focal neurological signs.    Review of Systems   Review of Systems   Constitutional:  Positive for fatigue.   HENT: Negative.     Eyes: Negative.    Respiratory: Negative.     Cardiovascular: Negative.    Gastrointestinal: Negative.    Endocrine: Negative.    Genitourinary: Negative.    Musculoskeletal: Negative.    Skin: Negative.    Allergic/Immunologic: Negative.    Neurological:  Positive for dizziness and weakness.   Hematological: Negative.    Psychiatric/Behavioral: Negative.           Personal History     Past Medical History:   Diagnosis Date    Cancer     COPD (chronic obstructive pulmonary disease)     Coronary artery disease     Elevated cholesterol     Heart attack     Hypertension     Lung cancer 2024     Tinnitus        Past Surgical History:   Procedure Laterality Date    BACK SURGERY  2004    bone spur on sciatica    BRONCHOSCOPY WITH ION ROBOTIC ASSIST N/A 9/27/2024    Procedure: BRONCHOSCOPY WITH ION ROBOT, fine needle aspiration and tissue biopsy right upper lobe mass, endobronchial ultrasound with fine needle aspiration lymph nodes (Level 10R);  Surgeon: Serafin Choudhary MD;  Location: [facility] ENDOSCOPY;  Service: Robotics - Pulmonary;  Laterality: N/A;  post: lung mass with lympadenopathy    CHOLECYSTECTOMY  2021    CORONARY ANGIOPLASTY WITH STENT PLACEMENT  07/2024    MOLE REMOVAL  1994    forehead    SKIN LESION EXCISION Right 1994    breast    VENOUS ACCESS DEVICE (PORT) INSERTION Right 10/7/2024    Procedure: INSERTION VENOUS ACCESS DEVICE;  Surgeon: Serafin Choudhary MD;  Location: [facility] MAIN OR;  Service: Thoracic;  Laterality: Right;       Family History: family history includes Breast cancer (age of onset: 62) in her mother; Heart attack in her mother; Lung cancer in her brother and sister; Throat cancer in her sister. Otherwise pertinent FHx was reviewed and not pertinent to current issue.    Social History:  reports that she has been smoking cigarettes. She started smoking about 55 years ago. She has a 55 pack-year smoking history. She has been exposed to tobacco smoke. She has never used smokeless tobacco. She reports that she does not drink alcohol and does not use drugs.    Home Medications:  Acetaminophen, Lidocaine Viscous HCl, OLANZapine, albuterol sulfate HFA, aspirin, atorvastatin, budesonide-formoterol, carvedilol, clopidogrel, ipratropium-albuterol, isosorbide mononitrate, lidocaine-prilocaine, mometasone, omeprazole, ondansetron, oxyCODONE, potassium chloride, and sucralfate    Allergies:  Allergies   Allergen Reactions    Morphine Hives and Shortness Of Breath    Codeine Hives    Sulfa Antibiotics Hives       Objective    Objective     Vitals:   Temp:  [98.5 °F (36.9 °C)-99.8 °F (37.7 °C)] 99.8  °F (37.7 °C)  Heart Rate:  [103-125] 103  Resp:  [16-29] 26  BP: (104-128)/(52-84) 106/57    Physical Exam  Constitutional:       Appearance: She is normal weight. She is ill-appearing.   HENT:      Head: Normocephalic and atraumatic.      Right Ear: External ear normal.      Left Ear: External ear normal.      Nose: Nose normal.      Mouth/Throat:      Mouth: Mucous membranes are moist.      Pharynx: Oropharynx is clear.   Eyes:      Extraocular Movements: Extraocular movements intact.      Conjunctiva/sclera: Conjunctivae normal.      Pupils: Pupils are equal, round, and reactive to light.   Cardiovascular:      Rate and Rhythm: Normal rate.      Pulses: Normal pulses.   Pulmonary:      Effort: Pulmonary effort is normal.   Abdominal:      General: Abdomen is flat.      Palpations: Abdomen is soft.   Musculoskeletal:         General: Normal range of motion.      Cervical back: Normal range of motion and neck supple.   Skin:     General: Skin is warm.   Neurological:      Mental Status: She is alert.   Psychiatric:         Mood and Affect: Mood normal.         Behavior: Behavior normal.         Thought Content: Thought content normal.         Judgment: Judgment normal.       .    CBC          12/27/2024    16:16 12/28/2024    05:03 12/29/2024    05:34   CBC   WBC 0.43  0.35  0.76  P   RBC 1.46  2.65  2.59  P   Hemoglobin 4.8  8.3  8.1  P   Hematocrit 14.4  24.3  23.8  P   MCV 98.6  91.7  91.9  P   MCH 32.9  31.3  31.3  P   MCHC 33.3  34.2  34.0  P   RDW 14.1  14.2  15.6  P   Platelets 8  38  30  P      Details         P Preliminary result               CMP          12/27/2024    14:10 12/28/2024    05:03 12/28/2024    17:51 12/29/2024    05:34   CMP   Glucose 112  94   100    BUN 5  9   12    Creatinine 0.44  0.43   0.51    EGFR 108.8  109.4   105.0    Sodium 133  138   133    Potassium 2.6  2.8  3.6  3.6    Chloride 91  96   94    Calcium 8.9  9.1   9.4    Total Protein 6.0       Albumin 3.6       Globulin 2.4        Total Bilirubin 0.8       Alkaline Phosphatase 72       AST (SGOT) 15       ALT (SGPT) 14       Albumin/Globulin Ratio 1.5       BUN/Creatinine Ratio 11.4  20.9   23.5    Anion Gap 9.7  8.7   10.6        CT Head Without Contrast    Result Date: 12/27/2024  Impression: 1.No acute intracranial abnormality. 2.No acute fracture or traumatic malalignment of the cervical spine. 3.Left posterior scalp hematoma. Electronically Signed: Merritt Sherly, DO  12/27/2024 4:21 PM EST  Workstation ID: [id number]    CT Cervical Spine Without Contrast    Result Date: 12/27/2024  Impression: 1.No acute intracranial abnormality. 2.No acute fracture or traumatic malalignment of the cervical spine. 3.Left posterior scalp hematoma. Electronically Signed: Merritt Sherly, DO  12/27/2024 4:21 PM EST  Workstation ID: [id number]       Assessment & Plan   Assessment / Plan     1.  Limited stage small cell lung cancer: ([id number]) of the right lung.   -On concurrent chemotherapy with carboplatin/etoposide since October of 2024.  -  she has completed radiation and cycle 4 chemotherapy, C4D1 on 12/16/24.   -planned for interval staging scans and MRI of the brain 4 weeks after completion of chemotherapy  -Likely candidate for consolidation immunotherapy with Imfenzi.     2.  Pancytopenia: Likely myelosuppression from chemotherapy.  Noted ANC 40, hemoglobin 4.8 and platelet 8K on presentation.  Status post 2 unit PRBC and 1 unit platelet transfusion.  Repeat hemoglobin improved to 8.1 this morning.  Platelet count 30K today.  Patient denied any significant signs/symptoms concerning for infection, however has some sinus congestion and dry cough.  Started patient on Neupogen 300 mcg/day for ANC recovery.   Transfuse as needed to maintain hemoglobin>7 and platelet count >30K in view of recent head injury and contusion.      3.  Fall, scalp contusion: CT head and cervical spine negative for any intracranial hemorrhage or C-spine injury. consider Cold  compresses and as needed pain medications.    4.  Coronary artery disease: Without symptoms.     5.  Chronic obstructive pulmonary disease    Thanks for this consult. Will continue to follow.    Zeus Kaiser MD 12/28/24.

## 2024-12-29 NOTE — PROGRESS NOTES
[facility]   Hematology oncology Inpatient Progress Note    Patient Name: Kandi Fuentes  : 1960  MRN: [id number]  Primary Care Physician:  Provider, No Known  Referring Physician: No Known Provider  Date of admission: 2024    Subjective   Subjective     Reason for Consult/ Chief Complaint: pancytopenia    HPI:  Kandi Fuentes is a 64 y.o. female with underlying medical history of limited stage SCLC, status post completion of concurrent chemoRT who presented to [facility] on 2024 with complaints of dizziness and a fall. She was seen at [facility] last week and labs drawn at that time indicated a low hemoglobin 7.6 and was not considered a candidate for RBC transfusion. WBC count and plt counts were WNL at that time. The patient reported having a fall in the kitchen, hitting her head and had a brief LOC. Thereafter she was brought to the ER for further evaluation.  She does have a headache at the site of a hematoma from striking her head. Of note, the patient had a LHC in 2024 with placement of a ADIN and was started on Plavix at that time.     A CT head and CT C-Spine was significant for .Left posterior scalp hematoma, otherwise no other acute findings were noted.    Patient was noted to have severe pancytopenia with ANC 0.04, hemoglobin 4.8 and platelet count of 8000.  She was admitted for further evaluation and management.    Pertinent cancer history summarized as follows:    Patient was initially seen and [facility] in 2024 for further evaluation and management of small cell lung cancer of the right lung.  She reported that between July and 2024 she was seen at the hospital with dyspnea and some chest pains.  She was found to have evidence of coronary artery disease and underwent percutaneous angioplasty and stenting of the affected coronary vessels.  In the process, however, a nodule in the right lung was identified.  Further investigations  led to the identification of a 4.4 cm lobulated tumor in the posterior right upper lobe communicating with the right middle lobe concerning for malignancy.  Eventually she had a navigational bronchoscopy and pathology reported small cell carcinoma on the biopsies.  A PET scan and MRI did not reveal any suggestion of metastatic disease.    10/23/2024: Received the first cycle of chemotherapy without any difficulties.    She was seen in the emergency room at [facility] in November 2024, in [facility], where she spent several hours.  She was found to have an elevated D-dimer and was offered a CT scan.  However, she could not obtain the test because of difficulties with transportation.      12/18/2024: Cycle 4-day 1 chemotherapy.  Feeling poorly. Weak and more dyspneic with exertion. Still smoking but less than before. More tremulous than before. Her appetite is poor.     Patient was planned for 4-week follow-up with repeat imaging and labs.      12/28/2024: Patient seen today for initial evaluation.  She reported that her dizziness and fatigue has somewhat improved since her admission.  Denied any other, acute complaints.  Has persistent left scalp hematoma which is tender to palpation.  She denied any focal neurological signs.    Interval history:  12/29/24: Patient seen this morning.  Appears to be nauseous and is throwing up.  Has ongoing scalp pain.  Also reported having productive cough with yellowish sputum.  Denied any fever/chills overnight    Review of Systems   Review of Systems   Constitutional:  Positive for fatigue.   HENT: Negative.     Eyes: Negative.    Respiratory: Negative.     Cardiovascular: Negative.    Gastrointestinal: Negative.    Endocrine: Negative.    Genitourinary: Negative.    Musculoskeletal: Negative.    Skin: Negative.    Allergic/Immunologic: Negative.    Neurological:  Positive for dizziness and weakness.   Hematological: Negative.    Psychiatric/Behavioral: Negative.            Personal History     Past Medical History:   Diagnosis Date    Cancer     COPD (chronic obstructive pulmonary disease)     Coronary artery disease     Elevated cholesterol     Heart attack     Hypertension     Lung cancer 2024    Tinnitus        Past Surgical History:   Procedure Laterality Date    BACK SURGERY  2004    bone spur on sciatica    BRONCHOSCOPY WITH ION ROBOTIC ASSIST N/A 9/27/2024    Procedure: BRONCHOSCOPY WITH ION ROBOT, fine needle aspiration and tissue biopsy right upper lobe mass, endobronchial ultrasound with fine needle aspiration lymph nodes (Level 10R);  Surgeon: Serafin Choudhary MD;  Location: [facility] ENDOSCOPY;  Service: Robotics - Pulmonary;  Laterality: N/A;  post: lung mass with lympadenopathy    CHOLECYSTECTOMY  2021    CORONARY ANGIOPLASTY WITH STENT PLACEMENT  07/2024    MOLE REMOVAL  1994    forehead    SKIN LESION EXCISION Right 1994    breast    VENOUS ACCESS DEVICE (PORT) INSERTION Right 10/7/2024    Procedure: INSERTION VENOUS ACCESS DEVICE;  Surgeon: Serafin Choudhary MD;  Location: [facility] MAIN OR;  Service: Thoracic;  Laterality: Right;       Family History: family history includes Breast cancer (age of onset: 62) in her mother; Heart attack in her mother; Lung cancer in her brother and sister; Throat cancer in her sister. Otherwise pertinent FHx was reviewed and not pertinent to current issue.    Social History:  reports that she has been smoking cigarettes. She started smoking about 55 years ago. She has a 55 pack-year smoking history. She has been exposed to tobacco smoke. She has never used smokeless tobacco. She reports that she does not drink alcohol and does not use drugs.    Home Medications:  Acetaminophen, Lidocaine Viscous HCl, OLANZapine, albuterol sulfate HFA, aspirin, atorvastatin, budesonide-formoterol, carvedilol, clopidogrel, ipratropium-albuterol, isosorbide mononitrate, lidocaine-prilocaine, mometasone, omeprazole, ondansetron, oxyCODONE, potassium chloride, and  sucralfate    Allergies:  Allergies   Allergen Reactions    Morphine Hives and Shortness Of Breath    Codeine Hives    Sulfa Antibiotics Hives       Objective    Objective     Vitals:    12/29/24 0405   BP: 106/57   Pulse: 103   Resp: 26   Temp: 99.8 °F (37.7 °C)   SpO2: 95%         Physical Exam  Constitutional:       Appearance: She is normal weight. She is ill-appearing.   HENT:      Head: Normocephalic and atraumatic.      Right Ear: External ear normal.      Left Ear: External ear normal.      Nose: Nose normal.      Mouth/Throat:      Mouth: Mucous membranes are moist.      Pharynx: Oropharynx is clear.   Eyes:      Extraocular Movements: Extraocular movements intact.      Conjunctiva/sclera: Conjunctivae normal.      Pupils: Pupils are equal, round, and reactive to light.   Cardiovascular:      Rate and Rhythm: Normal rate.      Pulses: Normal pulses.   Pulmonary:      Effort: Pulmonary effort is normal.   Abdominal:      General: Abdomen is flat.      Palpations: Abdomen is soft.   Musculoskeletal:         General: Normal range of motion.      Cervical back: Normal range of motion and neck supple.   Skin:     General: Skin is warm.   Neurological:      Mental Status: She is alert.   Psychiatric:         Mood and Affect: Mood normal.         Behavior: Behavior normal.         Thought Content: Thought content normal.         Judgment: Judgment normal.       CBC          12/27/2024    16:16 12/28/2024    05:03 12/29/2024    05:34   CBC   WBC 0.43  0.35  0.76  P   RBC 1.46  2.65  2.59  P   Hemoglobin 4.8  8.3  8.1  P   Hematocrit 14.4  24.3  23.8  P   MCV 98.6  91.7  91.9  P   MCH 32.9  31.3  31.3  P   MCHC 33.3  34.2  34.0  P   RDW 14.1  14.2  15.6  P   Platelets 8  38  30  P      Details         P Preliminary result             CMP          12/27/2024    14:10 12/28/2024    05:03 12/28/2024    17:51 12/29/2024    05:34   CMP   Glucose 112  94   100    BUN 5  9   12    Creatinine 0.44  0.43   0.51    EGFR 108.8   109.4   105.0    Sodium 133  138   133    Potassium 2.6  2.8  3.6  3.6    Chloride 91  96   94    Calcium 8.9  9.1   9.4    Total Protein 6.0       Albumin 3.6       Globulin 2.4       Total Bilirubin 0.8       Alkaline Phosphatase 72       AST (SGOT) 15       ALT (SGPT) 14       Albumin/Globulin Ratio 1.5       BUN/Creatinine Ratio 11.4  20.9   23.5    Anion Gap 9.7  8.7   10.6      CT Head Without Contrast    Result Date: 12/27/2024  Impression: 1.No acute intracranial abnormality. 2.No acute fracture or traumatic malalignment of the cervical spine. 3.Left posterior scalp hematoma. Electronically Signed: Merritt Dubois, DO  12/27/2024 4:21 PM EST  Workstation ID: [id number]    CT Cervical Spine Without Contrast    Result Date: 12/27/2024  Impression: 1.No acute intracranial abnormality. 2.No acute fracture or traumatic malalignment of the cervical spine. 3.Left posterior scalp hematoma. Electronically Signed: Merritt Dubois, DO  12/27/2024 4:21 PM EST  Workstation ID: [id number]       Assessment & Plan   Assessment / Plan     1.  Limited stage small cell lung cancer: ([id number]) of the right lung.   -On concurrent chemotherapy with carboplatin/etoposide since October of 2024.  -  she has completed radiation and cycle 4 chemotherapy, C4D1 on 12/16/24.   -planned for interval staging scans and MRI of the brain 4 weeks after completion of chemotherapy  -Likely candidate for consolidation immunotherapy with Imfenzi. Further management per Primary oncologist.     2.  Pancytopenia: Likely myelosuppression from chemotherapy.  Noted ANC 40, hemoglobin 4.8 and platelet 8K on presentation.  Status post 2 unit PRBC and 1 unit platelet transfusion.  Repeat hemoglobin improved to 8.1 this morning.  Platelet count 30K today.  Patient denied any significant signs/symptoms concerning for infection, however has some sinus congestion and dry cough.  Started patient on Neupogen 300 mcg/day for ANC recovery. Increase dose to 480mcg  due to no significant improvement In ANC  Transfuse as needed to maintain hemoglobin>7 and platelet count >30K in view of recent head injury and contusion.      3. Productive Cough: Started on broad-spectrum oral antibiotic [Levaquin] due to concern about infection given severe neutropenia.  Continue for 5-7 days.    3.  Fall, scalp contusion: CT head and cervical spine negative for any intracranial hemorrhage or C-spine injury. consider Cold compresses and as needed pain medications. Symptoms improved.    4. Nausea/Vomiting: Discontinue Zofran, started patient on Compazine as needed.  Considered scopolamine if no improvement.    5.  Coronary artery disease: Without symptoms.     6.  Chronic obstructive pulmonary disease    Thanks for this consult. Will continue to follow.    Zeus Kaiser MD 12/29/24.

## 2024-12-30 LAB
ANION GAP SERPL CALCULATED.3IONS-SCNC: 9.9 MMOL/L (ref 5–15)
ANISOCYTOSIS BLD QL: ABNORMAL
BUN SERPL-MCNC: 5 MG/DL (ref 8–23)
BUN/CREAT SERPL: 10 (ref 7–25)
CALCIUM SPEC-SCNC: 9.1 MG/DL (ref 8.6–10.5)
CHLORIDE SERPL-SCNC: 98 MMOL/L (ref 98–107)
CO2 SERPL-SCNC: 27.1 MMOL/L (ref 22–29)
CREAT SERPL-MCNC: 0.5 MG/DL (ref 0.57–1)
DEPRECATED RDW RBC AUTO: 49 FL (ref 37–54)
EGFRCR SERPLBLD CKD-EPI 2021: 105.5 ML/MIN/1.73
ERYTHROCYTE [DISTWIDTH] IN BLOOD BY AUTOMATED COUNT: 14.9 % (ref 12.3–15.4)
GLUCOSE SERPL-MCNC: 94 MG/DL (ref 65–99)
HCT VFR BLD AUTO: 23.5 % (ref 34–46.6)
HGB BLD-MCNC: 7.6 G/DL (ref 12–15.9)
LAB AP CASE REPORT: NORMAL
LYMPHOCYTES # BLD MANUAL: 0.72 10*3/MM3 (ref 0.7–3.1)
LYMPHOCYTES NFR BLD MANUAL: 20 % (ref 5–12)
MAGNESIUM SERPL-MCNC: 1.5 MG/DL (ref 1.6–2.4)
MCH RBC QN AUTO: 30.3 PG (ref 26.6–33)
MCHC RBC AUTO-ENTMCNC: 32.3 G/DL (ref 31.5–35.7)
MCV RBC AUTO: 93.6 FL (ref 79–97)
MONOCYTES # BLD: 0.2 10*3/MM3 (ref 0.1–0.9)
NEUTROPHILS # BLD AUTO: 0.09 10*3/MM3 (ref 1.7–7)
NEUTROPHILS NFR BLD MANUAL: 8 % (ref 42.7–76)
NEUTS BAND NFR BLD MANUAL: 1 % (ref 0–5)
PATH REPORT.FINAL DX SPEC: NORMAL
PHOSPHATE SERPL-MCNC: 2.8 MG/DL (ref 2.5–4.5)
PLATELET # BLD AUTO: 23 10*3/MM3 (ref 140–450)
PMV BLD AUTO: 10 FL (ref 6–12)
POIKILOCYTOSIS BLD QL SMEAR: ABNORMAL
POTASSIUM SERPL-SCNC: 3.5 MMOL/L (ref 3.5–5.2)
RBC # BLD AUTO: 2.51 10*6/MM3 (ref 3.77–5.28)
SCAN SLIDE: NORMAL
SMALL PLATELETS BLD QL SMEAR: ABNORMAL
SODIUM SERPL-SCNC: 135 MMOL/L (ref 136–145)
VARIANT LYMPHS NFR BLD MANUAL: 62 % (ref 19.6–45.3)
VARIANT LYMPHS NFR BLD MANUAL: 9 % (ref 0–5)
WBC MORPH BLD: NORMAL
WBC NRBC COR # BLD AUTO: 1.02 10*3/MM3 (ref 3.4–10.8)

## 2024-12-30 PROCEDURE — 25010000002 POTASSIUM CHLORIDE 10 MEQ/100ML SOLUTION: Performed by: HOSPITALIST

## 2024-12-30 PROCEDURE — 83735 ASSAY OF MAGNESIUM: CPT | Performed by: INTERNAL MEDICINE

## 2024-12-30 PROCEDURE — 97162 PT EVAL MOD COMPLEX 30 MIN: CPT

## 2024-12-30 PROCEDURE — 25010000002 FILGRASTIM 480 MCG/0.8ML SOLUTION PREFILLED SYRINGE: Performed by: STUDENT IN AN ORGANIZED HEALTH CARE EDUCATION/TRAINING PROGRAM

## 2024-12-30 PROCEDURE — 80048 BASIC METABOLIC PNL TOTAL CA: CPT | Performed by: INTERNAL MEDICINE

## 2024-12-30 PROCEDURE — 99232 SBSQ HOSP IP/OBS MODERATE 35: CPT | Performed by: STUDENT IN AN ORGANIZED HEALTH CARE EDUCATION/TRAINING PROGRAM

## 2024-12-30 PROCEDURE — 85007 BL SMEAR W/DIFF WBC COUNT: CPT | Performed by: INTERNAL MEDICINE

## 2024-12-30 PROCEDURE — 94799 UNLISTED PULMONARY SVC/PX: CPT

## 2024-12-30 PROCEDURE — 94664 DEMO&/EVAL PT USE INHALER: CPT

## 2024-12-30 PROCEDURE — 84100 ASSAY OF PHOSPHORUS: CPT | Performed by: INTERNAL MEDICINE

## 2024-12-30 PROCEDURE — 25010000002 MAGNESIUM SULFATE 2 GM/50ML SOLUTION: Performed by: HOSPITALIST

## 2024-12-30 PROCEDURE — 85025 COMPLETE CBC W/AUTO DIFF WBC: CPT | Performed by: INTERNAL MEDICINE

## 2024-12-30 PROCEDURE — 94760 N-INVAS EAR/PLS OXIMETRY 1: CPT

## 2024-12-30 RX ORDER — PANTOPRAZOLE SODIUM 40 MG/1
40 TABLET, DELAYED RELEASE ORAL
Status: DISCONTINUED | OUTPATIENT
Start: 2024-12-31 | End: 2024-12-31 | Stop reason: HOSPADM

## 2024-12-30 RX ORDER — MAGNESIUM SULFATE HEPTAHYDRATE 40 MG/ML
2 INJECTION, SOLUTION INTRAVENOUS ONCE
Status: COMPLETED | OUTPATIENT
Start: 2024-12-30 | End: 2024-12-30

## 2024-12-30 RX ORDER — POTASSIUM CHLORIDE 7.45 MG/ML
10 INJECTION INTRAVENOUS ONCE
Status: COMPLETED | OUTPATIENT
Start: 2024-12-30 | End: 2024-12-30

## 2024-12-30 RX ORDER — POTASSIUM CHLORIDE 1500 MG/1
40 TABLET, EXTENDED RELEASE ORAL ONCE
Status: DISCONTINUED | OUTPATIENT
Start: 2024-12-30 | End: 2024-12-30

## 2024-12-30 RX ADMIN — PANTOPRAZOLE SODIUM 40 MG: 40 INJECTION, POWDER, FOR SOLUTION INTRAVENOUS at 08:50

## 2024-12-30 RX ADMIN — BUDESONIDE AND FORMOTEROL FUMARATE DIHYDRATE 2 PUFF: 160; 4.5 AEROSOL RESPIRATORY (INHALATION) at 08:22

## 2024-12-30 RX ADMIN — LEVOFLOXACIN 750 MG: 750 TABLET, FILM COATED ORAL at 05:03

## 2024-12-30 RX ADMIN — CARVEDILOL 3.12 MG: 3.12 TABLET, FILM COATED ORAL at 17:09

## 2024-12-30 RX ADMIN — BUDESONIDE AND FORMOTEROL FUMARATE DIHYDRATE 2 PUFF: 160; 4.5 AEROSOL RESPIRATORY (INHALATION) at 21:00

## 2024-12-30 RX ADMIN — Medication 10 ML: at 20:13

## 2024-12-30 RX ADMIN — FILGRASTIM 480 MCG: 480 INJECTION, SOLUTION INTRAVENOUS; SUBCUTANEOUS at 17:04

## 2024-12-30 RX ADMIN — Medication 10 ML: at 08:50

## 2024-12-30 RX ADMIN — MAGNESIUM SULFATE IN WATER FOR 2 G: 40 INJECTION INTRAVENOUS at 10:08

## 2024-12-30 RX ADMIN — POTASSIUM CHLORIDE 10 MEQ: 7.46 INJECTION, SOLUTION INTRAVENOUS at 08:49

## 2024-12-30 NOTE — DISCHARGE PLACEMENT REQUEST
"Abdelrahman Devries (64 y.o. Female)       Date of Birth   1960    Social Security Number       Address   [address] [place] [address] [address] [place] [address] [place] [address]    Home Phone   [phone]    MRN   [id number]       Jewish   None    Marital Status                               Admission Date   12/27/24    Admission Type   Emergency    Admitting Provider   Claus Cao MD    Attending Provider   Brammell, Timothy Duane, MD    Department, Room/Bed   [facility] INPATIENT, 208/1       Discharge Date       Discharge Disposition       Discharge Destination                                 Attending Provider: Brammell, Timothy Duane, MD    Allergies: Morphine, Codeine, Sulfa Antibiotics    Isolation: None   Infection: None   Code Status: Not on file    Ht: 170.2 cm (67\")   Wt: 62.8 kg (138 lb 7.2 oz)    Admission Cmt: None   Principal Problem: Pancytopenia due to chemotherapy [D61.810]                   Active Insurance as of 12/27/2024       Primary Coverage       Payor Plan Insurance Group Employer/Plan Group    [facility] COMMUNITY PLAN OF [place] [facility] CARE [employer] [facility] COMMUNITY PLAN PATHWAYS IN        Payor Plan Address Payor Plan Phone Number Payor Plan Fax Number Effective Dates    [address]   11/1/2024 - None Entered    [facility] [id number]         Subscriber Name Subscriber Birth Date Member ID       ABDELRAHMAN DEVRIES 1960 [id number]                     Emergency Contacts        (Rel.) Home Phone Work Phone Mobile Phone    ASPENEILEEN (Grandchild) -- -- [phone]                "

## 2024-12-30 NOTE — PLAN OF CARE
Goal Outcome Evaluation:            Patient received another round of potassium this shift, awaiting potassium lab results, no issues noted, patient is hoping to go home today.

## 2024-12-30 NOTE — PLAN OF CARE
Problem: Adult Inpatient Plan of Care  Goal: Plan of Care Review  Outcome: Progressing  Goal: Patient-Specific Goal (Individualized)  Outcome: Progressing  Goal: Absence of Hospital-Acquired Illness or Injury  Outcome: Progressing  Intervention: Identify and Manage Fall Risk  Recent Flowsheet Documentation  Taken 12/30/2024 1400 by Sandhya Ewing RN  Safety Promotion/Fall Prevention: safety round/check completed  Taken 12/30/2024 1226 by Sandhya Ewing RN  Safety Promotion/Fall Prevention: safety round/check completed  Taken 12/30/2024 1000 by Sandhya Ewing RN  Safety Promotion/Fall Prevention: safety round/check completed  Taken 12/30/2024 0848 by Sandhya Ewing RN  Safety Promotion/Fall Prevention: safety round/check completed  Intervention: Prevent Skin Injury  Recent Flowsheet Documentation  Taken 12/30/2024 0848 by Sandhya Ewing RN  Body Position: position changed independently  Skin Protection: protective footwear used  Intervention: Prevent and Manage VTE (Venous Thromboembolism) Risk  Recent Flowsheet Documentation  Taken 12/30/2024 0848 by Sandhya Ewing RN  VTE Prevention/Management: SCDs (sequential compression devices) off  Intervention: Prevent Infection  Recent Flowsheet Documentation  Taken 12/30/2024 0848 by Sandhya Ewing RN  Infection Prevention: single patient room provided  Goal: Optimal Comfort and Wellbeing  Outcome: Progressing  Intervention: Provide Person-Centered Care  Recent Flowsheet Documentation  Taken 12/30/2024 0848 by Sandhya Ewing RN  Trust Relationship/Rapport:   care explained   choices provided  Goal: Readiness for Transition of Care  Outcome: Progressing     Problem: Nausea and Vomiting  Goal: Nausea and Vomiting Relief  Outcome: Progressing  Intervention: Prevent and Manage Nausea and Vomiting  Recent Flowsheet Documentation  Taken 12/30/2024 0848 by Sandhya Ewing RN  Environmental Support: calm environment promoted  Oral Care: oral  rinse provided     Problem: Comorbidity Management  Goal: Blood Pressure in Desired Range  Outcome: Progressing  Intervention: Maintain Blood Pressure Management  Recent Flowsheet Documentation  Taken 12/30/2024 0848 by Sandhya Ewing RN  Medication Review/Management: medications reviewed     Problem: Skin Injury Risk Increased  Goal: Skin Health and Integrity  Outcome: Progressing  Intervention: Optimize Skin Protection  Recent Flowsheet Documentation  Taken 12/30/2024 0848 by Sandhya Ewing RN  Activity Management: activity encouraged  Pressure Reduction Techniques: frequent weight shift encouraged  Head of Bed (HOB) Positioning: HOB elevated  Pressure Reduction Devices: pressure-redistributing mattress utilized  Skin Protection: protective footwear used     Problem: Fall Injury Risk  Goal: Absence of Fall and Fall-Related Injury  Outcome: Progressing  Intervention: Identify and Manage Contributors  Recent Flowsheet Documentation  Taken 12/30/2024 0848 by Sandhya Ewing RN  Medication Review/Management: medications reviewed  Self-Care Promotion: independence encouraged  Intervention: Promote Injury-Free Environment  Recent Flowsheet Documentation  Taken 12/30/2024 1400 by Sandhya Ewing RN  Safety Promotion/Fall Prevention: safety round/check completed  Taken 12/30/2024 1226 by Sandhya Ewing RN  Safety Promotion/Fall Prevention: safety round/check completed  Taken 12/30/2024 1000 by Sandhya Ewing RN  Safety Promotion/Fall Prevention: safety round/check completed  Taken 12/30/2024 0848 by Sandhya Ewing RN  Safety Promotion/Fall Prevention: safety round/check completed   Goal Outcome Evaluation:   Pt was given one dose of potassium and magnesium today per MD. She was started on filgrastim injection daily. She has denied pain today. Her carvedilol was held this morning due to low blood pressure reading and MD was notified. No concerns and call light within reach.

## 2024-12-30 NOTE — PLAN OF CARE
Goal Outcome Evaluation:              Outcome Evaluation: Pt is a 65 y/o female presenting to [facility] on 12/27 with c/o dizziness and fall with brief LOC. Pt with most recent chemo session for lung cancer on 12/18  CT head 12/27: No acute intracranial abnormality.  No acute fracture or traumatic malalignment of the cervical spine. Left posterior scalp hematoma.   CMH of SCLC, COPD, HTN, CAD.  Pt A&Ox4. Pt reports BLE cramping, but no other pain. Pt reports PLOF of living with son (can provide frequent, daily sup/assist) and grandson in first floor apartment with walk in entry. Pt reports PLOF of (I) with household mobility/ambulation; limited community mobility due to immunocompromised. No longer driving. Pt reports current fall is only recent fall.  Orthostatic vitals --  SUPINE /65 mmHg,  SITTING /61 mmHg,  STANDING /79 mmHg,   Pt reports feeling very hot post gait. SITTING /59 mmHg.  Pt completes bed mobility with (I), transfers with SBA, and ambulates x60 ft with CGA without AD with distance limited by fatigue. Pt is facilitated by good family support. Due to impaired endurance follow-up HHPT recommended at time of d/c from [facility].    Anticipated Discharge Disposition (PT): home with assist, home with home health

## 2024-12-30 NOTE — THERAPY EVALUATION
Patient Name: Kandi Fuentes  : 1960    MRN: [id number]                              Today's Date: 2024       Admit Date: 2024    Visit Dx:     ICD-10-CM ICD-9-CM   1. Anemia, unspecified type  D64.9 285.9   2. Hypokalemia  E87.6 276.8   3. Thrombocytopenia  D69.6 287.5   4. Fall, initial encounter  W19.XXXA E888.9   5. Closed head injury, initial encounter  S09.90XA 959.01   6. Leukopenia, unspecified type  D72.819 288.50     Patient Active Problem List   Diagnosis    Lung mass    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    Closed nondisplaced fracture of lateral malleolus of left fibula    Fracture of lateral malleolus    Hypokalemia    Pancytopenia due to chemotherapy     Past Medical History:   Diagnosis Date    Cancer     COPD (chronic obstructive pulmonary disease)     Coronary artery disease     Elevated cholesterol     Heart attack     Hypertension     Lung cancer     Tinnitus      Past Surgical History:   Procedure Laterality Date    BACK SURGERY      bone spur on sciatica    BRONCHOSCOPY WITH ION ROBOTIC ASSIST N/A 2024    Procedure: BRONCHOSCOPY WITH ION ROBOT, fine needle aspiration and tissue biopsy right upper lobe mass, endobronchial ultrasound with fine needle aspiration lymph nodes (Level 10R);  Surgeon: Serafin Choudhary MD;  Location: [facility] ENDOSCOPY;  Service: Robotics - Pulmonary;  Laterality: N/A;  post: lung mass with lympadenopathy    CHOLECYSTECTOMY      CORONARY ANGIOPLASTY WITH STENT PLACEMENT  2024    MOLE REMOVAL      forehead    SKIN LESION EXCISION Right     breast    VENOUS ACCESS DEVICE (PORT) INSERTION Right 10/7/2024    Procedure: INSERTION VENOUS ACCESS DEVICE;  Surgeon: Serafin Choudhary MD;  Location: [facility] MAIN OR;  Service: Thoracic;  Laterality: Right;      General Information       Row Name 24 1458          Physical Therapy Time and Intention    Document Type evaluation  -JV     Mode of Treatment physical therapy  -JV        Row Name 12/30/24 1458          General Information    Patient Profile Reviewed yes  -JV     Prior Level of Function independent:;all household mobility;community mobility  -JV     Existing Precautions/Restrictions fall;orthostatic hypotension  -JV     Barriers to Rehab medically complex  -JV       Row Name 12/30/24 1458          Living Environment    People in Home child(paddy), adult;grandchild(paddy)  -JV       Row Name 12/30/24 1458          Home Main Entrance    Number of Stairs, Main Entrance none  -JV       Row Name 12/30/24 1458          Stairs Within Home, Primary    Number of Stairs, Within Home, Primary none  -JV       Row Name 12/30/24 1458          Cognition    Orientation Status (Cognition) oriented x 4  -JV       Row Name 12/30/24 1458          Safety Issues/Impairments Affecting Functional Mobility    Impairments Affecting Function (Mobility) balance;endurance/activity tolerance;pain;shortness of breath;strength  -JV               User Key  (r) = Recorded By, (t) = Taken By, (c) = Cosigned By      Initials Name Provider Type    Britni Raphael Physical Therapist                   Mobility       Row Name 12/30/24 1459          Bed Mobility    Bed Mobility supine-sit-supine  -JV     Supine-Sit-Supine Alpine (Bed Mobility) modified independence  -JV     Assistive Device (Bed Mobility) bed rails;head of bed elevated  -JV       Row Name 12/30/24 1459          Bed-Chair Transfer    Bed-Chair Alpine (Transfers) standby assist;verbal cues  -JV       Row Name 12/30/24 1459          Sit-Stand Transfer    Sit-Stand Alpine (Transfers) standby assist;verbal cues  -JV       Row Name 12/30/24 1459          Gait/Stairs (Locomotion)    Alpine Level (Gait) contact guard;verbal cues  -JV     Distance in Feet (Gait) 60  -JV     Deviations/Abnormal Patterns (Gait) base of support, narrow;dalia decreased;gait speed decreased;stride length decreased  -JV     Bilateral Gait Deviations  forward flexed posture  -JV               User Key  (r) = Recorded By, (t) = Taken By, (c) = Cosigned By      Initials Name Provider Type    Britni Raphael Physical Therapist                   Obj/Interventions       Row Name 12/30/24 1500          Range of Motion Comprehensive    General Range of Motion bilateral lower extremity ROM WFL  -JV       Row Name 12/30/24 1500          Strength Comprehensive (MMT)    Comment, General Manual Muscle Testing (MMT) Assessment BLE grossly 4/5  -JV       Row Name 12/30/24 1500          Balance    Balance Assessment standing static balance;standing dynamic balance  -JV     Static Standing Balance standby assist  -JV     Dynamic Standing Balance contact guard  -JV     Position/Device Used, Standing Balance unsupported  -JV       Row Name 12/30/24 1500          Sensory Assessment (Somatosensory)    Sensory Assessment (Somatosensory) LE sensation intact  -JV               User Key  (r) = Recorded By, (t) = Taken By, (c) = Cosigned By      Initials Name Provider Type    Britni Raphael Physical Therapist                   Goals/Plan       Row Name 12/30/24 1502          Transfer Goal 1 (PT)    Activity/Assistive Device (Transfer Goal 1, PT) sit-to-stand/stand-to-sit;bed-to-chair/chair-to-bed  -JV     Jack Level/Cues Needed (Transfer Goal 1, PT) independent  -JV     Time Frame (Transfer Goal 1, PT) long term goal (LTG);2 weeks  -JV       Row Name 12/30/24 1502          Gait Training Goal 1 (PT)    Activity/Assistive Device (Gait Training Goal 1, PT) gait (walking locomotion);improve balance and speed;increase endurance/gait distance  -JV     Jack Level (Gait Training Goal 1, PT) independent  -JV     Distance (Gait Training Goal 1, PT) 150 ft  -JV     Time Frame (Gait Training Goal 1, PT) long term goal (LTG);2 weeks  -JV       Row Name 12/30/24 1502          Therapy Assessment/Plan (PT)    Planned Therapy Interventions (PT) gait training;home exercise  program;strengthening;transfer training;patient/family education  -JV               User Key  (r) = Recorded By, (t) = Taken By, (c) = Cosigned By      Initials Name Provider Type    Britni Raphael Physical Therapist                   Clinical Impression       Row Name 12/30/24 1500          Pain    Pretreatment Pain Rating 3/10  -JV     Posttreatment Pain Rating 3/10  -JV     Pain Location extremity  -JV     Pain Side/Orientation bilateral;lower  -JV       Row Name 12/30/24 1500          Plan of Care Review    Outcome Evaluation Pt is a 65 y/o female presenting to [facility] on 12/27 with c/o dizziness and fall with brief LOC. Pt with most recent chemo session for lung cancer on 12/18  CT head 12/27: No acute intracranial abnormality.  No acute fracture or traumatic malalignment of the cervical spine. Left posterior scalp hematoma.   CMH of SCLC, COPD, HTN, CAD.  Pt A&Ox4. Pt reports BLE cramping, but no other pain. Pt reports PLOF of living with son (can provide frequent, daily sup/assist) and grandson in first floor apartment with walk in entry. Pt reports PLOF of (I) with household mobility/ambulation; limited community mobility due to immunocompromised. No longer driving. Pt reports current fall is only recent fall.  Orthostatic vitals --  SUPINE /65 mmHg,  SITTING /61 mmHg,  STANDING /79 mmHg,   Pt reports feeling very hot post gait. SITTING /59 mmHg.  Pt completes bed mobility with (I), transfers with SBA, and ambulates x60 ft with CGA without AD with distance limited by fatigue. Pt is facilitated by good family support. Due to impaired endurance follow-up HHPT recommended at time of d/c from [facility].  -JV       Row Name 12/30/24 1500          Therapy Assessment/Plan (PT)    Criteria for Skilled Interventions Met (PT) yes;meets criteria;skilled treatment is necessary  -JV     Therapy Frequency (PT) 3 times/wk  -JV     Predicted Duration of Therapy Intervention (PT) until d/c  -J       Jackie  Name 12/30/24 1500          Vital Signs    Pre Systolic BP Rehab 107  -JV     Pre Treatment Diastolic BP 65  -JV     Intra Systolic BP Rehab 113  -JV     Intra Treatment Diastolic BP 61  -JV     Post Systolic BP Rehab 100  -JV     Post Treatment Diastolic BP 79  -JV     Pre Patient Position Supine  -JV     Intra Patient Position Sitting  -JV     Post Patient Position Standing  -JV       Row Name 12/30/24 1500          Positioning and Restraints    Pre-Treatment Position in bed  -JV     Post Treatment Position bed  -JV     In Bed notified nsg;fowlers;call light within reach;encouraged to call for assist;exit alarm on  -JV               User Key  (r) = Recorded By, (t) = Taken By, (c) = Cosigned By      Initials Name Provider Type    Britni Raphael Physical Therapist                   Outcome Measures       Row Name 12/30/24 0848          How much help from another person do you currently need...    Turning from your back to your side while in flat bed without using bedrails? 4  -BT     Moving from lying on back to sitting on the side of a flat bed without bedrails? 4  -BT     Moving to and from a bed to a chair (including a wheelchair)? 4  -BT     Standing up from a chair using your arms (e.g., wheelchair, bedside chair)? 4  -BT     Climbing 3-5 steps with a railing? 3  -BT     To walk in hospital room? 4  -BT     AM-PAC 6 Clicks Score (PT) 23  -BT               User Key  (r) = Recorded By, (t) = Taken By, (c) = Cosigned By      Initials Name Provider Type    BT Sandhya Ewing, RN Registered Nurse                                 Physical Therapy Education       Title: PT OT SLP Therapies (Done)       Topic: Physical Therapy (Done)       Point: Mobility training (Done)       Learning Progress Summary            Patient Acceptance, E,TB, VU by ZAY at 12/30/2024 1503                                      User Key       Initials Effective Dates Name Provider Type Discipline    ZAY 03/30/21 -  Britni Huston  Physical Therapist PT                  PT Recommendation and Plan  Planned Therapy Interventions (PT): gait training, home exercise program, strengthening, transfer training, patient/family education  Outcome Evaluation: Pt is a 63 y/o female presenting to [facility] on 12/27 with c/o dizziness and fall with brief LOC. Pt with most recent chemo session for lung cancer on 12/18  CT head 12/27: No acute intracranial abnormality.  No acute fracture or traumatic malalignment of the cervical spine. Left posterior scalp hematoma.   CMH of SCLC, COPD, HTN, CAD.  Pt A&Ox4. Pt reports BLE cramping, but no other pain. Pt reports PLOF of living with son (can provide frequent, daily sup/assist) and grandson in first floor apartment with walk in entry. Pt reports PLOF of (I) with household mobility/ambulation; limited community mobility due to immunocompromised. No longer driving. Pt reports current fall is only recent fall.  Orthostatic vitals --  SUPINE /65 mmHg,  SITTING /61 mmHg,  STANDING /79 mmHg,   Pt reports feeling very hot post gait. SITTING /59 mmHg.  Pt completes bed mobility with (I), transfers with SBA, and ambulates x60 ft with CGA without AD with distance limited by fatigue. Pt is facilitated by good family support. Due to impaired endurance follow-up HHPT recommended at time of d/c from [facility].     Time Calculation:         PT Charges       Row Name 12/30/24 1504             Time Calculation    Start Time 1022  -JV      Stop Time 1045  -JV      Time Calculation (min) 23 min  -JV      PT Received On 12/30/24  -JV      PT - Next Appointment 01/02/25  -JV      PT Goal Re-Cert Due Date 01/13/25  -JV         Time Calculation- PT    Total Timed Code Minutes- PT 0 minute(s)  -JV                User Key  (r) = Recorded By, (t) = Taken By, (c) = Cosigned By      Initials Name Provider Type    Britni Raphael Physical Therapist                  Therapy Charges for Today       Code Description Service  Date Service Provider Modifiers Qty    58380379855 HC PT EVAL MOD COMPLEXITY 4 12/30/2024 Britni Huston GP 1            PT G-Codes  AM-PAC 6 Clicks Score (PT): 23  PT Discharge Summary  Anticipated Discharge Disposition (PT): home with assist, home with home health    Britni Huston  12/30/2024

## 2024-12-30 NOTE — PROGRESS NOTES
[facility] MEDICINE SERVICE  DAILY PROGRESS NOTE    NAME: Kandi Fuentes  : 1960  MRN: [id number]      LOS: 3 days     PROVIDER OF SERVICE: Timothy Duane Brammell, MD    Chief Complaint: Pancytopenia due to chemotherapy    Subjective:     Interval History:  History taken from: patient    Patient denies any severe pain complaints.  Denies any shortness of breath.  Eating without issue.  Denies any other additional acute issues.        Review of Systems:   Review of Systems    Objective:     Vital Signs  Temp:  [98 °F (36.7 °C)-98.8 °F (37.1 °C)] 98 °F (36.7 °C)  Heart Rate:  [] 99  Resp:  [16-20] 20  BP: (107-122)/(51-62) 112/54   Body mass index is 21.68 kg/m².    Physical Exam  Physical Exam       Diagnostic Data    Results from last 7 days   Lab Units 24  0432 24  1616 24  1410   WBC 10*3/mm3 1.02*   < >  --    HEMOGLOBIN g/dL 7.6*   < >  --    HEMATOCRIT % 23.5*   < >  --    PLATELETS 10*3/mm3 23*   < >  --    GLUCOSE mg/dL 94   < > 112*   CREATININE mg/dL 0.50*   < > 0.44*   BUN mg/dL 5*   < > 5*   SODIUM mmol/L 135*   < > 133*   POTASSIUM mmol/L 3.5   < > 2.6*   AST (SGOT) U/L  --   --  15   ALT (SGPT) U/L  --   --  14   ALK PHOS U/L  --   --  72   BILIRUBIN mg/dL  --   --  0.8   ANION GAP mmol/L 9.9   < > 9.7    < > = values in this interval not displayed.       No radiology results for the last day          Assessment:    Pancytopenia associated with recent chemotherapy.  Moderate protein malnutrition  Small cell lung carcinoma  Coronary artery disease with stent placement in 2024  COPD       Plan.  Ongoing white blood cell count stimulating factor.  Follow-up labs.  Patient currently on oral antibiotic, Levaquin.  Oncology following.      Active and Resolved Problems  Active Hospital Problems    Diagnosis  POA    **Pancytopenia due to chemotherapy [D61.810]  Yes      Resolved Hospital Problems   No resolved problems to display.           VTE  Prophylaxis:  Mechanical VTE prophylaxis orders are present.             Disposition Planning:     Barriers to Discharge:count count improvement  Anticipated Date of Discharge: 01/01  Place of Discharge: home      Time: 30 minutes     There are no questions and answers to display.       Signature: Electronically signed by Timothy Duane Brammell, MD, 12/30/24, 14:02 EST.  [facility] Hospitalist Team

## 2024-12-30 NOTE — CASE MANAGEMENT/SOCIAL WORK
Social Work Assessment  [facility]     Patient Name: Kandi Fuentes  MRN: [id number]  Today's Date: 12/30/2024    Admit Date: 12/27/2024     Discharge Needs Assessment       Row Name 12/30/24 0955       Living Environment    People in Home child(paddy), adult;grandchild(paddy)    Name(s) of People in Home Patient, adult son and adult grandson all reside together.    Current Living Arrangements apartment    Potentially Unsafe Housing Conditions none    Primary Care Provided by self    Provides Primary Care For no one    Caregiving Concerns Grandson assists with errands and grocery shopping.    Family Caregiver if Needed none    Quality of Family Relationships supportive    Able to Return to Prior Arrangements yes       Resource/Environmental Concerns    Resource/Environmental Concerns none    Transportation Concerns none       Transition Planning    Patient/Family Anticipates Transition to home with family    Patient/Family Anticipated Services at Transition none    Transportation Anticipated family or friend will provide       Discharge Needs Assessment    Readmission Within the Last 30 Days no previous admission in last 30 days    Equipment Currently Used at Home none    Concerns to be Addressed discharge planning    Do you want help finding or keeping work or a job? I do not need or want help    Do you want help with school or training? For example, starting or completing job training or getting a high school diploma, GED or equivalent No    Equipment Needed After Discharge shower chair;walker, standard    Patient's Choice of Community Agency(s) Patient has just finished a round of Chemotherapy at [facility]                   Discharge Plan       Row Name 12/30/24 0957       Plan    Plan From home with adult son and adult grandson.    Patient/Family in Agreement with Plan yes    Plan Comments SW reviewed chart and met with pt at bedside.  She was a/ox4.  Patient will return home with adult son and grandson.   Patient recently completed a round of chemotherapy, has 2 weeks off and will re-start another round.  Patient reported she can use utility resources.  She is able to get all medication and enough food.  Patient is disabled.  Patient continues to smoke 1/2-1 pack cigarettes daily.  No alcohol consumption or illicit substance use.  No depression/anxiety identified.  CANDACE assisted patient with getting established with a PCP.  Appointment information on AVS.  She uses "[facility] at [address] [place] [address] in [place]  No DME currently used however, patient is requesting a walker and shower chair.  CANDACE notified RN/CM and will give [employer] DME resource.  Patient's friend/ Soila Jones will provide transportation at time of d/c.                  Continued Care and Services - Admitted Since 12/27/2024    No active coordination exists for this encounter.       Expected Discharge Date and Time       Expected Discharge Date Expected Discharge Time    Jan 1, 2025            Demographic Summary       Row Name 12/30/24 5153       General Information    Admission Type inpatient    Arrived From emergency department    Referral Source admission list    Reason for Consult discharge planning    Preferred Language English       Contact Information    Permission Granted to Share Info With     Contact Information Obtained for                    Functional Status       Row Name 12/30/24 1590       Functional Status    Usual Activity Tolerance good    Current Activity Tolerance moderate    Functional Status Comments Recent fall at home.       Assessment of Health Literacy    How often do you have someone help you read hospital materials? Occasionally    How often do you have problems learning about your medical condition because of difficulty understanding written information? Occasionally    How often do you have a problem understanding what is told to you about your medical condition? Occasionally    How confident  are you filling out medical forms by yourself? Quite a bit    Health Literacy Good       Functional Status, IADL    Medications independent    Meal Preparation independent    Housekeeping independent    Laundry independent    Shopping independent    If for any reason you need help with day-to-day activities such as bathing, preparing meals, shopping, managing finances, etc., do you get the help you need? I get all the help I need       Mental Status    General Appearance WDL WDL       Mental Status Summary    Recent Changes in Mental Status/Cognitive Functioning no changes       Employment/    Employment Status disabled                    BRIAN Fuentes MSW    Phone: [phone]  Cell: [phone]  Fax: [phone]  [email or web address]

## 2024-12-31 ENCOUNTER — READMISSION MANAGEMENT (OUTPATIENT)
Dept: CALL CENTER | Facility: HOSPITAL | Age: 64
End: 2024-12-31
Payer: MEDICAID

## 2024-12-31 VITALS
TEMPERATURE: 97.5 F | BODY MASS INDEX: 21.73 KG/M2 | DIASTOLIC BLOOD PRESSURE: 60 MMHG | OXYGEN SATURATION: 98 % | WEIGHT: 138.45 LBS | SYSTOLIC BLOOD PRESSURE: 101 MMHG | RESPIRATION RATE: 17 BRPM | HEART RATE: 105 BPM | HEIGHT: 67 IN

## 2024-12-31 DIAGNOSIS — C34.11 SMALL CELL CARCINOMA OF UPPER LOBE OF RIGHT LUNG: Primary | ICD-10-CM

## 2024-12-31 LAB
ALBUMIN SERPL-MCNC: 3.6 G/DL (ref 3.5–5.2)
ALBUMIN/GLOB SERPL: 1.4 G/DL
ALP SERPL-CCNC: 67 U/L (ref 39–117)
ALT SERPL W P-5'-P-CCNC: 13 U/L (ref 1–33)
ANION GAP SERPL CALCULATED.3IONS-SCNC: 10.5 MMOL/L (ref 5–15)
AST SERPL-CCNC: 14 U/L (ref 1–32)
BILIRUB SERPL-MCNC: 0.7 MG/DL (ref 0–1.2)
BUN SERPL-MCNC: 4 MG/DL (ref 8–23)
BUN/CREAT SERPL: 7.3 (ref 7–25)
CALCIUM SPEC-SCNC: 9.7 MG/DL (ref 8.6–10.5)
CHLORIDE SERPL-SCNC: 97 MMOL/L (ref 98–107)
CO2 SERPL-SCNC: 28.5 MMOL/L (ref 22–29)
CREAT SERPL-MCNC: 0.55 MG/DL (ref 0.57–1)
DEPRECATED RDW RBC AUTO: 49.1 FL (ref 37–54)
EGFRCR SERPLBLD CKD-EPI 2021: 103.1 ML/MIN/1.73
ERYTHROCYTE [DISTWIDTH] IN BLOOD BY AUTOMATED COUNT: 14.8 % (ref 12.3–15.4)
GLOBULIN UR ELPH-MCNC: 2.6 GM/DL
GLUCOSE SERPL-MCNC: 95 MG/DL (ref 65–99)
HCT VFR BLD AUTO: 24.4 % (ref 34–46.6)
HGB BLD-MCNC: 8.1 G/DL (ref 12–15.9)
LYMPHOCYTES # BLD MANUAL: 0.53 10*3/MM3 (ref 0.7–3.1)
LYMPHOCYTES NFR BLD MANUAL: 29 % (ref 5–12)
MCH RBC QN AUTO: 31.8 PG (ref 26.6–33)
MCHC RBC AUTO-ENTMCNC: 33.2 G/DL (ref 31.5–35.7)
MCV RBC AUTO: 95.7 FL (ref 79–97)
MONOCYTES # BLD: 1.19 10*3/MM3 (ref 0.1–0.9)
MYELOCYTES NFR BLD MANUAL: 2 % (ref 0–0)
NEUTROPHILS # BLD AUTO: 2.21 10*3/MM3 (ref 1.7–7)
NEUTROPHILS NFR BLD MANUAL: 33 % (ref 42.7–76)
NEUTS BAND NFR BLD MANUAL: 21 % (ref 0–5)
NRBC SPEC MANUAL: 3 /100 WBC (ref 0–0.2)
OTHER CELLS %: 1 % (ref 0–0)
PATHOLOGY REVIEW: YES
PLATELET # BLD AUTO: 21 10*3/MM3 (ref 140–450)
PMV BLD AUTO: 12 FL (ref 6–12)
POTASSIUM SERPL-SCNC: 3.4 MMOL/L (ref 3.5–5.2)
PROLYMPHOCYTES NFR BLD MANUAL: 1 % (ref 0–0)
PROT SERPL-MCNC: 6.2 G/DL (ref 6–8.5)
RBC # BLD AUTO: 2.55 10*6/MM3 (ref 3.77–5.28)
RBC MORPH BLD: NORMAL
SCAN SLIDE: NORMAL
SMALL PLATELETS BLD QL SMEAR: ABNORMAL
SODIUM SERPL-SCNC: 136 MMOL/L (ref 136–145)
VARIANT LYMPHS NFR BLD MANUAL: 13 % (ref 19.6–45.3)
WBC MORPH BLD: NORMAL
WBC NRBC COR # BLD AUTO: 4.09 10*3/MM3 (ref 3.4–10.8)

## 2024-12-31 PROCEDURE — 94799 UNLISTED PULMONARY SVC/PX: CPT

## 2024-12-31 PROCEDURE — 80053 COMPREHEN METABOLIC PANEL: CPT | Performed by: STUDENT IN AN ORGANIZED HEALTH CARE EDUCATION/TRAINING PROGRAM

## 2024-12-31 PROCEDURE — 85025 COMPLETE CBC W/AUTO DIFF WBC: CPT | Performed by: STUDENT IN AN ORGANIZED HEALTH CARE EDUCATION/TRAINING PROGRAM

## 2024-12-31 PROCEDURE — 25010000002 POTASSIUM CHLORIDE 10 MEQ/100ML SOLUTION: Performed by: HOSPITALIST

## 2024-12-31 PROCEDURE — 94664 DEMO&/EVAL PT USE INHALER: CPT

## 2024-12-31 PROCEDURE — 25010000002 HEPARIN LOCK FLUSH PER 10 UNITS: Performed by: HOSPITALIST

## 2024-12-31 PROCEDURE — 85007 BL SMEAR W/DIFF WBC COUNT: CPT | Performed by: STUDENT IN AN ORGANIZED HEALTH CARE EDUCATION/TRAINING PROGRAM

## 2024-12-31 RX ORDER — IPRATROPIUM BROMIDE AND ALBUTEROL SULFATE 2.5; .5 MG/3ML; MG/3ML
3 SOLUTION RESPIRATORY (INHALATION) 4 TIMES DAILY PRN
Qty: 120 ML | Refills: 5 | Status: SHIPPED | OUTPATIENT
Start: 2024-12-31 | End: 2025-04-30

## 2024-12-31 RX ORDER — METHOCARBAMOL 500 MG/1
500 TABLET, FILM COATED ORAL EVERY 6 HOURS PRN
Qty: 50 TABLET | Refills: 1 | Status: SHIPPED | OUTPATIENT
Start: 2024-12-31

## 2024-12-31 RX ORDER — METHOCARBAMOL 500 MG/1
750 TABLET, FILM COATED ORAL EVERY 6 HOURS PRN
Status: DISCONTINUED | OUTPATIENT
Start: 2024-12-31 | End: 2024-12-31 | Stop reason: HOSPADM

## 2024-12-31 RX ORDER — CYCLOBENZAPRINE HCL 10 MG
5 TABLET ORAL ONCE AS NEEDED
Status: COMPLETED | OUTPATIENT
Start: 2024-12-31 | End: 2024-12-31

## 2024-12-31 RX ORDER — HEPARIN SODIUM (PORCINE) LOCK FLUSH IV SOLN 100 UNIT/ML 100 UNIT/ML
5 SOLUTION INTRAVENOUS AS NEEDED
Status: DISCONTINUED | OUTPATIENT
Start: 2024-12-31 | End: 2024-12-31 | Stop reason: HOSPADM

## 2024-12-31 RX ORDER — POTASSIUM CHLORIDE 1500 MG/1
20 TABLET, EXTENDED RELEASE ORAL ONCE
Status: DISCONTINUED | OUTPATIENT
Start: 2024-12-31 | End: 2024-12-31

## 2024-12-31 RX ORDER — POTASSIUM CHLORIDE 7.45 MG/ML
10 INJECTION INTRAVENOUS ONCE
Status: COMPLETED | OUTPATIENT
Start: 2024-12-31 | End: 2024-12-31

## 2024-12-31 RX ADMIN — POTASSIUM CHLORIDE 10 MEQ: 7.46 INJECTION, SOLUTION INTRAVENOUS at 12:15

## 2024-12-31 RX ADMIN — HEPARIN 500 UNITS: 100 SYRINGE at 13:23

## 2024-12-31 RX ADMIN — CYCLOBENZAPRINE HYDROCHLORIDE 5 MG: 10 TABLET, FILM COATED ORAL at 04:30

## 2024-12-31 RX ADMIN — BUDESONIDE AND FORMOTEROL FUMARATE DIHYDRATE 2 PUFF: 160; 4.5 AEROSOL RESPIRATORY (INHALATION) at 06:40

## 2024-12-31 RX ADMIN — LEVOFLOXACIN 750 MG: 750 TABLET, FILM COATED ORAL at 05:10

## 2024-12-31 RX ADMIN — METHOCARBAMOL 750 MG: 500 TABLET ORAL at 08:02

## 2024-12-31 RX ADMIN — PANTOPRAZOLE SODIUM 40 MG: 40 TABLET, DELAYED RELEASE ORAL at 05:10

## 2024-12-31 RX ADMIN — Medication 10 ML: at 08:02

## 2024-12-31 NOTE — CASE MANAGEMENT/SOCIAL WORK
Continued Stay Note   [facility]     Patient Name: Kandi Fuentes  MRN: [id number]  Today's Date: 12/31/2024    Admit Date: 12/27/2024    Plan: Return home with family. Pt declining OPPT services/ no HHC coverage.   Discharge Plan       Row Name 12/31/24 1222       Plan    Plan Return home with family. Pt declining OPPT services/ no HHC coverage.               Mary Anne Arguelles RN     Office phone: [phone]  Office fax: [phone]

## 2024-12-31 NOTE — PLAN OF CARE
Goal Outcome Evaluation:   Pt is resting, vss, no complaints during the night, call light in reach, POC ongoing.

## 2024-12-31 NOTE — PLAN OF CARE
Problem: Adult Inpatient Plan of Care  Goal: Plan of Care Review  Outcome: Met  Goal: Patient-Specific Goal (Individualized)  Outcome: Met  Goal: Absence of Hospital-Acquired Illness or Injury  Outcome: Met  Intervention: Identify and Manage Fall Risk  Recent Flowsheet Documentation  Taken 12/31/2024 1245 by Sandhya Ewing RN  Safety Promotion/Fall Prevention: safety round/check completed  Taken 12/31/2024 1000 by Sandhya Ewing RN  Safety Promotion/Fall Prevention: safety round/check completed  Taken 12/31/2024 0809 by Sandhya Ewing RN  Safety Promotion/Fall Prevention: safety round/check completed  Intervention: Prevent Skin Injury  Recent Flowsheet Documentation  Taken 12/31/2024 0809 by Sandhya Ewing RN  Body Position: position changed independently  Skin Protection: protective footwear used  Intervention: Prevent and Manage VTE (Venous Thromboembolism) Risk  Recent Flowsheet Documentation  Taken 12/31/2024 0809 by Sandhya Ewing RN  VTE Prevention/Management: SCDs (sequential compression devices) off  Intervention: Prevent Infection  Recent Flowsheet Documentation  Taken 12/31/2024 0809 by Sandhya Ewing RN  Infection Prevention: single patient room provided  Goal: Optimal Comfort and Wellbeing  Outcome: Met  Intervention: Monitor Pain and Promote Comfort  Recent Flowsheet Documentation  Taken 12/31/2024 0809 by Sandhya Ewing RN  Pain Management Interventions: (muscle relaxer given) other (see comments)  Intervention: Provide Person-Centered Care  Recent Flowsheet Documentation  Taken 12/31/2024 0809 by Sandhya Ewing RN  Trust Relationship/Rapport:   care explained   choices provided  Goal: Readiness for Transition of Care  Outcome: Met     Problem: Nausea and Vomiting  Goal: Nausea and Vomiting Relief  Outcome: Met  Intervention: Prevent and Manage Nausea and Vomiting  Recent Flowsheet Documentation  Taken 12/31/2024 0809 by Sandhya Ewing RN  Environmental Support:  calm environment promoted  Oral Care: mouth wash rinse     Problem: Comorbidity Management  Goal: Blood Pressure in Desired Range  Outcome: Met  Intervention: Maintain Blood Pressure Management  Recent Flowsheet Documentation  Taken 12/31/2024 0809 by Sandhya Ewing RN  Medication Review/Management: medications reviewed     Problem: Skin Injury Risk Increased  Goal: Skin Health and Integrity  Outcome: Met  Intervention: Optimize Skin Protection  Recent Flowsheet Documentation  Taken 12/31/2024 0809 by Sandhya Ewing RN  Activity Management: activity encouraged  Pressure Reduction Techniques: frequent weight shift encouraged  Head of Bed (HOB) Positioning: HOB elevated  Pressure Reduction Devices: pressure-redistributing mattress utilized  Skin Protection: protective footwear used     Problem: Fall Injury Risk  Goal: Absence of Fall and Fall-Related Injury  Outcome: Met  Intervention: Identify and Manage Contributors  Recent Flowsheet Documentation  Taken 12/31/2024 0809 by Sandhya Ewing RN  Medication Review/Management: medications reviewed  Self-Care Promotion: independence encouraged  Intervention: Promote Injury-Free Environment  Recent Flowsheet Documentation  Taken 12/31/2024 1245 by Sandhya Ewing RN  Safety Promotion/Fall Prevention: safety round/check completed  Taken 12/31/2024 1000 by Sandhya Ewing RN  Safety Promotion/Fall Prevention: safety round/check completed  Taken 12/31/2024 0809 by Sandhya Ewing RN  Safety Promotion/Fall Prevention: safety round/check completed   Goal Outcome Evaluation:   Pt to discharge home today transporting via family/friend. Her port was deaccessed. She received an infusion of potassium prior to discharge. Her discharge paperwork was discussed with her and was sent home with her. No concerns.

## 2024-12-31 NOTE — PROGRESS NOTES
[facility]   Hematology oncology Inpatient Progress Note    Patient Name: Kandi uFentes  : 1960  MRN: [id number]  Primary Care Physician:  Provider, No Known  Referring Physician: No Known Provider  Date of admission: 2024    Subjective   Subjective     Reason for Consult/ Chief Complaint: pancytopenia    HPI:  Kandi Fuentes is a 64 y.o. female with underlying medical history of limited stage SCLC, status post completion of concurrent chemoRT who [facility] with complaints of dizziness and a fall. She was seen at [facility] last week and labs drawn at that time indicated a low hemoglobin 7.6 and was not considered a candidate for RBC transfusion. WBC count and plt counts were WNL at that time. The patient reported having a fall in the kitchen, hitting her head and had a brief LOC. Thereafter she was brought to the ER for further evaluation.  She does have a headache at the site of a hematoma from striking her head. Of note, the patient had a LHC in 2024 with placement of a ADIN and was started on Plavix at that time.     A CT head and CT C-Spine was significant for .Left posterior scalp hematoma, otherwise no other acute findings were noted.    Patient was noted to have severe pancytopenia with ANC 0.04, hemoglobin 4.8 and platelet count of 8000.  She was admitted for further evaluation and management.    Pertinent cancer history summarized as follows:    Patient was initially seen and [facility] in 2024 for further evaluation and management of small cell lung cancer of the right lung.  She reported that between July and 2024 she was seen at the hospital with dyspnea and some chest pains.  She was found to have evidence of coronary artery disease and underwent percutaneous angioplasty and stenting of the affected coronary vessels.  In the process, however, a nodule in the right lung was identified.  Further investigations  led to the identification of a 4.4 cm lobulated tumor in the posterior right upper lobe communicating with the right middle lobe concerning for malignancy.  Eventually she had a navigational bronchoscopy and pathology reported small cell carcinoma on the biopsies.  A PET scan and MRI did not reveal any suggestion of metastatic disease.    10/23/2024: Received the first cycle of chemotherapy without any difficulties.    She was seen in the emergency room at [facility] in November 2024, in [facility], where she spent several hours.  She was found to have an elevated D-dimer and was offered a CT scan.  However, she could not obtain the test because of difficulties with transportation.      12/18/2024: Cycle 4-day 1 chemotherapy.  Feeling poorly. Weak and more dyspneic with exertion. Still smoking but less than before. More tremulous than before. Her appetite is poor.     Patient was planned for 4-week follow-up with repeat imaging and labs.      12/28/2024: Patient seen today for initial evaluation.  She reported that her dizziness and fatigue has somewhat improved since her admission.  Denied any other, acute complaints.  Has persistent left scalp hematoma which is tender to palpation.  She denied any focal neurological signs.    12/29/24: Patient seen this morning.  Appears to be nauseous and is throwing up.  Has ongoing scalp pain.  Also reported having productive cough with yellowish sputum.  Denied any fever/chills overnight    Interval history:  12/30/24: Pt seen today for follow up. Nausea is better controlled. No new issues reported. Afebrile.    Review of Systems   Review of Systems   Constitutional:  Positive for fatigue.   HENT: Negative.     Eyes: Negative.    Respiratory: Negative.     Cardiovascular: Negative.    Gastrointestinal: Negative.    Endocrine: Negative.    Genitourinary: Negative.    Musculoskeletal: Negative.    Skin: Negative.    Allergic/Immunologic: Negative.    Neurological:   Positive for dizziness and weakness.   Hematological: Negative.    Psychiatric/Behavioral: Negative.           Personal History     Past Medical History:   Diagnosis Date    Cancer     COPD (chronic obstructive pulmonary disease)     Coronary artery disease     Elevated cholesterol     Heart attack     Hypertension     Lung cancer 2024    Tinnitus        Past Surgical History:   Procedure Laterality Date    BACK SURGERY  2004    bone spur on sciatica    BRONCHOSCOPY WITH ION ROBOTIC ASSIST N/A 9/27/2024    Procedure: BRONCHOSCOPY WITH ION ROBOT, fine needle aspiration and tissue biopsy right upper lobe mass, endobronchial ultrasound with fine needle aspiration lymph nodes (Level 10R);  Surgeon: Serafin Choudhary MD;  Location: [facility] ENDOSCOPY;  Service: Robotics - Pulmonary;  Laterality: N/A;  post: lung mass with lympadenopathy    CHOLECYSTECTOMY  2021    CORONARY ANGIOPLASTY WITH STENT PLACEMENT  07/2024    MOLE REMOVAL  1994    forehead    SKIN LESION EXCISION Right 1994    breast    VENOUS ACCESS DEVICE (PORT) INSERTION Right 10/7/2024    Procedure: INSERTION VENOUS ACCESS DEVICE;  Surgeon: Serafin Choudhary MD;  Location: [facility] MAIN OR;  Service: Thoracic;  Laterality: Right;       Family History: family history includes Breast cancer (age of onset: 62) in her mother; Heart attack in her mother; Lung cancer in her brother and sister; Throat cancer in her sister. Otherwise pertinent FHx was reviewed and not pertinent to current issue.    Social History:  reports that she has been smoking cigarettes. She started smoking about 55 years ago. She has a 55 pack-year smoking history. She has been exposed to tobacco smoke. She has never used smokeless tobacco. She reports that she does not drink alcohol and does not use drugs.    Home Medications:  Acetaminophen, Lidocaine Viscous HCl, OLANZapine, albuterol sulfate HFA, aspirin, atorvastatin, budesonide-formoterol, carvedilol, clopidogrel, ipratropium-albuterol, isosorbide  mononitrate, lidocaine-prilocaine, mometasone, omeprazole, ondansetron, oxyCODONE, potassium chloride, and sucralfate    Allergies:  Allergies   Allergen Reactions    Morphine Hives and Shortness Of Breath    Codeine Hives    Sulfa Antibiotics Hives       Objective    Objective     Vitals:    12/30/24 1935   BP: 108/60   Pulse: 94   Resp: 22   Temp: 98 °F (36.7 °C)   SpO2: 93%         Physical Exam  Constitutional:       Appearance: She is normal weight. She is ill-appearing.   HENT:      Head: Normocephalic and atraumatic.      Right Ear: External ear normal.      Left Ear: External ear normal.      Nose: Nose normal.      Mouth/Throat:      Mouth: Mucous membranes are moist.      Pharynx: Oropharynx is clear.   Eyes:      Extraocular Movements: Extraocular movements intact.      Conjunctiva/sclera: Conjunctivae normal.      Pupils: Pupils are equal, round, and reactive to light.   Cardiovascular:      Rate and Rhythm: Normal rate.      Pulses: Normal pulses.   Pulmonary:      Effort: Pulmonary effort is normal.   Abdominal:      General: Abdomen is flat.      Palpations: Abdomen is soft.   Musculoskeletal:         General: Normal range of motion.      Cervical back: Normal range of motion and neck supple.   Skin:     General: Skin is warm.   Neurological:      Mental Status: She is alert.   Psychiatric:         Mood and Affect: Mood normal.         Behavior: Behavior normal.         Thought Content: Thought content normal.         Judgment: Judgment normal.       CBC          12/28/2024    05:03 12/29/2024    05:34 12/30/2024    04:32   CBC   WBC 0.35  0.76  1.02    RBC 2.65  2.59  2.51    Hemoglobin 8.3  8.1  7.6    Hematocrit 24.3  23.8  23.5    MCV 91.7  91.9  93.6    MCH 31.3  31.3  30.3    MCHC 34.2  34.0  32.3    RDW 14.2  15.6  14.9    Platelets 38  30  23      CMP          12/28/2024    05:03 12/28/2024    17:51 12/29/2024    05:34 12/29/2024    17:29 12/30/2024    04:32   CMP   Glucose 94   100   94     BUN 9   12   5    Creatinine 0.43   0.51   0.50    EGFR 109.4   105.0   105.5    Sodium 138   133   135    Potassium 2.8  3.6  3.6  3.2  3.5    Chloride 96   94   98    Calcium 9.1   9.4   9.1    BUN/Creatinine Ratio 20.9   23.5   10.0    Anion Gap 8.7   10.6   9.9      CT Head Without Contrast    Result Date: 12/27/2024  Impression: 1.No acute intracranial abnormality. 2.No acute fracture or traumatic malalignment of the cervical spine. 3.Left posterior scalp hematoma. Electronically Signed: Merritt Sherly, DO  12/27/2024 4:21 PM EST  Workstation ID: [id number]    CT Cervical Spine Without Contrast    Result Date: 12/27/2024  Impression: 1.No acute intracranial abnormality. 2.No acute fracture or traumatic malalignment of the cervical spine. 3.Left posterior scalp hematoma. Electronically Signed: Merritt Sherly, DO  12/27/2024 4:21 PM EST  Workstation ID: [id number]       Assessment & Plan   Assessment / Plan     1.  Limited stage small cell lung cancer: ([id number]) of the right lung.   -On concurrent chemotherapy with carboplatin/etoposide since October of 2024.  -  she has completed radiation and cycle 4 chemotherapy, C4D1 on 12/16/24.   -planned for interval staging scans and MRI of the brain 4 weeks after completion of chemotherapy  -Likely candidate for consolidation immunotherapy with Imfenzi. Further management per Primary oncologist.     2.  Pancytopenia: Likely myelosuppression from chemotherapy.  Noted ANC 40, hemoglobin 4.8 and platelet 8K on presentation.  Status post 2 unit PRBC and 1 unit platelet transfusion.  Repeat hemoglobin improved to 8.1 this morning.  Platelet count 30K today.  Patient denied any significant signs/symptoms concerning for infection, however has some sinus congestion and dry cough.  Started patient on Neupogen 300 mcg/day for ANC recovery. Increase dose to 480mcg due to no significant improvement In ANC. Continue Neupogen 480mcg today due to ANC <1000  Transfuse as needed to  maintain hemoglobin>7 and platelet count >20K in view of recent head injury and contusion.    -plt counts downtrending again. Will monitor and transfuse as needed for Plt count <20K.    3. Productive Cough: Started on broad-spectrum oral antibiotic [Levaquin] due to concern about infection given severe neutropenia.  Continue for 5-7 days.    3.  Fall, scalp contusion: CT head and cervical spine negative for any intracranial hemorrhage or C-spine injury. consider Cold compresses and as needed pain medications. Symptoms improved.    4. Nausea/Vomiting: Discontinue Zofran, started patient on Compazine as needed.  Considered scopolamine if no improvement.    5.  Coronary artery disease: Without symptoms.     6.  Chronic obstructive pulmonary disease    Thanks for this consult. Will continue to follow. Can consider discharge if ANC and platelets stabilize/start improving.    Zeus Kaiser MD 12/30/24.

## 2024-12-31 NOTE — OUTREACH NOTE
Prep Survey      Flowsheet Row Responses   [facility] facility patient discharged from? [facility]   Is LACE score < 7 ? No   Eligibility [facility]   Date of Admission 12/27/24   Date of Discharge 12/31/24   Discharge Disposition Home or Self Care   Discharge diagnosis Pancytopenia due to chemotherapy   Does the patient have one of the following disease processes/diagnoses(primary or secondary)? Other   Does the patient have Home health ordered? No   Is there a DME ordered? Yes   What DME was ordered? [place] for walker and shower chair-pending at discharge   Prep survey completed? Yes            DUANE NIETO - Registered Nurse

## 2024-12-31 NOTE — DISCHARGE SUMMARY
[facility] Medicine Services  Discharge Summary    Date of Service: 2024  Patient Name: Kandi Fuentes  : 1960  MRN: [id number]    Date of Admission: 2024  Discharge Diagnosis:   Pancytopenia associated with recent chemotherapy.  Moderate protein malnutrition  Small cell lung carcinoma  Coronary artery disease with stent placement in 2024  COPD  Fall with closed head injury      Date of Discharge: 2024  Primary Care Physician: Elsa Miller APRN      Presenting Problem:   Hypokalemia [E87.6]  Thrombocytopenia [D69.6]  Closed head injury, initial encounter [S09.90XA]  Fall, initial encounter [W19.XXXA]  Pancytopenia due to chemotherapy [D61.810]  Anemia, unspecified type [D64.9]  Leukopenia, unspecified type [D72.819]    Active and Resolved Hospital Problems:  Active Hospital Problems    Diagnosis POA    **Pancytopenia due to chemotherapy [D61.810] Yes      Resolved Hospital Problems   No resolved problems to display.         Hospital Course     HPI:    As per history and physical 2024.    Hospital Course:  This is a pleasant 64-year-old white female with history of small cell lung carcinoma.  She had received outpatient chemotherapy and presented with dizziness and light lightheaded complaints and found to have pancytopenia in association with her recent chemotherapy.  She had some low-grade temperatures during her hospitalization with no true temperature spike.  Blood pressures were controlled.  Oxygenation was maintained on room air.  She did have oral Levaquin therapy prescribed by oncology while neutropenic.  She had no microbiology data.  CT scan of the head showed no acute intercranial process.  She had left posterior scalp hematoma appreciated.  She had her decreased potassium present on presentation that was supplemented and corrected.  She has liquid oral potassium supplement at home which she uses and is cost effective with her  prescription plan.  She had some microscopic hematuria noted.  With her worsening hemoglobin hematocrit she received transfusion of packed red blood cells.  She received white blood cell count stimulating factor under administration of the oncologist.  Phosphorus and magnesium was supplemented during her hospitalization.  Phosphorus and magnesium was supplemented during her hospitalization.  She was symptomatically resolved.  It was thought by hematology that she could be discharged with follow-up of her lab work in 3 days in the office.  She is continued on her antiplatelet therapy in spite of her thrombocytopenia with her history of coronary artery stenting.              Day of Discharge     Vital Signs:  Temp:  [97.5 °F (36.4 °C)-98.8 °F (37.1 °C)] 97.5 °F (36.4 °C)  Heart Rate:  [] 105  Resp:  [16-22] 17  BP: (101-113)/(53-95) 101/60    Physical Exam:  Physical Exam  Vitals reviewed.   HENT:      Head: Normocephalic.   Cardiovascular:      Rate and Rhythm: Normal rate and regular rhythm.   Pulmonary:      Effort: Pulmonary effort is normal.      Breath sounds: Normal breath sounds.   Abdominal:      General: Bowel sounds are normal.      Palpations: Abdomen is soft.      Tenderness: There is no abdominal tenderness.   Musculoskeletal:         General: No swelling.   Neurological:      Mental Status: She is alert. Mental status is at baseline.            Pertinent  and/or Most Recent Results     LAB RESULTS:      Lab 12/31/24  0729 12/30/24  0432 12/29/24  0534 12/28/24  0503 12/27/24  1616 12/27/24  1410   WBC 4.09 1.02* 0.76* 0.35* 0.43*  --    HEMOGLOBIN 8.1* 7.6* 8.1* 8.3* 4.8*  --    HEMATOCRIT 24.4* 23.5* 23.8* 24.3* 14.4*  --    PLATELETS 21* 23* 30* 38* 8*  --    NEUTROS ABS 2.21 0.09* 0.03*  --  0.04*  --    MCV 95.7 93.6 91.9 91.7 98.6*  --    PROTIME  --   --   --   --   --  14.5*   APTT  --   --   --   --   --  25.4         Lab 12/31/24  0729 12/30/24  0432 12/29/24  1729 12/29/24  0534  12/28/24  1751 12/28/24  0503 12/27/24  1410   SODIUM 136 135*  --  133*  --  138 133*   POTASSIUM 3.4* 3.5 3.2* 3.6 3.6 2.8* 2.6*   CHLORIDE 97* 98  --  94*  --  96* 91*   CO2 28.5 27.1  --  28.4  --  33.3* 32.3*   ANION GAP 10.5 9.9  --  10.6  --  8.7 9.7   BUN 4* 5*  --  12  --  9 5*   CREATININE 0.55* 0.50*  --  0.51*  --  0.43* 0.44*   EGFR 103.1 105.5  --  105.0  --  109.4 108.8   GLUCOSE 95 94  --  100*  --  94 112*   CALCIUM 9.7 9.1  --  9.4  --  9.1 8.9   MAGNESIUM  --  1.5*  --  1.5*  --  1.7 1.6   PHOSPHORUS  --  2.8 3.4 1.8*  --  3.0  --          Lab 12/31/24  0729 12/27/24  1410   TOTAL PROTEIN 6.2 6.0   ALBUMIN 3.6 3.6   GLOBULIN 2.6 2.4   ALT (SGPT) 13 14   AST (SGOT) 14 15   BILIRUBIN 0.7 0.8   ALK PHOS 67 72         Lab 12/27/24  1410   PROTIME 14.5*   INR 1.11*             Lab 12/27/24  1655   ABO TYPING B   RH TYPING Positive   ANTIBODY SCREEN Negative         Brief Urine Lab Results  (Last result in the past 365 days)        Color   Clarity   Blood   Leuk Est   Nitrite   Protein   CREAT   Urine HCG        12/27/24 1555 Yellow   Clear   Moderate (2+)   Negative   Negative   Negative                 Microbiology Results (last 10 days)       ** No results found for the last 240 hours. **            CT Head Without Contrast    Result Date: 12/27/2024  Impression: Impression: 1.No acute intracranial abnormality. 2.No acute fracture or traumatic malalignment of the cervical spine. 3.Left posterior scalp hematoma. Electronically Signed: Merritt Dubois DO  12/27/2024 4:21 PM EST  Workstation ID: [id number]    CT Cervical Spine Without Contrast    Result Date: 12/27/2024  Impression: Impression: 1.No acute intracranial abnormality. 2.No acute fracture or traumatic malalignment of the cervical spine. 3.Left posterior scalp hematoma. Electronically Signed: Merritt Dubois DO  12/27/2024 4:21 PM EST  Workstation ID: [id number]                 Labs Pending at Discharge:  Pending Results       Procedure  [Order ID] Specimen - Date/Time    Pathology Consultation [[id number]] Collected: 12/31/24 0729    Specimen: Blood             Procedures Performed           Consults:   Consults       Date and Time Order Name Status Description    12/28/2024 12:08 PM Hematology & Oncology Inpatient Consult Completed     12/27/2024  5:06 PM Hospitalist (on-call MD unless specified)                Discharge Details        Discharge Medications        New Medications        Instructions Start Date   methocarbamol 500 MG tablet  Commonly known as: ROBAXIN   500 mg, Oral, Every 6 Hours PRN             Continue These Medications        Instructions Start Date   albuterol sulfate  (90 Base) MCG/ACT inhaler  Commonly known as: PROVENTIL HFA;VENTOLIN HFA;PROAIR HFA   2 puffs, As Needed      aspirin 81 MG chewable tablet   81 mg, Daily      atorvastatin 40 MG tablet  Commonly known as: LIPITOR   40 mg, Daily      budesonide-formoterol 160-4.5 MCG/ACT inhaler  Commonly known as: Breyna   2 puffs, Inhalation, 2 Times Daily - RT      carvedilol 3.125 MG tablet  Commonly known as: COREG   3.125 mg, 2 Times Daily With Meals      clopidogrel 75 MG tablet  Commonly known as: PLAVIX   75 mg, Daily      ipratropium-albuterol 0.5-2.5 mg/3 ml nebulizer  Commonly known as: DUO-NEB   3 mL, Nebulization, 4 Times Daily PRN      isosorbide mononitrate 30 MG 24 hr tablet  Commonly known as: IMDUR   30 mg, Daily      Lidocaine Viscous HCl 2 % solution  Commonly known as: XYLOCAINE   10 mL, Oral, As Needed      lidocaine-prilocaine 2.5-2.5 % cream  Commonly known as: EMLA   1 Application, Topical, See Admin Instructions, Apply one hour prior to port access      mometasone 0.1 % ointment  Commonly known as: ELOCON   Apply to affected skin of chest and back 2-3 times daily as needed for itching from radiation treatments.      omeprazole 40 MG capsule  Commonly known as: priLOSEC   40 mg, Daily      ondansetron 8 MG tablet  Commonly known as: ZOFRAN   8 mg,  Oral, 3 Times Daily PRN      oxyCODONE 5 MG/5ML solution  Commonly known as: ROXICODONE   5 mg, Oral, Every 4 Hours PRN      potassium chloride 20 mEq/15 mL solution  Commonly known as: KAYCIEL   20 mEq, Oral, Daily      sucralfate 1 GM/10ML suspension  Commonly known as: Carafate   1 g, Oral, 4 Times Daily PRN      Tylenol 325 MG capsule  Generic drug: Acetaminophen   650 mg, As Needed             Stop These Medications      OLANZapine 5 MG tablet  Commonly known as: zyPREXA              Allergies   Allergen Reactions    Morphine Hives and Shortness Of Breath    Codeine Hives    Sulfa Antibiotics Hives         Discharge Disposition: home  Home or Self Care    Diet:  Hospital:  Diet Order   Procedures    Diet: Regular/House; Fluid Consistency: Thin (IDDSI 0)         Discharge Activity:         CODE STATUS:  There are no questions and answers to display.         Future Appointments   Date Time Provider Department Center   1/3/2025  1:00 PM [facility] PORT CHAIR [facility]   1/22/2025  9:00 AM Elsa Miller APRN [facility]   1/24/2025 10:00 AM [facility] CT 2 BH [facility]   1/24/2025 11:00 AM [facility] MRI 1 BH [facility]   1/27/2025 12:45 PM [facility] INJECTION CHAIR [facility]   1/27/2025  1:00 PM Wilbert Delgado MD [facility]   1/27/2025  1:30 PM Tereso Abarca MD [facility] None       Additional Instructions for the Follow-ups that You Need to Schedule       Discharge Follow-up with PCP   As directed       Currently Documented PCP:    Elsa Miller APRN    PCP Phone Number:    [phone]     Follow Up Details: one week        Discharge Follow-up with Specified Provider: one week; 1 Week   As directed      To: one week   Follow Up: 1 Week                Time spent on Discharge including face to face service:  45 minutes    Signature: Electronically signed by Timothy Duane Brammell, MD, 12/31/24, 14:36 EST.  [facility] Hospitalist Team

## 2025-01-01 NOTE — CASE MANAGEMENT/SOCIAL WORK
Case Management Discharge Note      Final Note: Routine home         Selected Continued Care - Discharged on 12/31/2024 Admission date: 12/27/2024 - Discharge disposition: Home or Self Care           Transportation Services  Private: Car    Final Discharge Disposition Code: 01 - home or self-care

## 2025-01-02 ENCOUNTER — TRANSITIONAL CARE MANAGEMENT TELEPHONE ENCOUNTER (OUTPATIENT)
Dept: CALL CENTER | Facility: HOSPITAL | Age: 65
End: 2025-01-02
Payer: MEDICAID

## 2025-01-02 LAB
LAB AP CASE REPORT: NORMAL
PATH REPORT.FINAL DX SPEC: NORMAL

## 2025-01-02 NOTE — OUTREACH NOTE
Call Center TCM Note      Flowsheet Row Responses   [facility] patient discharged from? [facility]   Does the patient have one of the following disease processes/diagnoses(primary or secondary)? Other   TCM attempt successful? Yes   Call start time 1045   Call end time 1046   Discharge diagnosis Pancytopenia due to chemotherapy   Person spoke with today (if not patient) and relationship pt   Meds reviewed with patient/caregiver? Yes   Is the patient having any side effects they believe may be caused by any medication additions or changes? No   Does the patient have all medications ordered at discharge? Yes   Is the patient taking all medications as directed (includes completed medication regime)? Yes   Does the patient have an appointment with their PCP within 7-14 days of discharge? No   Nursing Interventions Routed TCM call to PCP office, PCP office requested to make appointment - message sent   What DME was ordered? [place] for walker and shower chair-pending at discharge   Psychosocial issues? No   Did the patient receive a copy of their discharge instructions? Yes   Nursing interventions Reviewed instructions with patient   What is the patient's perception of their health status since discharge? Same   Is the patient/caregiver able to teach back signs and symptoms related to disease process for when to call PCP? Yes   Is the patient/caregiver able to teach back signs and symptoms related to disease process for when to call 911? Yes   Is the patient/caregiver able to teach back the hierarchy of who to call/visit for symptoms/problems? PCP, Specialist, Home health nurse, Urgent Care, ED, 911 Yes   TCM call completed? Yes   Wrap up additional comments Pt states she is not able to keep most foods/liquids down since throat radiation,  Pt is not nauseated, and states food gets caught in throat and comes right back up. Pt advised to speak with Oncologist at lab appt tomorrow. Pt verified PCP fu appt   Call end time  1046   Would this patient benefit from a Referral to [facility] Social Work? No   Is the patient interested in additional calls from an ambulatory ? No            Anjelica Rosado RN    1/2/2025, 10:49 EST

## 2025-01-03 ENCOUNTER — PATIENT OUTREACH (OUTPATIENT)
Dept: ONCOLOGY | Facility: CLINIC | Age: 65
End: 2025-01-03
Payer: MEDICAID

## 2025-01-03 NOTE — SIGNIFICANT NOTE
Patient called and reports that she still isn't feeling well. She is cancelling her lab appt today and going to the ER.

## 2025-01-08 ENCOUNTER — READMISSION MANAGEMENT (OUTPATIENT)
Dept: CALL CENTER | Facility: HOSPITAL | Age: 65
End: 2025-01-08
Payer: MEDICAID

## 2025-01-08 ENCOUNTER — APPOINTMENT (OUTPATIENT)
Dept: GENERAL RADIOLOGY | Facility: HOSPITAL | Age: 65
End: 2025-01-08
Payer: MEDICAID

## 2025-01-08 ENCOUNTER — HOSPITAL ENCOUNTER (OUTPATIENT)
Facility: HOSPITAL | Age: 65
Setting detail: OBSERVATION
LOS: 1 days | Discharge: HOME | End: 2025-01-11
Attending: INTERNAL MEDICINE | Admitting: INTERNAL MEDICINE
Payer: MEDICAID

## 2025-01-08 ENCOUNTER — PATIENT OUTREACH (OUTPATIENT)
Dept: ONCOLOGY | Facility: CLINIC | Age: 65
End: 2025-01-08
Payer: MEDICAID

## 2025-01-08 ENCOUNTER — HOSPITAL ENCOUNTER (OUTPATIENT)
Dept: ONCOLOGY | Facility: HOSPITAL | Age: 65
Discharge: HOME | End: 2025-01-08
Admitting: INTERNAL MEDICINE
Payer: MEDICAID

## 2025-01-08 VITALS
OXYGEN SATURATION: 96 % | DIASTOLIC BLOOD PRESSURE: 57 MMHG | SYSTOLIC BLOOD PRESSURE: 95 MMHG | HEIGHT: 67 IN | RESPIRATION RATE: 14 BRPM | WEIGHT: 119.7 LBS | HEART RATE: 99 BPM | BODY MASS INDEX: 18.79 KG/M2 | TEMPERATURE: 97.9 F

## 2025-01-08 DIAGNOSIS — C34.11 SMALL CELL CARCINOMA OF UPPER LOBE OF RIGHT LUNG: ICD-10-CM

## 2025-01-08 DIAGNOSIS — C34.11 SMALL CELL CARCINOMA OF UPPER LOBE OF RIGHT LUNG: Primary | ICD-10-CM

## 2025-01-08 DIAGNOSIS — R13.10 DYSPHAGIA, UNSPECIFIED TYPE: Primary | ICD-10-CM

## 2025-01-08 DIAGNOSIS — R53.1 WEAKNESS GENERALIZED: ICD-10-CM

## 2025-01-08 DIAGNOSIS — R91.8 LUNG MASS: ICD-10-CM

## 2025-01-08 LAB
ABO GROUP BLD: NORMAL
ALBUMIN SERPL-MCNC: 3.6 G/DL (ref 3.5–5.2)
ALBUMIN/GLOB SERPL: 1.4 G/DL
ALP SERPL-CCNC: 73 U/L (ref 39–117)
ALT SERPL W P-5'-P-CCNC: 10 U/L (ref 1–33)
ANION GAP SERPL CALCULATED.3IONS-SCNC: 10.6 MMOL/L (ref 5–15)
AST SERPL-CCNC: 17 U/L (ref 1–32)
BASOPHILS # BLD AUTO: 0.01 10*3/MM3 (ref 0–0.2)
BASOPHILS NFR BLD AUTO: 0.2 % (ref 0–1.5)
BILIRUB SERPL-MCNC: 1.1 MG/DL (ref 0–1.2)
BLD GP AB SCN SERPL QL: NEGATIVE
BUN SERPL-MCNC: 16 MG/DL (ref 8–23)
BUN/CREAT SERPL: 34.8 (ref 7–25)
CA-I SERPL ISE-MCNC: 1.14 MMOL/L (ref 1.15–1.3)
CALCIUM SPEC-SCNC: 9.2 MG/DL (ref 8.6–10.5)
CHLORIDE SERPL-SCNC: 94 MMOL/L (ref 98–107)
CO2 SERPL-SCNC: 30.4 MMOL/L (ref 22–29)
CREAT SERPL-MCNC: 0.46 MG/DL (ref 0.57–1)
DEPRECATED RDW RBC AUTO: 52.8 FL (ref 37–54)
EGFRCR SERPLBLD CKD-EPI 2021: 107.7 ML/MIN/1.73
EOSINOPHIL # BLD AUTO: 0 10*3/MM3 (ref 0–0.4)
EOSINOPHIL NFR BLD AUTO: 0 % (ref 0.3–6.2)
ERYTHROCYTE [DISTWIDTH] IN BLOOD BY AUTOMATED COUNT: 18.3 % (ref 12.3–15.4)
GLOBULIN UR ELPH-MCNC: 2.5 GM/DL
GLUCOSE SERPL-MCNC: 89 MG/DL (ref 65–99)
HCT VFR BLD AUTO: 27.4 % (ref 34–46.6)
HGB BLD-MCNC: 8.6 G/DL (ref 12–15.9)
IMM GRANULOCYTES # BLD AUTO: 0.03 10*3/MM3 (ref 0–0.05)
IMM GRANULOCYTES NFR BLD AUTO: 0.6 % (ref 0–0.5)
LYMPHOCYTES # BLD AUTO: 0.74 10*3/MM3 (ref 0.7–3.1)
LYMPHOCYTES NFR BLD AUTO: 14.7 % (ref 19.6–45.3)
MAGNESIUM SERPL-MCNC: 1.7 MG/DL (ref 1.6–2.4)
MCH RBC QN AUTO: 31.5 PG (ref 26.6–33)
MCHC RBC AUTO-ENTMCNC: 31.4 G/DL (ref 31.5–35.7)
MCV RBC AUTO: 100.4 FL (ref 79–97)
MONOCYTES # BLD AUTO: 0.76 10*3/MM3 (ref 0.1–0.9)
MONOCYTES NFR BLD AUTO: 15 % (ref 5–12)
NEUTROPHILS NFR BLD AUTO: 3.51 10*3/MM3 (ref 1.7–7)
NEUTROPHILS NFR BLD AUTO: 69.5 % (ref 42.7–76)
NRBC BLD AUTO-RTO: 0 /100 WBC (ref 0–0.2)
PHOSPHATE SERPL-MCNC: 3.6 MG/DL (ref 2.5–4.5)
PLATELET # BLD AUTO: 150 10*3/MM3 (ref 140–450)
PMV BLD AUTO: 9.8 FL (ref 6–12)
POTASSIUM SERPL-SCNC: 2.7 MMOL/L (ref 3.5–5.2)
PROT SERPL-MCNC: 6.1 G/DL (ref 6–8.5)
RBC # BLD AUTO: 2.73 10*6/MM3 (ref 3.77–5.28)
RH BLD: POSITIVE
SODIUM SERPL-SCNC: 135 MMOL/L (ref 136–145)
T&S EXPIRATION DATE: NORMAL
WBC NRBC COR # BLD AUTO: 5.05 10*3/MM3 (ref 3.4–10.8)

## 2025-01-08 PROCEDURE — 96360 HYDRATION IV INFUSION INIT: CPT

## 2025-01-08 PROCEDURE — 86900 BLOOD TYPING SEROLOGIC ABO: CPT

## 2025-01-08 PROCEDURE — 85025 COMPLETE CBC W/AUTO DIFF WBC: CPT | Performed by: NURSE PRACTITIONER

## 2025-01-08 PROCEDURE — G0378 HOSPITAL OBSERVATION PER HR: HCPCS

## 2025-01-08 PROCEDURE — 80053 COMPREHEN METABOLIC PANEL: CPT | Performed by: NURSE PRACTITIONER

## 2025-01-08 PROCEDURE — 96361 HYDRATE IV INFUSION ADD-ON: CPT

## 2025-01-08 PROCEDURE — 86900 BLOOD TYPING SEROLOGIC ABO: CPT | Performed by: NURSE PRACTITIONER

## 2025-01-08 PROCEDURE — G0379 DIRECT REFER HOSPITAL OBSERV: HCPCS

## 2025-01-08 PROCEDURE — 86901 BLOOD TYPING SEROLOGIC RH(D): CPT | Performed by: NURSE PRACTITIONER

## 2025-01-08 PROCEDURE — 86850 RBC ANTIBODY SCREEN: CPT | Performed by: NURSE PRACTITIONER

## 2025-01-08 PROCEDURE — 82330 ASSAY OF CALCIUM: CPT | Performed by: NURSE PRACTITIONER

## 2025-01-08 PROCEDURE — 84100 ASSAY OF PHOSPHORUS: CPT | Performed by: NURSE PRACTITIONER

## 2025-01-08 PROCEDURE — 83735 ASSAY OF MAGNESIUM: CPT | Performed by: NURSE PRACTITIONER

## 2025-01-08 PROCEDURE — 71045 X-RAY EXAM CHEST 1 VIEW: CPT

## 2025-01-08 PROCEDURE — 25810000003 SODIUM CHLORIDE 0.9 % SOLUTION: Performed by: NURSE PRACTITIONER

## 2025-01-08 PROCEDURE — 25810000003 SODIUM CHLORIDE 0.9 % SOLUTION: Performed by: INTERNAL MEDICINE

## 2025-01-08 RX ORDER — HEPARIN SODIUM (PORCINE) LOCK FLUSH IV SOLN 100 UNIT/ML 100 UNIT/ML
500 SOLUTION INTRAVENOUS AS NEEDED
Status: DISCONTINUED | OUTPATIENT
Start: 2025-01-08 | End: 2025-01-09 | Stop reason: HOSPADM

## 2025-01-08 RX ORDER — SODIUM CHLORIDE 0.9 % (FLUSH) 0.9 %
10 SYRINGE (ML) INJECTION AS NEEDED
OUTPATIENT
Start: 2025-01-08

## 2025-01-08 RX ORDER — ONDANSETRON 2 MG/ML
4 INJECTION INTRAMUSCULAR; INTRAVENOUS EVERY 6 HOURS PRN
Status: DISCONTINUED | OUTPATIENT
Start: 2025-01-08 | End: 2025-01-11 | Stop reason: HOSPADM

## 2025-01-08 RX ORDER — POLYETHYLENE GLYCOL 3350 17 G/17G
17 POWDER, FOR SOLUTION ORAL DAILY PRN
Status: DISCONTINUED | OUTPATIENT
Start: 2025-01-08 | End: 2025-01-11 | Stop reason: HOSPADM

## 2025-01-08 RX ORDER — IPRATROPIUM BROMIDE AND ALBUTEROL SULFATE 2.5; .5 MG/3ML; MG/3ML
3 SOLUTION RESPIRATORY (INHALATION) EVERY 4 HOURS PRN
Status: DISCONTINUED | OUTPATIENT
Start: 2025-01-08 | End: 2025-01-11 | Stop reason: HOSPADM

## 2025-01-08 RX ORDER — BISACODYL 5 MG/1
5 TABLET, DELAYED RELEASE ORAL DAILY PRN
Status: DISCONTINUED | OUTPATIENT
Start: 2025-01-08 | End: 2025-01-11 | Stop reason: HOSPADM

## 2025-01-08 RX ORDER — SODIUM CHLORIDE 9 MG/ML
100 INJECTION, SOLUTION INTRAVENOUS CONTINUOUS
Status: DISPENSED | OUTPATIENT
Start: 2025-01-08 | End: 2025-01-10

## 2025-01-08 RX ORDER — HEPARIN SODIUM (PORCINE) LOCK FLUSH IV SOLN 100 UNIT/ML 100 UNIT/ML
500 SOLUTION INTRAVENOUS AS NEEDED
OUTPATIENT
Start: 2025-01-08

## 2025-01-08 RX ORDER — SODIUM CHLORIDE 0.9 % (FLUSH) 0.9 %
10 SYRINGE (ML) INJECTION AS NEEDED
Status: DISCONTINUED | OUTPATIENT
Start: 2025-01-08 | End: 2025-01-09 | Stop reason: HOSPADM

## 2025-01-08 RX ORDER — SODIUM CHLORIDE 0.9 % (FLUSH) 0.9 %
10 SYRINGE (ML) INJECTION EVERY 12 HOURS SCHEDULED
Status: DISCONTINUED | OUTPATIENT
Start: 2025-01-08 | End: 2025-01-11 | Stop reason: HOSPADM

## 2025-01-08 RX ORDER — SODIUM CHLORIDE 9 MG/ML
40 INJECTION, SOLUTION INTRAVENOUS AS NEEDED
Status: DISCONTINUED | OUTPATIENT
Start: 2025-01-08 | End: 2025-01-11 | Stop reason: HOSPADM

## 2025-01-08 RX ORDER — SODIUM CHLORIDE 0.9 % (FLUSH) 0.9 %
10 SYRINGE (ML) INJECTION AS NEEDED
Status: DISCONTINUED | OUTPATIENT
Start: 2025-01-08 | End: 2025-01-11 | Stop reason: HOSPADM

## 2025-01-08 RX ORDER — ALUMINA, MAGNESIA, AND SIMETHICONE 2400; 2400; 240 MG/30ML; MG/30ML; MG/30ML
15 SUSPENSION ORAL EVERY 6 HOURS PRN
Status: DISCONTINUED | OUTPATIENT
Start: 2025-01-08 | End: 2025-01-11 | Stop reason: HOSPADM

## 2025-01-08 RX ORDER — ONDANSETRON 4 MG/1
4 TABLET, ORALLY DISINTEGRATING ORAL EVERY 6 HOURS PRN
Status: DISCONTINUED | OUTPATIENT
Start: 2025-01-08 | End: 2025-01-11 | Stop reason: HOSPADM

## 2025-01-08 RX ORDER — AMOXICILLIN 250 MG
2 CAPSULE ORAL 2 TIMES DAILY PRN
Status: DISCONTINUED | OUTPATIENT
Start: 2025-01-08 | End: 2025-01-11 | Stop reason: HOSPADM

## 2025-01-08 RX ORDER — BISACODYL 10 MG
10 SUPPOSITORY, RECTAL RECTAL DAILY PRN
Status: DISCONTINUED | OUTPATIENT
Start: 2025-01-08 | End: 2025-01-11 | Stop reason: HOSPADM

## 2025-01-08 RX ADMIN — SODIUM CHLORIDE 100 ML/HR: 9 INJECTION, SOLUTION INTRAVENOUS at 23:16

## 2025-01-08 RX ADMIN — Medication 10 ML: at 16:22

## 2025-01-08 RX ADMIN — Medication 10 ML: at 21:49

## 2025-01-08 RX ADMIN — SODIUM CHLORIDE 1000 ML: 9 INJECTION, SOLUTION INTRAVENOUS at 14:29

## 2025-01-08 NOTE — SIGNIFICANT NOTE
Patient just called me. Reports that she hasn't been able to keep any food down for 3 weeks, gets pills stuck for the last 3 weeks also. Hasn't called about this issues but was seen at the ER a few weeks ago. Also reports that her port is broke out. She is unsure if it's red but feels bumps. Doesn't feel warm to touch. Dr. Delgado informed and patient coming in today for fluids and evaluate port. [place] was able to find her a ride.

## 2025-01-08 NOTE — NURSING NOTE
Pt arrived to [facility] beds at the [facility] by w/c from [facility]. Pt a/ox4. Speech clear, able to voice needs. Pt R chest wall port accessed at infusion therapy today. Hospitalist notified of pt arrival.

## 2025-01-08 NOTE — PROGRESS NOTES
Port accessed by Estelita Jones RN with sterile technique and positive blood return noted.  Blood drawn from port.  10 cc blood wasted prior to specimen collection. Specimens sent to lab for processing. Port site is clean, dry and intact. The following secure chat sent to Dr. Delgado: Patient in clinic today as add-on for IVF. BP 87/52, . Patient states she cannot swallow; any liquids or food she attempts to swallow comes right back up. She is melting ice in her mouth and letting it drain down her throat. She has lost 19 pounds since 12/27/24. This has been going on for 3 weeks. She states her esophagus feels like it's full. Do you think a GI referral would be beneficial for her? Possibly assess for stricture or EOE?   Dr. Delgado came to chairside to assess patient. He decided to direct admit the patient to [facility] for evaluation of dysphagia. Patient tolerated IVF. Port flushed, saline locked and capped for use at the hospital. BP and heart rate retaken after IVF. Patient will be driven to the hospital by a friend and taken to the lobby to wait for her room.

## 2025-01-09 ENCOUNTER — INPATIENT HOSPITAL (OUTPATIENT)
Dept: URBAN - METROPOLITAN AREA HOSPITAL 84 | Facility: HOSPITAL | Age: 65
End: 2025-01-09
Payer: MEDICAID

## 2025-01-09 ENCOUNTER — ANESTHESIA (OUTPATIENT)
Dept: GASTROENTEROLOGY | Facility: HOSPITAL | Age: 65
End: 2025-01-09
Payer: MEDICAID

## 2025-01-09 ENCOUNTER — ANESTHESIA EVENT (OUTPATIENT)
Dept: GASTROENTEROLOGY | Facility: HOSPITAL | Age: 65
End: 2025-01-09
Payer: MEDICAID

## 2025-01-09 DIAGNOSIS — K22.2 ESOPHAGEAL OBSTRUCTION: ICD-10-CM

## 2025-01-09 DIAGNOSIS — K20.90 ESOPHAGITIS, UNSPECIFIED WITHOUT BLEEDING: ICD-10-CM

## 2025-01-09 DIAGNOSIS — R13.10 DYSPHAGIA, UNSPECIFIED: ICD-10-CM

## 2025-01-09 LAB
ANION GAP SERPL CALCULATED.3IONS-SCNC: 10.6 MMOL/L (ref 5–15)
BASOPHILS # BLD AUTO: 0 10*3/MM3 (ref 0–0.2)
BASOPHILS NFR BLD AUTO: 0 % (ref 0–1.5)
BUN SERPL-MCNC: 15 MG/DL (ref 8–23)
BUN/CREAT SERPL: 37.5 (ref 7–25)
CA-I SERPL ISE-MCNC: 1.07 MMOL/L (ref 1.15–1.3)
CALCIUM SPEC-SCNC: 9 MG/DL (ref 8.6–10.5)
CHLORIDE SERPL-SCNC: 96 MMOL/L (ref 98–107)
CO2 SERPL-SCNC: 28.4 MMOL/L (ref 22–29)
CREAT SERPL-MCNC: 0.4 MG/DL (ref 0.57–1)
DEPRECATED RDW RBC AUTO: 53.6 FL (ref 37–54)
EGFRCR SERPLBLD CKD-EPI 2021: 111.4 ML/MIN/1.73
EOSINOPHIL # BLD AUTO: 0 10*3/MM3 (ref 0–0.4)
EOSINOPHIL NFR BLD AUTO: 0 % (ref 0.3–6.2)
ERYTHROCYTE [DISTWIDTH] IN BLOOD BY AUTOMATED COUNT: 17.8 % (ref 12.3–15.4)
GLUCOSE SERPL-MCNC: 78 MG/DL (ref 65–99)
HCT VFR BLD AUTO: 24.1 % (ref 34–46.6)
HGB BLD-MCNC: 7.7 G/DL (ref 12–15.9)
IMM GRANULOCYTES # BLD AUTO: 0.03 10*3/MM3 (ref 0–0.05)
IMM GRANULOCYTES NFR BLD AUTO: 0.5 % (ref 0–0.5)
LYMPHOCYTES # BLD AUTO: 0.48 10*3/MM3 (ref 0.7–3.1)
LYMPHOCYTES NFR BLD AUTO: 8.6 % (ref 19.6–45.3)
MAGNESIUM SERPL-MCNC: 1.7 MG/DL (ref 1.6–2.4)
MCH RBC QN AUTO: 32.6 PG (ref 26.6–33)
MCHC RBC AUTO-ENTMCNC: 32 G/DL (ref 31.5–35.7)
MCV RBC AUTO: 102.1 FL (ref 79–97)
MONOCYTES # BLD AUTO: 0.63 10*3/MM3 (ref 0.1–0.9)
MONOCYTES NFR BLD AUTO: 11.4 % (ref 5–12)
NEUTROPHILS NFR BLD AUTO: 4.41 10*3/MM3 (ref 1.7–7)
NEUTROPHILS NFR BLD AUTO: 79.5 % (ref 42.7–76)
NRBC BLD AUTO-RTO: 0 /100 WBC (ref 0–0.2)
PHOSPHATE SERPL-MCNC: 3.3 MG/DL (ref 2.5–4.5)
PLATELET # BLD AUTO: 143 10*3/MM3 (ref 140–450)
PMV BLD AUTO: 10.1 FL (ref 6–12)
POTASSIUM SERPL-SCNC: 3.3 MMOL/L (ref 3.5–5.2)
RBC # BLD AUTO: 2.36 10*6/MM3 (ref 3.77–5.28)
SODIUM SERPL-SCNC: 135 MMOL/L (ref 136–145)
WBC NRBC COR # BLD AUTO: 5.55 10*3/MM3 (ref 3.4–10.8)

## 2025-01-09 PROCEDURE — 80048 BASIC METABOLIC PNL TOTAL CA: CPT | Performed by: NURSE PRACTITIONER

## 2025-01-09 PROCEDURE — 96365 THER/PROPH/DIAG IV INF INIT: CPT

## 2025-01-09 PROCEDURE — 83735 ASSAY OF MAGNESIUM: CPT | Performed by: NURSE PRACTITIONER

## 2025-01-09 PROCEDURE — 85025 COMPLETE CBC W/AUTO DIFF WBC: CPT | Performed by: NURSE PRACTITIONER

## 2025-01-09 PROCEDURE — 82330 ASSAY OF CALCIUM: CPT | Performed by: NURSE PRACTITIONER

## 2025-01-09 PROCEDURE — 43450 DILATE ESOPHAGUS 1/MULT PASS: CPT | Performed by: INTERNAL MEDICINE

## 2025-01-09 PROCEDURE — 96366 THER/PROPH/DIAG IV INF ADDON: CPT

## 2025-01-09 PROCEDURE — 43235 EGD DIAGNOSTIC BRUSH WASH: CPT | Performed by: INTERNAL MEDICINE

## 2025-01-09 PROCEDURE — 25010000002 POTASSIUM CHLORIDE 10 MEQ/100ML SOLUTION: Performed by: INTERNAL MEDICINE

## 2025-01-09 PROCEDURE — 25010000002 PROPOFOL 10 MG/ML EMULSION: Performed by: NURSE ANESTHETIST, CERTIFIED REGISTERED

## 2025-01-09 PROCEDURE — 99214 OFFICE O/P EST MOD 30 MIN: CPT | Performed by: INTERNAL MEDICINE

## 2025-01-09 PROCEDURE — G0378 HOSPITAL OBSERVATION PER HR: HCPCS

## 2025-01-09 PROCEDURE — 84100 ASSAY OF PHOSPHORUS: CPT | Performed by: NURSE PRACTITIONER

## 2025-01-09 RX ORDER — ONDANSETRON 2 MG/ML
4 INJECTION INTRAMUSCULAR; INTRAVENOUS EVERY 6 HOURS PRN
Status: DISCONTINUED | OUTPATIENT
Start: 2025-01-09 | End: 2025-01-11 | Stop reason: HOSPADM

## 2025-01-09 RX ORDER — SODIUM CHLORIDE 0.9 % (FLUSH) 0.9 %
10 SYRINGE (ML) INJECTION EVERY 12 HOURS SCHEDULED
Status: DISCONTINUED | OUTPATIENT
Start: 2025-01-09 | End: 2025-01-11 | Stop reason: HOSPADM

## 2025-01-09 RX ORDER — IPRATROPIUM BROMIDE AND ALBUTEROL SULFATE 2.5; .5 MG/3ML; MG/3ML
3 SOLUTION RESPIRATORY (INHALATION) ONCE AS NEEDED
Status: DISCONTINUED | OUTPATIENT
Start: 2025-01-09 | End: 2025-01-09 | Stop reason: HOSPADM

## 2025-01-09 RX ORDER — PANTOPRAZOLE SODIUM 40 MG/10ML
40 INJECTION, POWDER, LYOPHILIZED, FOR SOLUTION INTRAVENOUS
Status: DISCONTINUED | OUTPATIENT
Start: 2025-01-09 | End: 2025-01-11 | Stop reason: HOSPADM

## 2025-01-09 RX ORDER — POTASSIUM CHLORIDE 7.45 MG/ML
10 INJECTION INTRAVENOUS
Status: COMPLETED | OUTPATIENT
Start: 2025-01-09 | End: 2025-01-09

## 2025-01-09 RX ORDER — PROPOFOL 10 MG/ML
VIAL (ML) INTRAVENOUS AS NEEDED
Status: DISCONTINUED | OUTPATIENT
Start: 2025-01-09 | End: 2025-01-09 | Stop reason: SURG

## 2025-01-09 RX ORDER — MEPERIDINE HYDROCHLORIDE 25 MG/ML
12.5 INJECTION INTRAMUSCULAR; INTRAVENOUS; SUBCUTANEOUS
Status: DISCONTINUED | OUTPATIENT
Start: 2025-01-09 | End: 2025-01-09 | Stop reason: HOSPADM

## 2025-01-09 RX ORDER — ONDANSETRON 2 MG/ML
4 INJECTION INTRAMUSCULAR; INTRAVENOUS ONCE AS NEEDED
Status: DISCONTINUED | OUTPATIENT
Start: 2025-01-09 | End: 2025-01-09 | Stop reason: HOSPADM

## 2025-01-09 RX ORDER — SODIUM CHLORIDE 0.9 % (FLUSH) 0.9 %
10 SYRINGE (ML) INJECTION AS NEEDED
Status: DISCONTINUED | OUTPATIENT
Start: 2025-01-09 | End: 2025-01-11 | Stop reason: HOSPADM

## 2025-01-09 RX ORDER — LABETALOL HYDROCHLORIDE 5 MG/ML
5 INJECTION, SOLUTION INTRAVENOUS
Status: DISCONTINUED | OUTPATIENT
Start: 2025-01-09 | End: 2025-01-09 | Stop reason: HOSPADM

## 2025-01-09 RX ORDER — HYDRALAZINE HYDROCHLORIDE 20 MG/ML
5 INJECTION INTRAMUSCULAR; INTRAVENOUS
Status: DISCONTINUED | OUTPATIENT
Start: 2025-01-09 | End: 2025-01-09 | Stop reason: HOSPADM

## 2025-01-09 RX ORDER — ONDANSETRON 4 MG/1
4 TABLET, ORALLY DISINTEGRATING ORAL EVERY 6 HOURS PRN
Status: DISCONTINUED | OUTPATIENT
Start: 2025-01-09 | End: 2025-01-11 | Stop reason: HOSPADM

## 2025-01-09 RX ORDER — SUCRALFATE 1 G/1
1 TABLET ORAL
Status: DISCONTINUED | OUTPATIENT
Start: 2025-01-09 | End: 2025-01-11 | Stop reason: HOSPADM

## 2025-01-09 RX ORDER — EPHEDRINE SULFATE 5 MG/ML
5 INJECTION INTRAVENOUS ONCE AS NEEDED
Status: DISCONTINUED | OUTPATIENT
Start: 2025-01-09 | End: 2025-01-09 | Stop reason: HOSPADM

## 2025-01-09 RX ORDER — SODIUM CHLORIDE 9 MG/ML
9 INJECTION, SOLUTION INTRAVENOUS CONTINUOUS PRN
Status: DISCONTINUED | OUTPATIENT
Start: 2025-01-09 | End: 2025-01-10

## 2025-01-09 RX ORDER — DIPHENHYDRAMINE HYDROCHLORIDE 50 MG/ML
12.5 INJECTION INTRAMUSCULAR; INTRAVENOUS
Status: DISCONTINUED | OUTPATIENT
Start: 2025-01-09 | End: 2025-01-09 | Stop reason: HOSPADM

## 2025-01-09 RX ADMIN — POTASSIUM CHLORIDE 10 MEQ: 10 INJECTION, SOLUTION INTRAVENOUS at 06:51

## 2025-01-09 RX ADMIN — POTASSIUM CHLORIDE 10 MEQ: 10 INJECTION, SOLUTION INTRAVENOUS at 04:30

## 2025-01-09 RX ADMIN — Medication 10 ML: at 21:28

## 2025-01-09 RX ADMIN — Medication 10 ML: at 21:27

## 2025-01-09 RX ADMIN — POTASSIUM CHLORIDE 10 MEQ: 10 INJECTION, SOLUTION INTRAVENOUS at 05:34

## 2025-01-09 RX ADMIN — POTASSIUM CHLORIDE 10 MEQ: 10 INJECTION, SOLUTION INTRAVENOUS at 09:47

## 2025-01-09 RX ADMIN — PROPOFOL 50 MG: 10 INJECTION, EMULSION INTRAVENOUS at 14:37

## 2025-01-09 RX ADMIN — POTASSIUM CHLORIDE 10 MEQ: 10 INJECTION, SOLUTION INTRAVENOUS at 02:38

## 2025-01-09 RX ADMIN — PROPOFOL 100 MG: 10 INJECTION, EMULSION INTRAVENOUS at 14:35

## 2025-01-09 RX ADMIN — PROPOFOL 50 MG: 10 INJECTION, EMULSION INTRAVENOUS at 14:40

## 2025-01-09 RX ADMIN — SUCRALFATE 1 G: 1 TABLET ORAL at 17:02

## 2025-01-09 RX ADMIN — POTASSIUM CHLORIDE 10 MEQ: 10 INJECTION, SOLUTION INTRAVENOUS at 08:33

## 2025-01-09 RX ADMIN — PANTOPRAZOLE SODIUM 40 MG: 40 INJECTION, POWDER, FOR SOLUTION INTRAVENOUS at 17:02

## 2025-01-09 NOTE — CONSULTS
Nutrition Services    Patient Name: Kandi Fuentes  YOB: 1960  MRN: [id number]  Admission date: 1/8/2025    Comment:  -- Weigh patient    -- Diet per GI, consult RD if nutrition support desired, recommendations below.     -- Severe chronic disease related malnutrition related to hypermetabolic state of lung cancer as evidenced by PO intakes less than 75% for greater than 1 month, weight loss greater than 7.5% x 3 months and severe fat/muscle loss per physical exam.  See MSA below.        CLINICAL NUTRITION ASSESSMENT      Reason for Assessment 1/9: BMI less than 19, MST of 2     H&P      Past Medical History:   Diagnosis Date    Cancer     COPD (chronic obstructive pulmonary disease)     Coronary artery disease     Elevated cholesterol     Heart attack     Hypertension     Lung cancer 2024    Tinnitus        Past Surgical History:   Procedure Laterality Date    BACK SURGERY  2004    bone spur on sciatica    BRONCHOSCOPY WITH ION ROBOTIC ASSIST N/A 9/27/2024    Procedure: BRONCHOSCOPY WITH ION ROBOT, fine needle aspiration and tissue biopsy right upper lobe mass, endobronchial ultrasound with fine needle aspiration lymph nodes (Level 10R);  Surgeon: Serafin Choudhary MD;  Location: [facility] ENDOSCOPY;  Service: Robotics - Pulmonary;  Laterality: N/A;  post: lung mass with lympadenopathy    CHOLECYSTECTOMY  2021    CORONARY ANGIOPLASTY WITH STENT PLACEMENT  07/2024    MOLE REMOVAL  1994    forehead    SKIN LESION EXCISION Right 1994    breast    VENOUS ACCESS DEVICE (PORT) INSERTION Right 10/7/2024    Procedure: INSERTION VENOUS ACCESS DEVICE;  Surgeon: Serafin Choudhary MD;  Location: [facility] MAIN OR;  Service: Thoracic;  Laterality: Right;        Current Problems   Dysphagia  Dehydration  Hypokalemia  - GI consulted   - EGD 1/9     Anemia due to chemotherapy     Limited Stage SCLC of the right lung  - oncology consulted - direct admit from cancer center      CAD     COPD       Encounter Information        Trending  Narrative     1/9: Admitted for difficulty swallowing.  Patient admitted from Cancer Center after reporting dysphagia issue to her Oncologist.  GI following, EGD planned 1/9.  RD visited patient at beside.  Patient reports no intake x 3 weeks related to new onset dysphagia.  Patient cannot tolerate liquids or solids.  Vomiting but no nausea.  Noted with weight loss - see below.  NFPE completed, consistent with nutrition diagnosis of malnutrition using AND/ASPEN criteria. See MSA below.         Anthropometrics        Current Height, Weight            Usual Body Weight (UBW) 140# per patient        Trending Weight Hx     This admission: 1/9: 119#, RD ordered re-weight = 120#             PTA: 16.7% weight loss x 3 months - significant     Wt Readings from Last 30 Encounters:   01/08/25 1420 54.3 kg (119 lb 11.2 oz)   12/27/24 1946 62.8 kg (138 lb 7.2 oz)   12/27/24 1325 63.5 kg (140 lb)   12/18/24 1100 62.6 kg (138 lb)   12/18/24 1114 62.6 kg (138 lb)   12/17/24 1116 61.2 kg (135 lb)   12/17/24 1025 61.3 kg (135 lb 3.2 oz)   12/16/24 1155 60.4 kg (133 lb 3.2 oz)   11/27/24 0925 61.5 kg (135 lb 9.6 oz)   11/26/24 0935 61.5 kg (135 lb 9.6 oz)   11/26/24 1219 61.2 kg (135 lb)   11/25/24 0912 61.7 kg (136 lb)   11/20/24 1237 61 kg (134 lb 6.4 oz)   11/18/24 1241 60.5 kg (133 lb 6.4 oz)   11/11/24 1253 62.7 kg (138 lb 4.8 oz)   11/07/24 1306 64.7 kg (142 lb 9.6 oz)   11/06/24 0949 64.5 kg (142 lb 4.8 oz)   11/05/24 0943 64.4 kg (141 lb 14.4 oz)   11/04/24 0917 63.9 kg (140 lb 12.8 oz)   10/28/24 1235 64.2 kg (141 lb 9.6 oz)   10/23/24 1017 63.5 kg (140 lb)   10/21/24 1237 63 kg (138 lb 12.8 oz)   10/16/24 0903 64.4 kg (142 lb)   10/15/24 0904 64.1 kg (141 lb 6.4 oz)   10/14/24 0924 64.6 kg (142 lb 8 oz)   10/14/24 1337 64.4 kg (142 lb)   10/02/24 1422 64.9 kg (143 lb)   10/02/24 0918 65.2 kg (143 lb 12.8 oz)   10/02/24 0821 64.9 kg (143 lb)   09/27/24 1044 64.8 kg (142 lb 12.8 oz)   09/24/24 1249 63.5 kg (140 lb)      BMI  "kg/m2 There is no height or weight on file to calculate BMI.       Labs        Pertinent Labs    Results from last 7 days   Lab Units 01/09/25  0657 01/08/25  2045   SODIUM mmol/L 135* 135*   POTASSIUM mmol/L 3.3* 2.7*   CHLORIDE mmol/L 96* 94*   CO2 mmol/L 28.4 30.4*   BUN mg/dL 15 16   CREATININE mg/dL 0.40* 0.46*   CALCIUM mg/dL 9.0 9.2   BILIRUBIN mg/dL  --  1.1   ALK PHOS U/L  --  73   ALT (SGPT) U/L  --  10   AST (SGOT) U/L  --  17   GLUCOSE mg/dL 78 89     Results from last 7 days   Lab Units 01/09/25  0657 01/08/25  2213   MAGNESIUM mg/dL 1.7 1.7   PHOSPHORUS mg/dL 3.3 3.6   HEMOGLOBIN g/dL 7.7*  --    HEMATOCRIT % 24.1*  --      No results found for: \"HGBA1C\"     Medications    Scheduled Medications potassium chloride, 10 mEq, Intravenous, Q1H  sodium chloride, 10 mL, Intravenous, Q12H        Infusions sodium chloride, 100 mL/hr, Last Rate: 100 mL/hr (01/08/25 2316)        PRN Medications   aluminum-magnesium hydroxide-simethicone    senna-docusate sodium **AND** polyethylene glycol **AND** bisacodyl **AND** bisacodyl    Calcium Replacement - Follow Nurse / BPA Driven Protocol    ipratropium-albuterol    Magnesium Standard Dose Replacement - Follow Nurse / BPA Driven Protocol    melatonin    ondansetron ODT **OR** ondansetron    Phosphorus Replacement - Follow Nurse / BPA Driven Protocol    Potassium Replacement - Follow Nurse / BPA Driven Protocol    sodium chloride    sodium chloride     Physical Findings        Trending Physical   Appearance, NFPE 1/9: NFPE completed, consistent with nutrition diagnosis of malnutrition using AND/ASPEN criteria. See MSA below.     --  Edema  No edema documented      Bowel Function Last documented BM 1/8 (yesterday)     Tubes No feeding tube      Chewing/Swallowing Admitted for dysphagia - GI consulted      Skin Intact      --  Estimated/Assessed Needs    Estimated 1/9/25   Energy Requirements    EST Needs, Method, Wt used 1520-5314 kcal/day (30-40 kcal/kg of CBW 54.3 " kg)  Actual needs may exceed this range by 500-1000 kcal due to hypermetabolism of malnutrition. Will monitor trends and adjust as clinicallly indicated.        Protein Requirements    EST Needs, Method, Wt used 65-81 g/day (1.2-1.5 g/kg of CBW 54.3 kg)       Fluid Requirements     Estimated Needs (mL/day) 1 mL/kcal, will monitor hydration status      Fluid Deficit    Current Na Level (mEq/L)    Desired Na Level (mEq/L)    Estimated Fluid Deficit (L)       Current Nutrition Orders & Evaluation of Intake       Oral Nutrition     Food Allergies NKFA   Current PO Diet NPO Diet NPO Type: Strict NPO   Supplement None ordered   PO Evaluation     Trending % PO Intake 1/9: NPO   --  Enteral Nutrition    Enteral Route    Order, Modulars, Flushes    Tolerance    TF Observation         Parenteral Nutrition     TPN Route    Total # Days on TPN    TPN Order, Lipid Details    MVI & Trace Element Freq    TPN Observation       Nutritional Risk Screening        NRS-2002 Score          Nutrition Diagnosis         Nutrition Dx Problem 1 Chewing/swallowing issues noted related to past medical history including lung cancer as evidenced by NPO.        Nutrition Dx Problem 2 Severe chronic disease related malnutrition related to hypermetabolic state of lung cancer as evidenced by PO intakes less than 75% for greater than 1 month, weight loss greater than 7.5% x 3 months and severe fat/muscle loss per physical exam.        Intervention Goal         Intervention Goal(s) Begin nutrition as able   No weight loss     Nutrition Intervention        RD Action Monitor for ability to begin nutrition   Completed NFPE     Nutrition Prescription          Diet Prescription NPO   Supplement Prescription    --  Enteral Prescription Initial Goal:  *initial goal conservative d/t risk of RFS     Nutren 1.5 at 20 mL/hr + 10 mL/hr water flush      End Goal:    Nutren 1.5 at 55 mL/hr + water flush per clinical picture     Calories  1815 kcals (in range)     Protein  82 g (100% upper end)    Free water  920 mL   Flushes       The above end goal rate is for 22 hrs/day to assume interruptions for ADLs. Water flushes adjusted based on clinical picture + Rx flushes/IV fluids          TPN Prescription      Monitor/Evaluation        Monitor Per protocol, I&O, Pertinent labs, Weight, Swallow function     Malnutrition Severity Assessment      Patient meets criteria for : Severe Malnutrition  Malnutrition Type (Last 8 Hours)       Malnutrition Severity Assessment       Row Name 01/09/25 1407       Malnutrition Severity Assessment    Malnutrition Type Chronic Disease - Related Malnutrition      Row Name 01/09/25 1407       Insufficient Energy Intake     Insufficient Energy Intake Findings Severe    Insufficient Energy Intake  <75% of est. energy requirement for > or equal to 1 month      Row Name 01/09/25 1407       Unintentional Weight Loss     Unintentional Weight Loss Findings Severe    Unintentional Weight Loss  Weight loss greater than 7.5% in three months      Row Name 01/09/25 1407       Muscle Loss    Loss of Muscle Mass Findings Severe    [place] Region Severe - deep hollowing/scooping, lack of muscle to touch, facial bones well defined    Clavicle Bone Region Severe - protruding prominent bone    Acromion Bone Region Severe - squared shoulders, bones, and acromion process protrusion prominent    Scapular Bone Region Severe - prominent bones, depressions easily visible between ribs, scapula, spine, shoulders    Dorsal Hand Region Severe - prominent depression    Patellar Region Severe - prominent bone, square looking, very little muscle definition    Anterior Thigh Region Severe - line/depression along thigh, obviously thin    Posterior Calf Region Severe - thin with very little definition/firmness      Row Name 01/09/25 1407       Fat Loss    Subcutaneous Fat Loss Findings Severe    Orbital Region  Severe - pronounced hollowness/depression, dark circles, loose saggy  skin    Upper Arm Region Severe - mostly skin, very little space between folds, fingers touch    Thoracic & Lumbar Region Severe - ribs visible with prominent depressions, iliac crest very prominent      Row Name 01/09/25 1407       Criteria Met (Must meet criteria for severity in at least 2 of these categories: M Wasting, Fat Loss, Fluid, Secondary Signs, Wt. Status, Intake)    Patient meets criteria for  Severe Malnutrition                     Electronically signed by:  May Ordoñez RD  01/09/25 07:47 EST

## 2025-01-09 NOTE — PROGRESS NOTES
[facility] [address] MEDICINE SERVICE  DAILY PROGRESS NOTE    NAME: Kandi Fuentes  : 1960  MRN: [id number]      LOS: 1 day     PROVIDER OF SERVICE: Shawna Javed MD    Chief Complaint: Dysphagia    Subjective:     Interval History:    Patient seen and evaluated at bedside. Reports feeling weak with some stomach upset. Plans for EGD today.     Treatment plan discussed with patient. All questions addressed.     Review of Systems:   Denies fevers, chills  Denies chest pain, edema  Denies shortness of breath, cough  +nausea, +refluz  Denies dysuria, hematuria  +generalized weakness    Objective:     Vital Signs  Temp:  [97.4 °F (36.3 °C)-98.1 °F (36.7 °C)] 97.6 °F (36.4 °C)  Heart Rate:  [] 94  Resp:  [14-24] 20  BP: ()/(52-76) 125/60   Body mass index is 18.86 kg/m².    Physical Exam   General: No acute distress, thin  Neuro: AAOx3, no FND  CV: RRR, no peripheral edema  Pulm: CTAB, no increased work of breathing  Abd: Soft, nontender, nondistended  Skin: No rashes or lesions on exposed skin  Psych: Appropriate mood and affect    Scheduled Meds   potassium chloride, 10 mEq, Intravenous, Q1H  sodium chloride, 10 mL, Intravenous, Q12H       PRN Meds     aluminum-magnesium hydroxide-simethicone    senna-docusate sodium **AND** polyethylene glycol **AND** bisacodyl **AND** bisacodyl    Calcium Replacement - Follow Nurse / BPA Driven Protocol    ipratropium-albuterol    Magnesium Standard Dose Replacement - Follow Nurse / BPA Driven Protocol    melatonin    ondansetron ODT **OR** ondansetron    Phosphorus Replacement - Follow Nurse / BPA Driven Protocol    Potassium Replacement - Follow Nurse / BPA Driven Protocol    sodium chloride    sodium chloride   Infusions  sodium chloride, 100 mL/hr, Last Rate: 100 mL/hr (25 6781)          Diagnostic Data    Results from last 7 days   Lab Units 25  0657 25  2045   WBC 10*3/mm3 5.55 5.05   HEMOGLOBIN g/dL 7.7* 8.6*   HEMATOCRIT % 24.1* 27.4*    PLATELETS 10*3/mm3 143 150   GLUCOSE mg/dL 78 89   CREATININE mg/dL 0.40* 0.46*   BUN mg/dL 15 16   SODIUM mmol/L 135* 135*   POTASSIUM mmol/L 3.3* 2.7*   AST (SGOT) U/L  --  17   ALT (SGPT) U/L  --  10   ALK PHOS U/L  --  73   BILIRUBIN mg/dL  --  1.1   ANION GAP mmol/L 10.6 10.6       XR Chest 1 View    Result Date: 1/8/2025  Impression: 1.The mass in the right upper lobe has either decreased in size or resolved. 2.No active pulmonary disease. 3.Emphysema. Electronically Signed: Gilson Teran MD  1/8/2025 8:08 PM EST  Workstation ID: [id number]     Interval results reviewed.    Assessment/Plan:     Dysphagia  - GI consulted, appreciate recs  - SLP consulted, appreciate recs  - Plan for EGD today  - NPO, IVF, anti-emetics    Hypokalemia  - Replete per protocol  - Repeat BMP in AM    Generalized weakness  - Likely multifactorial in setting of chronic illness and poor po intake  - PT/OT eval pending    Small cell lung cancer of right lung  - Oncology consulted on admission, appreciate recs    Anemia, secondary to chemotherapy  - Oncology consulted, appreciate recs  - Repeat CBC in AM    Coronary arery disease  - Continue asa/plavix, bb, statin when able to tolerate po    COPD  - Not in exacerbation  - PRN nebs    Treatment plan discussed with nursing staff, case management.     VTE Prophylaxis:  Mechanical VTE prophylaxis orders are present.    Holding pharmacological dvt in setting of planned procedure    Code status is   Code Status and Medical Interventions: CPR (Attempt to Resuscitate); Full Support   Ordered at: 01/08/25 2123     Level Of Support Discussed With:    Patient     Code Status (Patient has no pulse and is not breathing):    CPR (Attempt to Resuscitate)     Medical Interventions (Patient has pulse or is breathing):    Full Support       Plan for disposition: Home 1/10/25    Barriers to discharge: EGD, NPO, PT/OT pending    Time: 35+ minutes     Signature: Electronically signed by Shawna Javed MD,  01/09/25, 08:21 EST.  [facility] Hospitalist Team

## 2025-01-09 NOTE — OUTREACH NOTE
Medical Week 2 Survey      Flowsheet Row Responses   [facility] facility patient discharged from? [facility]   Does the patient have one of the following disease processes/diagnoses(primary or secondary)? Other   Week 2 attempt successful? No   Unsuccessful attempts Attempt 1   Revoke Readmitted            Terra YU - Registered Nurse

## 2025-01-09 NOTE — PLAN OF CARE
Goal Outcome Evaluation:   Aox4. VSS. Pt had EGD done. Potassium runs complete. Care on going

## 2025-01-09 NOTE — CONSULTS
"        Hematology/Oncology Inpatient Consultation    Patient name: Kandi Fuentes  : 1960  MRN: [id number]  Referring Provider: JUANCHO Jimenez  Reason for Consultation: Established patient, direct admission for dysphagia    Hematology/Oncology History (from record):  10/2/2024: In the office for the first time to stage and treat small cell lung cancer of the right lung.  She reported that between July and 2024 she was seen at the hospital with dyspnea and some chest pains.  She was found to have evidence of coronary artery disease and underwent percutaneous angioplasty and stenting of the affected coronary vessels.  In the process, however, a nodule in the right lung was identified.  Further investigations led to the identification of a 4.4 cm lobulated tumor in the posterior right upper lobe communicating with the right middle lobe concerning for malignancy.  Evidence of severe emphysema was present, as well.  Eventually she had a navigational bronchoscopy and pathology reported small cell carcinoma on the biopsies.  A PET scan and MRI did not reveal any suggestion of metastatic disease.  At the time of this visit she is feeling somewhat better.  Her breathing has improved.  She still has some precordial discomfort that she describes is related to the stents \"that I can still feel\".  She has been eating reasonably well and has not lost much weight.  She is also been afebrile.  No diarrhea or dysuria.  On exam she appears chronically ill but is not in distress.  No jaundice.  The lungs are diminished bilaterally in a significant fashion.  The heart is regular.  The abdomen is flat and soft.  No palpable tumors.  No edema.  Independently reviewed all the imaging studies and discussed with her.  Treatment with concurrent chemotherapy and radiation followed by immunotherapy is reasonable.  Discussed with her the regimen of concurrent chemotherapy and radiation and in detail discussed with her " side effects and potential complications of both treatments.  Ideally we should begin on October 7 if it is at all possible.  I have communicated with Dr. Choudhary and with Dr. Abarca.     10/23/2024: Received the first cycle of chemotherapy without any difficulties. She feels about the same at this time and she has not had any new problems. Able to eat well and no nausea, vomiting or unintended weight loss. Denies chest pain and remains with the same degree of dyspnea. No abdominal pain or diarrhea. She continues to smoke at the same rate. On exam she is oriented and conversant. No jaundice. Poor dentition and no oral lesions. Respirations not labored Lungs diminished bilaterally. Heart regular. Abdomen soft and not tender. No edema. The laboratory exams were reviewed and discussed with her. To continue with the same treatment. She's to see me in 4 weeks.      11/27/2024: Tolerating the treatment with chemotherapy well.  She had chest pain and has been dyspneic since last evening.  She was seen in the emergency room at [facility], in [facility], where she spent several hours.  She was found to have an elevated D-dimer and was offered a CT scan.  However, she could not obtain the test because of difficulties with transportation.  She has been tolerating the chemotherapy well.  On exam today she is alert and conversant.  Oriented and in no distress.  There is no jaundice or pallor.  Poor dentition but no oral lesions.  Lungs markedly diminished bilaterally.  She is tachycardic.  Abdomen soft.  No edema.  To proceed with treatment.  To obtain a CT angiogram of the chest to ensure no pulmonary embolism, though I doubt it.  She is to see me in 3 weeks.     12/18/2024: Feeling poorly. Weak and more dyspneic with exertion. Still smoking but less than before. More tremulous than before. Her appetite is poor. She has not had much nausea. No chest pain or cough and no abdominal pain. On exam alert and oriented. No  distress. No jaundice and no oral lesions. Respirations not labored. Lungs diminished bilaterally and heart regular. Abdomen soft. No edema. Reviewed all the laboratory exams. Reviewed all the imaging studies and discussed with her. She has had a very good response to the treatment. She is to complete this cycle of treatment. She will most likely need a red cell transfusion and will have her laboratory exams on 12/20. She will see me in 4 weeks with new scans and MRI of the brain.     Chief complaint: Dysphagia    History of present illness:    Kandi Fuentes is a 64 y.o. female who presented to [facility] on 1/8/2025 with complaints of dysphagia.  She states she has had swallowing difficulties for about 3 weeks and has progressively worsened.  She states most the time swallowing liquids will cause her to gag and vomit.  She feels like something is stuck either in her throat or mid chest.  She states she feels weak and dizzy because she has not been able to eat or drink much.  She has also had decreased urination.  She has had normal bowel movements.  Denies fever, nausea, chest pain or shortness of breath.  Chest x-ray showed the mass in right upper lobe has [facility] very decreased in size now resolved.  Remaining lungs and pleural spaces are clear.  Emphysematous changes and chronic age-related changes involving bony thorax and thoracic aorta.    Of note, patient was recently admitted from 12/27/2024 through 12/31/2024 due to pancytopenia and electrolyte derangements.  She had CT imaging of the head that showed no acute intracranial process but did have left posterior scalp hematoma.  Potassium, phosphorus and magnesium were corrected during her hospitalization.  She also received transfusion of packed red blood cells as well as G-CSF.  She symptomatically improved and was discharged in stable condition.    01/09/25  Hematology/Oncology was consulted.     He/She  has a past medical history of Cancer, COPD  (chronic obstructive pulmonary disease), Coronary artery disease, Elevated cholesterol, Heart attack, Hypertension, Lung cancer (2024), and Tinnitus.    PCP: Elsa Miller APRN    History:  Past Medical History:   Diagnosis Date    Cancer     COPD (chronic obstructive pulmonary disease)     Coronary artery disease     Elevated cholesterol     Heart attack     Hypertension     Lung cancer 2024    Tinnitus    ,   Past Surgical History:   Procedure Laterality Date    BACK SURGERY  2004    bone spur on sciatica    BRONCHOSCOPY WITH ION ROBOTIC ASSIST N/A 9/27/2024    Procedure: BRONCHOSCOPY WITH ION ROBOT, fine needle aspiration and tissue biopsy right upper lobe mass, endobronchial ultrasound with fine needle aspiration lymph nodes (Level 10R);  Surgeon: Serafin Choudhary MD;  Location: [facility] ENDOSCOPY;  Service: Robotics - Pulmonary;  Laterality: N/A;  post: lung mass with lympadenopathy    CHOLECYSTECTOMY  2021    CORONARY ANGIOPLASTY WITH STENT PLACEMENT  07/2024    MOLE REMOVAL  1994    forehead    SKIN LESION EXCISION Right 1994    breast    VENOUS ACCESS DEVICE (PORT) INSERTION Right 10/7/2024    Procedure: INSERTION VENOUS ACCESS DEVICE;  Surgeon: Serafin Choudhary MD;  Location: [facility] MAIN OR;  Service: Thoracic;  Laterality: Right;   ,   Family History   Problem Relation Age of Onset    Breast cancer Mother 62    Heart attack Mother     Lung cancer Sister     Throat cancer Sister     Lung cancer Brother    ,   Social History     Tobacco Use    Smoking status: Every Day     Current packs/day: 1.00     Average packs/day: 1 pack/day for 55.0 years (55.0 ttl pk-yrs)     Types: Cigarettes     Start date: 1970     Passive exposure: Current    Smokeless tobacco: Never   Vaping Use    Vaping status: Never Used   Substance Use Topics    Alcohol use: Never    Drug use: Never   ,   Medications Prior to Admission   Medication Sig Dispense Refill Last Dose/Taking    albuterol sulfate  (90 Base) MCG/ACT inhaler Inhale  2 puffs As Needed for Wheezing or Shortness of Air.   1/8/2025 Morning    aspirin 81 MG chewable tablet Chew 1 tablet Daily.   1/8/2025    atorvastatin (LIPITOR) 40 MG tablet Take 1 tablet by mouth Daily.   1/7/2025 Evening    carvedilol (COREG) 3.125 MG tablet Take 1 tablet by mouth 2 (Two) Times a Day With Meals.   1/8/2025 Morning    clopidogrel (PLAVIX) 75 MG tablet Take 1 tablet by mouth Daily.   1/8/2025 Morning    isosorbide mononitrate (IMDUR) 30 MG 24 hr tablet Take 1 tablet by mouth Daily.   1/8/2025 Morning    omeprazole (priLOSEC) 40 MG capsule Take 1 capsule by mouth Daily.   Past Week Morning    Acetaminophen (Tylenol) 325 MG capsule Take 650 mg by mouth As Needed.   Unknown    budesonide-formoterol (Breyna) 160-4.5 MCG/ACT inhaler Inhale 2 puffs 2 (Two) Times a Day. 1 each 12 Unknown    ipratropium-albuterol (DUO-NEB) 0.5-2.5 mg/3 ml nebulizer Take 3 mL by nebulization 4 (Four) Times a Day As Needed for Wheezing or Shortness of Air for up to 120 days. 120 mL 5 Unknown    Lidocaine Viscous HCl (XYLOCAINE) 2 % solution Take 10 mL by mouth As Needed for Mild Pain (Take prior to meals up to 3-4 times per day to help with pain with swallowing). 600 mL 4 Unknown    lidocaine-prilocaine (EMLA) 2.5-2.5 % cream Apply 1 Application topically to the appropriate area as directed See Admin Instructions. Apply one hour prior to port access 30 g 3     methocarbamol (ROBAXIN) 500 MG tablet Take 1 tablet by mouth Every 6 (Six) Hours As Needed for Muscle Spasms. 50 tablet 1 Unknown    mometasone (ELOCON) 0.1 % ointment Apply to affected skin of chest and back 2-3 times daily as needed for itching from radiation treatments. 50 g 2 Unknown    ondansetron (ZOFRAN) 8 MG tablet Take 1 tablet by mouth 3 (Three) Times a Day As Needed for Nausea or Vomiting. 30 tablet 5 Unknown    oxyCODONE (ROXICODONE) 5 MG/5ML solution Take 5 mL by mouth Every 4 (Four) Hours As Needed for Moderate Pain (Pain with swallowing.). 300 mL 0      potassium chloride (KAYCIEL) 20 mEq/15 mL solution Take 15 mL by mouth Daily. 450 mL 5 Unknown    sucralfate (Carafate) 1 GM/10ML suspension Take 10 mL by mouth 4 (Four) Times a Day As Needed (For pain and discomfort with swallowing). 600 mL 4    , Scheduled Meds:  potassium chloride, 10 mEq, Intravenous, Q1H  sodium chloride, 10 mL, Intravenous, Q12H    , Continuous Infusions:  sodium chloride, 100 mL/hr, Last Rate: 100 mL/hr (01/08/25 3256)    , PRN Meds:    aluminum-magnesium hydroxide-simethicone    senna-docusate sodium **AND** polyethylene glycol **AND** bisacodyl **AND** bisacodyl    Calcium Replacement - Follow Nurse / BPA Driven Protocol    ipratropium-albuterol    Magnesium Standard Dose Replacement - Follow Nurse / BPA Driven Protocol    melatonin    ondansetron ODT **OR** ondansetron    Phosphorus Replacement - Follow Nurse / BPA Driven Protocol    Potassium Replacement - Follow Nurse / BPA Driven Protocol    sodium chloride    sodium chloride   Allergies:  Morphine, Codeine, and Sulfa antibiotics    Subjective     ROS:  Review of Systems     Objective   Vital Signs:   /60 (BP Location: Right arm, Patient Position: Sitting)   Pulse 94   Temp 97.6 °F (36.4 °C) (Oral)   Resp 20   Wt 54.6 kg (120 lb 6.4 oz)   SpO2 97%   BMI 18.86 kg/m²     Physical Exam: (performed by MD)  Physical Exam    Results Review:  Lab Results (last 48 hours)       Procedure Component Value Units Date/Time    Basic Metabolic Panel [[id number]]  (Abnormal) Collected: 01/09/25 0657    Specimen: Blood Updated: 01/09/25 0734     Glucose 78 mg/dL      BUN 15 mg/dL      Creatinine 0.40 mg/dL      Sodium 135 mmol/L      Potassium 3.3 mmol/L      Chloride 96 mmol/L      CO2 28.4 mmol/L      Calcium 9.0 mg/dL      BUN/Creatinine Ratio 37.5     Anion Gap 10.6 mmol/L      eGFR 111.4 mL/min/1.73     Narrative:      GFR Categories in Chronic Kidney Disease (CKD)      GFR Category          GFR (mL/min/1.73)    Interpretation  G1                      90 or greater         Normal or high (1)  G2                      60-89                Mild decrease (1)  G3a                   45-59                Mild to moderate decrease  G3b                   30-44                Moderate to severe decrease  G4                    15-29                Severe decrease  G5                    14 or less           Kidney failure          (1)In the absence of evidence of kidney disease, neither GFR category G1 or G2 fulfill the criteria for CKD.    eGFR calculation 2021 CKD-EPI creatinine equation, which does not include race as a factor    CBC Auto Differential [[id number]]  (Abnormal) Collected: 01/09/25 0657    Specimen: Blood Updated: 01/09/25 0734     WBC 5.55 10*3/mm3      RBC 2.36 10*6/mm3      Hemoglobin 7.7 g/dL      Hematocrit 24.1 %      .1 fL      MCH 32.6 pg      MCHC 32.0 g/dL      RDW 17.8 %      RDW-SD 53.6 fl      MPV 10.1 fL      Platelets 143 10*3/mm3      Neutrophil % 79.5 %      Lymphocyte % 8.6 %      Monocyte % 11.4 %      Eosinophil % 0.0 %      Basophil % 0.0 %      Immature Grans % 0.5 %      Neutrophils, Absolute 4.41 10*3/mm3      Lymphocytes, Absolute 0.48 10*3/mm3      Monocytes, Absolute 0.63 10*3/mm3      Eosinophils, Absolute 0.00 10*3/mm3      Basophils, Absolute 0.00 10*3/mm3      Immature Grans, Absolute 0.03 10*3/mm3      nRBC 0.0 /100 WBC     Magnesium [[id number]]  (Normal) Collected: 01/09/25 0657    Specimen: Blood Updated: 01/09/25 0734     Magnesium 1.7 mg/dL     Phosphorus [[id number]]  (Normal) Collected: 01/09/25 0657    Specimen: Blood Updated: 01/09/25 0734     Phosphorus 3.3 mg/dL     Calcium, Ionized [[id number]]  (Abnormal) Collected: 01/09/25 0657    Specimen: Blood Updated: 01/09/25 0716     Ionized Calcium 1.07 mmol/L     Phosphorus [[id number]]  (Normal) Collected: 01/08/25 2213    Specimen: Blood Updated: 01/08/25 2244     Phosphorus 3.6 mg/dL     Magnesium [[id number]]  (Normal) Collected: 01/08/25 2213     Specimen: Blood Updated: 01/08/25 2244     Magnesium 1.7 mg/dL     Calcium, Ionized [[id number]]  (Abnormal) Collected: 01/08/25 2213    Specimen: Blood Updated: 01/08/25 2228     Ionized Calcium 1.14 mmol/L     Comprehensive Metabolic Panel [[id number]]  (Abnormal) Collected: 01/08/25 2045    Specimen: Blood Updated: 01/08/25 2114     Glucose 89 mg/dL      BUN 16 mg/dL      Creatinine 0.46 mg/dL      Sodium 135 mmol/L      Potassium 2.7 mmol/L      Chloride 94 mmol/L      CO2 30.4 mmol/L      Calcium 9.2 mg/dL      Total Protein 6.1 g/dL      Albumin 3.6 g/dL      ALT (SGPT) 10 U/L      AST (SGOT) 17 U/L      Alkaline Phosphatase 73 U/L      Total Bilirubin 1.1 mg/dL      Globulin 2.5 gm/dL      A/G Ratio 1.4 g/dL      BUN/Creatinine Ratio 34.8     Anion Gap 10.6 mmol/L      eGFR 107.7 mL/min/1.73     Narrative:      GFR Categories in Chronic Kidney Disease (CKD)      GFR Category          GFR (mL/min/1.73)    Interpretation  G1                     90 or greater         Normal or high (1)  G2                      60-89                Mild decrease (1)  G3a                   45-59                Mild to moderate decrease  G3b                   30-44                Moderate to severe decrease  G4                    15-29                Severe decrease  G5                    14 or less           Kidney failure          (1)In the absence of evidence of kidney disease, neither GFR category G1 or G2 fulfill the criteria for CKD.    eGFR calculation 2021 CKD-EPI creatinine equation, which does not include race as a factor    CBC & Differential [[id number]]  (Abnormal) Collected: 01/08/25 2045    Specimen: Blood Updated: 01/08/25 2050    Narrative:      The following orders were created for panel order CBC & Differential.  Procedure                               Abnormality         Status                     ---------                               -----------         ------                     CBC Auto Differential[[id number]]         Abnormal            Final result                 Please view results for these tests on the individual orders.    CBC Auto Differential [[id number]]  (Abnormal) Collected: 01/08/25 2045    Specimen: Blood Updated: 01/08/25 2050     WBC 5.05 10*3/mm3      RBC 2.73 10*6/mm3      Hemoglobin 8.6 g/dL      Hematocrit 27.4 %      .4 fL      MCH 31.5 pg      MCHC 31.4 g/dL      RDW 18.3 %      RDW-SD 52.8 fl      MPV 9.8 fL      Platelets 150 10*3/mm3      Neutrophil % 69.5 %      Lymphocyte % 14.7 %      Monocyte % 15.0 %      Eosinophil % 0.0 %      Basophil % 0.2 %      Immature Grans % 0.6 %      Neutrophils, Absolute 3.51 10*3/mm3      Lymphocytes, Absolute 0.74 10*3/mm3      Monocytes, Absolute 0.76 10*3/mm3      Eosinophils, Absolute 0.00 10*3/mm3      Basophils, Absolute 0.01 10*3/mm3      Immature Grans, Absolute 0.03 10*3/mm3      nRBC 0.0 /100 WBC              Pending Results:     Imaging Reviewed:   XR Chest 1 View    Result Date: 1/8/2025  Impression: 1.The mass in the right upper lobe has either decreased in size or resolved. 2.No active pulmonary disease. 3.Emphysema. Electronically Signed: Gilson Teran MD  1/8/2025 8:08 PM EST  Workstation ID: [id number]          Assessment & Plan   ASSESSMENT  Stage small cell lung cancer ([id number]) of the right lung.  She has completed radiation and 4 cycles of chemotherapy with carboplatin and topside on 12/16/2024.  Planned for interval restaging scans and MRI of the brain 4 weeks after completion of chemotherapy.  Likely a candidate for consolidation immunotherapy with Imfinzi.  Dysphagia with secondary dehydration and electrolyte derangements: Further evaluation with gastroenterology - EGD completed 1/9/2025 with esophageal stricture, radiation esophagitis.   Anemia secondary to chemotherapy: Hemoglobin is stable.  Continue to monitor CBC.  Transfuse as needed to keep hemoglobin above 7.0 g/dL or as otherwise directed per primary team, transfusions to be  managed by primary team    PLAN  As above  Further recommendations per Dr. Delgado    Above note was prepared for Dr. Wilbert Delgado -physical exam and review of systems were performed by physician.      Electronically signed by Kelli Terry PA-C, 01/09/25 1/9/2025: Ms. Estrada is a patient of mine and has had treatment with concurrent chemotherapy and radiation for lung cancer. She seems to have had a good response. However, she called the office to report that for several days, maybe as many as 21, she had been progressively less able to swallow even liquids. She had been admitted to the hospital with similar but not as severe symptoms and was discharged without resolution of the symptoms. She was found to have dysphagia of even thin liquids. She was given intravenous fluids and was referred for admission to the hospital. She tells me today that she has been feeling better, though she did not sleep well. She has been free of pain but she remains absolutely unable to swallow even water. No dyspnea. No cough or chest pain. On exam she is conversant and oriented. No jaundice or pallor. No oral lesions and respirations not labored. Lungs diminished and abdomen soft. No edema. Reviewed the laboratory exams. She is to be seen by Gastroenterology with the impression of esophageal stenosis resultant from the previous concurrent chemotherapy and radiation. Discussed with her.   I have reviewed the records with Ms. Terry and I concur with her note. I formulated the analysis and the plans.     Wilbert Delgado MD on 1/9/2025 at 17:22

## 2025-01-09 NOTE — OP NOTE
ESOPHAGOGASTRODUODENOSCOPY Procedure Report    Patient Name:  aKndi Fuentes  YOB: 1960    Date of Surgery:  1/9/2025     Pre-Op Diagnosis:  Dysphagia, unspecified type [R13.10]  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 [C34.11]    Post-Op Diagnosis:  1.  Esophageal stricture status post 40 Colombian Gatica dilation  2.  Radiation esophagitis       Procedure/CPT® Codes:      Procedure(s):  ESOPHAGOGASTRODUODENOSCOPY WITH DILATION (BOUGIE 32, 36, 40)    Staff:  Surgeon(s):  Jason Black MD      Anesthesia: Monitored Anesthesia Care    Description of Procedure:  A description of the procedure as well as risks, benefits and alternative methods were explained to the patient who voiced understanding and signed the corresponding consent form. A physical exam was performed and vital signs were monitored throughout the procedure.    After informed consent was obtained, the patient was placed in the left lateral decubitus position and sedated as above.  The Olympus video gastroscope was inserted into the oropharynx and the esophagus was intubated without difficulty.  Examination of the esophagus, stomach, pylorus, duodenal bulb, and second portion of the duodenum was performed without difficulty. The scope was then retroflexed and the fundus was visualized. The procedure was not difficult and there were no immediate complications.  There was no blood loss.    Findings:  Esophagus: Ulceration with stricturing at approximately 26 cm from the incisors consistent with radiation esophagitis with ulceration and stricture.  The distal esophagus was unremarkable.  32 [place]ian Gatica dilator passed with minimal resistance felt and no mucosal dilation noted.  Then a 36 [place] Gatica dilator was passed with minimal resistance felt and no mucosal dilation noted.  Finally a 40 [place] Gatica dilator was passed with mild to moderate resistance fell and effective mucosal dilation  noted.  Stomach: Normal  Duodenum: Normal    Impression:  1.  Esophageal stricture status post 40 French Gatica dilation  2.  Radiation esophagitis with stricture    Recommendations:  1.  Continue proton pump inhibitor daily  2.  Regular soft diet as tolerated  3.  Carafate suspension as needed  4.  Plan repeat EGD with dilation as needed for dysphagia  5.  We will see as needed      Jason Black MD     Date: 1/9/2025    Time: 14:43 EST      .

## 2025-01-09 NOTE — H&P
[facility] Medicine Services    Hospitalist History and Physical     Kandi Fuentes : 1960 MRN:[id number] LOS:1 ROOM: 2204/1     Reason for admission: Dysphagia     Assessment / Plan     Dysphagia  Dehydration  Hypokalemia  - pt unable to take in liquids and solids due to difficulty swallowing   - CXR: the mass in the right upper lobe has either decreased in size or resolved. No active pulmonary disease. 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         History of Present illness     Kandi Fuentes is a 64 y.o. female with PMH of SCLC, COPD, HTN, CAD who [facility]up of difficulty swallowing. She stated she has had difficulty swallowing for 3 weeks and it has become progressively worse. Most of the time even swallowing water will cause her to gag and the fluids come right back up. She feels like something is stuck in either her mid chest or her throat. She feels weak and dizzy because she has not been able to eat or drink. She has had decreased urination due to low intake. She has had normal bowel movements. She denied any fever, no nausea, no shortness of breath or chest pain. She has not had any blood in her emesis when she vomits back up the liquids she tries to drink. She apparently has lost 19  lbs since 12/27/2024. She was seen by Dr. Delgado at the bedside at the cancer care center. She was given IVFs and directly admitted for further workup of dysphagia.     Upon exam patient is resting in bed comfortably. She stated she is able to suck on ice and swallow the liquid from the small ice chips without difficulty. Vitals stable.     Subjective / Review of systems     Review of Systems   Constitutional: Negative.    HENT:  Positive for trouble swallowing.    Eyes: Negative.    Respiratory: Negative.     Cardiovascular: Negative.    Gastrointestinal: Negative.    Genitourinary: Negative.    Musculoskeletal: Negative.    Skin: Negative.    Neurological: Negative.    Psychiatric/Behavioral: Negative.          Past Medical/Surgical/Social/Family History & Allergies     Past Medical History:   Diagnosis Date    Cancer     COPD (chronic obstructive pulmonary disease)     Coronary artery disease     Elevated cholesterol     Heart attack     Hypertension     Lung cancer 2024    Tinnitus       Past Surgical History:   Procedure Laterality Date    BACK SURGERY  2004    bone spur on sciatica    BRONCHOSCOPY WITH ION ROBOTIC ASSIST N/A 9/27/2024    Procedure: BRONCHOSCOPY WITH ION ROBOT, fine needle aspiration and tissue biopsy right upper lobe mass, endobronchial ultrasound with fine needle aspiration lymph nodes (Level 10R);  Surgeon: Serafin Choudhary MD;  Location: [facility] ENDOSCOPY;  Service: Robotics - Pulmonary;  Laterality: N/A;  post: lung mass with lympadenopathy    CHOLECYSTECTOMY  2021    CORONARY ANGIOPLASTY WITH STENT PLACEMENT  07/2024    MOLE REMOVAL  1994    forehead    SKIN LESION EXCISION Right 1994    breast    VENOUS ACCESS DEVICE (PORT) INSERTION Right 10/7/2024    Procedure: INSERTION VENOUS ACCESS DEVICE;  Surgeon: Serafin Choudhary MD;  Location: [facility] MAIN OR;  Service: Thoracic;  Laterality: Right;      Social History     Socioeconomic History    Marital status:    Tobacco Use    Smoking status:  Every Day     Current packs/day: 1.00     Average packs/day: 1 pack/day for 55.0 years (55.0 ttl pk-yrs)     Types: Cigarettes     Start date: 1970     Passive exposure: Current    Smokeless tobacco: Never   Vaping Use    Vaping status: Never Used   Substance and Sexual Activity    Alcohol use: Never    Drug use: Never    Sexual activity: Defer      Family History   Problem Relation Age of Onset    Breast cancer Mother 62    Heart attack Mother     Lung cancer Sister     Throat cancer Sister     Lung cancer Brother       Allergies   Allergen Reactions    Morphine Hives and Shortness Of Breath    Codeine Hives    Sulfa Antibiotics Hives      Social Drivers of Health     Tobacco Use: High Risk (1/8/2025)    Patient History     Smoking Tobacco Use: Every Day     Smokeless Tobacco Use: Never     Passive Exposure: Current   Alcohol Use: Not At Risk (1/8/2025)    AUDIT-C     Frequency of Alcohol Consumption: Never     Average Number of Drinks: Patient does not drink     Frequency of Binge Drinking: Never   Financial Resource Strain: Low Risk  (12/30/2024)    Overall Financial Resource Strain (CARDIA)     Difficulty of Paying Living Expenses: Not very hard   Food Insecurity: No Food Insecurity (12/30/2024)    Hunger Vital Sign     Worried About Running Out of Food in the Last Year: Never true     Ran Out of Food in the Last Year: Never true   Transportation Needs: No Transportation Needs (12/30/2024)    PRAPARE - Transportation     Lack of Transportation (Medical): No     Lack of Transportation (Non-Medical): No   Physical Activity: Inactive (12/30/2024)    Exercise Vital Sign     Days of Exercise per Week: 0 days     Minutes of Exercise per Session: 0 min   Stress: No Stress Concern Present (12/30/2024)    [employer] - Occupational Stress Questionnaire     Feeling of Stress : Only a little   Social Connections: Not At Risk (12/30/2024)    Family and Community Support     Help with Day-to-Day  Activities: I get all the help I need     Lonely or Isolated: Sometimes   Interpersonal Safety: Not At Risk (1/8/2025)    Abuse Screen     Unsafe at Home or Work/School: no     Feels Threatened by Someone?: no     Does Anyone Keep You from Contacting Others or Doint Things Outside the Home?: no     Physical Sign of Abuse Present: no   Depression: Not at risk (12/30/2024)    PHQ-2     PHQ-2 Score: 0   Housing Stability: Not At Risk (1/8/2025)    Housing Stability     Current Living Arrangements: home     Potentially Unsafe Housing Conditions: none   Utilities: Not At Risk (12/30/2024)    [facility] Utilities     Threatened with loss of utilities: No   Health Literacy: Not At Risk (12/30/2024)    Education     Help with school or training?: No     Preferred Language: English   Employment: Not At Risk (12/30/2024)    Employment     Do you want help finding or keeping work or a job?: I do not need or want help   Disabilities: Not At Risk (1/8/2025)    Disabilities     Concentrating, Remembering, or Making Decisions Difficulty: no     Doing Errands Independently Difficulty: no   Recent Concern: Disabilities - At Risk (12/30/2024)    Disabilities     Concentrating, Remembering, or Making Decisions Difficulty: yes     Doing Errands Independently Difficulty: no        Home Medications     Prior to Admission medications    Medication Sig Start Date End Date Taking? Authorizing Provider   Acetaminophen (Tylenol) 325 MG capsule Take 650 mg by mouth As Needed.    George Howe MD   albuterol sulfate  (90 Base) MCG/ACT inhaler Inhale 2 puffs As Needed for Wheezing or Shortness of Air.    ProviderGeorge MD   aspirin 81 MG chewable tablet Chew 1 tablet Daily.    George Howe MD   atorvastatin (LIPITOR) 40 MG tablet Take 1 tablet by mouth Daily.    George Howe MD   budesonide-formoterol (Breyna) 160-4.5 MCG/ACT inhaler Inhale 2 puffs 2 (Two) Times a Day. 10/21/24   Tita Mcmahon MD   carvedilol  (COREG) 3.125 MG tablet Take 1 tablet by mouth 2 (Two) Times a Day With Meals. 8/6/24   George Howe MD   clopidogrel (PLAVIX) 75 MG tablet Take 1 tablet by mouth Daily. 10/22/24 10/22/25  George Howe MD   ipratropium-albuterol (DUO-NEB) 0.5-2.5 mg/3 ml nebulizer Take 3 mL by nebulization 4 (Four) Times a Day As Needed for Wheezing or Shortness of Air for up to 120 days. 12/31/24 4/30/25  Brammell, Timothy Duane, MD   isosorbide mononitrate (IMDUR) 30 MG 24 hr tablet Take 1 tablet by mouth Daily.    George Howe MD   Lidocaine Viscous HCl (XYLOCAINE) 2 % solution Take 10 mL by mouth As Needed for Mild Pain (Take prior to meals up to 3-4 times per day to help with pain with swallowing). 10/18/24   Tereso Abarca MD   lidocaine-prilocaine (EMLA) 2.5-2.5 % cream Apply 1 Application topically to the appropriate area as directed See Admin Instructions. Apply one hour prior to port access 10/14/24   Wilbert Delgado MD   methocarbamol (ROBAXIN) 500 MG tablet Take 1 tablet by mouth Every 6 (Six) Hours As Needed for Muscle Spasms. 12/31/24   Brammell, Timothy Duane, MD   mometasone (ELOCON) 0.1 % ointment Apply to affected skin of chest and back 2-3 times daily as needed for itching from radiation treatments. 11/15/24   Tereso Abarca MD   omeprazole (priLOSEC) 40 MG capsule Take 1 capsule by mouth Daily.    George Howe MD   ondansetron (ZOFRAN) 8 MG tablet Take 1 tablet by mouth 3 (Three) Times a Day As Needed for Nausea or Vomiting. 10/10/24   Wilbert Delgado MD   oxyCODONE (ROXICODONE) 5 MG/5ML solution Take 5 mL by mouth Every 4 (Four) Hours As Needed for Moderate Pain (Pain with swallowing.). 11/18/24   Tereso Abarca MD   potassium chloride (KAYCIEL) 20 mEq/15 mL solution Take 15 mL by mouth Daily. 12/17/24   Wilbert Delgado MD   sucralfate (Carafate) 1 GM/10ML suspension Take 10 mL by mouth 4 (Four) Times a Day As Needed (For pain and discomfort with swallowing).  "10/18/24   Tereso Abarca MD        Objective / Physical Exam     Vital signs:  Temp: 97.4 °F (36.3 °C)  BP: 116/76  Heart Rate: 94  Resp: 22  SpO2: 94 %    Admission Weight:      Physical Exam  Vitals and nursing note reviewed.   HENT:      Head: Normocephalic and atraumatic.   Eyes:      Extraocular Movements: Extraocular movements intact.      Pupils: Pupils are equal, round, and reactive to light.   Cardiovascular:      Rate and Rhythm: Normal rate and regular rhythm.      Pulses: Normal pulses.      Heart sounds: Normal heart sounds.   Pulmonary:      Effort: Pulmonary effort is normal.      Breath sounds: Normal breath sounds.   Abdominal:      General: Bowel sounds are normal.      Palpations: Abdomen is soft.      Tenderness: There is no abdominal tenderness.   Musculoskeletal:         General: Normal range of motion.   Skin:     General: Skin is warm and dry.   Neurological:      Mental Status: She is alert and oriented to person, place, and time.   Psychiatric:         Mood and Affect: Mood normal.         Behavior: Behavior normal.          Labs     Results from last 7 days   Lab Units 01/08/25 2045   WBC 10*3/mm3 5.05   HEMOGLOBIN g/dL 8.6*   HEMATOCRIT % 27.4*   PLATELETS 10*3/mm3 150            Invalid input(s): \"BILI TOTAL\"               Imaging     XR Chest 1 View    Result Date: 1/8/2025  XR CHEST 1 VW Date of Exam: 1/8/2025 7:59 PM EST Indication: Suspicion of aspiration. History of right lung cancer. Comparison: 10/7/2024. Findings: The heart size is normal. The pulmonary vascular markings are normal. There is a right-sided port in place. The mass in the right upper lobe has either decreased in size or resolved. The remaining lungs and pleural spaces are clear. There are emphysematous changes. There are chronic age-related changes involving the bony thorax and thoracic aorta.     Impression: 1.The mass in the right upper lobe has either decreased in size or resolved. 2.No active pulmonary " disease. 3.Emphysema. Electronically Signed: Gilson Teran MD  1/8/2025 8:08 PM EST  Workstation ID: [id number]          Current Medications     Scheduled Meds:  sodium chloride, 10 mL, Intravenous, Q12H         Continuous Infusions:  sodium chloride, 100 mL/hr           [facility]  01/08/25   21:01 EST   I have reviewed this document and agree with the contents of this document

## 2025-01-09 NOTE — ANESTHESIA POSTPROCEDURE EVALUATION
Patient: Kandi Fuentes    Procedure Summary       Date: 01/09/25 [facility]    Anesthesia Start: 1430 Anesthesia Stop: 1447    Procedure: ESOPHAGOGASTRODUODENOSCOPY WITH DILATION (BOUGIE 32, 36, 40) Diagnosis:       Dysphagia, unspecified type      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      (Dysphagia, unspecified type [R13.10])      (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 [C34.11])    Surgeons: Jason Black MD Provider: Harley Champagne MD    Anesthesia Type: general ASA Status: 3            Anesthesia Type: general    Vitals  Vitals Value Taken Time   /61 01/09/25 1513   Temp     Pulse 78 01/09/25 1514   Resp 17 01/09/25 1510   SpO2 93 % 01/09/25 1514   Vitals shown include unfiled device data.        Post Anesthesia Care and Evaluation    Patient location during evaluation: PACU  Patient participation: complete - patient participated  Level of consciousness: awake  Pain scale: See nurse's notes for pain score.  Pain management: adequate    Airway patency: patent  Anesthetic complications: No anesthetic complications  PONV Status: none  Cardiovascular status: acceptable  Respiratory status: acceptable and spontaneous ventilation  Hydration status: acceptable    Comments: Patient seen and examined postoperatively; vital signs stable; SpO2 greater than or equal to 90%; cardiopulmonary status stable; nausea/vomiting adequately controlled; pain adequately controlled; no apparent anesthesia complications; patient discharged from anesthesia care when discharge criteria were met

## 2025-01-09 NOTE — ANESTHESIA PREPROCEDURE EVALUATION
Anesthesia Evaluation     Patient summary reviewed and Nursing notes reviewed   no history of anesthetic complications:   NPO Solid Status: > 8 hours  NPO Liquid Status: > 2 hours           Airway   Dental      Pulmonary    (+) a smoker Current, lung cancer, COPD,  Cardiovascular     ECG reviewed  PT is on anticoagulation therapy  Patient on routine beta blocker    (+) hypertension, past MI , CAD, dysrhythmias PVC, Tachycardia, hyperlipidemia      Neuro/Psych  GI/Hepatic/Renal/Endo      Musculoskeletal     (+) chronic pain  Abdominal    Substance History      OB/GYN          Other   blood dyscrasia anemia,   history of cancer    ROS/Med Hx Other: Additional History:  Hyponatremia, hypocalcemia, hypokalemia, pancytopenia, dysphagia    PSH:    CHOLECYSTECTOMY CORONARY ANGIOPLASTY WITH STENT PLACEMENT  MOLE REMOVAL SKIN LESION EXCISION  BACK SURGERY BRONCHOSCOPY WITH ION ROBOTIC ASSIST  VENOUS ACCESS DEVICE (PORT) INSERTION               Anesthesia Plan    ASA 3     general   total IV anesthesia  (Patient identified; pre-operative vital signs, all relevant labs/studies, complete medical/surgical/anesthetic history, full medication list, full allergy list, and NPO status obtained/reviewed; physical assessment performed; anesthetic options, side effects, potential complications, risks, and benefits discussed; questions answered; written anesthesia consent obtained; patient cleared for procedure; anesthesia machine and equipment checked and functioning)  intravenous induction     Anesthetic plan, risks, benefits, and alternatives have been provided, discussed and informed consent has been obtained with: patient.    Plan discussed with CRNA and CAA.    CODE STATUS:    Level Of Support Discussed With: Patient  Code Status (Patient has no pulse and is not breathing): CPR (Attempt to Resuscitate)  Medical Interventions (Patient has pulse or is breathing): Full Support

## 2025-01-09 NOTE — CASE MANAGEMENT/SOCIAL WORK
Continued Stay Note   [facility]     Patient Name: Kandi Fuentes  MRN: [id number]  Today's Date: 1/9/2025    Admit Date: 1/8/2025        Discharge Plan    Patient off unit for procedure. Will f/u for full CM assessment tomorrow               Lisa Camp RN      [facility]  Office: [phone]  Cell: [phone]  Fax # [phone]

## 2025-01-10 LAB
ANION GAP SERPL CALCULATED.3IONS-SCNC: 7.9 MMOL/L (ref 5–15)
BASOPHILS # BLD AUTO: 0.01 10*3/MM3 (ref 0–0.2)
BASOPHILS NFR BLD AUTO: 0.2 % (ref 0–1.5)
BUN SERPL-MCNC: 8 MG/DL (ref 8–23)
BUN/CREAT SERPL: 19.5 (ref 7–25)
CA-I SERPL ISE-MCNC: 1.09 MMOL/L (ref 1.15–1.3)
CALCIUM SPEC-SCNC: 9.2 MG/DL (ref 8.6–10.5)
CHLORIDE SERPL-SCNC: 95 MMOL/L (ref 98–107)
CO2 SERPL-SCNC: 32.1 MMOL/L (ref 22–29)
CREAT SERPL-MCNC: 0.41 MG/DL (ref 0.57–1)
DEPRECATED RDW RBC AUTO: 57.1 FL (ref 37–54)
EGFRCR SERPLBLD CKD-EPI 2021: 110.7 ML/MIN/1.73
EOSINOPHIL # BLD AUTO: 0 10*3/MM3 (ref 0–0.4)
EOSINOPHIL NFR BLD AUTO: 0 % (ref 0.3–6.2)
ERYTHROCYTE [DISTWIDTH] IN BLOOD BY AUTOMATED COUNT: 18.5 % (ref 12.3–15.4)
GLUCOSE SERPL-MCNC: 87 MG/DL (ref 65–99)
HCT VFR BLD AUTO: 26 % (ref 34–46.6)
HGB BLD-MCNC: 8.3 G/DL (ref 12–15.9)
IMM GRANULOCYTES # BLD AUTO: 0.02 10*3/MM3 (ref 0–0.05)
IMM GRANULOCYTES NFR BLD AUTO: 0.5 % (ref 0–0.5)
LYMPHOCYTES # BLD AUTO: 0.54 10*3/MM3 (ref 0.7–3.1)
LYMPHOCYTES NFR BLD AUTO: 13.1 % (ref 19.6–45.3)
MAGNESIUM SERPL-MCNC: 1.6 MG/DL (ref 1.6–2.4)
MCH RBC QN AUTO: 32.3 PG (ref 26.6–33)
MCHC RBC AUTO-ENTMCNC: 31.9 G/DL (ref 31.5–35.7)
MCV RBC AUTO: 101.2 FL (ref 79–97)
MONOCYTES # BLD AUTO: 0.64 10*3/MM3 (ref 0.1–0.9)
MONOCYTES NFR BLD AUTO: 15.6 % (ref 5–12)
NEUTROPHILS NFR BLD AUTO: 2.9 10*3/MM3 (ref 1.7–7)
NEUTROPHILS NFR BLD AUTO: 70.6 % (ref 42.7–76)
NRBC BLD AUTO-RTO: 0 /100 WBC (ref 0–0.2)
PHOSPHATE SERPL-MCNC: 3.1 MG/DL (ref 2.5–4.5)
PLATELET # BLD AUTO: 176 10*3/MM3 (ref 140–450)
PMV BLD AUTO: 9.7 FL (ref 6–12)
POTASSIUM SERPL-SCNC: 3.1 MMOL/L (ref 3.5–5.2)
POTASSIUM SERPL-SCNC: 4.4 MMOL/L (ref 3.5–5.2)
RBC # BLD AUTO: 2.57 10*6/MM3 (ref 3.77–5.28)
SODIUM SERPL-SCNC: 135 MMOL/L (ref 136–145)
WBC NRBC COR # BLD AUTO: 4.11 10*3/MM3 (ref 3.4–10.8)

## 2025-01-10 PROCEDURE — 84132 ASSAY OF SERUM POTASSIUM: CPT | Performed by: STUDENT IN AN ORGANIZED HEALTH CARE EDUCATION/TRAINING PROGRAM

## 2025-01-10 PROCEDURE — 85025 COMPLETE CBC W/AUTO DIFF WBC: CPT | Performed by: INTERNAL MEDICINE

## 2025-01-10 PROCEDURE — 80048 BASIC METABOLIC PNL TOTAL CA: CPT | Performed by: INTERNAL MEDICINE

## 2025-01-10 PROCEDURE — G0378 HOSPITAL OBSERVATION PER HR: HCPCS

## 2025-01-10 PROCEDURE — 82330 ASSAY OF CALCIUM: CPT | Performed by: INTERNAL MEDICINE

## 2025-01-10 PROCEDURE — 84100 ASSAY OF PHOSPHORUS: CPT | Performed by: INTERNAL MEDICINE

## 2025-01-10 PROCEDURE — 83735 ASSAY OF MAGNESIUM: CPT | Performed by: INTERNAL MEDICINE

## 2025-01-10 PROCEDURE — 25810000003 SODIUM CHLORIDE 0.9 % SOLUTION: Performed by: INTERNAL MEDICINE

## 2025-01-10 RX ORDER — METHOCARBAMOL 500 MG/1
500 TABLET, FILM COATED ORAL EVERY 6 HOURS PRN
Status: DISCONTINUED | OUTPATIENT
Start: 2025-01-10 | End: 2025-01-11 | Stop reason: HOSPADM

## 2025-01-10 RX ORDER — CLOPIDOGREL BISULFATE 75 MG/1
75 TABLET ORAL DAILY
Status: DISCONTINUED | OUTPATIENT
Start: 2025-01-10 | End: 2025-01-11 | Stop reason: HOSPADM

## 2025-01-10 RX ORDER — POTASSIUM CHLORIDE 1.5 G/1.58G
40 POWDER, FOR SOLUTION ORAL EVERY 4 HOURS
Status: COMPLETED | OUTPATIENT
Start: 2025-01-10 | End: 2025-01-10

## 2025-01-10 RX ORDER — CARVEDILOL 3.12 MG/1
3.12 TABLET ORAL 2 TIMES DAILY WITH MEALS
Status: DISCONTINUED | OUTPATIENT
Start: 2025-01-10 | End: 2025-01-11 | Stop reason: HOSPADM

## 2025-01-10 RX ORDER — ASPIRIN 81 MG/1
81 TABLET, CHEWABLE ORAL DAILY
Status: DISCONTINUED | OUTPATIENT
Start: 2025-01-10 | End: 2025-01-11 | Stop reason: HOSPADM

## 2025-01-10 RX ORDER — ATORVASTATIN CALCIUM 40 MG/1
40 TABLET, FILM COATED ORAL DAILY
Status: DISCONTINUED | OUTPATIENT
Start: 2025-01-10 | End: 2025-01-11 | Stop reason: HOSPADM

## 2025-01-10 RX ADMIN — SODIUM CHLORIDE 100 ML/HR: 9 INJECTION, SOLUTION INTRAVENOUS at 17:42

## 2025-01-10 RX ADMIN — POTASSIUM CHLORIDE 40 MEQ: 1.5 POWDER, FOR SOLUTION ORAL at 12:44

## 2025-01-10 RX ADMIN — Medication 10 ML: at 08:07

## 2025-01-10 RX ADMIN — POTASSIUM CHLORIDE 40 MEQ: 1.5 POWDER, FOR SOLUTION ORAL at 17:44

## 2025-01-10 RX ADMIN — Medication 10 ML: at 21:29

## 2025-01-10 RX ADMIN — POTASSIUM CHLORIDE 40 MEQ: 1.5 POWDER, FOR SOLUTION ORAL at 08:06

## 2025-01-10 RX ADMIN — SUCRALFATE 1 G: 1 TABLET ORAL at 12:44

## 2025-01-10 RX ADMIN — CARVEDILOL 3.12 MG: 3.12 TABLET, FILM COATED ORAL at 17:43

## 2025-01-10 RX ADMIN — METHOCARBAMOL TABLETS 500 MG: 500 TABLET, COATED ORAL at 15:10

## 2025-01-10 RX ADMIN — SUCRALFATE 1 G: 1 TABLET ORAL at 17:44

## 2025-01-10 RX ADMIN — PANTOPRAZOLE SODIUM 40 MG: 40 INJECTION, POWDER, FOR SOLUTION INTRAVENOUS at 17:44

## 2025-01-10 RX ADMIN — PANTOPRAZOLE SODIUM 40 MG: 40 INJECTION, POWDER, FOR SOLUTION INTRAVENOUS at 07:21

## 2025-01-10 RX ADMIN — CARVEDILOL 3.12 MG: 3.12 TABLET, FILM COATED ORAL at 08:06

## 2025-01-10 RX ADMIN — SODIUM CHLORIDE 100 ML/HR: 9 INJECTION, SOLUTION INTRAVENOUS at 08:06

## 2025-01-10 RX ADMIN — ATORVASTATIN CALCIUM 40 MG: 40 TABLET, FILM COATED ORAL at 08:06

## 2025-01-10 RX ADMIN — ASPIRIN 81 MG CHEWABLE TABLET 81 MG: 81 TABLET CHEWABLE at 08:06

## 2025-01-10 RX ADMIN — SUCRALFATE 1 G: 1 TABLET ORAL at 07:20

## 2025-01-10 RX ADMIN — CLOPIDOGREL BISULFATE 75 MG: 75 TABLET ORAL at 08:06

## 2025-01-10 NOTE — PROGRESS NOTES
[facility] MEDICINE SERVICE  DAILY PROGRESS NOTE    NAME: Kandi Fuentes  : 1960  MRN: [id number]      LOS: 1 day     PROVIDER OF SERVICE: Shawna Javed MD    Chief Complaint: Dysphagia    Subjective:     Interval History:    Patient seen and evaluated at bedside. Eating well this morning. Still reporting weakness. Eval with PT pending.    Treatment plan discussed with patient. All questions addressed.     Review of Systems:   Denies fevers, chills  Denies chest pain, edema  Denies shortness of breath, cough  Denies dysuria, hematuria  +generalized weakness    Objective:     Vital Signs  Temp:  [97.6 °F (36.4 °C)-98.1 °F (36.7 °C)] 97.8 °F (36.6 °C)  Heart Rate:  [81-94] 92  Resp:  [17-24] 21  BP: (112-134)/(47-69) 124/66   Body mass index is 18.76 kg/m².    Physical Exam   General: No acute distress, thin  Neuro: AAOx3, no FND  CV: RRR, no peripheral edema  Pulm: CTAB, no increased work of breathing  Abd: Soft, nontender, nondistended  Skin: No rashes or lesions on exposed skin  Psych: Appropriate mood and affect    Scheduled Meds   pantoprazole, 40 mg, Intravenous, BID AC  potassium chloride, 40 mEq, Oral, Q4H  sodium chloride, 10 mL, Intravenous, Q12H  sodium chloride, 10 mL, Intravenous, Q12H  sucralfate, 1 g, Oral, TID AC       PRN Meds     aluminum-magnesium hydroxide-simethicone    senna-docusate sodium **AND** polyethylene glycol **AND** bisacodyl **AND** bisacodyl    Calcium Replacement - Follow Nurse / BPA Driven Protocol    ipratropium-albuterol    Magnesium Standard Dose Replacement - Follow Nurse / BPA Driven Protocol    melatonin    ondansetron ODT **OR** ondansetron    ondansetron ODT **OR** ondansetron    Phosphorus Replacement - Follow Nurse / BPA Driven Protocol    Potassium Replacement - Follow Nurse / BPA Driven Protocol    sodium chloride    sodium chloride    sodium chloride    sodium chloride   Infusions  sodium chloride, 100 mL/hr, Last Rate: 100 mL/hr (25  2316)  sodium chloride, 9 mL/hr          Diagnostic Data    Results from last 7 days   Lab Units 01/10/25  0604 01/09/25  0657 01/08/25  2045   WBC 10*3/mm3 4.11   < > 5.05   HEMOGLOBIN g/dL 8.3*   < > 8.6*   HEMATOCRIT % 26.0*   < > 27.4*   PLATELETS 10*3/mm3 176   < > 150   GLUCOSE mg/dL 87   < > 89   CREATININE mg/dL 0.41*   < > 0.46*   BUN mg/dL 8   < > 16   SODIUM mmol/L 135*   < > 135*   POTASSIUM mmol/L 3.1*   < > 2.7*   AST (SGOT) U/L  --   --  17   ALT (SGPT) U/L  --   --  10   ALK PHOS U/L  --   --  73   BILIRUBIN mg/dL  --   --  1.1   ANION GAP mmol/L 7.9   < > 10.6    < > = values in this interval not displayed.       XR Chest 1 View    Result Date: 1/8/2025  Impression: 1.The mass in the right upper lobe has either decreased in size or resolved. 2.No active pulmonary disease. 3.Emphysema. Electronically Signed: Gilson Teran MD  1/8/2025 8:08 PM EST  Workstation ID: [id number]     Interval results reviewed.    Assessment/Plan:     Dysphagia  - GI consulted, appreciate recs  - SLP consulted, appreciate recs  - s/p EGD  - Tolerating diet    Hypokalemia  - Replete per protocol  - Repeat BMP in AM    Generalized weakness  - Likely multifactorial in setting of chronic illness and poor po intake  - PT/OT eval pending    Small cell lung cancer of right lung  - Oncology consulted on admission, appreciate recs    Anemia, secondary to chemotherapy  - Oncology consulted, appreciate recs  - Repeat CBC in AM    Coronary arery disease  - Continue asa/plavix, bb, statin when able to tolerate po    COPD  - Not in exacerbation  - PRN nebs    Treatment plan discussed with nursing staff, case management.     VTE Prophylaxis:  Mechanical VTE prophylaxis orders are present.    Holding pharmacological dvt in setting of planned procedure    Code status is   Code Status and Medical Interventions: CPR (Attempt to Resuscitate); Full Support   Ordered at: 01/08/25 2123     Level Of Support Discussed With:    Patient     Code Status  (Patient has no pulse and is not breathing):    CPR (Attempt to Resuscitate)     Medical Interventions (Patient has pulse or is breathing):    Full Support       Plan for disposition: Home 1/11/25    Barriers to discharge: PT/OT pending    Time: 35+ minutes     Signature: Electronically signed by Shawna Javed MD, 01/10/25, 07:44 EST.  [facility] Hospitalist Team

## 2025-01-10 NOTE — PROGRESS NOTES
Nutrition Services  Patient Name: Kandi Fuentes  YOB: 1960  MRN: [id number]  Admission date: 1/8/2025    PROGRESS NOTE      Nutrition Intervention Updates: Boost Original BID (Provides 480 kcals, 20 g protein if consumed)      If pt is unable to tolerate solids and alternative nutrition is needed, please consult RD        Encounter Information: Progress note to check on nutrition plan. Pt underwent EGD with dilation yesterday, was started on a soft to chew diet. Will order nutrition supplements to aid PO intake. No meal intakes have been documented yet.        PO Diet: Diet: Regular/House; Texture: Soft to Chew (NDD 3); Soft to Chew: Whole Meat; Fluid Consistency: Thin (IDDSI 0)   PO Supplements: None ordered    PO Intake:  None documented        Current nutrition support: -   Nutrition support review: -       Labs (reviewed below): Low K+, repletion is ordered        GI Function:  BM 1/8, pt has stool softeners ordered PRN        Brief weight review   Wt Readings from Last 10 Encounters:   01/10/25 0600 54.3 kg (119 lb 12.4 oz)   01/09/25 0758 54.6 kg (120 lb 6.4 oz)   01/08/25 1420 54.3 kg (119 lb 11.2 oz)   12/27/24 1946 62.8 kg (138 lb 7.2 oz)   12/27/24 1325 63.5 kg (140 lb)   12/18/24 1100 62.6 kg (138 lb)   12/18/24 1114 62.6 kg (138 lb)   12/17/24 1116 61.2 kg (135 lb)   12/17/24 1025 61.3 kg (135 lb 3.2 oz)   12/16/24 1155 60.4 kg (133 lb 3.2 oz)   11/27/24 0925 61.5 kg (135 lb 9.6 oz)   11/26/24 0935 61.5 kg (135 lb 9.6 oz)        Results from last 7 days   Lab Units 01/10/25  0604 01/09/25  0657 01/08/25  2045   SODIUM mmol/L 135* 135* 135*   POTASSIUM mmol/L 3.1* 3.3* 2.7*   CHLORIDE mmol/L 95* 96* 94*   CO2 mmol/L 32.1* 28.4 30.4*   BUN mg/dL 8 15 16   CREATININE mg/dL 0.41* 0.40* 0.46*   CALCIUM mg/dL 9.2 9.0 9.2   BILIRUBIN mg/dL  --   --  1.1   ALK PHOS U/L  --   --  73   ALT (SGPT) U/L  --   --  10   AST (SGOT) U/L  --   --  17   GLUCOSE mg/dL 87 78 89     Results from last 7 days    Lab Units 01/10/25  0604 01/09/25  0657 01/08/25  2213   MAGNESIUM mg/dL 1.6 1.7 1.7   PHOSPHORUS mg/dL 3.1 3.3 3.6   HEMOGLOBIN g/dL 8.3* 7.7*  --    HEMATOCRIT % 26.0* 24.1*  --          RD to follow up per protocol.    Electronically signed by:  Jazmine Childress RD  01/10/25 10:43 EST

## 2025-01-11 ENCOUNTER — READMISSION MANAGEMENT (OUTPATIENT)
Dept: CALL CENTER | Facility: HOSPITAL | Age: 65
End: 2025-01-11
Payer: MEDICAID

## 2025-01-11 VITALS
SYSTOLIC BLOOD PRESSURE: 111 MMHG | WEIGHT: 124.6 LBS | RESPIRATION RATE: 18 BRPM | OXYGEN SATURATION: 99 % | HEIGHT: 67 IN | DIASTOLIC BLOOD PRESSURE: 64 MMHG | BODY MASS INDEX: 19.56 KG/M2 | TEMPERATURE: 98 F | HEART RATE: 96 BPM

## 2025-01-11 LAB
ANION GAP SERPL CALCULATED.3IONS-SCNC: 6.6 MMOL/L (ref 5–15)
BASOPHILS # BLD AUTO: 0.01 10*3/MM3 (ref 0–0.2)
BASOPHILS NFR BLD AUTO: 0.3 % (ref 0–1.5)
BUN SERPL-MCNC: 7 MG/DL (ref 8–23)
BUN/CREAT SERPL: 15.6 (ref 7–25)
CALCIUM SPEC-SCNC: 8.5 MG/DL (ref 8.6–10.5)
CHLORIDE SERPL-SCNC: 104 MMOL/L (ref 98–107)
CO2 SERPL-SCNC: 28.4 MMOL/L (ref 22–29)
CREAT SERPL-MCNC: 0.45 MG/DL (ref 0.57–1)
DEPRECATED RDW RBC AUTO: 61.4 FL (ref 37–54)
EGFRCR SERPLBLD CKD-EPI 2021: 108.3 ML/MIN/1.73
EOSINOPHIL # BLD AUTO: 0 10*3/MM3 (ref 0–0.4)
EOSINOPHIL NFR BLD AUTO: 0 % (ref 0.3–6.2)
ERYTHROCYTE [DISTWIDTH] IN BLOOD BY AUTOMATED COUNT: 19 % (ref 12.3–15.4)
GLUCOSE SERPL-MCNC: 103 MG/DL (ref 65–99)
HCT VFR BLD AUTO: 23.5 % (ref 34–46.6)
HGB BLD-MCNC: 7.5 G/DL (ref 12–15.9)
IMM GRANULOCYTES # BLD AUTO: 0.02 10*3/MM3 (ref 0–0.05)
IMM GRANULOCYTES NFR BLD AUTO: 0.5 % (ref 0–0.5)
LYMPHOCYTES # BLD AUTO: 0.57 10*3/MM3 (ref 0.7–3.1)
LYMPHOCYTES NFR BLD AUTO: 14.3 % (ref 19.6–45.3)
MCH RBC QN AUTO: 33.2 PG (ref 26.6–33)
MCHC RBC AUTO-ENTMCNC: 31.9 G/DL (ref 31.5–35.7)
MCV RBC AUTO: 104 FL (ref 79–97)
MONOCYTES # BLD AUTO: 0.72 10*3/MM3 (ref 0.1–0.9)
MONOCYTES NFR BLD AUTO: 18 % (ref 5–12)
NEUTROPHILS NFR BLD AUTO: 2.68 10*3/MM3 (ref 1.7–7)
NEUTROPHILS NFR BLD AUTO: 66.9 % (ref 42.7–76)
NRBC BLD AUTO-RTO: 0 /100 WBC (ref 0–0.2)
PLATELET # BLD AUTO: 158 10*3/MM3 (ref 140–450)
PMV BLD AUTO: 9.5 FL (ref 6–12)
POTASSIUM SERPL-SCNC: 4.1 MMOL/L (ref 3.5–5.2)
RBC # BLD AUTO: 2.26 10*6/MM3 (ref 3.77–5.28)
SODIUM SERPL-SCNC: 139 MMOL/L (ref 136–145)
WBC NRBC COR # BLD AUTO: 4 10*3/MM3 (ref 3.4–10.8)

## 2025-01-11 PROCEDURE — 99214 OFFICE O/P EST MOD 30 MIN: CPT | Performed by: INTERNAL MEDICINE

## 2025-01-11 PROCEDURE — 80048 BASIC METABOLIC PNL TOTAL CA: CPT | Performed by: INTERNAL MEDICINE

## 2025-01-11 PROCEDURE — 85025 COMPLETE CBC W/AUTO DIFF WBC: CPT | Performed by: INTERNAL MEDICINE

## 2025-01-11 PROCEDURE — G0378 HOSPITAL OBSERVATION PER HR: HCPCS

## 2025-01-11 PROCEDURE — 25010000002 HEPARIN LOCK FLUSH PER 10 UNITS: Performed by: INTERNAL MEDICINE

## 2025-01-11 RX ORDER — OMEPRAZOLE 40 MG/1
40 CAPSULE, DELAYED RELEASE ORAL 2 TIMES DAILY
Qty: 60 CAPSULE | Refills: 0 | Status: SHIPPED | OUTPATIENT
Start: 2025-01-11 | End: 2025-02-10

## 2025-01-11 RX ORDER — HEPARIN SODIUM (PORCINE) LOCK FLUSH IV SOLN 100 UNIT/ML 100 UNIT/ML
500 SOLUTION INTRAVENOUS ONCE
Status: COMPLETED | OUTPATIENT
Start: 2025-01-11 | End: 2025-01-11

## 2025-01-11 RX ADMIN — Medication 500 UNITS: at 11:35

## 2025-01-11 RX ADMIN — PANTOPRAZOLE SODIUM 40 MG: 40 INJECTION, POWDER, FOR SOLUTION INTRAVENOUS at 08:22

## 2025-01-11 RX ADMIN — ATORVASTATIN CALCIUM 40 MG: 40 TABLET, FILM COATED ORAL at 08:22

## 2025-01-11 RX ADMIN — SUCRALFATE 1 G: 1 TABLET ORAL at 10:55

## 2025-01-11 RX ADMIN — ASPIRIN 81 MG CHEWABLE TABLET 81 MG: 81 TABLET CHEWABLE at 08:22

## 2025-01-11 RX ADMIN — CARVEDILOL 3.12 MG: 3.12 TABLET, FILM COATED ORAL at 08:21

## 2025-01-11 RX ADMIN — CLOPIDOGREL BISULFATE 75 MG: 75 TABLET ORAL at 08:22

## 2025-01-11 RX ADMIN — Medication 10 ML: at 08:22

## 2025-01-11 RX ADMIN — SUCRALFATE 1 G: 1 TABLET ORAL at 08:22

## 2025-01-11 NOTE — CASE MANAGEMENT/SOCIAL WORK
Ambulatory Care Coordination Note  8/6/2018  CM Risk Score: 1  [place] Mortality Risk Score:      ACC: Kong Cannon, SCOTT    Summary Note:    Mother phoned on 8/3/18 crying stating the police are trying to take my kids. She states I've never done nothing get my kids taken from me. [facility] was notified. Mother states she was in process of moving out of her apartment. She states it was very messy because she was moving. She mentioned bugs and  involvement but she was difficult to understand due to crying. Writer question where her children were and she then stated with her Aunt. Mother was informed Writer will talk to ELBA Peres and request she call Mother back. Raysa Pillai attempted to reach Mother without success. Care Coordination Interventions    Referral from Primary Care Provider:  No  Suggested Interventions and Community Resources  Social Work:  In Process         Goals Addressed     None          Prior to Admission medications    Medication Sig Start Date End Date Taking? Authorizing Provider   pediatric multivitamin-iron (POLY-VI-SOL WITH IRON) 15 MG chewable tablet Give 1/2 tablet daily.  1/9/17   PAUL Juarez CNP       Future Appointments  Date Time Provider [address]   10/3/2018 9:00 AM PAUL Juarez CNP [address] Case Management Discharge Note      Final Note: Routine home.         Selected Continued Care - Discharged on 1/11/2025 Admission date: 1/8/2025 - Discharge disposition: Home or Self Care       Transportation Services  Private: Car    Final Discharge Disposition Code: 01 - home or self-care   Visit Information Date & Time Provider Department Dept. Phone Encounter #  
 7/11/2017  9:50 AM Ruslan Canseco MD [facility] at [address] [id number] Follow-up Instructions Return in about 2 days (around 7/13/2017), or if symptoms worsen or fail to improve. Your Appointments 8/7/2017 10:30 AM  
ROUTINE CARE with Ruslan Canseco MD  
[facility] at [facility] Appt Note: 4wk   
 [phone]  
[facility] Upcoming Health Maintenance Date Due INFLUENZA AGE 9 TO ADULT 8/1/2017 PAP AKA CERVICAL CYTOLOGY 11/19/2017 DTaP/Tdap/Td series (2 - Td) 5/17/2026 Allergies as of 7/11/2017  Review Complete On: 7/11/2017 By: Ruslan Canseco MD  
  
 Severity Noted Reaction Type Reactions Sulfa (Sulfonamide Antibiotics) High 03/12/2010    Anaphylaxis Codeine Medium 03/17/2010    Itching Current Immunizations  Reviewed on 5/2/2016 Name Date Influenza Vaccine (Quad) PF 10/27/2016, 10/30/2015 Influenza Vaccine PF 10/11/2013 Influenza Vaccine Split 12/13/2012, 10/5/2011 Pneumococcal Polysaccharide (PPSV-23) 4/11/2016 11:40 AM  
  
 Not reviewed this visit You Were Diagnosed With   
  
 Codes Comments Acute deep vein thrombosis (DVT) of popliteal vein of right lower extremity (HCC)    -  Primary ICD-10-CM: C48.774 ICD-9-CM: 453.41 Vitals BP Pulse Temp Resp Height(growth percentile) Weight(growth percentile) 108/59 (BP 1 Location: Left arm, BP Patient Position: Sitting) 76 98.2 °F (36.8 °C) (Oral) 16 5' 5\" (1.651 m) 237 lb (107.5 kg) LMP SpO2 BMI OB Status Smoking Status (LMP Unknown) 98% 39.44 kg/m2 Hysterectomy Never Smoker Vitals History BMI and BSA Data Body Mass Index Body Surface Area  
 39.44 kg/m 2 2.22 m 2 Preferred Pharmacy Pharmacy Name Phone [facility], [facility] [address] [place] [address] [phone] Your Updated Medication List  
  
   
This list is accurate as of: 7/11/17 10:56 AM.  Always use your most recent med list.  
  
  
  
  
 albuterol 90 mcg/actuation inhaler Commonly known as:  PROVENTIL HFA, VENTOLIN HFA, PROAIR HFA Take 1 Puff by inhalation every four (4) hours as needed for Wheezing. furosemide 40 mg tablet Commonly known as:  LASIX TAKE 1 TABLET BY MOUTH TWICE DAILY AS NEEDED  
  
 methotrexate 2.5 mg tablet Commonly known as:  RHEUMATREX  
TAKE 8 TABLETS BY MOUTH ONCE A WEEK, takes on Saturdays  
  
 oxyCODONE-acetaminophen 5-325 mg per tablet Commonly known as:  PERCOCET Take 1 Tab by mouth every four (4) hours as needed. Max Daily Amount: 6 Tabs. Indications: Pain  
  
 pantoprazole 40 mg tablet Commonly known as:  PROTONIX Take 1 Tab by mouth daily. phentermine 37.5 mg tablet Commonly known as:  ADIPEX-P Take 1/2 tablet before breakfast and before dinner  
  
 raNITIdine 150 mg tablet Commonly known as:  ZANTAC Take 150 mg by mouth two (2) times a day. rivaroxaban 15 mg Tab tablet Commonly known as:  Frederich Gram Take 1 Tab by mouth two (2) times daily (with meals). topiramate 50 mg tablet Commonly known as:  TOPAMAX Take 1 Tab by mouth two (2) times a day. TYLENOL EXTRA STRENGTH 500 mg tablet Generic drug:  acetaminophen Take 1,000 mg by mouth every six (6) hours as needed for Pain.  
  
 warfarin 5 mg tablet Commonly known as:  COUMADIN Take 1 Tab by mouth daily. Indications: DEEP VEIN THROMBOSIS PREVENTION Prescriptions Sent to Pharmacy Refills  
 warfarin (COUMADIN) 5 mg tablet 1 Sig: Take 1 Tab by mouth daily. Indications: DEEP VEIN THROMBOSIS PREVENTION Class: Normal  
 Pharmacy: [address] [place] [address], [address] [place] [address] #: [phone] Route: Oral  
  
Follow-up Instructions Return in about 2 days (around 7/13/2017), or if symptoms worsen or fail to improve. To-Do List   
 07/11/2017 Imaging:  DUPLEX LOWER EXT VENOUS BILAT   
  
 07/11/2017 11:30 AM  
  Appointment with Luis Linares 2 at [facility] Ultrasound ([phone]) No Prep  GENERAL INSTRUCTIONS 1. Bring any non [facility] facility films/reports pertaining to the area being studied with you on the day of appointment. 2. A written order with a valid diagnosis and Physicians signature is required for all scheduled tests. 3. Check in at registration 30 minutes before your appointment time unless you were instructed to do otherwise. Patient Instructions For coumadin, start taking 10 mg (2 tab of the 5mg) daily x 3 days and then we can re-evaluate your dose. Since we discussed the options and you would like to proceed with the Xarelto instead of the Lovenox, please take the Xarelto 15 mg twice daily while we bridge you with coumadin. If you have any signs of bleeding from the rectum, stop taking the medication and go to the ER. Introducing [facility] & HEALTH [facility]! [facility] introduces Signature patient portal. Now you can access parts of your medical record, email your doctor's office, and request medication refills online. 1. In your internet browser, go to [email or web address] 2. Click on the First Time User? Click Here link in the Sign In box. You will see the New Member Sign Up page. 3. Enter your Signature Access Code exactly as it appears below. You will not need to use this code after youve completed the sign-up process. If you do not sign up before the expiration date, you must request a new code. · Signature Access Code: [facility] Expires: 8/9/2017 10:54 AM 
 
4. Enter the last four digits of your Social Security Number (xxxx) and Date of Birth (mm/dd/yyyy) as indicated and click Submit.  You will be taken to the next sign-up page. 5. Create a Bluenote ID. This will be your Bluenote login ID and cannot be changed, so think of one that is secure and easy to remember. 6. Create a Bluenote password. You can change your password at any time. 7. Enter your Password Reset Question and Answer. This can be used at a later time if you forget your password. 8. Enter your e-mail address. You will receive e-mail notification when new information is available in [address] 9. Click Sign Up. You can now view and download portions of your medical record. 10. Click the Download Summary menu link to download a portable copy of your medical information. If you have questions, please visit the Frequently Asked Questions section of the Bluenote website. Remember, Bluenote is NOT to be used for urgent needs. For medical emergencies, dial 911. Now available from your iPhone and Android! Please provide this summary of care documentation to your next provider. Your primary care clinician is listed as Shayy Boo. If you have any questions after today's visit, please call [phone].

## 2025-01-11 NOTE — DISCHARGE PLACEMENT REQUEST
"Abdelrahman Devries (64 y.o. Female)       Date of Birth   1960    Social Security Number       Address   [address] [place] [address] [address] [place] [address] [place] [address]    Home Phone   [phone]    MRN   [id number]       Yazidi   None    Marital Status                               Admission Date   1/8/25    Admission Type   Elective    Admitting Provider   Jorge Alberto Carrion MD    Attending Provider       Department, Room/Bed   [facility] 2F, 2204/1       Discharge Date   1/11/2025    Discharge Disposition   Home or Self Care    Discharge Destination                                 Attending Provider: (none)   Allergies: Morphine, Codeine, Sulfa Antibiotics    Isolation: None   Infection: None   Code Status: CPR    Ht: 170.2 cm (67.01\")   Wt: 56.5 kg (124 lb 9.6 oz)    Admission Cmt: None   Principal Problem: Dysphagia [R13.10]                   Active Insurance as of 1/8/2025       Primary Coverage       Payor Plan Insurance Group Employer/Plan Group    [facility] [place] - [facility] CARE CONNECT [facility] COMMUNITY PLAN PATHWAYS IN        Payor Plan Address Payor Plan Phone Number Payor Plan Fax Number Effective Dates    [address]   11/1/2024 - None Entered    [facility] [phone]         Subscriber Name Subscriber Birth Date Member ID       ABDELRAHMAN DEVRIES 1960 [id number]                     Emergency Contacts        (Rel.) Home Phone Work Phone Mobile Phone    EILEEN LOUISE (Grandchild) -- -- [phone]          "

## 2025-01-11 NOTE — PROGRESS NOTES
"        Hematology/Oncology Inpatient Progress Note    Patient name: Kandi Fuentes  : 1960  MRN: [id number]  Referring Provider: JUANCHO Jimenez  Reason for Consultation: Established patient, direct admission for dysphagia     Hematology/Oncology History (from record):  10/2/2024: In the office for the first time to stage and treat small cell lung cancer of the right lung.  She reported that between July and 2024 she was seen at the hospital with dyspnea and some chest pains.  She was found to have evidence of coronary artery disease and underwent percutaneous angioplasty and stenting of the affected coronary vessels.  In the process, however, a nodule in the right lung was identified.  Further investigations led to the identification of a 4.4 cm lobulated tumor in the posterior right upper lobe communicating with the right middle lobe concerning for malignancy.  Evidence of severe emphysema was present, as well.  Eventually she had a navigational bronchoscopy and pathology reported small cell carcinoma on the biopsies.  A PET scan and MRI did not reveal any suggestion of metastatic disease.  At the time of this visit she is feeling somewhat better.  Her breathing has improved.  She still has some precordial discomfort that she describes is related to the stents \"that I can still feel\".  She has been eating reasonably well and has not lost much weight.  She is also been afebrile.  No diarrhea or dysuria.  On exam she appears chronically ill but is not in distress.  No jaundice.  The lungs are diminished bilaterally in a significant fashion.  The heart is regular.  The abdomen is flat and soft.  No palpable tumors.  No edema.  Independently reviewed all the imaging studies and discussed with her.  Treatment with concurrent chemotherapy and radiation followed by immunotherapy is reasonable.  Discussed with her the regimen of concurrent chemotherapy and radiation and in detail discussed with " her side effects and potential complications of both treatments.  Ideally we should begin on October 7 if it is at all possible.  I have communicated with Dr. Choudhary and with Dr. Abarca.     10/23/2024: Received the first cycle of chemotherapy without any difficulties. She feels about the same at this time and she has not had any new problems. Able to eat well and no nausea, vomiting or unintended weight loss. Denies chest pain and remains with the same degree of dyspnea. No abdominal pain or diarrhea. She continues to smoke at the same rate. On exam she is oriented and conversant. No jaundice. Poor dentition and no oral lesions. Respirations not labored Lungs diminished bilaterally. Heart regular. Abdomen soft and not tender. No edema. The laboratory exams were reviewed and discussed with her. To continue with the same treatment. She's to see me in 4 weeks.      11/27/2024: Tolerating the treatment with chemotherapy well.  She had chest pain and has been dyspneic since last evening.  She was seen in the emergency room at [facility], in [facility], where she spent several hours.  She was found to have an elevated D-dimer and was offered a CT scan.  However, she could not obtain the test because of difficulties with transportation.  She has been tolerating the chemotherapy well.  On exam today she is alert and conversant.  Oriented and in no distress.  There is no jaundice or pallor.  Poor dentition but no oral lesions.  Lungs markedly diminished bilaterally.  She is tachycardic.  Abdomen soft.  No edema.  To proceed with treatment.  To obtain a CT angiogram of the chest to ensure no pulmonary embolism, though I doubt it.  She is to see me in 3 weeks.     12/18/2024: Feeling poorly. Weak and more dyspneic with exertion. Still smoking but less than before. More tremulous than before. Her appetite is poor. She has not had much nausea. No chest pain or cough and no abdominal pain. On exam alert and  oriented. No distress. No jaundice and no oral lesions. Respirations not labored. Lungs diminished bilaterally and heart regular. Abdomen soft. No edema. Reviewed all the laboratory exams. Reviewed all the imaging studies and discussed with her. She has had a very good response to the treatment. She is to complete this cycle of treatment. She will most likely need a red cell transfusion and will have her laboratory exams on 12/20. She will see me in 4 weeks with new scans and MRI of the brain.      Chief complaint: Dysphagia     History of present illness:    Kandi Fuentes is a 64 y.o. female who presented to [facility] on 1/8/2025 with complaints of dysphagia.  She states she has had swallowing difficulties for about 3 weeks and has progressively worsened.  She states most the time swallowing liquids will cause her to gag and vomit.  She feels like something is stuck either in her throat or mid chest.  She states she feels weak and dizzy because she has not been able to eat or drink much.  She has also had decreased urination.  She has had normal bowel movements.  Denies fever, nausea, chest pain or shortness of breath.  Chest x-ray showed the mass in right upper lobe has [facility] very decreased in size now resolved.  Remaining lungs and pleural spaces are clear.  Emphysematous changes and chronic age-related changes involving bony thorax and thoracic aorta.     Of note, patient was recently admitted from 12/27/2024 through 12/31/2024 due to pancytopenia and electrolyte derangements.  She had CT imaging of the head that showed no acute intracranial process but did have left posterior scalp hematoma.  Potassium, phosphorus and magnesium were corrected during her hospitalization.  She also received transfusion of packed red blood cells as well as G-CSF.  She symptomatically improved and was discharged in stable condition.     01/09/25  Hematology/Oncology was consulted.      He/She  has a past medical  history of Cancer, COPD (chronic obstructive pulmonary disease), Coronary artery disease, Elevated cholesterol, Heart attack, Hypertension, Lung cancer (2024), and Tinnitus.     PCP: Elsa Miller APRN    INTERIM SUMMARY  1/11/2025 pt reports eating liquids very well, had cereals, soups, broths and having no pain on swallowing. No bleeding or spitting up blood. Potassium up to WNL and was told she can go home today.    History:  Past Medical History:   Diagnosis Date    Cancer     COPD (chronic obstructive pulmonary disease)     Coronary artery disease     Elevated cholesterol     Heart attack     Hypertension     Lung cancer 2024    Tinnitus    ,   Past Surgical History:   Procedure Laterality Date    BACK SURGERY  2004    bone spur on sciatica    BRONCHOSCOPY WITH ION ROBOTIC ASSIST N/A 09/27/2024    Procedure: BRONCHOSCOPY WITH ION ROBOT, fine needle aspiration and tissue biopsy right upper lobe mass, endobronchial ultrasound with fine needle aspiration lymph nodes (Level 10R);  Surgeon: Serafin Choudhary MD;  Location: [facility] ENDOSCOPY;  Service: Robotics - Pulmonary;  Laterality: N/A;  post: lung mass with lympadenopathy    CHOLECYSTECTOMY  2021    CORONARY ANGIOPLASTY WITH STENT PLACEMENT  07/2024    x2    MOLE REMOVAL  1994    forehead    SKIN LESION EXCISION Right 1994    breast    VENOUS ACCESS DEVICE (PORT) INSERTION Right 10/07/2024    Procedure: INSERTION VENOUS ACCESS DEVICE;  Surgeon: Serafin Choudhary MD;  Location: [facility] MAIN OR;  Service: Thoracic;  Laterality: Right;   ,   Family History   Problem Relation Age of Onset    Breast cancer Mother 62    Heart attack Mother     Lung cancer Sister     Throat cancer Sister     Lung cancer Brother    ,   Social History     Tobacco Use    Smoking status: Every Day     Current packs/day: 1.00     Average packs/day: 1 pack/day for 55.0 years (55.0 ttl pk-yrs)     Types: Cigarettes     Start date: 1970     Passive exposure: Current    Smokeless tobacco: Never    Vaping Use    Vaping status: Never Used   Substance Use Topics    Alcohol use: Never    Drug use: Never   ,   Medications Prior to Admission   Medication Sig Dispense Refill Last Dose/Taking    albuterol sulfate  (90 Base) MCG/ACT inhaler Inhale 2 puffs As Needed for Wheezing or Shortness of Air.   1/8/2025 Morning    aspirin 81 MG chewable tablet Chew 1 tablet Daily.   1/8/2025    atorvastatin (LIPITOR) 40 MG tablet Take 1 tablet by mouth Daily.   1/7/2025 Evening    carvedilol (COREG) 3.125 MG tablet Take 1 tablet by mouth 2 (Two) Times a Day With Meals.   1/8/2025 Morning    clopidogrel (PLAVIX) 75 MG tablet Take 1 tablet by mouth Daily.   1/8/2025 Morning    isosorbide mononitrate (IMDUR) 30 MG 24 hr tablet Take 1 tablet by mouth Daily.   1/8/2025 Morning    omeprazole (priLOSEC) 40 MG capsule Take 1 capsule by mouth Daily.   Past Week Morning    Acetaminophen (Tylenol) 325 MG capsule Take 650 mg by mouth As Needed.   Unknown    budesonide-formoterol (Breyna) 160-4.5 MCG/ACT inhaler Inhale 2 puffs 2 (Two) Times a Day. 1 each 12 Unknown    ipratropium-albuterol (DUO-NEB) 0.5-2.5 mg/3 ml nebulizer Take 3 mL by nebulization 4 (Four) Times a Day As Needed for Wheezing or Shortness of Air for up to 120 days. 120 mL 5 Unknown    Lidocaine Viscous HCl (XYLOCAINE) 2 % solution Take 10 mL by mouth As Needed for Mild Pain (Take prior to meals up to 3-4 times per day to help with pain with swallowing). 600 mL 4 Unknown    lidocaine-prilocaine (EMLA) 2.5-2.5 % cream Apply 1 Application topically to the appropriate area as directed See Admin Instructions. Apply one hour prior to port access 30 g 3     methocarbamol (ROBAXIN) 500 MG tablet Take 1 tablet by mouth Every 6 (Six) Hours As Needed for Muscle Spasms. 50 tablet 1 Unknown    mometasone (ELOCON) 0.1 % ointment Apply to affected skin of chest and back 2-3 times daily as needed for itching from radiation treatments. 50 g 2 Unknown    ondansetron (ZOFRAN) 8 MG  tablet Take 1 tablet by mouth 3 (Three) Times a Day As Needed for Nausea or Vomiting. 30 tablet 5 Unknown    oxyCODONE (ROXICODONE) 5 MG/5ML solution Take 5 mL by mouth Every 4 (Four) Hours As Needed for Moderate Pain (Pain with swallowing.). 300 mL 0     potassium chloride (KAYCIEL) 20 mEq/15 mL solution Take 15 mL by mouth Daily. 450 mL 5 Unknown    sucralfate (Carafate) 1 GM/10ML suspension Take 10 mL by mouth 4 (Four) Times a Day As Needed (For pain and discomfort with swallowing). 600 mL 4    , Scheduled Meds:  aspirin, 81 mg, Oral, Daily  atorvastatin, 40 mg, Oral, Daily  carvedilol, 3.125 mg, Oral, BID With Meals  clopidogrel, 75 mg, Oral, Daily  pantoprazole, 40 mg, Intravenous, BID AC  sodium chloride, 10 mL, Intravenous, Q12H  sodium chloride, 10 mL, Intravenous, Q12H  sucralfate, 1 g, Oral, TID AC    , Continuous Infusions:   , PRN Meds:    aluminum-magnesium hydroxide-simethicone    senna-docusate sodium **AND** polyethylene glycol **AND** bisacodyl **AND** bisacodyl    Calcium Replacement - Follow Nurse / BPA Driven Protocol    ipratropium-albuterol    Magnesium Standard Dose Replacement - Follow Nurse / BPA Driven Protocol    melatonin    methocarbamol    ondansetron ODT **OR** ondansetron    ondansetron ODT **OR** ondansetron    Phosphorus Replacement - Follow Nurse / BPA Driven Protocol    Potassium Replacement - Follow Nurse / BPA Driven Protocol    sodium chloride    sodium chloride    sodium chloride   Allergies:  Morphine, Codeine, and Sulfa antibiotics    ROS:  Review of Systems   Constitutional:  Positive for activity change and appetite change. Negative for chills, diaphoresis, fatigue and fever.   HENT: Negative.     Eyes: Negative.    Respiratory: Negative.     Cardiovascular: Negative.    Gastrointestinal: Negative.    Endocrine: Negative.    Genitourinary: Negative.    Musculoskeletal: Negative.    Skin: Negative.    Allergic/Immunologic: Negative.    Neurological: Negative.   "  Hematological: Negative.    Psychiatric/Behavioral: Negative.          Objective   Vital Signs:   /64 (BP Location: Right arm, Patient Position: Lying)   Pulse 96   Temp 98 °F (36.7 °C) (Oral)   Resp 18   Ht 170.2 cm (67.01\")   Wt 56.5 kg (124 lb 9.6 oz)   SpO2 99%   BMI 19.51 kg/m²     Physical Exam: (performed by MD)  Physical Exam  Constitutional:       General: She is not in acute distress.     Appearance: She is not ill-appearing, toxic-appearing or diaphoretic.      Comments: thin   HENT:      Head: Normocephalic and atraumatic.      Right Ear: External ear normal.      Left Ear: External ear normal.      Nose: Nose normal.      Mouth/Throat:      Mouth: Mucous membranes are moist.      Pharynx: Oropharynx is clear.   Eyes:      Extraocular Movements: Extraocular movements intact.      Pupils: Pupils are equal, round, and reactive to light.   Cardiovascular:      Rate and Rhythm: Normal rate and regular rhythm.      Pulses: Normal pulses.      Heart sounds: Normal heart sounds.   Pulmonary:      Effort: Pulmonary effort is normal.      Breath sounds: Normal breath sounds.   Abdominal:      General: Abdomen is flat. There is no distension.      Tenderness: There is no abdominal tenderness.   Genitourinary:     Comments: deferred  Musculoskeletal:         General: Normal range of motion.      Cervical back: Neck supple.   Skin:     General: Skin is warm and dry.   Neurological:      General: No focal deficit present.      Mental Status: She is alert. Mental status is at baseline.   Psychiatric:         Mood and Affect: Mood normal.         Behavior: Behavior normal.         Results Review:  Lab Results (last 48 hours)       Procedure Component Value Units Date/Time    Basic Metabolic Panel [[id number]]  (Abnormal) Collected: 01/11/25 0448    Specimen: Blood Updated: 01/11/25 0517     Glucose 103 mg/dL      BUN 7 mg/dL      Creatinine 0.45 mg/dL      Sodium 139 mmol/L      Potassium 4.1 mmol/L      " Chloride 104 mmol/L      CO2 28.4 mmol/L      Calcium 8.5 mg/dL      BUN/Creatinine Ratio 15.6     Anion Gap 6.6 mmol/L      eGFR 108.3 mL/min/1.73     Narrative:      GFR Categories in Chronic Kidney Disease (CKD)      GFR Category          GFR (mL/min/1.73)    Interpretation  G1                     90 or greater         Normal or high (1)  G2                      60-89                Mild decrease (1)  G3a                   45-59                Mild to moderate decrease  G3b                   30-44                Moderate to severe decrease  G4                    15-29                Severe decrease  G5                    14 or less           Kidney failure          (1)In the absence of evidence of kidney disease, neither GFR category G1 or G2 fulfill the criteria for CKD.    eGFR calculation 2021 CKD-EPI creatinine equation, which does not include race as a factor    CBC Auto Differential [[id number]]  (Abnormal) Collected: 01/11/25 0448    Specimen: Blood Updated: 01/11/25 0453     WBC 4.00 10*3/mm3      RBC 2.26 10*6/mm3      Hemoglobin 7.5 g/dL      Hematocrit 23.5 %      .0 fL      MCH 33.2 pg      MCHC 31.9 g/dL      RDW 19.0 %      RDW-SD 61.4 fl      MPV 9.5 fL      Platelets 158 10*3/mm3      Neutrophil % 66.9 %      Lymphocyte % 14.3 %      Monocyte % 18.0 %      Eosinophil % 0.0 %      Basophil % 0.3 %      Immature Grans % 0.5 %      Neutrophils, Absolute 2.68 10*3/mm3      Lymphocytes, Absolute 0.57 10*3/mm3      Monocytes, Absolute 0.72 10*3/mm3      Eosinophils, Absolute 0.00 10*3/mm3      Basophils, Absolute 0.01 10*3/mm3      Immature Grans, Absolute 0.02 10*3/mm3      nRBC 0.0 /100 WBC     Potassium [[id number]]  (Normal) Collected: 01/10/25 2128    Specimen: Blood Updated: 01/10/25 2157     Potassium 4.4 mmol/L      Comment: Result checked         Basic Metabolic Panel [[id number]]  (Abnormal) Collected: 01/10/25 0604    Specimen: Blood Updated: 01/10/25 0645     Glucose 87 mg/dL      BUN 8  mg/dL      Creatinine 0.41 mg/dL      Sodium 135 mmol/L      Potassium 3.1 mmol/L      Chloride 95 mmol/L      CO2 32.1 mmol/L      Calcium 9.2 mg/dL      BUN/Creatinine Ratio 19.5     Anion Gap 7.9 mmol/L      eGFR 110.7 mL/min/1.73     Narrative:      GFR Categories in Chronic Kidney Disease (CKD)      GFR Category          GFR (mL/min/1.73)    Interpretation  G1                     90 or greater         Normal or high (1)  G2                      60-89                Mild decrease (1)  G3a                   45-59                Mild to moderate decrease  G3b                   30-44                Moderate to severe decrease  G4                    15-29                Severe decrease  G5                    14 or less           Kidney failure          (1)In the absence of evidence of kidney disease, neither GFR category G1 or G2 fulfill the criteria for CKD.    eGFR calculation 2021 CKD-EPI creatinine equation, which does not include race as a factor    Magnesium [[id number]]  (Normal) Collected: 01/10/25 0604    Specimen: Blood Updated: 01/10/25 0645     Magnesium 1.6 mg/dL     Phosphorus [[id number]]  (Normal) Collected: 01/10/25 0604    Specimen: Blood Updated: 01/10/25 0645     Phosphorus 3.1 mg/dL     Calcium, Ionized [[id number]]  (Abnormal) Collected: 01/10/25 0604    Specimen: Blood Updated: 01/10/25 0627     Ionized Calcium 1.09 mmol/L     CBC Auto Differential [[id number]]  (Abnormal) Collected: 01/10/25 0604    Specimen: Blood Updated: 01/10/25 0625     WBC 4.11 10*3/mm3      RBC 2.57 10*6/mm3      Hemoglobin 8.3 g/dL      Hematocrit 26.0 %      .2 fL      MCH 32.3 pg      MCHC 31.9 g/dL      RDW 18.5 %      RDW-SD 57.1 fl      MPV 9.7 fL      Platelets 176 10*3/mm3      Neutrophil % 70.6 %      Lymphocyte % 13.1 %      Monocyte % 15.6 %      Eosinophil % 0.0 %      Basophil % 0.2 %      Immature Grans % 0.5 %      Neutrophils, Absolute 2.90 10*3/mm3      Lymphocytes, Absolute 0.54 10*3/mm3       Monocytes, Absolute 0.64 10*3/mm3      Eosinophils, Absolute 0.00 10*3/mm3      Basophils, Absolute 0.01 10*3/mm3      Immature Grans, Absolute 0.02 10*3/mm3      nRBC 0.0 /100 WBC              Pending Results: none    Imaging Reviewed:   XR Chest 1 View    Result Date: 1/8/2025  Impression: 1.The mass in the right upper lobe has either decreased in size or resolved. 2.No active pulmonary disease. 3.Emphysema. Electronically Signed: Gilson Teran MD  1/8/2025 8:08 PM EST  Workstation ID: [id number]          Assessment & Plan   ASSESSMENT/PLAN  Stage small cell lung cancer ([id number]) of the right lung.  She has completed radiation and 4 cycles of chemotherapy with carboplatin and topside on 12/16/2024.  Planned for interval restaging scans and MRI of the brain 4 weeks after completion of chemotherapy.  Likely a candidate for consolidation immunotherapy with Imfinzi (outpatient).  -pt has CT scans on 1/24 and f/u with Dr Delgado on 1/27    Dysphagia with secondary dehydration and electrolyte derangements: Further evaluation with gastroenterology   - EGD completed 1/9/2025 with esophageal stricture, radiation esophagitis.   -patient able to eat liquid diet well w/o pain! Told patient she would likely have to f/u with GI on a regular basis as patient will likely need re-stretched    Anemia secondary to chemotherapy: Hemoglobin is stable.    -Continue to monitor CBC.  Transfuse as needed to keep hemoglobin above 7.0 g/dL or as otherwise directed per primary team, transfusions to be managed by primary team    Electronically signed by Shawna Regalado MD PhD, 01/11/25, 8:32 AM EST.        Thank you for this consult. We will be happy to follow along with you.

## 2025-01-11 NOTE — CASE MANAGEMENT/SOCIAL WORK
Discharge Planning Assessment   [facility]     Patient Name: Kandi Fuentes  MRN: [id number]  Today's Date: 1/10/2025    Admit Date: 1/8/2025    Plan: Return home with family   Discharge Needs Assessment       Row Name 01/10/25 2122       Living Environment    People in Home child(paddy), adult;grandchild(paddy)    Name(s) of People in Home jerry Gonzalez, and grandchildren    Current Living Arrangements home    Potentially Unsafe Housing Conditions none    In the past 12 months has the electric, gas, oil, or water company threatened to shut off services in your home? No    Primary Care Provided by self    Provides Primary Care For no one    Family Caregiver if Needed child(paddy), adult    Family Caregiver Names jerry Gonzalez    Quality of Family Relationships helpful;involved;supportive    Able to Return to Prior Arrangements yes       Resource/Environmental Concerns    Resource/Environmental Concerns none    Transportation Concerns none       Transportation Needs    In the past 12 months, has lack of transportation kept you from medical appointments or from getting medications? no    In the past 12 months, has lack of transportation kept you from meetings, work, or from getting things needed for daily living? No       Food Insecurity    Within the past 12 months, you worried that your food would run out before you got the money to buy more. Never true    Within the past 12 months, the food you bought just didn't last and you didn't have money to get more. Never true       Transition Planning    Patient/Family Anticipates Transition to home with family    Patient/Family Anticipated Services at Transition none    Transportation Anticipated family or friend will provide;health plan transportation       Discharge Needs Assessment    Readmission Within the Last 30 Days no previous admission in last 30 days    Equipment Currently Used at Home walker, rolling;nebulizer    Concerns to be Addressed care coordination/care  conferences;discharge planning    Do you want help finding or keeping work or a job? Patient declined    Do you want help with school or training? For example, starting or completing job training or getting a high school diploma, GED or equivalent Patient declined    Anticipated Changes Related to Illness none    Equipment Needed After Discharge shower chair                Discharge Plan       Row Name 01/10/25 2125       Plan    Plan Return home with family    Plan Comments CM met with patient at bedside to discuss discharge planning. Patient lives at home with son Carlos and grandchildren. She does not drive and has friends or health plan transport when needed. She has a rolling walker and nebulizer at home. She states she needs a shower chair. PCP and pharmacy confirmed, declines [facility]. Denies issues affording utilities and food. Does voice some difficulty with certain medications but has used [employer] card and checks prices, she states liquid potassium has been recent costly medication. Denies current HHC or OPPT. Friends will transport at discharge.                  Continued Care and Services - Admitted Since 1/8/2025    No active coordination exists for this encounter.        Demographic Summary       Row Name 01/10/25 2121       General Information    Admission Type observation    Arrived From emergency department    Referral Source admission list    Reason for Consult care coordination/care conference;discharge planning    Preferred Language English       Contact Information    Permission Granted to Share Info With                    Functional Status       Row Name 01/10/25 2122       Functional Status    Usual Activity Tolerance good    Current Activity Tolerance good       Functional Status, IADL    Medications independent    Meal Preparation independent    Housekeeping independent    Laundry independent    Shopping independent       Mental Status Summary    Recent Changes in Mental  Status/Cognitive Functioning no changes             Lisa Camp RN      [facility]  Office: [phone]  Cell: [phone]  Fax # [phone]

## 2025-01-11 NOTE — OUTREACH NOTE
Prep Survey      Flowsheet Row Responses   [facility] patient discharged from? [facility]   Is LACE score < 7 ? No   Eligibility [facility]   Date of Admission 01/08/25   Date of Discharge 01/11/25   Discharge Disposition Home or Self Care   Discharge diagnosis Dysphagia   Does the patient have one of the following disease processes/diagnoses(primary or secondary)? Other   Does the patient have Home health ordered? No   Is there a DME ordered? Yes   What DME was ordered? shower chair   Prep survey completed? Yes            Terra YU - Registered Nurse

## 2025-01-11 NOTE — PLAN OF CARE
Problem: Pain Acute  Goal: Optimal Pain Control and Function  Outcome: Progressing  Intervention: Optimize Psychosocial Wellbeing  Description: Facilitate patient's self-control over pain by providing pain information and allowing choices in treatment.  Consider and address emotional response to pain; express compassion and reassurance.  Explore and promote use of coping strategies; address barriers to successful coping.  Evaluate and assist with psychosocial, cultural and spiritual factors impacting pain.  Assess for risk factors for developing chronic pain, such as depression, fear, pain avoidance and pain catastrophizing.  Modify pain perception using techniques, such as distraction, mindfulness, guided imagery, meditation or music.  Consider referral for ongoing coping support, such as education, relaxation training and role of thoughts.  Recent Flowsheet Documentation  Taken 1/10/2025 0800 by Erin Gastelum RN  Supportive Measures:   active listening utilized   verbalization of feelings encouraged  Diversional Activities:   smartphone   television  Intervention: Prevent or Manage Pain  Description: Evaluate pain level, effect of treatment and patient response at regular intervals.  Minimize painful stimuli; coordinate care and adjust environment (e.g., light, noise, unnecessary movement); promote sleep/rest.  Match pharmacologic analgesia to severity and type of pain mechanism (e.g., neuropathic, muscle, inflammatory); consider multimodal approach.  Provide medication at regular intervals; titrate to patient response; premedicate for painful procedures.  Manage breakthrough pain with additional doses; consider rotation or switching medication.  Monitor for signs of substance tolerance (increased dose to reach desired effect, decreased effect with same dose).  Manage medication-induced adverse events and side effects.  Provide multimodal interventions, such as physical activity, therapeutic exercise, yoga,  TENS (transcutaneous electrical nerve stimulation) and manual therapy.  Train in functional activity modifications, such as body mechanics, posture, ergonomics, energy conservation and activity pacing.  Consider addition of complementary or alternative therapy, such as acupuncture, hypnosis or therapeutic touch.  Recent Flowsheet Documentation  Taken 1/10/2025 1200 by Erin Gastelum, RN  Medication Review/Management:   medications reviewed   high-risk medications identified  Taken 1/10/2025 0800 by Erin Gastelum RN  Medication Review/Management:   medications reviewed   high-risk medications identified   Goal Outcome Evaluation:  Plan of Care Reviewed With: patient           Outcome Evaluation: patient stable on room air, potassium replaced today. No complaints or concerns. Plan of care to continue.

## 2025-01-11 NOTE — PLAN OF CARE
Goal Outcome Evaluation:  Plan of Care Reviewed With: patient        Progress: improving                                Problem: Pain Acute  Goal: Optimal Pain Control and Function  Outcome: Progressing  Intervention: Optimize Psychosocial Wellbeing  Recent Flowsheet Documentation  Taken 1/11/2025 0817 by Rozina Rivera LPN  Diversional Activities:   smartphone   television  Intervention: Prevent or Manage Pain  Recent Flowsheet Documentation  Taken 1/11/2025 1024 by Rozina Rivera LPN  Medication Review/Management: medications reviewed  Taken 1/11/2025 0817 by Rozina Rivera LPN  Medication Review/Management: medications reviewed     Problem: Malnutrition  Goal: Improved Nutritional Intake  Outcome: Progressing     Problem: Fall Injury Risk  Goal: Absence of Fall and Fall-Related Injury  Outcome: Progressing  Intervention: Identify and Manage Contributors  Recent Flowsheet Documentation  Taken 1/11/2025 1024 by Rozina Rivera LPN  Medication Review/Management: medications reviewed  Taken 1/11/2025 0817 by Rozina Rivera LPN  Medication Review/Management: medications reviewed  Intervention: Promote Injury-Free Environment  Recent Flowsheet Documentation  Taken 1/11/2025 1024 by Rozina Rivera LPN  Safety Promotion/Fall Prevention:   safety round/check completed   room organization consistent   nonskid shoes/slippers when out of bed   mobility aid in reach   lighting adjusted   gait belt   fall prevention program maintained   clutter free environment maintained   assistive device/personal items within reach   activity supervised  Taken 1/11/2025 0817 by Rozina Rivera LPN  Safety Promotion/Fall Prevention:   safety round/check completed   room organization consistent   nonskid shoes/slippers when out of bed   mobility aid in reach   lighting adjusted   fall prevention program maintained   gait belt   clutter free environment maintained   activity supervised   assistive device/personal items within reach     Problem: Adult  Inpatient Plan of Care  Goal: Plan of Care Review  Outcome: Progressing  Flowsheets (Taken 1/11/2025 1036)  Progress: improving  Plan of Care Reviewed With: patient  Goal: Patient-Specific Goal (Individualized)  Outcome: Progressing  Goal: Absence of Hospital-Acquired Illness or Injury  Outcome: Progressing  Intervention: Identify and Manage Fall Risk  Recent Flowsheet Documentation  Taken 1/11/2025 1024 by Rozina Rivera LPN  Safety Promotion/Fall Prevention:   safety round/check completed   room organization consistent   nonskid shoes/slippers when out of bed   mobility aid in reach   lighting adjusted   gait belt   fall prevention program maintained   clutter free environment maintained   assistive device/personal items within reach   activity supervised  Taken 1/11/2025 0817 by Rozina Rivera LPN  Safety Promotion/Fall Prevention:   safety round/check completed   room organization consistent   nonskid shoes/slippers when out of bed   mobility aid in reach   lighting adjusted   fall prevention program maintained   gait belt   clutter free environment maintained   activity supervised   assistive device/personal items within reach  Intervention: Prevent Skin Injury  Recent Flowsheet Documentation  Taken 1/11/2025 0817 by Rozina Rivera LPN  Body Position: position changed independently  Skin Protection: transparent dressing maintained  Intervention: Prevent and Manage VTE (Venous Thromboembolism) Risk  Recent Flowsheet Documentation  Taken 1/11/2025 0817 by Rozina Rivera LPN  VTE Prevention/Management: SCDs (sequential compression devices) off  Intervention: Prevent Infection  Recent Flowsheet Documentation  Taken 1/11/2025 1024 by Rozina Rivera LPN  Infection Prevention:   single patient room provided   rest/sleep promoted   personal protective equipment utilized   equipment surfaces disinfected   environmental surveillance performed   cohorting utilized   hand hygiene promoted  Taken 1/11/2025 0817 by Rozina Rivera  LPN  Infection Prevention:   single patient room provided   rest/sleep promoted   personal protective equipment utilized   hand hygiene promoted   equipment surfaces disinfected   environmental surveillance performed   cohorting utilized  Goal: Optimal Comfort and Wellbeing  Outcome: Progressing  Intervention: Provide Person-Centered Care  Recent Flowsheet Documentation  Taken 1/11/2025 0817 by Rozina Rivera LPN  Trust Relationship/Rapport: care explained  Goal: Readiness for Transition of Care  Outcome: Progressing     Problem: Skin Injury Risk Increased  Goal: Skin Health and Integrity  Outcome: Progressing  Intervention: Optimize Skin Protection  Recent Flowsheet Documentation  Taken 1/11/2025 0817 by Rozina Rivera LPN  Activity Management: sitting, edge of bed  Pressure Reduction Techniques: frequent weight shift encouraged  Head of Bed (HOB) Positioning: HOB elevated  Pressure Reduction Devices: pressure-redistributing mattress utilized  Skin Protection: transparent dressing maintained

## 2025-01-11 NOTE — SIGNIFICANT NOTE
01/11/25 1102   OTHER   Discipline physical therapist   Rehab Time/Intention   Session Not Performed other (see comments)  (Up ad tami in room, no functional mobility concerns at this time. PT to sign off.)

## 2025-01-11 NOTE — PLAN OF CARE
Goal Outcome Evaluation:  Plan of Care Reviewed With: patient        Progress: improving  Outcome Evaluation: VSS, remains on RA, up ad tami. Pt anxious for discharge. Potassium last PM better at 4.4, awaiting morning labs at time of note. IVF stopped. No c/o pain. Able to make needs known. Continue current plan of care.

## 2025-01-11 NOTE — DISCHARGE SUMMARY
[facility]  Discharge Summary    Date of Service: 2025  Patient Name: Kandi Fuentes  : 1960  MRN: [id number]    Date of Admission: 2025  Discharge Diagnosis:   -Dysphagia  -Esophageal stricture  -Radiation esophagitis   -Hypokalemia   -small cell lung cancer  -Anemia of chronic disease  -COPD    Date of Discharge:  2025  Primary Care Physician: Elsa Miller, JUANCHO      Presenting Problem:   Dysphagia [R13.10]    Active and Resolved Hospital Problems:  Active Hospital Problems    Diagnosis POA    **Dysphagia [R13.10] Yes    COPD (chronic obstructive pulmonary disease) [J44.9] Yes    Coronary artery disease [I25.10] Yes    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         Hospital Course     Brief HPI and Hospital Course:    64 y.o. female with PMH of SCLC, COPD, HTN, CAD who was directly admitted [facility] for further workup of difficulty swallowing. She stated she has had difficulty swallowing for 3 weeks and it has become progressively worse. Most of the time even swallowing water will cause her to gag and the fluids come right back up. She feels like something is stuck in either her mid chest or her throat. She feels weak and dizzy because she has not been able to eat or drink. She has had decreased urination due to low intake. She has had normal bowel movements. She denied any fever, no nausea, no shortness of breath or chest pain. She has not had any blood in her emesis when she vomits back up the liquids she tries to drink. She apparently has lost 19 lbs since 2024     -Patient admitted and evaluated by GI and underwent an EGD noted to have esophageal stricture which was stretched and patient was noted also to have changes consistent with radiation esophagitis for which she was started on PPI twice daily she was also on PPI at home however it  was once daily patient was started on clear liquid diet after the procedure and tolerated well plan to keep on clear liquid diet for today and then advance to GI soft from tomorrow, she is already feeling great and asking to go home.    The beneficiary is confined to one level of the home environment and there is no toilet on that level.     DISCHARGE Follow Up Recommendations for labs and diagnostics:   Keep upcoming appointment with Dr. Delgado and primary care physician    Reasons For Change In Medications and Indications for New Medications:      Day of Discharge     Vital Signs:  Temp:  [97.7 °F (36.5 °C)-98.2 °F (36.8 °C)] 98 °F (36.7 °C)  Heart Rate:  [] 96  Resp:  [16-28] 18  BP: (108-130)/(53-84) 111/64    Physical Exam:    General Appearance:    Alert, cooperative, in no acute distress   Head:    Normocephalic, without obvious abnormality, atraumatic   Eyes:            conjunctivae and sclerae normal, no icterus, no pallor, corneas  clear, PERRLA   Neck:   No adenopathy, supple, trachea midline, no thyromegaly, no   carotid bruit, no JVD   Lungs:     Clear to auscultation,respirations regular, even and                  unlabored    Heart:    Regular rhythm and normal rate, normal S1 and S2, no            murmur, no gallop, no rub, no click   Abdomen:     Normal bowel sounds, no masses, no organomegaly, soft        nontender, nondistended, no guarding, no rebound                No tenderness   Extremities:   Moves all extremities well, no edema, no cyanosis, no             redness   Lymph nodes:   No palpable adenopathy   Neurologic:   Cranial nerves 2 - 12 grossly intact, sensation intact, DTR       present and equal bilaterally          Pertinent  and/or Most Recent Results     LAB RESULTS:      Lab 01/11/25  0448 01/10/25  0604 01/09/25  0657 01/08/25  2045   WBC 4.00 4.11 5.55 5.05   HEMOGLOBIN 7.5* 8.3* 7.7* 8.6*   HEMATOCRIT 23.5* 26.0* 24.1* 27.4*   PLATELETS 158 176 143 150   NEUTROS ABS 2.68  2.90 4.41 3.51   IMMATURE GRANS (ABS) 0.02 0.02 0.03 0.03   LYMPHS ABS 0.57* 0.54* 0.48* 0.74   MONOS ABS 0.72 0.64 0.63 0.76   EOS ABS 0.00 0.00 0.00 0.00   .0* 101.2* 102.1* 100.4*         Lab 01/11/25  0448 01/10/25  2128 01/10/25  0604 01/09/25  0657 01/08/25 2213 01/08/25 2045   SODIUM 139  --  135* 135*  --  135*   POTASSIUM 4.1 4.4 3.1* 3.3*  --  2.7*   CHLORIDE 104  --  95* 96*  --  94*   CO2 28.4  --  32.1* 28.4  --  30.4*   ANION GAP 6.6  --  7.9 10.6  --  10.6   BUN 7*  --  8 15  --  16   CREATININE 0.45*  --  0.41* 0.40*  --  0.46*   EGFR 108.3  --  110.7 111.4  --  107.7   GLUCOSE 103*  --  87 78  --  89   CALCIUM 8.5*  --  9.2 9.0  --  9.2   IONIZED CALCIUM  --   --  1.09* 1.07* 1.14*  --    MAGNESIUM  --   --  1.6 1.7 1.7  --    PHOSPHORUS  --   --  3.1 3.3 3.6  --          Lab 01/08/25 2045   TOTAL PROTEIN 6.1   ALBUMIN 3.6   GLOBULIN 2.5   ALT (SGPT) 10   AST (SGOT) 17   BILIRUBIN 1.1   ALK PHOS 73                 Lab 01/08/25 2045   ABO TYPING B   RH TYPING Positive   ANTIBODY SCREEN Negative         Brief Urine Lab Results  (Last result in the past 365 days)        Color   Clarity   Blood   Leuk Est   Nitrite   Protein   CREAT   Urine HCG        12/27/24 1555 Yellow   Clear   Moderate (2+)   Negative   Negative   Negative                 Microbiology Results (last 10 days)       ** No results found for the last 240 hours. **            XR Chest 1 View    Result Date: 1/8/2025  Impression: Impression: 1.The mass in the right upper lobe has either decreased in size or resolved. 2.No active pulmonary disease. 3.Emphysema. Electronically Signed: Gilson Teran MD  1/8/2025 8:08 PM EST  Workstation ID: [id number]    CT Head Without Contrast    Result Date: 12/27/2024  Impression: Impression: 1.No acute intracranial abnormality. 2.No acute fracture or traumatic malalignment of the cervical spine. 3.Left posterior scalp hematoma. Electronically Signed: Merritt Dubois DO  12/27/2024 4:21 PM EST   Workstation ID: [id number]    CT Cervical Spine Without Contrast    Result Date: 12/27/2024  Impression: Impression: 1.No acute intracranial abnormality. 2.No acute fracture or traumatic malalignment of the cervical spine. 3.Left posterior scalp hematoma. Electronically Signed: Merritt Sherly,   12/27/2024 4:21 PM EST  Workstation ID: [id number]                 Labs Pending at Discharge:      Procedures Performed  Procedure(s):  ESOPHAGOGASTRODUODENOSCOPY WITH DILATION (BOUGIE 32, 36, 40)  01/09 1435 Upper GI Endoscopy      Consults:   Consults       Date and Time Order Name Status Description    1/8/2025 10:11 PM Hematology & Oncology Inpatient Consult Completed     1/8/2025  7:49 PM Inpatient Gastroenterology Consult      12/28/2024 12:08 PM Hematology & Oncology Inpatient Consult Completed               Discharge Details        Discharge Medications        Changes to Medications        Instructions Start Date   omeprazole 40 MG capsule  Commonly known as: priLOSEC  What changed: when to take this   40 mg, Oral, 2 Times Daily             Continue These Medications        Instructions Start Date   albuterol sulfate  (90 Base) MCG/ACT inhaler  Commonly known as: PROVENTIL HFA;VENTOLIN HFA;PROAIR HFA   2 puffs, As Needed      aspirin 81 MG chewable tablet   81 mg, Daily      atorvastatin 40 MG tablet  Commonly known as: LIPITOR   40 mg, Daily      budesonide-formoterol 160-4.5 MCG/ACT inhaler  Commonly known as: Breyna   2 puffs, Inhalation, 2 Times Daily - RT      carvedilol 3.125 MG tablet  Commonly known as: COREG   3.125 mg, 2 Times Daily With Meals      clopidogrel 75 MG tablet  Commonly known as: PLAVIX   75 mg, Daily      ipratropium-albuterol 0.5-2.5 mg/3 ml nebulizer  Commonly known as: DUO-NEB   3 mL, Nebulization, 4 Times Daily PRN      isosorbide mononitrate 30 MG 24 hr tablet  Commonly known as: IMDUR   30 mg, Daily      Lidocaine Viscous HCl 2 % solution  Commonly known as: XYLOCAINE   10 mL,  Oral, As Needed      lidocaine-prilocaine 2.5-2.5 % cream  Commonly known as: EMLA   1 Application, Topical, See Admin Instructions, Apply one hour prior to port access      methocarbamol 500 MG tablet  Commonly known as: ROBAXIN   500 mg, Oral, Every 6 Hours PRN      mometasone 0.1 % ointment  Commonly known as: ELOCON   Apply to affected skin of chest and back 2-3 times daily as needed for itching from radiation treatments.      ondansetron 8 MG tablet  Commonly known as: ZOFRAN   8 mg, Oral, 3 Times Daily PRN      oxyCODONE 5 MG/5ML solution  Commonly known as: ROXICODONE   5 mg, Oral, Every 4 Hours PRN      potassium chloride 20 mEq/15 mL solution  Commonly known as: KAYCIEL   20 mEq, Oral, Daily      sucralfate 1 GM/10ML suspension  Commonly known as: Carafate   1 g, Oral, 4 Times Daily PRN      Tylenol 325 MG capsule  Generic drug: Acetaminophen   650 mg, As Needed               Allergies   Allergen Reactions    Morphine Hives and Shortness Of Breath    Codeine Hives    Sulfa Antibiotics Hives         Discharge Disposition:   Home or Self Care    Diet:  Hospital:  Diet Order   Procedures    Diet: Regular/House; Texture: Soft to Chew (NDD 3); Soft to Chew: Whole Meat; Fluid Consistency: Thin (IDDSI 0)         Discharge Activity:         CODE STATUS:  Code Status and Medical Interventions: CPR (Attempt to Resuscitate); Full Support   Ordered at: 01/08/25 2123     Level Of Support Discussed With:    Patient     Code Status (Patient has no pulse and is not breathing):    CPR (Attempt to Resuscitate)     Medical Interventions (Patient has pulse or is breathing):    Full Support         Future Appointments   Date Time Provider Department Center   1/22/2025  9:00 AM Elsa Miller APRN [facility]   1/24/2025 10:00 AM [facility] 2 [facility]   1/24/2025 11:00 AM [facility] 1 [facility]   1/27/2025 12:45 PM [facility] INJECTION CHAIR [facility]   1/27/2025  1:00 PM Wilbert Delgado MD [facility]    1/27/2025  1:30 PM Tereso Abarca MD [facility] None           Time spent on Discharge including face to face service:  >30 minutes    Signature: Electronically signed by Dameon Davis MD, 01/11/25, 10:05 EST.  [facility] Hospitalist Team

## 2025-01-11 NOTE — CASE MANAGEMENT/SOCIAL WORK
Continued Stay Note   [facility]     Patient Name: Kandi Fuentes  MRN: [id number]  Today's Date: 1/11/2025    Admit Date: 1/8/2025    Plan: Return home with family   Discharge Plan       Row Name 01/11/25 1116       Plan    Plan Comments CM noted pt's discharge orders in place and that she had requested shower chair upon discharge. CM notified Dr. Davis of pt's request and requested order be placed. CM obtained 3-in-1 bedside commode/shower chair from [facility] closet and delivered to pt's bedside. Pt stated she would prefer a shower chair/bench, rather than the 3-in-1 commode. CM advised pt that the 3-in-1 commodes are the only shower chairs we have in stock at the hospital to deliver to the bedside. CM offered to place referral to [employer] for them to possibly be able to deliver a shower bench to pt's home next week. Pt agreeable. CM returned 3-in-1 commode to [facility] closet. CM updated Dr. Davis and request new order for misc dme : shower bench be placed. Dr. Davis approved order to be changed. CM placed new order and new referral to [employer] via epic basket.             Expected Discharge Date and Time       Expected Discharge Date Expected Discharge Time    Jan 11, 2025           Sarah Santana RN      Office Phone: [phone]  Office Cell: [phone]

## 2025-01-13 ENCOUNTER — TRANSITIONAL CARE MANAGEMENT TELEPHONE ENCOUNTER (OUTPATIENT)
Dept: CALL CENTER | Facility: HOSPITAL | Age: 65
End: 2025-01-13
Payer: MEDICAID

## 2025-01-13 NOTE — OUTREACH NOTE
Call Center TCM Note      Flowsheet Row Responses   [facility] facility patient discharged from? [facility]   Does the patient have one of the following disease processes/diagnoses(primary or secondary)? Other   TCM attempt successful? No   Unsuccessful attempts Attempt 2  [New patient appt- 01/22]            Hilaria Gillis RN    1/13/2025, 12:41 EST

## 2025-01-13 NOTE — OUTREACH NOTE
Call Center TCM Note      Flowsheet Row Responses   [facility] [place] patient discharged from? [facility]   Does the patient have one of the following disease processes/diagnoses(primary or secondary)? Other   TCM attempt successful? No   Unsuccessful attempts Attempt 1            Hilaria Gillis RN    1/13/2025, 11:24 EST

## 2025-01-14 ENCOUNTER — TRANSITIONAL CARE MANAGEMENT TELEPHONE ENCOUNTER (OUTPATIENT)
Dept: CALL CENTER | Facility: HOSPITAL | Age: 65
End: 2025-01-14
Payer: MEDICAID

## 2025-01-14 NOTE — OUTREACH NOTE
Call Center TCM Note      Flowsheet Row Responses   [facility] facility patient discharged from? [facility]   Does the patient have one of the following disease processes/diagnoses(primary or secondary)? Other   TCM attempt successful? No   Unsuccessful attempts Attempt 3            Sowmya Souza RN    1/14/2025, 08:26 EST

## 2025-01-20 NOTE — PROGRESS NOTES
[facility] RADIATION ONCOLOGY  FOLLOW-UP NOTE    NAME: Kandi Fuentes  YOB: 1960  MRN #: [id number]  DATE OF SERVICE: 1/27/2025  REFERRING PROVIDER: Provider, No Known   PRIMARY CARE PROVIDER: Provider, No Known    CHIEF COMPLAINT:  Interval CT/MRI follow-up    DIAGNOSIS:   Encounter Diagnosis   Name Primary?    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       INTERVAL HISTORY     Kandi Fuentes is a 64 y.o. female with history of CAD, chronic CHF, recent MI, severe emphysema and now a history of limited stage small cell carcinoma, stage Stage IIIB (cT3, cN2, cM0) of the right upper lobe. She completed chemoradiation therapy on 11/22/2024, 6000 cGy in 30 fractions to right lung. She returns for routine follow-up.     12/27/2024: Admitted to [facility] for hypokalemia, pancytopenia, closed head injury after fall, and moderate protein malnutrition. She was discharged home in stable condition on 12/31/2024.    1/8/2025: Admitted to [facility] for dysphagia and dehydration. She was evaluated by GI and underwent an EGD noted to have esophageal stricture which was stretched. She was noted also to have changes consistent with radiation esophagitis for which she was started on PPI twice daily. She was discharged home in stable condition on 1/11/2025.    1/24/2025: MRI Brain showed no evidence of intracranial metastatic disease.. CT CAP report is still pending at the time of our visit.    The patient reports she has been able to gain a few pounds since discharge.  She is still struggling swallowing certain items.  She choked on Jell-O.  She cannot eat thicker foods.  She is mainly eating soups and shakes.      IMAGING     MRI Brain With and Without Contrast  [facility]  Result Date: 1/24/2025  Impression: No evidence of intracranial metastatic disease.  Stable mild chronic and age-related  findings as above.    CT CAP With Contrast  [facility]  Result Date: 1/24/2025  Impression:   Report not available at time of our visit.  On personal review I see no evidence of disease progression but discussed the importance of waiting until the final report is available    XR Chest 1 View  [facility]  Result Date: 1/8/2025  Impression: 1.The mass in the right upper lobe has either decreased in size or resolved. 2.No active pulmonary disease. 3.Emphysema. Electronically Signed: Gilson Teran MD  1/8/2025 8:08 PM EST  Workstation ID: [id number]    CT Head Without Contrast  [facility]  Result Date: 12/27/2024  Impression: 1.No acute intracranial abnormality. 2.No acute fracture or traumatic malalignment of the cervical spine. 3.Left posterior scalp hematoma. Electronically Signed: Merritt Dubois,   12/27/2024 4:21 PM EST  Workstation ID: [id number]    CT Cervical Spine Without Contrast  [facility]  Result Date: 12/27/2024  Impression: 1.No acute intracranial abnormality. 2.No acute fracture or traumatic malalignment of the cervical spine. 3.Left posterior scalp hematoma. Electronically Signed: Merritt Dubois,   12/27/2024 4:21 PM EST  Workstation ID: [id number]      PATHOLOGY     No recent pathology to review.      LABS     HEMATOLOGY:  WBC   Date Value Ref Range Status   01/27/2025 4.66 3.40 - 10.80 10*3/mm3 Final     RBC   Date Value Ref Range Status   01/27/2025 3.17 (L) 3.77 - 5.28 10*6/mm3 Final     Hemoglobin   Date Value Ref Range Status   01/27/2025 10.6 (L) 12.0 - 15.9 g/dL Final     Hematocrit   Date Value Ref Range Status   01/27/2025 34.5 34.0 - 46.6 % Final     Platelets   Date Value Ref Range Status   01/27/2025 286 140 - 450 10*3/mm3 Final     CHEMISTRY:  Lab Results   Component Value Date    GLUCOSE 103 (H) 01/11/2025    BUN 7 (L) 01/11/2025    CREATININE 0.45 (L) 01/11/2025    BCR 15.6 01/11/2025    K 4.1 01/11/2025    CO2 28.4 01/11/2025    CALCIUM 8.5 (L)  01/11/2025    ALBUMIN 3.6 01/08/2025    AST 17 01/08/2025    ALT 10 01/08/2025       PROBLEM LIST     Patient Active Problem List   Diagnosis    Lung mass    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    Closed nondisplaced fracture of lateral malleolus of left fibula    Fracture of lateral malleolus    Hypokalemia    Pancytopenia due to chemotherapy    Dysphagia    COPD (chronic obstructive pulmonary disease)    Coronary artery disease        CURRENT MEDICATIONS     Current Outpatient Medications   Medication Instructions    albuterol sulfate  (90 Base) MCG/ACT inhaler 2 puffs, As Needed    aspirin 81 mg, Daily    atorvastatin (LIPITOR) 40 mg, Daily    budesonide-formoterol (Breyna) 160-4.5 MCG/ACT inhaler 2 puffs, Inhalation, 2 Times Daily - RT    carvedilol (COREG) 3.125 mg, 2 Times Daily With Meals    clopidogrel (PLAVIX) 75 mg, Daily    ipratropium-albuterol (DUO-NEB) 0.5-2.5 mg/3 ml nebulizer 3 mL, Nebulization, 4 Times Daily PRN    isosorbide mononitrate (IMDUR) 30 mg, Daily    Lidocaine Viscous HCl (XYLOCAINE) 2 % solution 10 mL, Oral, As Needed    lidocaine-prilocaine (EMLA) 2.5-2.5 % cream 1 Application, Topical, See Admin Instructions, Apply one hour prior to port access    methocarbamol (ROBAXIN) 500 mg, Oral, Every 6 Hours PRN    mometasone (ELOCON) 0.1 % ointment Apply to affected skin of chest and back 2-3 times daily as needed for itching from radiation treatments.    omeprazole (PRILOSEC) 40 mg, Oral, 2 Times Daily    ondansetron (ZOFRAN) 8 mg, Oral, 3 Times Daily PRN    oxyCODONE (ROXICODONE) 5 mg, Oral, Every 4 Hours PRN    potassium chloride (KAYCIEL) 20 mEq/15 mL solution 20 mEq, Oral, Daily    sucralfate (CARAFATE) 1 g, Oral, 4 Times Daily PRN    Tylenol 650 mg, As Needed        ALLERGIES     Allergies   Allergen Reactions    Morphine Hives and Shortness Of Breath    Codeine Hives    Sulfa Antibiotics Hives         REVIEW OF SYSTEMS     Review of Systems   Constitutional:  Positive  for appetite change, fatigue and unexpected weight change.   HENT:  Positive for trouble swallowing.         There were no vitals filed for this visit.   Physical Exam  Constitutional:       General: She is not in acute distress.     Comments: In a wheelchair   HENT:      Head: Normocephalic and atraumatic.   Pulmonary:      Effort: Pulmonary effort is normal. No respiratory distress.   Neurological:      Mental Status: She is alert and oriented to person, place, and time. Mental status is at baseline.   Psychiatric:         Mood and Affect: Mood normal.         Behavior: Behavior normal.          ECOG:  Ambulatory and capable of all selfcare but unable to carry out any work activities; up and about more than 50% of waking hours = 2        ASSESSMENT AND PLAN     ASSESSMENT:    Kandi Fuentes is a 64 y.o. female with history of CAD, chronic CHF, recent MI, severe emphysema and now a history of limited stage small cell carcinoma, stage Stage IIIB (cT3, cN2, cM0) of the right upper lobe. She completed chemoradiation therapy on 11/22/2024, 6000 cGy in 30 fractions to right lung. She returns for routine follow-up.     Diagnoses and all orders for this visit:    1. 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 (Primary)  -     MRI Brain With & Without Contrast; Future  -     CT Chest With Contrast Diagnostic; Future  -     CT Abdomen Pelvis With Contrast; Future       PLAN:      Orders:  - MRI Brain and CT CAP in 3 months  - Follow-up after imaging or sooner as clinically indicated    We reviewed the patient's recent imaging.  We discussed the current findings which shows no progression or recurrence of disease.  We discussed plans for continued close surveillance with MRI brain imaging and CT chest abdomen and pelvis.  We reviewed nutritional options to help the patient as she is struggled with strictures from radiation esophagitis.  We discussed the importance of good nutrition and hydration.  Discussed the  potential need for additional esophageal dilations as needed. Patient expressed understanding.  She is encouraged reach out with questions or concerns prior to her next follow-up.    I spent 30 minutes caring for Kandi on this date of service. This time includes time spent by me in the following activities:preparing for the visit, reviewing tests, obtaining and/or reviewing a separately obtained history, performing a medically appropriate examination and/or evaluation , counseling and educating the patient/family/caregiver, ordering medications, tests, or procedures, documenting information in the medical record, and independently interpreting results and communicating that information with the patient/family/caregiver      FOLLOW UP     No follow-ups on file.     CC: Provider, No Known Provider, No Known

## 2025-01-23 NOTE — PROGRESS NOTES
"                         HEMATOLOGY ONCOLOGY OUTPATIENT FOLLOWUP       Patient name: Kandi Fuentes  : 1960  MRN: [id number]  Primary Care Physician: Provider, No Known  Referring Physician: Provider, No Known  Reason For Consult: Limited stage small cell lung cancer.    History of Present Illness:    10/2/2024: In the office for the first time to stage and treat small cell lung cancer of the right lung.  She reported that between July and 2024 she was seen at the hospital with dyspnea and some chest pains.  She was found to have evidence of coronary artery disease and underwent percutaneous angioplasty and stenting of the affected coronary vessels.  In the process, however, a nodule in the right lung was identified.  Further investigations led to the identification of a 4.4 cm lobulated tumor in the posterior right upper lobe communicating with the right middle lobe concerning for malignancy.  Evidence of severe emphysema was present, as well.  Eventually she had a navigational bronchoscopy and pathology reported small cell carcinoma on the biopsies.  A PET scan and MRI did not reveal any suggestion of metastatic disease.  At the time of this visit she is feeling somewhat better.  Her breathing has improved.  She still has some precordial discomfort that she describes is related to the stents \"that I can still feel\".  She has been eating reasonably well and has not lost much weight.  She is also been afebrile.  No diarrhea or dysuria.  On exam she appears chronically ill but is not in distress.  No jaundice.  The lungs are diminished bilaterally in a significant fashion.  The heart is regular.  The abdomen is flat and soft.  No palpable tumors.  No edema.  Independently reviewed all the imaging studies and discussed with her.  Treatment with concurrent chemotherapy and radiation followed by immunotherapy is reasonable.  Discussed with her the regimen of concurrent chemotherapy and radiation and in " detail discussed with her side effects and potential complications of both treatments.  Ideally we should begin on October 7 if it is at all possible.  I have communicated with Dr. Choudhary and with Dr. Abarca.    10/23/2024: Received the first cycle of chemotherapy without any difficulties. She feels about the same at this time and she has not had any new problems. Able to eat well and no nausea, vomiting or unintended weight loss. Denies chest pain and remains with the same degree of dyspnea. No abdominal pain or diarrhea. She continues to smoke at the same rate. On exam she is oriented and conversant. No jaundice. Poor dentition and no oral lesions. Respirations not labored Lungs diminished bilaterally. Heart regular. Abdomen soft and not tender. No edema. The laboratory exams were reviewed and discussed with her. To continue with the same treatment. She's to see me in 4 weeks.     11/27/2024: Tolerating the treatment with chemotherapy well.  She had chest pain and has been dyspneic since last evening.  She was seen in the emergency room at [facility], in [facility], where she spent several hours.  She was found to have an elevated D-dimer and was offered a CT scan.  However, she could not obtain the test because of difficulties with transportation.  She has been tolerating the chemotherapy well.  On exam today she is alert and conversant.  Oriented and in no distress.  There is no jaundice or pallor.  Poor dentition but no oral lesions.  Lungs markedly diminished bilaterally.  She is tachycardic.  Abdomen soft.  No edema.  To proceed with treatment.  To obtain a CT angiogram of the chest to ensure no pulmonary embolism, though I doubt it.  She is to see me in 3 weeks.    12/18/2024: Feeling poorly. Weak and more dyspneic with exertion. Still smoking but less than before. More tremulous than before. Her appetite is poor. She has not had much nausea. No chest pain or cough and no abdominal pain. On exam  alert and oriented. No distress. No jaundice and no oral lesions. Respirations not labored. Lungs diminished bilaterally and heart regular. Abdomen soft. No edema. Reviewed all the laboratory exams. Reviewed all the imaging studies and discussed with her. She has had a very good response to the treatment. She is to complete this cycle of treatment. She will most likely need a red cell transfusion and will have her laboratory exams on 12/20. She will see me in 4 weeks with new scans and MRI of the brain.     1/9/2025: Ms. Estrada is a patient of [facility] and has had treatment with concurrent chemotherapy and radiation for lung cancer. She seems to have had a good response. However, she called the office to report that for several days, maybe as many as 21, she had been progressively less able to swallow even liquids. She had been admitted to the hospital with similar but not as severe symptoms and was discharged without resolution of the symptoms. She was found to have dysphagia of even thin liquids. She was given intravenous fluids and was referred for admission to the hospital. She tells me today that she has been feeling better, though she did not sleep well. She has been free of pain but she remains absolutely unable to swallow even water. No dyspnea. No cough or chest pain. On exam she is conversant and oriented. No jaundice or pallor. No oral lesions and respirations not labored. Lungs diminished and abdomen soft. No edema. Reviewed the laboratory exams. She is to be seen by Gastroenterology with the impression of esophageal stenosis resultant from the previous concurrent chemotherapy and radiation. Discussed with her.     1/11/2025: Indeed on January 9 she underwent an upper gastrointestinal endoscopy that revealed a stricture of the esophagus.  This was dilated successfully and without any difficulties.  Almost immediately she was able to eat better.  She was discharged to continue follow-up as  outpatient.    1/27/2025: In the office to review the results of her MRI and CT scans.  She feels much better.  She still has some difficulty swallowing but she can now eat essentially anything that she chews thoroughly.  She has some difficulties with mastication as well and does the number of foods that she has been eating is somewhat limited.  She can drink liquids without any difficulties.  She has no pain.  She continues to have dyspnea with exertion.  She also continues to smoke.  Her gait is not steady and she has been using the wheelchair.  She has had no fevers or nocturnal diaphoresis.  No chest pains or abdominal pain.  On exam chronically ill-appearing.  No distress.  No jaundice.  No pallor.  Very poor dentition.  Respirations not labored.  Lungs markedly diminished bilaterally.  Heart regular.  Abdomen soft.  No edema.  Reviewed the laboratory exams and discussed with her.  Reviewed the images of the scans and reviewed them with her.  Explained the findings.  Discussed the use of immunotherapy in this setting.  It has been proven to result in longer responses and improved overall survival.  She is agreeable to it.  A treatment plan was placed.  I discussed with her the potential complications of the treatment, including life-threatening complications.  She is to see me in 3 weeks after commencement of the immunotherapy.    Past Medical History:   Diagnosis Date    Cancer     COPD (chronic obstructive pulmonary disease)     Coronary artery disease     Elevated cholesterol     Heart attack     Hypertension     Lung cancer 2024    Tinnitus      Past Surgical History:   Procedure Laterality Date    BACK SURGERY  2004    bone spur on sciatica    BRONCHOSCOPY WITH ION ROBOTIC ASSIST N/A 09/27/2024    Procedure: BRONCHOSCOPY WITH ION ROBOT, fine needle aspiration and tissue biopsy right upper lobe mass, endobronchial ultrasound with fine needle aspiration lymph nodes (Level 10R);  Surgeon: Serafin Choudhary MD;   Location: [facility] ENDOSCOPY;  Service: Robotics - Pulmonary;  Laterality: N/A;  post: lung mass with lympadenopathy    CHOLECYSTECTOMY  2021    CORONARY ANGIOPLASTY WITH STENT PLACEMENT  07/2024    x2    ENDOSCOPY N/A 1/9/2025    Procedure: ESOPHAGOGASTRODUODENOSCOPY WITH DILATION (BOUGIE 32, 36, 40);  Surgeon: Jason Black MD;  Location: [facility] ENDOSCOPY;  Service: Gastroenterology;  Laterality: N/A;  ESOPHAGEAL STRICTURE, esophageal ulcer    MOLE REMOVAL  1994    forehead    SKIN LESION EXCISION Right 1994    breast    VENOUS ACCESS DEVICE (PORT) INSERTION Right 10/07/2024    Procedure: INSERTION VENOUS ACCESS DEVICE;  Surgeon: Serafin Choudhary MD;  Location: [facility] MAIN OR;  Service: Thoracic;  Laterality: Right;       Current Outpatient Medications:     Acetaminophen (Tylenol) 325 MG capsule, Take 650 mg by mouth As Needed., Disp: , Rfl:     albuterol sulfate  (90 Base) MCG/ACT inhaler, Inhale 2 puffs As Needed for Wheezing or Shortness of Air., Disp: , Rfl:     aspirin 81 MG chewable tablet, Chew 1 tablet Daily., Disp: , Rfl:     atorvastatin (LIPITOR) 40 MG tablet, Take 1 tablet by mouth Daily., Disp: , Rfl:     budesonide-formoterol (Breyna) 160-4.5 MCG/ACT inhaler, Inhale 2 puffs 2 (Two) Times a Day., Disp: 1 each, Rfl: 12    carvedilol (COREG) 3.125 MG tablet, Take 1 tablet by mouth 2 (Two) Times a Day With Meals., Disp: , Rfl:     clopidogrel (PLAVIX) 75 MG tablet, Take 1 tablet by mouth Daily., Disp: , Rfl:     ipratropium-albuterol (DUO-NEB) 0.5-2.5 mg/3 ml nebulizer, Take 3 mL by nebulization 4 (Four) Times a Day As Needed for Wheezing or Shortness of Air for up to 120 days., Disp: 120 mL, Rfl: 5    isosorbide mononitrate (IMDUR) 30 MG 24 hr tablet, Take 1 tablet by mouth Daily., Disp: , Rfl:     Lidocaine Viscous HCl (XYLOCAINE) 2 % solution, Take 10 mL by mouth As Needed for Mild Pain (Take prior to meals up to 3-4 times per day to help with pain with swallowing)., Disp: 600 mL, Rfl: 4     lidocaine-prilocaine (EMLA) 2.5-2.5 % cream, Apply 1 Application topically to the appropriate area as directed See Admin Instructions. Apply one hour prior to port access, Disp: 30 g, Rfl: 3    methocarbamol (ROBAXIN) 500 MG tablet, Take 1 tablet by mouth Every 6 (Six) Hours As Needed for Muscle Spasms., Disp: 50 tablet, Rfl: 1    mometasone (ELOCON) 0.1 % ointment, Apply to affected skin of chest and back 2-3 times daily as needed for itching from radiation treatments., Disp: 50 g, Rfl: 2    omeprazole (priLOSEC) 40 MG capsule, Take 1 capsule by mouth 2 (Two) Times a Day for 30 days., Disp: 60 capsule, Rfl: 0    ondansetron (ZOFRAN) 8 MG tablet, Take 1 tablet by mouth 3 (Three) Times a Day As Needed for Nausea or Vomiting., Disp: 30 tablet, Rfl: 5    oxyCODONE (ROXICODONE) 5 MG/5ML solution, Take 5 mL by mouth Every 4 (Four) Hours As Needed for Moderate Pain (Pain with swallowing.)., Disp: 300 mL, Rfl: 0    potassium chloride (KAYCIEL) 20 mEq/15 mL solution, Take 15 mL by mouth Daily., Disp: 450 mL, Rfl: 5    sucralfate (Carafate) 1 GM/10ML suspension, Take 10 mL by mouth 4 (Four) Times a Day As Needed (For pain and discomfort with swallowing)., Disp: 600 mL, Rfl: 4  No current facility-administered medications for this visit.    Facility-Administered Medications Ordered in Other Visits:     heparin injection 500 Units, 500 Units, Intravenous, PRN, Wilbert Delgado MD, 500 Units at 01/27/25 1231    sodium chloride 0.9 % flush 10 mL, 10 mL, Intravenous, PRN, Wilbert Delgado MD, 10 mL at 01/27/25 1231    Allergies   Allergen Reactions    Morphine Hives and Shortness Of Breath    Codeine Hives    Sulfa Antibiotics Hives     Family History   Problem Relation Age of Onset    Breast cancer Mother 62    Heart attack Mother     Lung cancer Sister     Throat cancer Sister     Lung cancer Brother      Cancer-related family history includes Breast cancer (age of onset: 62) in her mother; Lung cancer in her brother and  sister; Throat cancer in her sister.    Social History     Tobacco Use    Smoking status: Every Day     Current packs/day: 1.00     Average packs/day: 1 pack/day for 55.1 years (55.1 ttl pk-yrs)     Types: Cigarettes     Start date: 1970     Passive exposure: Current    Smokeless tobacco: Never   Vaping Use    Vaping status: Never Used   Substance Use Topics    Alcohol use: Never    Drug use: Never     Social History     Social History Narrative    Not on file     ROS:   Review of Systems   Constitutional:  Positive for fatigue. Negative for activity change, appetite change, chills, diaphoresis, fever and unexpected weight change.   HENT:  Negative for congestion, dental problem, drooling, ear discharge, ear pain, facial swelling, hearing loss, mouth sores, nosebleeds, postnasal drip, rhinorrhea, sinus pressure, sinus pain, sneezing, sore throat, tinnitus, trouble swallowing and voice change.    Eyes:  Negative for photophobia, pain, discharge, redness, itching and visual disturbance.   Respiratory:  Positive for cough and shortness of breath. Negative for apnea, choking, chest tightness, wheezing and stridor.    Cardiovascular:  Positive for chest pain. Negative for palpitations and leg swelling.   Gastrointestinal:  Negative for abdominal distention, abdominal pain, anal bleeding, blood in stool, constipation, diarrhea, nausea, rectal pain and vomiting.   Endocrine: Negative for cold intolerance, heat intolerance, polydipsia and polyuria.   Genitourinary:  Negative for decreased urine volume, difficulty urinating, dysuria, flank pain, frequency, genital sores, hematuria and urgency.   Musculoskeletal:  Negative for arthralgias, back pain, gait problem, joint swelling, myalgias, neck pain and neck stiffness.   Skin:  Negative for color change, pallor and rash.   Neurological:  Negative for dizziness, tremors, seizures, syncope, facial asymmetry, speech difficulty, weakness, light-headedness, numbness and headaches.  "  Hematological:  Negative for adenopathy. Does not bruise/bleed easily.   Psychiatric/Behavioral:  Negative for agitation, behavioral problems, confusion, decreased concentration, hallucinations, self-injury, sleep disturbance and suicidal ideas. The patient is not nervous/anxious.      Objective:    Vital Signs:  Vitals:    01/27/25 1242   BP: 101/61   Pulse: 98   Resp: 18   Temp: 98.2 °F (36.8 °C)   SpO2: 94%   Weight: 56.2 kg (123 lb 12.8 oz)   Height: 170.2 cm (67\")   PainSc: 0-No pain     Body mass index is 19.39 kg/m².    ECOG  (1) Restricted in physically strenuous activity, ambulatory and able to do work of light nature    Physical Exam:   Physical Exam  Constitutional:       General: She is not in acute distress.     Appearance: She is ill-appearing. She is not toxic-appearing or diaphoretic.      Comments: Well-built, slender and well oriented woman.  She appears chronically ill but is not in acute distress.   HENT:      Head: Normocephalic and atraumatic.      Right Ear: External ear normal.      Left Ear: External ear normal.      Nose: Nose normal.      Mouth/Throat:      Mouth: Mucous membranes are moist.      Pharynx: Oropharynx is clear. No oropharyngeal exudate or posterior oropharyngeal erythema.   Eyes:      General: No scleral icterus.        Right eye: No discharge.         Left eye: No discharge.      Conjunctiva/sclera: Conjunctivae normal.      Pupils: Pupils are equal, round, and reactive to light.   Cardiovascular:      Rate and Rhythm: Normal rate and regular rhythm.      Pulses: Normal pulses.      Heart sounds: No murmur heard.     No friction rub. No gallop.   Pulmonary:      Effort: No respiratory distress.      Breath sounds: No stridor. No wheezing, rhonchi or rales.      Comments: Bilaterally and symmetrically diminished.  Abdominal:      General: Abdomen is flat. Bowel sounds are normal. There is no distension.      Palpations: Abdomen is soft. There is no mass.      Tenderness: " There is no abdominal tenderness. There is no right CVA tenderness, left CVA tenderness, guarding or rebound.      Hernia: No hernia is present.   Musculoskeletal:         General: No tenderness, deformity or signs of injury.      Cervical back: No rigidity.      Right lower leg: No edema.      Left lower leg: No edema.   Lymphadenopathy:      Cervical: No cervical adenopathy.   Skin:     Coloration: Skin is not jaundiced or pale.      Findings: No bruising, lesion or rash.   Neurological:      General: No focal deficit present.      Mental Status: She is alert and oriented to person, place, and time.      Cranial Nerves: No cranial nerve deficit.   Psychiatric:         Mood and Affect: Mood normal.         Behavior: Behavior normal.         Thought Content: Thought content normal.         Judgment: Judgment normal.     NATHALIE Delgado MD performed the physical exam on 1/27/2025 as documented above.    Lab Results - Last 18 Months   Lab Units 01/27/25  1230 01/11/25  0448 01/10/25  0604   WBC 10*3/mm3 4.66 4.00 4.11   HEMOGLOBIN g/dL 10.6* 7.5* 8.3*   HEMATOCRIT % 34.5 23.5* 26.0*   PLATELETS 10*3/mm3 286 158 176   MCV fL 108.8* 104.0* 101.2*     Lab Results - Last 18 Months   Lab Units 12/27/24  1410 09/27/24  0940   INR  1.11* 1.02   APTT seconds 25.4 25.5     Lab Results   Component Value Date    PTT 25.4 12/27/2024    INR 1.11 (H) 12/27/2024     Assessment & Plan     1.  Limited stage small cell lung cancer: ([id number]) of the right lung.  On concurrent chemotherapy with carboplatin/etoposide since October of 2024. With near complete response.  Reviewed the recent scans.  She has had near complete response and no evidence of metastatic disease.  To start consult later today if durvalumab.  Discussed with him the regimen.  Discussed with her the potential side effects or complications.  Reviewed all imaging studies with her and discussed the findings.  2.  Anemia: Improved.  3.  Coronary artery disease: Without  symptoms.   4.  Chronic obstructive pulmonary disease: Continues to smoke.  5.  Cigarette smoker: Again discussed with her.  6.  Reviewed the records from the hospital.  Reviewed the imaging studies.  Reviewed with her the images and the reports.  Discussed the findings.  Reviewed laboratory exams.  7.  To start treatment with durvalumab as soon as approved.  Place the treatment plan.  Discussed with her at length.  She is to see me in approximately 6 weeks.    Wilbert Delgado MD on 1/27/2025 at 1325.

## 2025-01-24 ENCOUNTER — HOSPITAL ENCOUNTER (OUTPATIENT)
Dept: CT IMAGING | Facility: HOSPITAL | Age: 65
Discharge: HOME | End: 2025-01-24
Payer: MEDICAID

## 2025-01-24 ENCOUNTER — HOSPITAL ENCOUNTER (OUTPATIENT)
Dept: MRI IMAGING | Facility: HOSPITAL | Age: 65
Discharge: HOME | End: 2025-01-24
Payer: MEDICAID

## 2025-01-24 DIAGNOSIS — C34.11 SMALL CELL CARCINOMA OF UPPER LOBE OF RIGHT LUNG: ICD-10-CM

## 2025-01-24 DIAGNOSIS — R91.8 LUNG MASS: ICD-10-CM

## 2025-01-24 PROCEDURE — 25010000002 GADOTERIDOL PER 1 ML: Performed by: INTERNAL MEDICINE

## 2025-01-24 PROCEDURE — A9579 GAD-BASE MR CONTRAST NOS,1ML: HCPCS | Performed by: INTERNAL MEDICINE

## 2025-01-24 PROCEDURE — 74177 CT ABD & PELVIS W/CONTRAST: CPT

## 2025-01-24 PROCEDURE — 70553 MRI BRAIN STEM W/O & W/DYE: CPT

## 2025-01-24 PROCEDURE — 71260 CT THORAX DX C+: CPT

## 2025-01-24 PROCEDURE — 25510000001 IOPAMIDOL PER 1 ML: Performed by: INTERNAL MEDICINE

## 2025-01-24 RX ORDER — IOPAMIDOL 755 MG/ML
100 INJECTION, SOLUTION INTRAVASCULAR
Status: COMPLETED | OUTPATIENT
Start: 2025-01-24 | End: 2025-01-24

## 2025-01-24 RX ADMIN — IOPAMIDOL 100 ML: 755 INJECTION, SOLUTION INTRAVENOUS at 10:54

## 2025-01-24 RX ADMIN — GADOTERIDOL 11 ML: 279.3 INJECTION, SOLUTION INTRAVENOUS at 11:47

## 2025-01-27 ENCOUNTER — HOSPITAL ENCOUNTER (OUTPATIENT)
Dept: ONCOLOGY | Facility: HOSPITAL | Age: 65
Discharge: HOME | End: 2025-01-27
Admitting: INTERNAL MEDICINE
Payer: MEDICAID

## 2025-01-27 ENCOUNTER — OFFICE VISIT (OUTPATIENT)
Dept: ONCOLOGY | Facility: CLINIC | Age: 65
End: 2025-01-27
Payer: MEDICAID

## 2025-01-27 ENCOUNTER — OFFICE VISIT (OUTPATIENT)
Dept: RADIATION ONCOLOGY | Facility: HOSPITAL | Age: 65
End: 2025-01-27
Payer: MEDICAID

## 2025-01-27 VITALS
SYSTOLIC BLOOD PRESSURE: 101 MMHG | BODY MASS INDEX: 19.43 KG/M2 | DIASTOLIC BLOOD PRESSURE: 61 MMHG | HEART RATE: 98 BPM | RESPIRATION RATE: 18 BRPM | WEIGHT: 123.8 LBS | HEIGHT: 67 IN | OXYGEN SATURATION: 94 % | TEMPERATURE: 98.2 F

## 2025-01-27 DIAGNOSIS — R91.8 LUNG MASS: ICD-10-CM

## 2025-01-27 DIAGNOSIS — C34.11 SMALL CELL CARCINOMA OF UPPER LOBE OF RIGHT LUNG: Primary | ICD-10-CM

## 2025-01-27 LAB
BASOPHILS # BLD AUTO: 0.01 10*3/MM3 (ref 0–0.2)
BASOPHILS NFR BLD AUTO: 0.2 % (ref 0–1.5)
DEPRECATED RDW RBC AUTO: 73.6 FL (ref 37–54)
EOSINOPHIL # BLD AUTO: 0.03 10*3/MM3 (ref 0–0.4)
EOSINOPHIL NFR BLD AUTO: 0.6 % (ref 0.3–6.2)
ERYTHROCYTE [DISTWIDTH] IN BLOOD BY AUTOMATED COUNT: 19.2 % (ref 12.3–15.4)
HCT VFR BLD AUTO: 34.5 % (ref 34–46.6)
HGB BLD-MCNC: 10.6 G/DL (ref 12–15.9)
LYMPHOCYTES # BLD AUTO: 0.74 10*3/MM3 (ref 0.7–3.1)
LYMPHOCYTES NFR BLD AUTO: 15.9 % (ref 19.6–45.3)
MCH RBC QN AUTO: 33.4 PG (ref 26.6–33)
MCHC RBC AUTO-ENTMCNC: 30.7 G/DL (ref 31.5–35.7)
MCV RBC AUTO: 108.8 FL (ref 79–97)
MONOCYTES # BLD AUTO: 0.81 10*3/MM3 (ref 0.1–0.9)
MONOCYTES NFR BLD AUTO: 17.4 % (ref 5–12)
NEUTROPHILS NFR BLD AUTO: 3.07 10*3/MM3 (ref 1.7–7)
NEUTROPHILS NFR BLD AUTO: 65.9 % (ref 42.7–76)
PLATELET # BLD AUTO: 286 10*3/MM3 (ref 140–450)
PMV BLD AUTO: 8.8 FL (ref 6–12)
RBC # BLD AUTO: 3.17 10*6/MM3 (ref 3.77–5.28)
WBC NRBC COR # BLD AUTO: 4.66 10*3/MM3 (ref 3.4–10.8)

## 2025-01-27 PROCEDURE — G0463 HOSPITAL OUTPT CLINIC VISIT: HCPCS | Performed by: INTERNAL MEDICINE

## 2025-01-27 PROCEDURE — 1160F RVW MEDS BY RX/DR IN RCRD: CPT | Performed by: INTERNAL MEDICINE

## 2025-01-27 PROCEDURE — 25010000002 HEPARIN LOCK FLUSH PER 10 UNITS: Performed by: INTERNAL MEDICINE

## 2025-01-27 PROCEDURE — 99214 OFFICE O/P EST MOD 30 MIN: CPT | Performed by: INTERNAL MEDICINE

## 2025-01-27 PROCEDURE — 36591 DRAW BLOOD OFF VENOUS DEVICE: CPT

## 2025-01-27 PROCEDURE — 1126F AMNT PAIN NOTED NONE PRSNT: CPT | Performed by: INTERNAL MEDICINE

## 2025-01-27 PROCEDURE — 1159F MED LIST DOCD IN RCRD: CPT | Performed by: INTERNAL MEDICINE

## 2025-01-27 PROCEDURE — 85025 COMPLETE CBC W/AUTO DIFF WBC: CPT | Performed by: INTERNAL MEDICINE

## 2025-01-27 RX ORDER — SODIUM CHLORIDE 0.9 % (FLUSH) 0.9 %
10 SYRINGE (ML) INJECTION AS NEEDED
OUTPATIENT
Start: 2025-01-27

## 2025-01-27 RX ORDER — HEPARIN SODIUM (PORCINE) LOCK FLUSH IV SOLN 100 UNIT/ML 100 UNIT/ML
500 SOLUTION INTRAVENOUS AS NEEDED
Status: DISCONTINUED | OUTPATIENT
Start: 2025-01-27 | End: 2025-01-28 | Stop reason: HOSPADM

## 2025-01-27 RX ORDER — SODIUM CHLORIDE 0.9 % (FLUSH) 0.9 %
10 SYRINGE (ML) INJECTION AS NEEDED
Status: DISCONTINUED | OUTPATIENT
Start: 2025-01-27 | End: 2025-01-28 | Stop reason: HOSPADM

## 2025-01-27 RX ORDER — HEPARIN SODIUM (PORCINE) LOCK FLUSH IV SOLN 100 UNIT/ML 100 UNIT/ML
500 SOLUTION INTRAVENOUS AS NEEDED
OUTPATIENT
Start: 2025-01-27

## 2025-01-27 RX ADMIN — HEPARIN 500 UNITS: 100 SYRINGE at 12:31

## 2025-01-27 RX ADMIN — Medication 10 ML: at 12:31

## 2025-01-27 NOTE — PROGRESS NOTES
Pt to injection room for PF and labs prior to appt with Dr. Delgado. Port accessed and flushed with good blood return noted. 10cc of blood wasted prior to specimen collection. Blood specimen obtained and sent to lab for processing per protocol.  Port flushed with saline and heparin prior to needle removal. Pt to waiting room.

## 2025-02-05 ENCOUNTER — HOSPITAL ENCOUNTER (OUTPATIENT)
Dept: ONCOLOGY | Facility: HOSPITAL | Age: 65
Discharge: HOME | End: 2025-02-05
Payer: MEDICAID

## 2025-02-05 ENCOUNTER — OFFICE VISIT (OUTPATIENT)
Dept: PHARMACY | Facility: HOSPITAL | Age: 65
End: 2025-02-05
Payer: MEDICAID

## 2025-02-05 VITALS
HEIGHT: 67 IN | RESPIRATION RATE: 14 BRPM | DIASTOLIC BLOOD PRESSURE: 78 MMHG | SYSTOLIC BLOOD PRESSURE: 133 MMHG | BODY MASS INDEX: 19.48 KG/M2 | HEART RATE: 108 BPM | WEIGHT: 124.1 LBS | OXYGEN SATURATION: 93 % | TEMPERATURE: 97.3 F

## 2025-02-05 DIAGNOSIS — C34.11 SMALL CELL CARCINOMA OF UPPER LOBE OF RIGHT LUNG: Primary | ICD-10-CM

## 2025-02-05 DIAGNOSIS — R91.8 LUNG MASS: ICD-10-CM

## 2025-02-05 LAB
ALP BLD-CCNC: 77 U/L (ref 42–141)
BASOPHILS # BLD AUTO: 0 10*3/MM3 (ref 0–0.2)
BASOPHILS NFR BLD AUTO: 0 % (ref 0–1.5)
BUN BLDA-MCNC: 9 MG/DL (ref 7–22)
CALCIUM BLD QL: 9.6 MG/DL (ref 8–10.3)
CHLORIDE BLDA-SCNC: 98 MMOL/L (ref 98–108)
CO2 BLDA-SCNC: 34 MMOL/L (ref 18–33)
CREAT BLDA-MCNC: 0.5 MG/DL (ref 0.6–1.2)
DEPRECATED RDW RBC AUTO: 68 FL (ref 37–54)
EGFRCR SERPLBLD CKD-EPI 2021: 105.5 ML/MIN/1.73
EOSINOPHIL # BLD AUTO: 0.02 10*3/MM3 (ref 0–0.4)
EOSINOPHIL NFR BLD AUTO: 0.3 % (ref 0.3–6.2)
ERYTHROCYTE [DISTWIDTH] IN BLOOD BY AUTOMATED COUNT: 17.5 % (ref 12.3–15.4)
GLUCOSE BLDC GLUCOMTR-MCNC: 92 MG/DL (ref 73–118)
HCT VFR BLD AUTO: 37.4 % (ref 34–46.6)
HGB BLD-MCNC: 11.7 G/DL (ref 12–15.9)
LYMPHOCYTES # BLD AUTO: 1.14 10*3/MM3 (ref 0.7–3.1)
LYMPHOCYTES NFR BLD AUTO: 19.8 % (ref 19.6–45.3)
MCH RBC QN AUTO: 33.8 PG (ref 26.6–33)
MCHC RBC AUTO-ENTMCNC: 31.3 G/DL (ref 31.5–35.7)
MCV RBC AUTO: 108.1 FL (ref 79–97)
MONOCYTES # BLD AUTO: 0.91 10*3/MM3 (ref 0.1–0.9)
MONOCYTES NFR BLD AUTO: 15.8 % (ref 5–12)
NEUTROPHILS NFR BLD AUTO: 3.7 10*3/MM3 (ref 1.7–7)
NEUTROPHILS NFR BLD AUTO: 64.1 % (ref 42.7–76)
PLATELET # BLD AUTO: 237 10*3/MM3 (ref 140–450)
PMV BLD AUTO: 9 FL (ref 6–12)
POC ALBUMIN: 3.4 G/L (ref 3.3–5.5)
POC ALT (SGPT): 14 U/L (ref 10–47)
POC AST (SGOT): 27 U/L (ref 11–38)
POC TOTAL BILIRUBIN: 1 MG/DL (ref 0.2–1.6)
POC TOTAL PROTEIN: 6.6 G/DL (ref 6.4–8.1)
POTASSIUM BLDA-SCNC: 3.3 MMOL/L (ref 3.6–5.1)
RBC # BLD AUTO: 3.46 10*6/MM3 (ref 3.77–5.28)
SODIUM BLD-SCNC: 141 MMOL/L (ref 128–145)
T4 FREE SERPL-MCNC: 0.98 NG/DL (ref 0.93–1.7)
TSH SERPL DL<=0.05 MIU/L-ACNC: 0.93 UIU/ML (ref 0.27–4.2)
WBC NRBC COR # BLD AUTO: 5.77 10*3/MM3 (ref 3.4–10.8)

## 2025-02-05 PROCEDURE — 25010000002 DURVALUMAB 50 MG/ML SOLUTION 10 ML VIAL: Performed by: INTERNAL MEDICINE

## 2025-02-05 PROCEDURE — 25810000003 SODIUM CHLORIDE 0.9 % SOLUTION 250 ML FLEX CONT: Performed by: INTERNAL MEDICINE

## 2025-02-05 PROCEDURE — 96413 CHEMO IV INFUSION 1 HR: CPT

## 2025-02-05 PROCEDURE — 84439 ASSAY OF FREE THYROXINE: CPT | Performed by: INTERNAL MEDICINE

## 2025-02-05 PROCEDURE — 25010000002 HEPARIN LOCK FLUSH PER 10 UNITS: Performed by: INTERNAL MEDICINE

## 2025-02-05 PROCEDURE — 80050 GENERAL HEALTH PANEL: CPT | Performed by: INTERNAL MEDICINE

## 2025-02-05 RX ORDER — SODIUM CHLORIDE 9 MG/ML
20 INJECTION, SOLUTION INTRAVENOUS ONCE
Status: CANCELLED | OUTPATIENT
Start: 2025-02-05

## 2025-02-05 RX ORDER — HEPARIN SODIUM (PORCINE) LOCK FLUSH IV SOLN 100 UNIT/ML 100 UNIT/ML
500 SOLUTION INTRAVENOUS AS NEEDED
OUTPATIENT
Start: 2025-02-05

## 2025-02-05 RX ORDER — SODIUM CHLORIDE 9 MG/ML
20 INJECTION, SOLUTION INTRAVENOUS ONCE
Status: DISCONTINUED | OUTPATIENT
Start: 2025-02-05 | End: 2025-02-06 | Stop reason: HOSPADM

## 2025-02-05 RX ORDER — SODIUM CHLORIDE 0.9 % (FLUSH) 0.9 %
10 SYRINGE (ML) INJECTION AS NEEDED
OUTPATIENT
Start: 2025-02-05

## 2025-02-05 RX ORDER — HEPARIN SODIUM (PORCINE) LOCK FLUSH IV SOLN 100 UNIT/ML 100 UNIT/ML
500 SOLUTION INTRAVENOUS AS NEEDED
Status: DISCONTINUED | OUTPATIENT
Start: 2025-02-05 | End: 2025-02-06 | Stop reason: HOSPADM

## 2025-02-05 RX ORDER — SODIUM CHLORIDE 0.9 % (FLUSH) 0.9 %
10 SYRINGE (ML) INJECTION AS NEEDED
Status: DISCONTINUED | OUTPATIENT
Start: 2025-02-05 | End: 2025-02-06 | Stop reason: HOSPADM

## 2025-02-05 RX ADMIN — Medication 10 ML: at 15:36

## 2025-02-05 RX ADMIN — HEPARIN 500 UNITS: 100 SYRINGE at 15:36

## 2025-02-05 RX ADMIN — SODIUM CHLORIDE 1500 MG: 9 INJECTION, SOLUTION INTRAVENOUS at 14:28

## 2025-02-05 NOTE — PROGRESS NOTES
Pharmacy Patient Education Note          Kandi Fuentes is a 64 y.o. female being seen at [facility] by Dr. Delgado for a diagnosis of 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 with a plan to begin treatment with Durvalumab. Pharmacist reviewed regimen, as detailed below, with patient.     The discussion included the dose, route, and frequency of administration. Most common side and clinically significant effects were discussed including fatigue, rash/itchy skin, cough/shortness of breath, diarrhea, changes in liver function, electrolyte disturbances, endocrine disturbances, muscle/joint pain and other immune mediated side effects.     Patient was counseled on when to notify a provider including in the event of the following:  Signs and symptoms of infection, including temperature >100.4  Signs and symptoms of serious bleeding including blood in the urine or stool  Changes in urinary output  Frequent nausea/vomiting or diarrhea or severe abdominal pain  Development of mouth sores or ulcerations  The patient was provided with general information on when to call the office for adverse events.     Patient provided with handout regarding medications, and reviewed general plan for immunotherapy administration. Patient engaged in counseling throughout. Allowed time for patient to ask questions. Patient without any further questions at this time and verbalized understanding.    Althea Myles RPH  15:09 EST

## 2025-02-09 ENCOUNTER — HOSPITAL ENCOUNTER (INPATIENT)
Facility: HOSPITAL | Age: 65
LOS: 3 days | Discharge: HOME | End: 2025-02-12
Attending: INTERNAL MEDICINE | Admitting: INTERNAL MEDICINE
Payer: MEDICAID

## 2025-02-09 ENCOUNTER — APPOINTMENT (OUTPATIENT)
Dept: GENERAL RADIOLOGY | Facility: HOSPITAL | Age: 65
End: 2025-02-09
Payer: MEDICAID

## 2025-02-09 DIAGNOSIS — I95.9 HYPOTENSION, UNSPECIFIED HYPOTENSION TYPE: ICD-10-CM

## 2025-02-09 DIAGNOSIS — C34.11 SMALL CELL CARCINOMA OF UPPER LOBE OF RIGHT LUNG: ICD-10-CM

## 2025-02-09 DIAGNOSIS — J10.1 INFLUENZA A: Primary | ICD-10-CM

## 2025-02-09 DIAGNOSIS — R09.02 HYPOXIA: ICD-10-CM

## 2025-02-09 DIAGNOSIS — J43.9 PULMONARY EMPHYSEMA, UNSPECIFIED EMPHYSEMA TYPE: ICD-10-CM

## 2025-02-09 PROBLEM — J11.1 FLU: Status: ACTIVE | Noted: 2025-02-09

## 2025-02-09 LAB
ALBUMIN SERPL-MCNC: 3.5 G/DL (ref 3.5–5.2)
ALBUMIN/GLOB SERPL: 1.6 G/DL
ALP SERPL-CCNC: 64 U/L (ref 39–117)
ALT SERPL W P-5'-P-CCNC: 19 U/L (ref 1–33)
ANION GAP SERPL CALCULATED.3IONS-SCNC: 7.4 MMOL/L (ref 5–15)
APTT PPP: 68.3 SECONDS (ref 22.7–35.4)
ARTERIAL PATENCY WRIST A: POSITIVE
AST SERPL-CCNC: 37 U/L (ref 1–32)
ATMOSPHERIC PRESS: ABNORMAL MM[HG]
B PARAPERT DNA SPEC QL NAA+PROBE: NOT DETECTED
B PERT DNA SPEC QL NAA+PROBE: NOT DETECTED
BACTERIA UR QL AUTO: ABNORMAL /HPF
BASE EXCESS BLDA CALC-SCNC: 6.9 MMOL/L (ref 0–3)
BASOPHILS # BLD AUTO: 0 10*3/MM3 (ref 0–0.2)
BASOPHILS NFR BLD AUTO: 0 % (ref 0–1.5)
BDY SITE: ABNORMAL
BILIRUB SERPL-MCNC: 0.6 MG/DL (ref 0–1.2)
BILIRUB UR QL STRIP: NEGATIVE
BUN SERPL-MCNC: 8 MG/DL (ref 8–23)
BUN/CREAT SERPL: 16.7 (ref 7–25)
C PNEUM DNA NPH QL NAA+NON-PROBE: NOT DETECTED
CA-I SERPL ISE-MCNC: 1.15 MMOL/L (ref 1.15–1.3)
CALCIUM SPEC-SCNC: 8.7 MG/DL (ref 8.6–10.5)
CHLORIDE SERPL-SCNC: 92 MMOL/L (ref 98–107)
CLARITY UR: CLEAR
CO2 BLDA-SCNC: 34.2 MMOL/L (ref 22–29)
CO2 SERPL-SCNC: 31.6 MMOL/L (ref 22–29)
COLOR UR: YELLOW
CREAT SERPL-MCNC: 0.48 MG/DL (ref 0.57–1)
CRP SERPL-MCNC: 3.13 MG/DL (ref 0–0.5)
D-LACTATE SERPL-SCNC: 0.4 MMOL/L (ref 0.3–2)
DEPRECATED RDW RBC AUTO: 66.1 FL (ref 37–54)
EGFRCR SERPLBLD CKD-EPI 2021: 106.6 ML/MIN/1.73
EOSINOPHIL # BLD AUTO: 0 10*3/MM3 (ref 0–0.4)
EOSINOPHIL NFR BLD AUTO: 0 % (ref 0.3–6.2)
ERYTHROCYTE [DISTWIDTH] IN BLOOD BY AUTOMATED COUNT: 16.7 % (ref 12.3–15.4)
FLUAV H1 2009 PAND RNA NPH QL NAA+PROBE: DETECTED
FLUBV RNA ISLT QL NAA+PROBE: NOT DETECTED
GLOBULIN UR ELPH-MCNC: 2.2 GM/DL
GLUCOSE SERPL-MCNC: 95 MG/DL (ref 65–99)
GLUCOSE UR STRIP-MCNC: NEGATIVE MG/DL
HADV DNA SPEC NAA+PROBE: NOT DETECTED
HCO3 BLDA-SCNC: 32.7 MMOL/L (ref 21–28)
HCOV 229E RNA SPEC QL NAA+PROBE: NOT DETECTED
HCOV HKU1 RNA SPEC QL NAA+PROBE: NOT DETECTED
HCOV NL63 RNA SPEC QL NAA+PROBE: NOT DETECTED
HCOV OC43 RNA SPEC QL NAA+PROBE: NOT DETECTED
HCT VFR BLD AUTO: 28.8 % (ref 34–46.6)
HEMODILUTION: NO
HGB BLD-MCNC: 9 G/DL (ref 12–15.9)
HGB UR QL STRIP.AUTO: ABNORMAL
HMPV RNA NPH QL NAA+NON-PROBE: NOT DETECTED
HOLD SPECIMEN: NORMAL
HPIV1 RNA ISLT QL NAA+PROBE: NOT DETECTED
HPIV2 RNA SPEC QL NAA+PROBE: NOT DETECTED
HPIV3 RNA NPH QL NAA+PROBE: NOT DETECTED
HPIV4 P GENE NPH QL NAA+PROBE: NOT DETECTED
HYALINE CASTS UR QL AUTO: ABNORMAL /LPF
IMM GRANULOCYTES # BLD AUTO: 0.02 10*3/MM3 (ref 0–0.05)
IMM GRANULOCYTES NFR BLD AUTO: 0.5 % (ref 0–0.5)
INHALED O2 CONCENTRATION: 44 %
INR PPP: 1.16 (ref 0.9–1.1)
KETONES UR QL STRIP: NEGATIVE
LEUKOCYTE ESTERASE UR QL STRIP.AUTO: NEGATIVE
LYMPHOCYTES # BLD AUTO: 0.58 10*3/MM3 (ref 0.7–3.1)
LYMPHOCYTES NFR BLD AUTO: 14 % (ref 19.6–45.3)
M PNEUMO IGG SER IA-ACNC: NOT DETECTED
MAGNESIUM SERPL-MCNC: 1.8 MG/DL (ref 1.6–2.4)
MCH RBC QN AUTO: 33.1 PG (ref 26.6–33)
MCHC RBC AUTO-ENTMCNC: 31.3 G/DL (ref 31.5–35.7)
MCV RBC AUTO: 105.9 FL (ref 79–97)
MODALITY: ABNORMAL
MONOCYTES # BLD AUTO: 0.34 10*3/MM3 (ref 0.1–0.9)
MONOCYTES NFR BLD AUTO: 8.2 % (ref 5–12)
NEUTROPHILS NFR BLD AUTO: 3.2 10*3/MM3 (ref 1.7–7)
NEUTROPHILS NFR BLD AUTO: 77.3 % (ref 42.7–76)
NITRITE UR QL STRIP: NEGATIVE
NRBC BLD AUTO-RTO: 0 /100 WBC (ref 0–0.2)
PCO2 BLDA: 50.7 MM HG (ref 35–48)
PH BLDA: 7.42 PH UNITS (ref 7.35–7.45)
PH UR STRIP.AUTO: 6.5 [PH] (ref 5–8)
PHOSPHATE SERPL-MCNC: 3.1 MG/DL (ref 2.5–4.5)
PLATELET # BLD AUTO: 118 10*3/MM3 (ref 140–450)
PMV BLD AUTO: 9.4 FL (ref 6–12)
PO2 BLD: 263 MM[HG] (ref 0–500)
PO2 BLDA: 115.9 MM HG (ref 83–108)
POTASSIUM SERPL-SCNC: 3 MMOL/L (ref 3.5–5.2)
PROT SERPL-MCNC: 5.7 G/DL (ref 6–8.5)
PROT UR QL STRIP: NEGATIVE
PROTHROMBIN TIME: 14.8 SECONDS (ref 11.7–14.2)
RBC # BLD AUTO: 2.72 10*6/MM3 (ref 3.77–5.28)
RBC # UR STRIP: ABNORMAL /HPF
REF LAB TEST METHOD: ABNORMAL
RHINOVIRUS RNA SPEC NAA+PROBE: NOT DETECTED
RSV RNA NPH QL NAA+NON-PROBE: NOT DETECTED
SAO2 % BLDCOA: 98.5 % (ref 94–98)
SARS-COV-2 RNA RESP QL NAA+PROBE: NOT DETECTED
SODIUM SERPL-SCNC: 131 MMOL/L (ref 136–145)
SP GR UR STRIP: 1.01 (ref 1–1.03)
SQUAMOUS #/AREA URNS HPF: ABNORMAL /HPF
UROBILINOGEN UR QL STRIP: ABNORMAL
WBC # UR STRIP: ABNORMAL /HPF
WBC NRBC COR # BLD AUTO: 4.14 10*3/MM3 (ref 3.4–10.8)
WHOLE BLOOD HOLD COAG: NORMAL
WHOLE BLOOD HOLD SPECIMEN: NORMAL

## 2025-02-09 PROCEDURE — 94640 AIRWAY INHALATION TREATMENT: CPT

## 2025-02-09 PROCEDURE — 93005 ELECTROCARDIOGRAM TRACING: CPT | Performed by: INTERNAL MEDICINE

## 2025-02-09 PROCEDURE — 99291 CRITICAL CARE FIRST HOUR: CPT

## 2025-02-09 PROCEDURE — 25810000003 LACTATED RINGERS PER 1000 ML: Performed by: INTERNAL MEDICINE

## 2025-02-09 PROCEDURE — 25010000002 METHYLPREDNISOLONE PER 40 MG: Performed by: INTERNAL MEDICINE

## 2025-02-09 PROCEDURE — 85610 PROTHROMBIN TIME: CPT

## 2025-02-09 PROCEDURE — 84100 ASSAY OF PHOSPHORUS: CPT

## 2025-02-09 PROCEDURE — 85025 COMPLETE CBC W/AUTO DIFF WBC: CPT

## 2025-02-09 PROCEDURE — 83605 ASSAY OF LACTIC ACID: CPT

## 2025-02-09 PROCEDURE — 25810000003 SODIUM CHLORIDE 0.9 % SOLUTION 250 ML FLEX CONT: Performed by: PHYSICIAN ASSISTANT

## 2025-02-09 PROCEDURE — 36600 WITHDRAWAL OF ARTERIAL BLOOD: CPT

## 2025-02-09 PROCEDURE — 25810000003 LACTATED RINGERS SOLUTION: Performed by: INTERNAL MEDICINE

## 2025-02-09 PROCEDURE — 36415 COLL VENOUS BLD VENIPUNCTURE: CPT

## 2025-02-09 PROCEDURE — 25010000002 AMPICILLIN-SULBACTAM PER 1.5 G: Performed by: PHYSICIAN ASSISTANT

## 2025-02-09 PROCEDURE — 71045 X-RAY EXAM CHEST 1 VIEW: CPT

## 2025-02-09 PROCEDURE — 87040 BLOOD CULTURE FOR BACTERIA: CPT

## 2025-02-09 PROCEDURE — 82803 BLOOD GASES ANY COMBINATION: CPT

## 2025-02-09 PROCEDURE — 25810000003 SEPSIS FLUID NS 0.9 % SOLUTION: Performed by: PHYSICIAN ASSISTANT

## 2025-02-09 PROCEDURE — 0202U NFCT DS 22 TRGT SARS-COV-2: CPT | Performed by: PHYSICIAN ASSISTANT

## 2025-02-09 PROCEDURE — 93005 ELECTROCARDIOGRAM TRACING: CPT

## 2025-02-09 PROCEDURE — 94799 UNLISTED PULMONARY SVC/PX: CPT

## 2025-02-09 PROCEDURE — 25810000003 SODIUM CHLORIDE 0.9 % SOLUTION: Performed by: PHYSICIAN ASSISTANT

## 2025-02-09 PROCEDURE — 83735 ASSAY OF MAGNESIUM: CPT

## 2025-02-09 PROCEDURE — 85730 THROMBOPLASTIN TIME PARTIAL: CPT

## 2025-02-09 PROCEDURE — 25010000002 AZITHROMYCIN PER 500 MG: Performed by: PHYSICIAN ASSISTANT

## 2025-02-09 PROCEDURE — 82330 ASSAY OF CALCIUM: CPT

## 2025-02-09 PROCEDURE — 81001 URINALYSIS AUTO W/SCOPE: CPT

## 2025-02-09 PROCEDURE — 80053 COMPREHEN METABOLIC PANEL: CPT

## 2025-02-09 PROCEDURE — 86140 C-REACTIVE PROTEIN: CPT

## 2025-02-09 RX ORDER — ALUMINA, MAGNESIA, AND SIMETHICONE 2400; 2400; 240 MG/30ML; MG/30ML; MG/30ML
15 SUSPENSION ORAL EVERY 6 HOURS PRN
Status: DISCONTINUED | OUTPATIENT
Start: 2025-02-09 | End: 2025-02-12 | Stop reason: HOSPADM

## 2025-02-09 RX ORDER — NITROGLYCERIN 0.4 MG/1
0.4 TABLET SUBLINGUAL
Status: DISCONTINUED | OUTPATIENT
Start: 2025-02-09 | End: 2025-02-12 | Stop reason: HOSPADM

## 2025-02-09 RX ORDER — AMOXICILLIN 250 MG
2 CAPSULE ORAL 2 TIMES DAILY PRN
Status: DISCONTINUED | OUTPATIENT
Start: 2025-02-09 | End: 2025-02-12 | Stop reason: HOSPADM

## 2025-02-09 RX ORDER — SODIUM CHLORIDE 0.9 % (FLUSH) 0.9 %
10 SYRINGE (ML) INJECTION AS NEEDED
Status: DISCONTINUED | OUTPATIENT
Start: 2025-02-09 | End: 2025-02-12 | Stop reason: HOSPADM

## 2025-02-09 RX ORDER — IPRATROPIUM BROMIDE AND ALBUTEROL SULFATE 2.5; .5 MG/3ML; MG/3ML
3 SOLUTION RESPIRATORY (INHALATION) ONCE
Status: COMPLETED | OUTPATIENT
Start: 2025-02-09 | End: 2025-02-09

## 2025-02-09 RX ORDER — POLYETHYLENE GLYCOL 3350 17 G/17G
17 POWDER, FOR SOLUTION ORAL DAILY PRN
Status: DISCONTINUED | OUTPATIENT
Start: 2025-02-09 | End: 2025-02-12 | Stop reason: HOSPADM

## 2025-02-09 RX ORDER — SODIUM CHLORIDE 0.9 % (FLUSH) 0.9 %
10 SYRINGE (ML) INJECTION EVERY 12 HOURS SCHEDULED
Status: DISCONTINUED | OUTPATIENT
Start: 2025-02-09 | End: 2025-02-12 | Stop reason: HOSPADM

## 2025-02-09 RX ORDER — SODIUM CHLORIDE 9 MG/ML
40 INJECTION, SOLUTION INTRAVENOUS AS NEEDED
Status: DISCONTINUED | OUTPATIENT
Start: 2025-02-09 | End: 2025-02-12 | Stop reason: HOSPADM

## 2025-02-09 RX ORDER — MIDODRINE HYDROCHLORIDE 5 MG/1
5 TABLET ORAL
Status: DISCONTINUED | OUTPATIENT
Start: 2025-02-09 | End: 2025-02-10

## 2025-02-09 RX ORDER — ACETAMINOPHEN 325 MG/1
650 TABLET ORAL EVERY 4 HOURS PRN
Status: DISCONTINUED | OUTPATIENT
Start: 2025-02-09 | End: 2025-02-12 | Stop reason: HOSPADM

## 2025-02-09 RX ORDER — ACETAMINOPHEN 500 MG
1000 TABLET ORAL ONCE
Status: COMPLETED | OUTPATIENT
Start: 2025-02-09 | End: 2025-02-09

## 2025-02-09 RX ORDER — ONDANSETRON 2 MG/ML
4 INJECTION INTRAMUSCULAR; INTRAVENOUS EVERY 6 HOURS PRN
Status: DISCONTINUED | OUTPATIENT
Start: 2025-02-09 | End: 2025-02-12 | Stop reason: HOSPADM

## 2025-02-09 RX ORDER — METHYLPREDNISOLONE SODIUM SUCCINATE 40 MG/ML
40 INJECTION, POWDER, LYOPHILIZED, FOR SOLUTION INTRAMUSCULAR; INTRAVENOUS EVERY 8 HOURS
Status: COMPLETED | OUTPATIENT
Start: 2025-02-09 | End: 2025-02-10

## 2025-02-09 RX ORDER — SODIUM CHLORIDE, SODIUM LACTATE, POTASSIUM CHLORIDE, CALCIUM CHLORIDE 600; 310; 30; 20 MG/100ML; MG/100ML; MG/100ML; MG/100ML
75 INJECTION, SOLUTION INTRAVENOUS CONTINUOUS
Status: DISPENSED | OUTPATIENT
Start: 2025-02-09 | End: 2025-02-10

## 2025-02-09 RX ORDER — ACETAMINOPHEN 160 MG/5ML
650 SOLUTION ORAL EVERY 4 HOURS PRN
Status: DISCONTINUED | OUTPATIENT
Start: 2025-02-09 | End: 2025-02-12 | Stop reason: HOSPADM

## 2025-02-09 RX ORDER — ACETAMINOPHEN 650 MG/1
650 SUPPOSITORY RECTAL EVERY 4 HOURS PRN
Status: DISCONTINUED | OUTPATIENT
Start: 2025-02-09 | End: 2025-02-12 | Stop reason: HOSPADM

## 2025-02-09 RX ORDER — OSELTAMIVIR PHOSPHATE 75 MG/1
75 CAPSULE ORAL EVERY 12 HOURS SCHEDULED
Status: DISCONTINUED | OUTPATIENT
Start: 2025-02-09 | End: 2025-02-12 | Stop reason: HOSPADM

## 2025-02-09 RX ORDER — MIDODRINE HYDROCHLORIDE 5 MG/1
10 TABLET ORAL
Status: DISCONTINUED | OUTPATIENT
Start: 2025-02-09 | End: 2025-02-09

## 2025-02-09 RX ORDER — POTASSIUM CHLORIDE 1500 MG/1
40 TABLET, EXTENDED RELEASE ORAL
Status: DISPENSED | OUTPATIENT
Start: 2025-02-09 | End: 2025-02-09

## 2025-02-09 RX ORDER — BISACODYL 5 MG/1
5 TABLET, DELAYED RELEASE ORAL DAILY PRN
Status: DISCONTINUED | OUTPATIENT
Start: 2025-02-09 | End: 2025-02-12 | Stop reason: HOSPADM

## 2025-02-09 RX ORDER — POTASSIUM CHLORIDE 7.45 MG/ML
10 INJECTION INTRAVENOUS
Status: DISCONTINUED | OUTPATIENT
Start: 2025-02-09 | End: 2025-02-09

## 2025-02-09 RX ORDER — ONDANSETRON 4 MG/1
4 TABLET, ORALLY DISINTEGRATING ORAL EVERY 6 HOURS PRN
Status: DISCONTINUED | OUTPATIENT
Start: 2025-02-09 | End: 2025-02-12 | Stop reason: HOSPADM

## 2025-02-09 RX ORDER — BISACODYL 10 MG
10 SUPPOSITORY, RECTAL RECTAL DAILY PRN
Status: DISCONTINUED | OUTPATIENT
Start: 2025-02-09 | End: 2025-02-12 | Stop reason: HOSPADM

## 2025-02-09 RX ADMIN — POTASSIUM CHLORIDE 40 MEQ: 1500 TABLET, EXTENDED RELEASE ORAL at 17:56

## 2025-02-09 RX ADMIN — SODIUM CHLORIDE, POTASSIUM CHLORIDE, SODIUM LACTATE AND CALCIUM CHLORIDE 500 ML: 600; 310; 30; 20 INJECTION, SOLUTION INTRAVENOUS at 17:56

## 2025-02-09 RX ADMIN — MIDODRINE HYDROCHLORIDE 5 MG: 5 TABLET ORAL at 17:56

## 2025-02-09 RX ADMIN — OSELTAMIVIR PHOSPHATE 75 MG: 75 CAPSULE ORAL at 17:56

## 2025-02-09 RX ADMIN — AMPICILLIN SODIUM AND SULBACTAM SODIUM 3 G: 2; 1 INJECTION, POWDER, FOR SOLUTION INTRAMUSCULAR; INTRAVENOUS at 12:15

## 2025-02-09 RX ADMIN — AZITHROMYCIN MONOHYDRATE 500 MG: 500 INJECTION, POWDER, LYOPHILIZED, FOR SOLUTION INTRAVENOUS at 12:14

## 2025-02-09 RX ADMIN — SODIUM CHLORIDE 1647 ML: 9 INJECTION, SOLUTION INTRAVENOUS at 11:56

## 2025-02-09 RX ADMIN — METHYLPREDNISOLONE SODIUM SUCCINATE 40 MG: 40 INJECTION, POWDER, FOR SOLUTION INTRAMUSCULAR; INTRAVENOUS at 17:56

## 2025-02-09 RX ADMIN — SODIUM CHLORIDE 500 ML: 900 INJECTION, SOLUTION INTRAVENOUS at 14:43

## 2025-02-09 RX ADMIN — SODIUM CHLORIDE, POTASSIUM CHLORIDE, SODIUM LACTATE AND CALCIUM CHLORIDE 75 ML/HR: 600; 310; 30; 20 INJECTION, SOLUTION INTRAVENOUS at 18:50

## 2025-02-09 RX ADMIN — IPRATROPIUM BROMIDE AND ALBUTEROL SULFATE 3 ML: .5; 3 SOLUTION RESPIRATORY (INHALATION) at 12:33

## 2025-02-09 RX ADMIN — ACETAMINOPHEN 1000 MG: 500 TABLET, FILM COATED ORAL at 11:57

## 2025-02-09 NOTE — Clinical Note
Level of Care: Telemetry [5]   Admitting Physician: GLYNN SMITH [[id number]]   Attending Physician: GLYNN SMITH [[id number]]

## 2025-02-09 NOTE — ED PROVIDER NOTES
Subjective   History of Present Illness  64-year-old female presents emergency room by EMS with complaints of fever chills increased cough since Thursday.  This is day 4 and progressively symptoms are worsening she called EMS and EMS reports O2 sat on room air was 79%.  Patient normally does not have to use oxygen at home but she does have a history of COPD and emphysema.  Patient denies nausea vomiting she denies chest pain        Review of Systems   Constitutional:  Positive for chills, fatigue and fever.   Respiratory:  Positive for cough, shortness of breath and wheezing.    Gastrointestinal:  Negative for abdominal pain, nausea and vomiting.   Genitourinary:  Negative for dysuria, frequency and hematuria.       Past Medical History:   Diagnosis Date    Cancer     COPD (chronic obstructive pulmonary disease)     Coronary artery disease     Elevated cholesterol     Heart attack     Hypertension     Lung cancer 2024    Tinnitus        Allergies   Allergen Reactions    Morphine Hives and Shortness Of Breath    Codeine Hives    Sulfa Antibiotics Hives       Past Surgical History:   Procedure Laterality Date    BACK SURGERY  2004    bone spur on sciatica    BRONCHOSCOPY WITH ION ROBOTIC ASSIST N/A 09/27/2024    Procedure: BRONCHOSCOPY WITH ION ROBOT, fine needle aspiration and tissue biopsy right upper lobe mass, endobronchial ultrasound with fine needle aspiration lymph nodes (Level 10R);  Surgeon: Serafin Choudhary MD;  Location: [facility] ENDOSCOPY;  Service: Robotics - Pulmonary;  Laterality: N/A;  post: lung mass with lympadenopathy    CHOLECYSTECTOMY  2021    CORONARY ANGIOPLASTY WITH STENT PLACEMENT  07/2024    x2    ENDOSCOPY N/A 1/9/2025    Procedure: ESOPHAGOGASTRODUODENOSCOPY WITH DILATION (BOUGIE 32, 36, 40);  Surgeon: Jason Black MD;  Location: [facility] ENDOSCOPY;  Service: Gastroenterology;  Laterality: N/A;  ESOPHAGEAL STRICTURE, esophageal ulcer    MOLE REMOVAL  1994    forehead    SKIN LESION  "EXCISION Right 1994    breast    VENOUS ACCESS DEVICE (PORT) INSERTION Right 10/07/2024    Procedure: INSERTION VENOUS ACCESS DEVICE;  Surgeon: Serafin Choudhary MD;  Location: [facility] MAIN OR;  Service: Thoracic;  Laterality: Right;       Family History   Problem Relation Age of Onset    Breast cancer Mother 62    Heart attack Mother     Lung cancer Sister     Throat cancer Sister     Lung cancer Brother        Social History     Socioeconomic History    Marital status:    Tobacco Use    Smoking status: Every Day     Current packs/day: 1.00     Average packs/day: 1 pack/day for 55.1 years (55.1 ttl pk-yrs)     Types: Cigarettes     Start date: 1970     Passive exposure: Current    Smokeless tobacco: Never   Vaping Use    Vaping status: Never Used   Substance and Sexual Activity    Alcohol use: Never    Drug use: Never    Sexual activity: Defer           Objective   Physical Exam  Vitals and nursing note reviewed.   Constitutional:       Appearance: Normal appearance.   Cardiovascular:      Rate and Rhythm: Normal rate and regular rhythm.   Pulmonary:      Effort: Accessory muscle usage and retractions present.      Breath sounds: No stridor. Examination of the right-lower field reveals wheezing. Examination of the left-lower field reveals wheezing. Wheezing present.      Comments: Diffuse wheezes and coarse breath sounds noted bilaterally.  Musculoskeletal:      Cervical back: Normal range of motion and neck supple.   Neurological:      Mental Status: She is alert.         Procedures           ED Course    /48   Pulse 78   Temp 98.7 °F (37.1 °C) (Oral)   Resp 17   Ht 170.2 cm (67\")   Wt 54.9 kg (121 lb)   SpO2 93%   BMI 18.95 kg/m²   Labs Reviewed   RESPIRATORY PANEL PCR W/ COVID-19 (SARS-COV-2), NP SWAB IN UTM/VTP, 2 HR TAT - Abnormal; Notable for the following components:       Result Value    Influenza A H1 2009 PCR Detected (*)     All other components within normal limits    Narrative:     In " the setting of a positive respiratory panel with a viral infection PLUS a negative procalcitonin without other underlying concern for bacterial infection, consider observing off antibiotics or discontinuation of antibiotics and continue supportive care. If the respiratory panel is positive for atypical bacterial infection (Bordetella pertussis, Chlamydophila pneumoniae, or Mycoplasma pneumoniae), consider antibiotic de-escalation to target atypical bacterial infection.   COMPREHENSIVE METABOLIC PANEL - Abnormal; Notable for the following components:    Creatinine 0.48 (*)     Sodium 131 (*)     Potassium 3.0 (*)     Chloride 92 (*)     CO2 31.6 (*)     Total Protein 5.7 (*)     AST (SGOT) 37 (*)     All other components within normal limits    Narrative:     GFR Categories in Chronic Kidney Disease (CKD)      GFR Category          GFR (mL/min/1.73)    Interpretation  G1                     90 or greater         Normal or high (1)  G2                      60-89                Mild decrease (1)  G3a                   45-59                Mild to moderate decrease  G3b                   30-44                Moderate to severe decrease  G4                    15-29                Severe decrease  G5                    14 or less           Kidney failure          (1)In the absence of evidence of kidney disease, neither GFR category G1 or G2 fulfill the criteria for CKD.    eGFR calculation 2021 CKD-EPI creatinine equation, which does not include race as a factor   PROTIME-INR - Abnormal; Notable for the following components:    Protime 14.8 (*)     INR 1.16 (*)     All other components within normal limits   APTT - Abnormal; Notable for the following components:    PTT 68.3 (*)     All other components within normal limits   C-REACTIVE PROTEIN - Abnormal; Notable for the following components:    C-Reactive Protein 3.13 (*)     All other components within normal limits   BLOOD GAS, ARTERIAL - Abnormal; Notable for the  following components:    pCO2, Arterial 50.7 (*)     pO2, Arterial 115.9 (*)     HCO3, Arterial 32.7 (*)     Base Excess, Arterial 6.9 (*)     O2 Saturation, Arterial 98.5 (*)     CO2 Content 34.2 (*)     All other components within normal limits   URINALYSIS W/ CULTURE IF INDICATED - Abnormal; Notable for the following components:    Blood, UA Trace (*)     All other components within normal limits    Narrative:     In absence of clinical symptoms, the presence of pyuria, bacteria, and/or nitrites on the urinalysis result does not correlate with infection.   CBC WITH AUTO DIFFERENTIAL - Abnormal; Notable for the following components:    RBC 2.72 (*)     Hemoglobin 9.0 (*)     Hematocrit 28.8 (*)     .9 (*)     MCH 33.1 (*)     MCHC 31.3 (*)     RDW 16.7 (*)     RDW-SD 66.1 (*)     Platelets 118 (*)     Neutrophil % 77.3 (*)     Lymphocyte % 14.0 (*)     Eosinophil % 0.0 (*)     Lymphocytes, Absolute 0.58 (*)     All other components within normal limits   URINALYSIS, MICROSCOPIC ONLY - Abnormal; Notable for the following components:    RBC, UA 6-10 (*)     All other components within normal limits   MAGNESIUM - Normal   PHOSPHORUS - Normal   CALCIUM, IONIZED - Normal   POC LACTATE - Normal   BLOOD CULTURE   BLOOD CULTURE   RAINBOW DRAW    Narrative:     The following orders were created for panel order [place] Draw.  Procedure                               Abnormality         Status                     ---------                               -----------         ------                     Green Top (Gel)[[id number]]                                  Final result               Lavender Top[[id number]]                                     Final result               Gold Top - SST[[id number]]                                   Final result               Light Blue Top[[id number]]                                   Final result                 Please view results for these tests on the individual orders.   CBC AND  DIFFERENTIAL    Narrative:     The following orders were created for panel order CBC & Differential.  Procedure                               Abnormality         Status                     ---------                               -----------         ------                     CBC Auto Differential[[id number]]        Abnormal            Final result               Scan Slide[[id number]]                                                                    Please view results for these tests on the individual orders.   GREEN TOP   LAVENDER TOP   GOLD TOP - SST   LIGHT BLUE TOP   EXTRA TUBES    Narrative:     The following orders were created for panel order Extra Tubes.  Procedure                               Abnormality         Status                     ---------                               -----------         ------                     Gold Top - SST[[id number]]                                   Final result                 Please view results for these tests on the individual orders.   GOLD TOP - SST     Medications   sodium chloride 0.9 % flush 10 mL (has no administration in time range)   sodium chloride 0.9 % flush 10 mL (has no administration in time range)   Potassium Replacement - Follow Nurse / BPA Driven Protocol (has no administration in time range)   potassium chloride (KLOR-CON M20) CR tablet 40 mEq (has no administration in time range)   potassium chloride 10 mEq in 100 mL IVPB (has no administration in time range)   sepsis fluid NS 0.9 % bolus 1,647 mL (1,647 mL Intravenous New Bag 2/9/25 1156)   ampicillin-sulbactam (UNASYN) 3 g in sodium chloride 0.9 % 100 mL MBP (0 g Intravenous Stopped 2/9/25 1256)   azithromycin (ZITHROMAX) 500 mg in sodium chloride 0.9 % 250 mL IVPB-VTB (0 mg Intravenous Stopped 2/9/25 1302)   acetaminophen (TYLENOL) tablet 1,000 mg (1,000 mg Oral Given 2/9/25 1157)   ipratropium-albuterol (DUO-NEB) nebulizer solution 3 mL (3 mL Nebulization Given 2/9/25 1233)   sodium chloride 0.9 %  bolus 500 mL (0 mL Intravenous Stopped 2/9/25 1526)     XR Chest 1 View    Result Date: 2/9/2025  Impression: Emphysema without evidence of pneumonia. Electronically Signed: Brown Vaughn MD  2/9/2025 12:08 PM EST  Workstation ID: [id number]                                                      Medical Decision Making  64-year-old female presents emergency room by ambulance with complaints of increased shortness of breath.  Patient states over the past 4 days she has had fevers chills increased cough and congestion today when EMS arrived her O2 sat on room air was 79%.  Patient states she does not normally use oxygen at home.  Patient currently being treated for lung cancer, has history of cardiac disease.  Patient denies chest pain.  Physical exam: HEENT ill-appearing patient using accessory muscles to breathe currently on 4 L O2 nasal cannula.  HEENT is nontraumatic.  Lungs decreased breath sounds bilaterally with few expiratory wheezes noted.  Heart is regular rate rhythm.  Abdomen soft nontender normal bowel sounds all 4 quadrants skin is warm and dry with no cyanosis or edema noted.  Vital signs: BP is 84/54 temperature is 101.5 heart rate 91 respirations 24 O2 sat on 6 L is 96%.  ER course EKG was obtained heart rate of 110 sinus tachycardia no ST elevation or depression independently interpreted by Dr Cevallos.  Chest x-ray per radiologist shows emphysema without evidence of pneumonia.  Arterial blood gas shows pCO2 of 50.7 p.o. to 115.9 with a pH of 7.4.  Lactate was 0.4 C-reactive protein is 3.13 ionized calcium is 1.15 phosphorus is 3.1 magnesium 1.8 PTT is elevated at 68.3 PT and INR 14.8 at 1.16 CMP is glucose of 95 BUN 8 creatinine is 0.48 sodium of 131 and potassium of 3 respiratory panel is positive for flu A urinalysis was negative for infection.  CBC WBC is 4.14 hemoglobin of 9 hematocrit of 3028.8 with platelets of 118.  During ER stay patient received azithromycin and Unasyn per sepsis protocol  DuoNeb Tylenol 1000 mg for fever and a normal saline bolus per sepsis protocol.  Blood pressure did respond well to the fluid bolus however went down again subsequently another 500 cc of normal saline was given BP remained stable at 105/48 temperature is now 98.7 heart rate is 78 respiration 17 SpO2 on 2 L is 93%.  With a diagnosis of influenza A, hypotension, hypoxia patient was admitted in stable condition to hospitalist.  Patient's ER course treatment plan and disposition discussed with Dr Cevallos.    Problems Addressed:  Hypotension, unspecified hypotension type: complicated acute illness or injury  Hypoxia: complicated acute illness or injury  Influenza A: complicated acute illness or injury    Risk  OTC drugs.  Prescription drug management.  Decision regarding hospitalization.    Critical Care  Total time providing critical care: 30 minutes        Final diagnoses:   Influenza A   Hypoxia   Hypotension, unspecified hypotension type       ED Disposition  ED Disposition       ED Disposition   Decision to Admit    Condition   --    Comment   Level of Care: Telemetry [5]   Admitting Physician: GLYNN SMITH [[id number]]   Attending Physician: GLYNN SMITH [[id number]]                 No follow-up provider specified.       Medication List      No changes were made to your prescriptions during this visit.            Raghav Blas PA-C  02/09/25 [id number]

## 2025-02-09 NOTE — H&P
History and Physical   Kandi Fuentes : 1960 MRN:[id number] LOS:0     Reason for admission: Flu     Assessment / Plan     # Acute hypoxic respiratory failure secondary to influenza A  #Lung cancer currently on chemotherapy  -flu pos CRP 3   -pt has soft BP and she took the imdur 30 mg this am, she got sepsis bolus in the ED and MAP 55-65   -CXR with no pnuemonia   -midodrine and another LR bolus followed by the LR 75 cc hr for now, monitor volume overload sign.  -tamiflu, solumedrol, resp tx, oxygen supplement           Nutrition: Diet: Cardiac; Healthy Heart (2-3 Na+); Fluid Consistency: Thin (IDDSI 0)     DVT Prophylaxis: Active VTE Prophylaxis  Mechanical:        Start        25 1639  Maintain Sequential Compression Device  Continuous                          Select Pharmacologic VTE Prophylaxis if Desired & Appropriate      History of Present illness     A 64 y.o. old female patient with PMH of lung cancer COPD CAD hypertension presents to the hospital with complaints of worsening dyspnea after she got her last chemotherapy this Wednesday.  Patient is needed 2 L oxygen in the ED positive for influenza A no leukocytosis hemoglobin on 9 CRP 3 potassium 3 sodium 131 chest x-ray with emphysema without evidence of pneumonia.  Patient blood pressure is soft in the ED was given IV bolus.  Currently MAP is around 55-65.  Patient is not in distress and maintained mental status.  Will try midodrine and another bolus along with maintenance IV fluid.  PCU level of care.  Low threshold to start vasopressor and ICU consult.    Admitted for acute hypoxic respiratory failure secondary to influenza A.    Subjective / Review of systems     Review of Systems   Shortness of breath  Feeling lethargy    Past Medical/Surgical/Social/Family History & Allergies     Past Medical History:   Diagnosis Date    Cancer     COPD (chronic obstructive pulmonary disease)     Coronary artery disease     Elevated cholesterol     Heart  attack     Hypertension     Lung cancer 2024    Tinnitus       Past Surgical History:   Procedure Laterality Date    BACK SURGERY  2004    bone spur on sciatica    BRONCHOSCOPY WITH ION ROBOTIC ASSIST N/A 09/27/2024    Procedure: BRONCHOSCOPY WITH ION ROBOT, fine needle aspiration and tissue biopsy right upper lobe mass, endobronchial ultrasound with fine needle aspiration lymph nodes (Level 10R);  Surgeon: Serafin Choudhary MD;  Location: [facility] ENDOSCOPY;  Service: Robotics - Pulmonary;  Laterality: N/A;  post: lung mass with lympadenopathy    CHOLECYSTECTOMY  2021    CORONARY ANGIOPLASTY WITH STENT PLACEMENT  07/2024    x2    ENDOSCOPY N/A 1/9/2025    Procedure: ESOPHAGOGASTRODUODENOSCOPY WITH DILATION (BOUGIE 32, 36, 40);  Surgeon: Jason Black MD;  Location: [facility] ENDOSCOPY;  Service: Gastroenterology;  Laterality: N/A;  ESOPHAGEAL STRICTURE, esophageal ulcer    MOLE REMOVAL  1994    forehead    SKIN LESION EXCISION Right 1994    breast    VENOUS ACCESS DEVICE (PORT) INSERTION Right 10/07/2024    Procedure: INSERTION VENOUS ACCESS DEVICE;  Surgeon: Serafin Choudhary MD;  Location: [facility] MAIN OR;  Service: Thoracic;  Laterality: Right;      Social History     Socioeconomic History    Marital status:    Tobacco Use    Smoking status: Every Day     Current packs/day: 1.00     Average packs/day: 1 pack/day for 55.1 years (55.1 ttl pk-yrs)     Types: Cigarettes     Start date: 1970     Passive exposure: Current    Smokeless tobacco: Never   Vaping Use    Vaping status: Never Used   Substance and Sexual Activity    Alcohol use: Never    Drug use: Never    Sexual activity: Defer      Family History   Problem Relation Age of Onset    Breast cancer Mother 62    Heart attack Mother     Lung cancer Sister     Throat cancer Sister     Lung cancer Brother       Allergies   Allergen Reactions    Morphine Hives and Shortness Of Breath    Codeine Hives    Sulfa Antibiotics Hives        Home Medications     Prior to  Admission medications    Medication Sig Start Date End Date Taking? Authorizing Provider   Acetaminophen (Tylenol) 325 MG capsule Take 650 mg by mouth As Needed.    George Howe MD   albuterol sulfate  (90 Base) MCG/ACT inhaler Inhale 2 puffs As Needed for Wheezing or Shortness of Air.    George Howe MD   aspirin 81 MG chewable tablet Chew 1 tablet Daily.    George Howe MD   atorvastatin (LIPITOR) 40 MG tablet Take 1 tablet by mouth Daily.    George Howe MD   carvedilol (COREG) 3.125 MG tablet Take 1 tablet by mouth 2 (Two) Times a Day With Meals. 8/6/24   Georeg Howe MD   clopidogrel (PLAVIX) 75 MG tablet Take 1 tablet by mouth Daily. 10/22/24 10/22/25  George Howe MD   ipratropium-albuterol (DUO-NEB) 0.5-2.5 mg/3 ml nebulizer Take 3 mL by nebulization 4 (Four) Times a Day As Needed for Wheezing or Shortness of Air for up to 120 days. 12/31/24 4/30/25  Brammell, Timothy Duane, MD   isosorbide mononitrate (IMDUR) 30 MG 24 hr tablet Take 1 tablet by mouth Daily.    George Howe MD   lidocaine-prilocaine (EMLA) 2.5-2.5 % cream Apply 1 Application topically to the appropriate area as directed See Admin Instructions. Apply one hour prior to port access 10/14/24   Wilbert Delgado MD   methocarbamol (ROBAXIN) 500 MG tablet Take 1 tablet by mouth Every 6 (Six) Hours As Needed for Muscle Spasms. 12/31/24   Brammell, Timothy Duane, MD   mometasone (ELOCON) 0.1 % ointment Apply to affected skin of chest and back 2-3 times daily as needed for itching from radiation treatments. 11/15/24   Tereso Abarca MD   omeprazole (priLOSEC) 40 MG capsule Take 1 capsule by mouth 2 (Two) Times a Day for 30 days. 1/11/25 2/10/25  Dameon Davis MD   ondansetron (ZOFRAN) 8 MG tablet Take 1 tablet by mouth 3 (Three) Times a Day As Needed for Nausea or Vomiting. 10/10/24   Wilbert Delgado MD   oxyCODONE (ROXICODONE) 5 MG/5ML solution Take 5 mL by mouth Every 4  (Four) Hours As Needed for Moderate Pain (Pain with swallowing.). 11/18/24   Tereso Abarca MD      Objective / Physical Exam   Vital signs:  Temp: 98.7 °F (37.1 °C)  BP: 105/48  Heart Rate: 78  Resp: 17  SpO2: 93 %  Weight: 54.9 kg (121 lb)    Admission Weight: Weight: 54.9 kg (121 lb)    Physical Exam   Physical Exam  HENT:      Head: Normocephalic and atraumatic.      Nose: Nose normal.   Eyes:      Extraocular Movements: Extraocular movements intact.      Conjunctivae/sclerae: Conjunctivae normal.      Pupils: Pupils are equal, round, and reactive to light.   Cardiovascular:      Rate and Rhythm: Normal       Pulses: Normal pulses.      Heart sounds: Normal heart sounds.   Pulmonary:     Reduced BS bilaterally   Abdominal:      General: Abdomen is flat. Bowel sounds are normal.      Palpations: Abdomen is soft.   Musculoskeletal:         General: Normal range of motion.      Cervical back: Normal range of motion and neck supple.   Skin:     General: Skin is dry.   Neurological:      General: No focal deficit present.      Mental Status: alert.   Psychiatric:         Mood and Affect: Mood normal.        Labs     Results from last 7 days   Lab Units 02/09/25  1422 02/05/25  1326   WBC 10*3/mm3 4.14 5.77   HEMATOCRIT % 28.8* 37.4   PLATELETS 10*3/mm3 118* 237      Results from last 7 days   Lab Units 02/09/25  1205 02/05/25  1331   SODIUM mmol/L 131*  --    POTASSIUM mmol/L 3.0*  --    CHLORIDE mmol/L 92*  --    CO2 mmol/L 31.6*  --    BUN mg/dL 8  --    CREATININE mg/dL 0.48* 0.50*        Current Medications   Scheduled Meds:lactated ringers, 500 mL, Intravenous, Once  methylPREDNISolone sodium succinate, 40 mg, Intravenous, Q8H  midodrine, 5 mg, Oral, TID AC  potassium chloride ER, 40 mEq, Oral, Q2H  potassium chloride, 10 mEq, Intravenous, Q1H  sodium chloride, 10 mL, Intravenous, Q12H         Continuous Infusions:lactated ringers, 75 mL/hr  Pharmacy to Dose Oseltamivir (TAMIFLU),          Miriam Chow  MD  [facility]   02/09/25   16:50 EST

## 2025-02-10 LAB
ANION GAP SERPL CALCULATED.3IONS-SCNC: 5.6 MMOL/L (ref 5–15)
ANISOCYTOSIS BLD QL: NORMAL
BASOPHILS # BLD AUTO: 0.01 10*3/MM3 (ref 0–0.2)
BASOPHILS NFR BLD AUTO: 0.3 % (ref 0–1.5)
BUN SERPL-MCNC: 8 MG/DL (ref 8–23)
BUN/CREAT SERPL: 18.6 (ref 7–25)
CALCIUM SPEC-SCNC: 8.2 MG/DL (ref 8.6–10.5)
CHLORIDE SERPL-SCNC: 99 MMOL/L (ref 98–107)
CO2 SERPL-SCNC: 30.4 MMOL/L (ref 22–29)
CREAT SERPL-MCNC: 0.43 MG/DL (ref 0.57–1)
DEPRECATED RDW RBC AUTO: 66.5 FL (ref 37–54)
EGFRCR SERPLBLD CKD-EPI 2021: 109.4 ML/MIN/1.73
EOSINOPHIL # BLD AUTO: 0 10*3/MM3 (ref 0–0.4)
EOSINOPHIL NFR BLD AUTO: 0 % (ref 0.3–6.2)
ERYTHROCYTE [DISTWIDTH] IN BLOOD BY AUTOMATED COUNT: 16.6 % (ref 12.3–15.4)
GLUCOSE BLDC GLUCOMTR-MCNC: 185 MG/DL (ref 70–105)
GLUCOSE SERPL-MCNC: 124 MG/DL (ref 65–99)
HCT VFR BLD AUTO: 30.9 % (ref 34–46.6)
HGB BLD-MCNC: 9.6 G/DL (ref 12–15.9)
HOLD SPECIMEN: NORMAL
IMM GRANULOCYTES # BLD AUTO: 0.02 10*3/MM3 (ref 0–0.05)
IMM GRANULOCYTES NFR BLD AUTO: 0.6 % (ref 0–0.5)
LYMPHOCYTES # BLD AUTO: 0.54 10*3/MM3 (ref 0.7–3.1)
LYMPHOCYTES NFR BLD AUTO: 16.5 % (ref 19.6–45.3)
MACROCYTES BLD QL SMEAR: NORMAL
MAGNESIUM SERPL-MCNC: 1.7 MG/DL (ref 1.6–2.4)
MCH RBC QN AUTO: 33.3 PG (ref 26.6–33)
MCHC RBC AUTO-ENTMCNC: 31.1 G/DL (ref 31.5–35.7)
MCV RBC AUTO: 107.3 FL (ref 79–97)
MONOCYTES # BLD AUTO: 0.18 10*3/MM3 (ref 0.1–0.9)
MONOCYTES NFR BLD AUTO: 5.5 % (ref 5–12)
NEUTROPHILS NFR BLD AUTO: 2.52 10*3/MM3 (ref 1.7–7)
NEUTROPHILS NFR BLD AUTO: 77.1 % (ref 42.7–76)
NRBC BLD AUTO-RTO: 0 /100 WBC (ref 0–0.2)
PHOSPHATE SERPL-MCNC: 3.3 MG/DL (ref 2.5–4.5)
PLATELET # BLD AUTO: 141 10*3/MM3 (ref 140–450)
PMV BLD AUTO: 9.7 FL (ref 6–12)
POTASSIUM SERPL-SCNC: 3.8 MMOL/L (ref 3.5–5.2)
QT INTERVAL: 347 MS
QTC INTERVAL: 469 MS
RBC # BLD AUTO: 2.88 10*6/MM3 (ref 3.77–5.28)
SMALL PLATELETS BLD QL SMEAR: ADEQUATE
SODIUM SERPL-SCNC: 135 MMOL/L (ref 136–145)
WBC MORPH BLD: NORMAL
WBC NRBC COR # BLD AUTO: 3.27 10*3/MM3 (ref 3.4–10.8)

## 2025-02-10 PROCEDURE — 97165 OT EVAL LOW COMPLEX 30 MIN: CPT

## 2025-02-10 PROCEDURE — 83735 ASSAY OF MAGNESIUM: CPT | Performed by: INTERNAL MEDICINE

## 2025-02-10 PROCEDURE — 82948 REAGENT STRIP/BLOOD GLUCOSE: CPT

## 2025-02-10 PROCEDURE — 94664 DEMO&/EVAL PT USE INHALER: CPT

## 2025-02-10 PROCEDURE — 85007 BL SMEAR W/DIFF WBC COUNT: CPT | Performed by: INTERNAL MEDICINE

## 2025-02-10 PROCEDURE — 94799 UNLISTED PULMONARY SVC/PX: CPT

## 2025-02-10 PROCEDURE — 84100 ASSAY OF PHOSPHORUS: CPT | Performed by: INTERNAL MEDICINE

## 2025-02-10 PROCEDURE — 25010000002 METHYLPREDNISOLONE PER 40 MG: Performed by: INTERNAL MEDICINE

## 2025-02-10 PROCEDURE — 94761 N-INVAS EAR/PLS OXIMETRY MLT: CPT

## 2025-02-10 PROCEDURE — 97162 PT EVAL MOD COMPLEX 30 MIN: CPT

## 2025-02-10 PROCEDURE — 97116 GAIT TRAINING THERAPY: CPT

## 2025-02-10 PROCEDURE — 80048 BASIC METABOLIC PNL TOTAL CA: CPT | Performed by: INTERNAL MEDICINE

## 2025-02-10 PROCEDURE — 85025 COMPLETE CBC W/AUTO DIFF WBC: CPT | Performed by: INTERNAL MEDICINE

## 2025-02-10 RX ORDER — MIDODRINE HYDROCHLORIDE 5 MG/1
5 TABLET ORAL
Status: DISCONTINUED | OUTPATIENT
Start: 2025-02-10 | End: 2025-02-12

## 2025-02-10 RX ORDER — IPRATROPIUM BROMIDE AND ALBUTEROL SULFATE 2.5; .5 MG/3ML; MG/3ML
3 SOLUTION RESPIRATORY (INHALATION)
Status: DISCONTINUED | OUTPATIENT
Start: 2025-02-10 | End: 2025-02-12 | Stop reason: HOSPADM

## 2025-02-10 RX ORDER — PREDNISONE 20 MG/1
20 TABLET ORAL
Status: DISCONTINUED | OUTPATIENT
Start: 2025-02-11 | End: 2025-02-11

## 2025-02-10 RX ADMIN — ACETAMINOPHEN 650 MG: 325 TABLET, FILM COATED ORAL at 04:13

## 2025-02-10 RX ADMIN — Medication 5 MG: at 22:21

## 2025-02-10 RX ADMIN — IPRATROPIUM BROMIDE AND ALBUTEROL SULFATE 3 ML: .5; 3 SOLUTION RESPIRATORY (INHALATION) at 16:27

## 2025-02-10 RX ADMIN — OSELTAMIVIR PHOSPHATE 75 MG: 75 CAPSULE ORAL at 09:31

## 2025-02-10 RX ADMIN — IPRATROPIUM BROMIDE AND ALBUTEROL SULFATE 3 ML: .5; 3 SOLUTION RESPIRATORY (INHALATION) at 20:33

## 2025-02-10 RX ADMIN — IPRATROPIUM BROMIDE AND ALBUTEROL SULFATE 3 ML: .5; 3 SOLUTION RESPIRATORY (INHALATION) at 09:00

## 2025-02-10 RX ADMIN — Medication 10 ML: at 04:14

## 2025-02-10 RX ADMIN — OSELTAMIVIR PHOSPHATE 75 MG: 75 CAPSULE ORAL at 20:22

## 2025-02-10 RX ADMIN — METHYLPREDNISOLONE SODIUM SUCCINATE 40 MG: 40 INJECTION, POWDER, FOR SOLUTION INTRAMUSCULAR; INTRAVENOUS at 17:21

## 2025-02-10 RX ADMIN — METHYLPREDNISOLONE SODIUM SUCCINATE 40 MG: 40 INJECTION, POWDER, FOR SOLUTION INTRAMUSCULAR; INTRAVENOUS at 09:31

## 2025-02-10 RX ADMIN — Medication 5 MG: at 04:13

## 2025-02-10 RX ADMIN — METHYLPREDNISOLONE SODIUM SUCCINATE 40 MG: 40 INJECTION, POWDER, FOR SOLUTION INTRAMUSCULAR; INTRAVENOUS at 04:14

## 2025-02-10 RX ADMIN — ACETAMINOPHEN 650 MG: 325 TABLET, FILM COATED ORAL at 20:11

## 2025-02-10 RX ADMIN — Medication 10 ML: at 20:22

## 2025-02-10 NOTE — THERAPY EVALUATION
Patient Name: Kandi Fuentes  : 1960    MRN: [id number]                              Today's Date: 2/10/2025       Admit Date: 2025    Visit Dx:     ICD-10-CM ICD-9-CM   1. Influenza A  J10.1 487.1   2. Hypoxia  R09.02 799.02   3. 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    Closed nondisplaced fracture of lateral malleolus of left fibula    Fracture of lateral malleolus    Hypokalemia    Pancytopenia due to chemotherapy    Dysphagia    COPD (chronic obstructive pulmonary disease)    Coronary artery disease    Flu     Past Medical History:   Diagnosis Date    Cancer     COPD (chronic obstructive pulmonary disease)     Coronary artery disease     Elevated cholesterol     Heart attack     Hypertension     Lung cancer     Tinnitus      Past Surgical History:   Procedure Laterality Date    BACK SURGERY      bone spur on sciatica    BRONCHOSCOPY WITH ION ROBOTIC ASSIST N/A 2024    Procedure: BRONCHOSCOPY WITH ION ROBOT, fine needle aspiration and tissue biopsy right upper lobe mass, endobronchial ultrasound with fine needle aspiration lymph nodes (Level 10R);  Surgeon: Serafin Choudhary MD;  Location: [facility] ENDOSCOPY;  Service: Robotics - Pulmonary;  Laterality: N/A;  post: lung mass with lympadenopathy    CHOLECYSTECTOMY      CORONARY ANGIOPLASTY WITH STENT PLACEMENT  07/2024    x2    ENDOSCOPY N/A 2025    Procedure: ESOPHAGOGASTRODUODENOSCOPY WITH DILATION (BOUGIE 32, 36, 40);  Surgeon: Jason Black MD;  Location: [facility] ENDOSCOPY;  Service: Gastroenterology;  Laterality: N/A;  ESOPHAGEAL STRICTURE, esophageal ulcer    MOLE REMOVAL      forehead    SKIN LESION EXCISION Right     breast    VENOUS ACCESS DEVICE (PORT) INSERTION Right 10/07/2024    Procedure: INSERTION VENOUS ACCESS DEVICE;  Surgeon: Serafin Choudhary MD;  Location: [facility] MAIN OR;  Service:  Thoracic;  Laterality: Right;      General Information       Row Name 02/10/25 1526          OT Time and Intention    Document Type evaluation  -SR     Mode of Treatment occupational therapy  -SR       Row Name 02/10/25 1526          Occupational Profile    Reason for Services/Referral (Occupational Profile) Pt admitted for Flu and is currently undergoing chemotherapy for lung cancer.   At baseline, pt lives in a ground floor apartment with her son and grandson. She is typically independent with household mobility without need for AD.  -SR       Row Name 02/10/25 1526          Living Environment    People in Home child(paddy), adult;grandchild(paddy)  -SR       Row Name 02/10/25 1526          Cognition    Orientation Status (Cognition) oriented x 4  -SR               User Key  (r) = Recorded By, (t) = Taken By, (c) = Cosigned By      Initials Name Provider Type    SR Radha Wei OT Occupational Therapist                     Mobility/ADL's       Row Name 02/10/25 1526          Bed Mobility    Bed Mobility supine-sit  -SR     Supine-Sit [place] (Bed Mobility) supervision  -SR       Row Name 02/10/25 1526          Sit-Stand Transfer    Sit-Stand [place] (Transfers) supervision  -SR       Row Name 02/10/25 1526          Functional Mobility    Functional Mobility- Ind. Level contact guard assist  -SR     Functional Mobility- Device --  HHA  -SR       Row Name 02/10/25 1526          Activities of Daily Living    BADL Assessment/Intervention lower body dressing  -SR       Row Name 02/10/25 1526          Lower Body Dressing Assessment/Training    [place] Level (Lower Body Dressing) don;socks;supervision  -SR               User Key  (r) = Recorded By, (t) = Taken By, (c) = Cosigned By      Initials Name Provider Type    SR Radha Wei OT Occupational Therapist                   Obj/Interventions       Row Name 02/10/25 1527          Range of Motion Comprehensive    General Range of Motion  no range of motion deficits identified  -SR       Row Name 02/10/25 1527          Strength Comprehensive (MMT)    General Manual Muscle Testing (MMT) Assessment no strength deficits identified  -       Row Name 02/10/25 1527          Balance    Dynamic Sitting Balance standby assist  -SR     Static Standing Balance contact guard  -SR     Dynamic Standing Balance contact guard  -SR     Balance Interventions sitting;standing;sit to stand;supported;static;dynamic;minimal challenge  -SR               User Key  (r) = Recorded By, (t) = Taken By, (c) = Cosigned By      Initials Name Provider Type    SR Radha Wei, OT Occupational Therapist                   Goals/Plan    No documentation.                  Clinical Impression       Row Name 02/10/25 1527          Pain Assessment    Pretreatment Pain Rating 0/10 - no pain  -SR     Posttreatment Pain Rating 0/10 - no pain  -SR       [facility] Name 02/10/25 1527          Plan of Care Review    Outcome Evaluation Pt admitted for Flu and is currently undergoing chemotherapy for lung cancer.   At baseline, pt lives in a ground floor apartment with her son and grandson. She is typically independent with household mobility without need for AD.  Pt able to complete bed mobility and lower body dressing with CGA.  She has good strength and reports she has been walking to the bathroom and completing toilet hygiene without assist.  She has no further OT needs at this time as she is functioning near baseline.  Recommend home with assist at discharge.  -       Row Name 02/10/25 1527          Therapy Assessment/Plan (OT)    Therapy Frequency (OT) evaluation only  -       Row Name 02/10/25 1527          Positioning and Restraints    Pre-Treatment Position in bed  -SR     In Chair call light within reach;encouraged to call for assist;exit alarm on  -SR               User Key  (r) = Recorded By, (t) = Taken By, (c) = Cosigned By      Initials Name Provider Type    SR Eriberto  Radha DURAN OT Occupational Therapist                   Outcome Measures       Row Name 02/10/25 1520 02/10/25 1200       How much help from another person do you currently need...    Turning from your back to your side while in flat bed without using bedrails? 4  -BR 4  -AB    Moving from lying on back to sitting on the side of a flat bed without bedrails? 4  -BR 4  -AB    Moving to and from a bed to a chair (including a wheelchair)? 3  -BR 4  -AB    Standing up from a chair using your arms (e.g., wheelchair, bedside chair)? 4  -BR 4  -AB    Climbing 3-5 steps with a railing? 2  -BR 4  -AB    To walk in hospital room? 3  -BR 4  -AB    AM-PAC 6 Clicks Score (PT) 20  -BR 24  -AB    Highest Level of Mobility Goal 6 --> Walk 10 steps or more  -BR 8 --> Walked 250 feet or more  -AB      Row Name 02/10/25 1129          How much help from another person do you currently need...    Turning from your back to your side while in flat bed without using bedrails? 4  -AB     Moving from lying on back to sitting on the side of a flat bed without bedrails? 4  -AB     Moving to and from a bed to a chair (including a wheelchair)? 3  -AB     Standing up from a chair using your arms (e.g., wheelchair, bedside chair)? 2  -AB     Climbing 3-5 steps with a railing? 2  -AB     To walk in hospital room? 3  -AB     AM-PAC 6 Clicks Score (PT) 18  -AB     Highest Level of Mobility Goal 6 --> Walk 10 steps or more  -AB       Row Name 02/10/25 1520          Functional Assessment    Outcome Measure Options AM-PAC 6 Clicks Basic Mobility (PT)  -BR               User Key  (r) = Recorded By, (t) = Taken By, (c) = Cosigned By      Initials Name Provider Type    AB Marie Borden, RN Registered Nurse    Naomi Gant, PT Physical Therapist                    Occupational Therapy Education       Title: PT OT SLP Therapies (In Progress)       Topic: Occupational Therapy (In Progress)       Point: ADL training (In Progress)       Description:    Instruct learner(s) on proper safety adaptation and remediation techniques during self care or transfers.   Instruct in proper use of assistive devices.                  Learning Progress Summary            Patient Acceptance, E,TB, NR by SR at 2/10/2025 1530                      Point: Home exercise program (Not Started)       Description:   Instruct learner(s) on appropriate technique for monitoring, assisting and/or progressing therapeutic exercises/activities.                  Learner Progress:  Not documented in this visit.              Point: Precautions (Not Started)       Description:   Instruct learner(s) on prescribed precautions during self-care and functional transfers.                  Learner Progress:  Not documented in this visit.              Point: Body mechanics (In Progress)       Description:   Instruct learner(s) on proper positioning and spine alignment during self-care, functional mobility activities and/or exercises.                  Learning Progress Summary            Patient Acceptance, E,TB, NR by SR at 2/10/2025 1530                                      User Key       Initials Effective Dates Name Provider Type Discipline     06/16/21 -  Radha Wei, OT Occupational Therapist OT                  OT Recommendation and Plan  Therapy Frequency (OT): evaluation only  Plan of Care Review  Outcome Evaluation: Pt admitted for Flu and is currently undergoing chemotherapy for lung cancer.   At baseline, pt lives in a ground floor apartment with her son and grandson. She is typically independent with household mobility without need for AD.  Pt able to complete bed mobility and lower body dressing with CGA.  She has good strength and reports she has been walking to the bathroom and completing toilet hygiene without assist.  She has no further OT needs at this time as she is functioning near baseline.  Recommend home with assist at discharge.     Time Calculation:         Time  Calculation- OT       Row Name 02/10/25 1530             Time Calculation- OT    OT Start Time 1352  -SR      OT Stop Time 1410  -SR      OT Time Calculation (min) 18 min  -SR      Total Timed Code Minutes- OT 0 minute(s)  -SR      OT Received On 02/10/25  -SR                User Key  (r) = Recorded By, (t) = Taken By, (c) = Cosigned By      Initials Name Provider Type    SR Radha Wei, OT Occupational Therapist                  Therapy Charges for Today       Code Description Service Date Service Provider Modifiers Qty    [id number] HC OT EVAL LOW COMPLEXITY 4 2/10/2025 Radha Wei OT GO 1                 Radha Wei OT  2/10/2025

## 2025-02-10 NOTE — PROGRESS NOTES
[facility] MEDICINE SERVICE  DAILY PROGRESS NOTE    NAME: Kandi Fuentes  : 1960  MRN: [id number]      LOS: 1 day     PROVIDER OF SERVICE: Claus Cao MD    Chief Complaint: Flu    Subjective:     Interval History: Patient states her breathing is better although she still feels generally weak.  She denies any fevers or chills overnight.  She denies any nausea or vomiting.        Review of Systems:   Review of Systems    Objective:     Vital Signs  Temp:  [98.6 °F (37 °C)-101.5 °F (38.6 °C)] 98.7 °F (37.1 °C)  Heart Rate:  [] 87  Resp:  [15-24] 15  BP: ()/(41-98) 140/67  Flow (L/min) (Oxygen Therapy):  [1-6] 1   Body mass index is 18.95 kg/m².    Physical Exam  Physical Exam  General Appearance:  Alert, cooperative, no distress, appears stated age  Head:  Normocephalic, without obvious abnormality, atraumatic  Eyes:  PERRL, conjunctiva/corneas clear, EOM's intact, fundi benign, both eyes  Ears:  Normal TM's and external ear canals, both ears  Nose: Nares normal, septum midline, mucosa normal, no drainage or sinus tenderness  Throat: Lips, mucosa, and tongue normal; teeth and gums normal  Neck: Supple, symmetrical, trachea midline, no adenopathy, thyroid: not enlarged, symmetric, no tenderness/mass/nodules, no carotid bruit or JVD  Lungs:   Mild bilateral rhonchi and expiratory wheezing, respirations unlabored  Heart:  Regular rate and rhythm, S1, S2 normal, no murmur, rub or gallop  Abdomen:  Soft, non-tender, bowel sounds active all four quadrants,  no masses, no organomegaly  Extremities: Extremities normal, atraumatic, no cyanosis or edema  Pulses: 2+ and symmetric  Skin: Skin color, texture, turgor normal, no rashes or lesions  Neurologic: Normal         Diagnostic Data    Results from last 7 days   Lab Units 02/10/25  0418 25  1422 25  1205   WBC 10*3/mm3 3.27*   < >  --    HEMOGLOBIN g/dL 9.6*   < >  --    HEMATOCRIT % 30.9*   < >  --    PLATELETS 10*3/mm3 141   < >   --    GLUCOSE mg/dL 124*  --  95   CREATININE mg/dL 0.43*  --  0.48*   BUN mg/dL 8  --  8   SODIUM mmol/L 135*  --  131*   POTASSIUM mmol/L 3.8  --  3.0*   AST (SGOT) U/L  --   --  37*   ALT (SGPT) U/L  --   --  19   ALK PHOS U/L  --   --  64   BILIRUBIN mg/dL  --   --  0.6   ANION GAP mmol/L 5.6  --  7.4    < > = values in this interval not displayed.       XR Chest 1 View    Result Date: 2/9/2025  Impression: Emphysema without evidence of pneumonia. Electronically Signed: Brown Vaughn MD  2/9/2025 12:08 PM EST  Workstation ID: [id number]       I reviewed the patient's new clinical results.    Assessment/Plan:     Active and Resolved Problems  Active Hospital Problems    Diagnosis  POA    **Flu [J11.1]  Yes      Resolved Hospital Problems   No resolved problems to display.       Sepsis due to acute influenza A infection  -Clinically patient seems to be improving with improvement in respiratory symptoms  -Continue Tamiflu, steroids and transition to oral steroids tomorrow  -No evidence of pneumonia and therefore we will hold off on antibiotics  -Improvement in BP and sepsis symptoms    Acute COPD exacerbation with acute respiratory failure with hypoxia  -Secondary to underlying influenza infection  -Continue steroids, nebulizers  -Encourage pulmonary toileting with I-S, flutter valve  -Continue O2 and wean as tolerated  -Plan for 6-minute walking oximetry prior to discharge    Metastatic lung cancer  -Patient is receiving outpatient chemotherapy  -Oncology consult appreciated  -No acute issues currently    CAD  -Continue GDMT    Hypertension  -Patient was hypotensive on admission  -Hold antiplatelets for now and resume in the next 24 hours if BP remains stable  -Wean off of midodrine          VTE Prophylaxis:  Mechanical VTE prophylaxis orders are present.             Disposition Planning:     Barriers to Discharge: Improvement in respiratory symptoms, general weakness  Anticipated Date of Discharge: 2/11  Place of  Discharge: Home      Time: 40 minutes     There are no questions and answers to display.       Signature: Electronically signed by Claus Cao MD, 02/10/25, 10:22 EST.  [facility] Hospitalist Team

## 2025-02-10 NOTE — PLAN OF CARE
Goal Outcome Evaluation:              Outcome Evaluation: Pt admitted for Flu and is currently undergoing chemotherapy for lung cancer.   At baseline, pt lives in a ground floor apartment with her son and grandson. She is typically independent with household mobility without need for AD.  Pt able to complete bed mobility and lower body dressing with CGA.  She has good strength and reports she has been walking to the bathroom and completing toilet hygiene without assist.  She has no further OT needs at this time as she is functioning near baseline.  Recommend home with assist at discharge.

## 2025-02-10 NOTE — THERAPY EVALUATION
Patient Name: Kandi Fuentes  : 1960    MRN: [id number]                              Today's Date: 2/10/2025       Admit Date: 2025    Visit Dx:     ICD-10-CM ICD-9-CM   1. Influenza A  J10.1 487.1   2. Hypoxia  R09.02 799.02   3. 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    Closed nondisplaced fracture of lateral malleolus of left fibula    Fracture of lateral malleolus    Hypokalemia    Pancytopenia due to chemotherapy    Dysphagia    COPD (chronic obstructive pulmonary disease)    Coronary artery disease    Flu     Past Medical History:   Diagnosis Date    Cancer     COPD (chronic obstructive pulmonary disease)     Coronary artery disease     Elevated cholesterol     Heart attack     Hypertension     Lung cancer     Tinnitus      Past Surgical History:   Procedure Laterality Date    BACK SURGERY      bone spur on sciatica    BRONCHOSCOPY WITH ION ROBOTIC ASSIST N/A 2024    Procedure: BRONCHOSCOPY WITH ION ROBOT, fine needle aspiration and tissue biopsy right upper lobe mass, endobronchial ultrasound with fine needle aspiration lymph nodes (Level 10R);  Surgeon: Serafin Choudhary MD;  Location: [facility] ENDOSCOPY;  Service: Robotics - Pulmonary;  Laterality: N/A;  post: lung mass with lympadenopathy    CHOLECYSTECTOMY      CORONARY ANGIOPLASTY WITH STENT PLACEMENT  07/2024    x2    ENDOSCOPY N/A 2025    Procedure: ESOPHAGOGASTRODUODENOSCOPY WITH DILATION (BOUGIE 32, 36, 40);  Surgeon: Jason Black MD;  Location: [facility] ENDOSCOPY;  Service: Gastroenterology;  Laterality: N/A;  ESOPHAGEAL STRICTURE, esophageal ulcer    MOLE REMOVAL      forehead    SKIN LESION EXCISION Right     breast    VENOUS ACCESS DEVICE (PORT) INSERTION Right 10/07/2024    Procedure: INSERTION VENOUS ACCESS DEVICE;  Surgeon: Serafin Choudhary MD;  Location: [facility] MAIN OR;  Service:  Thoracic;  Laterality: Right;      General Information       Row Name 02/10/25 1508          Physical Therapy Time and Intention    Document Type evaluation  -BR     Mode of Treatment physical therapy  -BR       Row Name 02/10/25 1508          General Information    Patient Profile Reviewed yes  -BR     Prior Level of Function independent:;all household mobility;gait;transfer  -BR     Existing Precautions/Restrictions oxygen therapy device and L/min  -BR     Barriers to Rehab medically complex  -BR       Row Name 02/10/25 1508          Living Environment    People in Home child(paddy), adult;grandchild(paddy)  -BR       Row Name 02/10/25 1508          Home Main Entrance    Number of Stairs, Main Entrance none  -BR       Row Name 02/10/25 1508          Stairs Within Home, Primary    Stair Railings, Within Home, Primary none  -BR       Row Name 02/10/25 1508          Cognition    Orientation Status (Cognition) oriented x 4  -BR       Row Name 02/10/25 1508          Safety Issues/Impairments Affecting Functional Mobility    Impairments Affecting Function (Mobility) endurance/activity tolerance;balance;strength  -BR               User Key  (r) = Recorded By, (t) = Taken By, (c) = Cosigned By      Initials Name Provider Type    BR Naomi Gallego PT Physical Therapist                   Mobility       Row Name 02/10/25 1509          Bed Mobility    Bed Mobility supine-sit  -BR     Supine-Sit [place] (Bed Mobility) set up  -BR     Assistive Device (Bed Mobility) head of bed elevated  -BR       Row Name 02/10/25 1509          Sit-Stand Transfer    Sit-Stand [place] (Transfers) supervision  -BR       Row Name 02/10/25 1509          Gait/Stairs (Locomotion)    [place] Level (Gait) minimum assist (75% patient effort)  -BR     Patient was able to Ambulate yes  -BR     Distance in Feet (Gait) 85  -BR     Deviations/Abnormal Patterns (Gait) dalia decreased  -BR     Bilateral Gait Deviations forward flexed  posture;heel strike decreased  -BR     Comment, (Gait/Stairs) Pt wore a surical mask in hallway. PT utilized hand held assist.  -BR               User Key  (r) = Recorded By, (t) = Taken By, (c) = Cosigned By      Initials Name Provider Type    Naomi Gant PT Physical Therapist                   Obj/Interventions       Row Name 02/10/25 1511          Range of Motion Comprehensive    General Range of Motion bilateral lower extremity ROM WFL  -BR       Row Name 02/10/25 1511          Strength Comprehensive (MMT)    Comment, General Manual Muscle Testing (MMT) Assessment BLE strength grossly 3+/5  -BR       Row Name 02/10/25 1511          Balance    Balance Assessment sitting dynamic balance;standing static balance;standing dynamic balance  -BR     Dynamic Sitting Balance standby assist  -BR     Position, Sitting Balance unsupported;sitting edge of bed  -BR     Static Standing Balance contact guard  -BR     Dynamic Standing Balance minimal assist  -BR     Position/Device Used, Standing Balance unsupported  -BR       Row Name 02/10/25 1511          Sensory Assessment (Somatosensory)    Sensory Assessment (Somatosensory) --  Pt has decreased sensation B feet.  -BR               User Key  (r) = Recorded By, (t) = Taken By, (c) = Cosigned By      Initials Name Provider Type    Naomi Gant PT Physical Therapist                   Goals/Plan       Row Name 02/10/25 1519          Bed Mobility Goal 1 (PT)    Activity/Assistive Device (Bed Mobility Goal 1, PT) bed mobility activities, all  -BR     [place] Level/Cues Needed (Bed Mobility Goal 1, PT) independent  -BR     Time Frame (Bed Mobility Goal 1, PT) long term goal (LTG);2 weeks  -BR       Row Name 02/10/25 1519          Transfer Goal 1 (PT)    Activity/Assistive Device (Transfer Goal 1, PT) transfers, all  -BR     [place] Level/Cues Needed (Transfer Goal 1, PT) independent  -BR     Time Frame (Transfer Goal 1, PT) long term goal (LTG);2 weeks   -BR       Row Name 02/10/25 1519          Gait Training Goal 1 (PT)    Activity/Assistive Device (Gait Training Goal 1, PT) gait (walking locomotion);assistive device use  -BR     [place] Level (Gait Training Goal 1, PT) modified independence  -BR     Distance (Gait Training Goal 1, PT) 125  -BR     Time Frame (Gait Training Goal 1, PT) long term goal (LTG);2 weeks  -BR       Row Name 02/10/25 1519          Therapy Assessment/Plan (PT)    Planned Therapy Interventions (PT) balance training;gait training;patient/family education;transfer training;strengthening  -BR               User Key  (r) = Recorded By, (t) = Taken By, (c) = Cosigned By      Initials Name Provider Type    BR Naomi Gallego, PT Physical Therapist                   Clinical Impression       Row Name 02/10/25 1512          Pain    Pretreatment Pain Rating 0/10 - no pain  -BR     Posttreatment Pain Rating 0/10 - no pain  -BR       Row Name 02/10/25 1519 02/10/25 1512       Plan of Care Review    Plan of Care Reviewed With -- patient  -BR    Outcome Evaluation Pt presents as a 64 y.o. F with PMH of lung cancer ,COPD, CAD, HTN who presented to [facility] on 2/9/25 with complaints of worsening dyspnea after she got her last chemotherapy this Wednesday.  Pt tested + for influenza. Chest x-ray with emphysema without evidence of pneumonia.  Pt A and O x 4 with O2 at 2 L. Pt eager to mobilize with PT. At baseline, pt lives in a ground floor apartment with her son and grandson. She is typically independent with household mobility without need for AD. This date, pt transfers with supervision and she ambulated 85 feet with min assist of 1 using hand held assist; Pt donned a surgical mask while in hallway. Pt has some general unsteadiness up on her feet and would benefit from a standard cane for mobility- CM notified. PT will follow pt for aforementioned deficits. PT recommendation is Home with Assist with cane.  -BR --      Row Name 02/10/25 1512           Therapy Assessment/Plan (PT)    Rehab Potential (PT) good  -BR     Criteria for Skilled Interventions Met (PT) yes;meets criteria;skilled treatment is necessary  -BR     Therapy Frequency (PT) 3 times/wk  -BR     Predicted Duration of Therapy Intervention (PT) until D/C  -BR       Row Name 02/10/25 1512          Vital Signs    Pre Systolic BP Rehab 129  -BR     Pre Treatment Diastolic BP 69  -BR     Post Systolic BP Rehab 120  -BR     Post Treatment Diastolic BP 88  -BR     Pretreatment Heart Rate (beats/min) 84  -BR     Posttreatment Heart Rate (beats/min) 91  -BR     Pre SpO2 (%) 93  -BR     O2 Delivery Pre Treatment nasal cannula  2L  -BR     Post SpO2 (%) 92  -BR     O2 Delivery Post Treatment nasal cannula  2L  -BR     Pre Patient Position Supine  -BR     Intra Patient Position Standing  -BR     Post Patient Position Sitting  -BR       Row Name 02/10/25 1512          Positioning and Restraints    Pre-Treatment Position in bed  -BR     Post Treatment Position chair  -BR     In Chair notified nsg;reclined;call light within reach;encouraged to call for assist;exit alarm on;legs elevated  -BR               User Key  (r) = Recorded By, (t) = Taken By, (c) = Cosigned By      Initials Name Provider Type    BR Naomi Gallego, PT Physical Therapist                   Outcome Measures       Row Name 02/10/25 1520 02/10/25 1200       How much help from another person do you currently need...    Turning from your back to your side while in flat bed without using bedrails? 4  -BR 4  -AB    Moving from lying on back to sitting on the side of a flat bed without bedrails? 4  -BR 4  -AB    Moving to and from a bed to a chair (including a wheelchair)? 3  -BR 4  -AB    Standing up from a chair using your arms (e.g., wheelchair, bedside chair)? 4  -BR 4  -AB    Climbing 3-5 steps with a railing? 2  -BR 4  -AB    To walk in hospital room? 3  -BR 4  -AB    AM-PAC 6 Clicks Score (PT) 20  -BR 24  -AB    Highest Level of Mobility  Goal 6 --> Walk 10 steps or more  -BR 8 --> Walked 250 feet or more  -AB      Row Name 02/10/25 1129          How much help from another person do you currently need...    Turning from your back to your side while in flat bed without using bedrails? 4  -AB     Moving from lying on back to sitting on the side of a flat bed without bedrails? 4  -AB     Moving to and from a bed to a chair (including a wheelchair)? 3  -AB     Standing up from a chair using your arms (e.g., wheelchair, bedside chair)? 2  -AB     Climbing 3-5 steps with a railing? 2  -AB     To walk in hospital room? 3  -AB     AM-PAC 6 Clicks Score (PT) 18  -AB     Highest Level of Mobility Goal 6 --> Walk 10 steps or more  -AB       Row Name 02/10/25 1520          Functional Assessment    Outcome Measure Options AM-PAC 6 Clicks Basic Mobility (PT)  -BR               User Key  (r) = Recorded By, (t) = Taken By, (c) = Cosigned By      Initials Name Provider Type    AB Marie Borden, RN Registered Nurse    Naomi Gant, PT Physical Therapist                                 Physical Therapy Education       Title: PT OT SLP Therapies (Done)       Topic: Physical Therapy (Done)       Point: Mobility training (Done)       Learning Progress Summary            Patient DAMION Meneses D, VU, DU by BR at 2/10/2025 1520                      Point: Body mechanics (Done)       Learning Progress Summary            Patient DAMION Meneses D, VU, DU by BR at 2/10/2025 1520                      Point: Precautions (Done)       Learning Progress Summary            Patient DAMION Meneses D, VU, DU by BR at 2/10/2025 1520                                      User Key       Initials Effective Dates Name Provider Type Discipline     02/01/22 -  Naomi Gallego, ASHLEIGH Physical Therapist PT                  PT Recommendation and Plan  Planned Therapy Interventions (PT): balance training, gait training, patient/family education, transfer training, strengthening  Outcome Evaluation: Pt  presents as a 64 y.o. F with PMH of lung cancer ,COPD, CAD, HTN who presented to [facility] on 2/9/25 with complaints of worsening dyspnea after she got her last chemotherapy this Wednesday.  Pt tested + for influenza. Chest x-ray with emphysema without evidence of pneumonia.  Pt A and O x 4 with O2 at 2 L. Pt eager to mobilize with PT. At baseline, pt lives in a ground floor apartment with her son and grandson. She is typically independent with household mobility without need for AD. This date, pt transfers with supervision and she ambulated 85 feet with min assist of 1 using hand held assist; Pt donned a surgical mask while in hallway. Pt has some general unsteadiness up on her feet and would benefit from a standard cane for mobility- CM notified. PT will follow pt for aforementioned deficits. PT recommendation is Home with Assist with cane.     Time Calculation:         PT Charges       Row Name 02/10/25 1520             Time Calculation    Start Time 1349  -BR      Stop Time 1413  -BR      Time Calculation (min) 24 min  -BR      PT Received On 02/10/25  -BR      PT - Next Appointment 02/12/25  -BR      PT Goal Re-Cert Due Date 02/24/25  -BR         Time Calculation- PT    Total Timed Code Minutes- PT 10 minute(s)  -BR                User Key  (r) = Recorded By, (t) = Taken By, (c) = Cosigned By      Initials Name Provider Type    Naomi Gant PT Physical Therapist                  Therapy Charges for Today       Code Description Service Date Service Provider Modifiers Qty    49842131499 HC GAIT TRAINING EA 15 MIN 2/10/2025 Naomi Gallego, PT GP 1    31155552776 HC PT EVAL MOD COMPLEXITY 3 2/10/2025 Naomi Gallego, PT GP 1            PT G-Codes  Outcome Measure Options: AM-PAC 6 Clicks Basic Mobility (PT)  AM-PAC 6 Clicks Score (PT): 20  PT Discharge Summary  Anticipated Discharge Disposition (PT): home with assist    Naomi Gallego PT  2/10/2025

## 2025-02-10 NOTE — PLAN OF CARE
Goal Outcome Evaluation:  Plan of Care Reviewed With: patient  Pt presents as a 64 y.o. F with PMH of lung cancer ,COPD, CAD, HTN who presented to [facility] on 2/9/25 with complaints of worsening dyspnea after she got her last chemotherapy this Wednesday.  Pt tested + for influenza. Chest x-ray with emphysema without evidence of pneumonia.  Pt A and O x 4 with O2 at 2 L. Pt eager to mobilize with PT. At baseline, pt lives in a ground floor apartment with her son and grandson. She is typically independent with household mobility without need for AD. This date, pt transfers with supervision and she ambulated 85 feet with min assist of 1 using hand held assist; Pt donned a surgical mask while in hallway. Pt has some general unsteadiness up on her feet and would benefit from a standard cane for mobility- CM notified. PT will follow pt for aforementioned deficits. PT recommendation is Home with Assist with cane.               Anticipated Discharge Disposition (PT): home with assist

## 2025-02-10 NOTE — CASE MANAGEMENT/SOCIAL WORK
Discharge Planning Assessment   [place]     Patient Name: Kandi Fuentes  MRN: [id number]  Today's Date: 2/10/2025    Admit Date: 2/9/2025    Plan: D/C Plan: Return home with family. WC Van or Medicaid transport. Does not want help with finding PCP   Discharge Needs Assessment       Row Name 02/10/25 1029       Living Environment    People in Home child(paddy), adult;grandchild(paddy)    Name(s) of People in Home SonCarlos and Grandchild, Farhad.    Current Living Arrangements apartment    Potentially Unsafe Housing Conditions none    In the past 12 months has the electric, gas, oil, or water company threatened to shut off services in your home? No    Primary Care Provided by child(paddy);self    Provides Primary Care For no one    Family Caregiver if Needed child(paddy), adult    Family Caregiver Names Trent Gonzalez    Quality of Family Relationships helpful;involved;supportive    Able to Return to Prior Arrangements yes       Resource/Environmental Concerns    Resource/Environmental Concerns none    Transportation Concerns none       Transportation Needs    In the past 12 months, has lack of transportation kept you from medical appointments or from getting medications? no    In the past 12 months, has lack of transportation kept you from meetings, work, or from getting things needed for daily living? No       Food Insecurity    Within the past 12 months, you worried that your food would run out before you got the money to buy more. Never true    Within the past 12 months, the food you bought just didn't last and you didn't have money to get more. Never true       Transition Planning    Patient/Family Anticipates Transition to home with family    Patient/Family Anticipated Services at Transition none    Transportation Anticipated health plan transportation       Discharge Needs Assessment    Readmission Within the Last 30 Days no previous admission in last 30 days    Equipment Currently Used at Home none    Concerns to be  Addressed denies needs/concerns at this time    Do you want help finding or keeping work or a job? I do not need or want help    Do you want help with school or training? For example, starting or completing job training or getting a high school diploma, GED or equivalent No    Anticipated Changes Related to Illness none    Equipment Needed After Discharge none    Provided Post Acute Provider List? N/A    Provided Post Acute Provider Quality & Resource List? N/A    Current Discharge Risk dependent with mobility/activities of daily living                   Discharge Plan       Row Name 02/10/25 1031       Plan    Plan D/C Plan: Return home with family. WC Van or Medicaid transport. Does not want help with finding PCP    Provided Post Acute Provider List? N/A    Provided Post Acute Provider Quality & Resource List? N/A    Plan Comments CM spoke with patient at bedside to discuss admission assessment and discharge planning. Patient confirms pharmacy. The patient does not want help with finding a PCP. Patient already enrolled in meds to bed program. Patient denies any difficulty affording medications at this time. Patient denies any additional needs for services or DME at this time. Patient will need WC Van or Medicaid transport at d/c. CM completed the SDOH with the patient at bedside. D/C barriers: NC 2L O2, Accessed port, IV steroid.             Expected Discharge Date and Time       Expected Discharge Date Expected Discharge Time    Feb 11, 2025            Demographic Summary       Row Name 02/10/25 1027       General Information    Admission Type inpatient    Arrived From home    Referral Source admission list    Reason for Consult discharge planning    Preferred Language English       Contact Information    Permission Granted to Share Info With                    Functional Status       Row Name 02/10/25 1028       Functional Status    Usual Activity Tolerance moderate    Current Activity Tolerance  moderate       Functional Status, IADL    Medications assistive person    Meal Preparation assistive person    Housekeeping assistive person    Laundry assistive person    Shopping assistive person    If for any reason you need help with day-to-day activities such as bathing, preparing meals, shopping, managing finances, etc., do you get the help you need? I get all the help I need       Mental Status    General Appearance WDL WDL       Mental Status Summary    Recent Changes in Mental Status/Cognitive Functioning no changes       Employment/    Employment Status disabled;, previous service           Current or Previous  Service none             Social Drivers of Health     Tobacco Use: High Risk (1/30/2025)    Received from [facility]    Patient History     Smoking Tobacco Use: Every Day     Smokeless Tobacco Use: Never     Passive Exposure: Current   Alcohol Use: Not At Risk (2/10/2025)    AUDIT-C     Frequency of Alcohol Consumption: Never     Average Number of Drinks: Patient does not drink     Frequency of Binge Drinking: Never   Financial Resource Strain: Low Risk  (2/10/2025)    Overall Financial Resource Strain (CARDIA)     Difficulty of Paying Living Expenses: Not very hard   Food Insecurity: No Food Insecurity (2/10/2025)    Hunger Vital Sign     Worried About Running Out of Food in the Last Year: Never true     Ran Out of Food in the Last Year: Never true   Transportation Needs: No Transportation Needs (2/10/2025)    PRAPARE - Transportation     Lack of Transportation (Medical): No     Lack of Transportation (Non-Medical): No   Physical Activity: Inactive (2/10/2025)    Exercise Vital Sign     Days of Exercise per Week: 0 days     Minutes of Exercise per Session: 0 min   Stress: Stress Concern Present (2/10/2025)    [employer] [place] [place] [employer] - Occupational Stress Questionnaire     Feeling of Stress : To some extent   Social Connections: Not At Risk  (2/10/2025)    Family and Community Support     Help with Day-to-Day Activities: I get all the help I need     Lonely or Isolated: Never   Interpersonal Safety: Not At Risk (2/10/2025)    Abuse Screen     Unsafe at Home or Work/School: no     Feels Threatened by Someone?: no     Does Anyone Keep You from Contacting Others or Doint Things Outside the Home?: no     Physical Sign of Abuse Present: no   Depression: Not at risk (2/10/2025)    PHQ-2     PHQ-2 Score: 0   Housing Stability: Not At Risk (2/10/2025)    Housing Stability     Current Living Arrangements: apartment     Potentially Unsafe Housing Conditions: none   Utilities: Not At Risk (2/10/2025)    [facility] Utilities     Threatened with loss of utilities: No   Health Literacy: Not At Risk (2/10/2025)    Education     Help with school or training?: No     Preferred Language: English   Employment: Not At Risk (2/10/2025)    Employment     Do you want help finding or keeping work or a job?: I do not need or want help   Disabilities: At Risk (2/10/2025)    Disabilities     Concentrating, Remembering, or Making Decisions Difficulty: no     Doing Errands Independently Difficulty: yes      G[place] [address]  Phone: [phone]  Fax: [phone]

## 2025-02-11 PROBLEM — E43 SEVERE PROTEIN-CALORIE MALNUTRITION: Status: ACTIVE | Noted: 2025-02-11

## 2025-02-11 LAB
ANION GAP SERPL CALCULATED.3IONS-SCNC: 10.5 MMOL/L (ref 5–15)
BASOPHILS # BLD AUTO: 0 10*3/MM3 (ref 0–0.2)
BASOPHILS NFR BLD AUTO: 0 % (ref 0–1.5)
BUN SERPL-MCNC: 13 MG/DL (ref 8–23)
BUN/CREAT SERPL: 34.2 (ref 7–25)
CALCIUM SPEC-SCNC: 8.8 MG/DL (ref 8.6–10.5)
CHLORIDE SERPL-SCNC: 99 MMOL/L (ref 98–107)
CO2 SERPL-SCNC: 31.5 MMOL/L (ref 22–29)
CREAT SERPL-MCNC: 0.38 MG/DL (ref 0.57–1)
DEPRECATED RDW RBC AUTO: 64.3 FL (ref 37–54)
EGFRCR SERPLBLD CKD-EPI 2021: 112.8 ML/MIN/1.73
EOSINOPHIL # BLD AUTO: 0 10*3/MM3 (ref 0–0.4)
EOSINOPHIL NFR BLD AUTO: 0 % (ref 0.3–6.2)
ERYTHROCYTE [DISTWIDTH] IN BLOOD BY AUTOMATED COUNT: 16.5 % (ref 12.3–15.4)
GLUCOSE SERPL-MCNC: 146 MG/DL (ref 65–99)
HCT VFR BLD AUTO: 32.5 % (ref 34–46.6)
HGB BLD-MCNC: 10.2 G/DL (ref 12–15.9)
IMM GRANULOCYTES # BLD AUTO: 0 10*3/MM3 (ref 0–0.05)
IMM GRANULOCYTES NFR BLD AUTO: 0 % (ref 0–0.5)
LYMPHOCYTES # BLD AUTO: 0.45 10*3/MM3 (ref 0.7–3.1)
LYMPHOCYTES NFR BLD AUTO: 15.7 % (ref 19.6–45.3)
MAGNESIUM SERPL-MCNC: 1.7 MG/DL (ref 1.6–2.4)
MCH RBC QN AUTO: 33 PG (ref 26.6–33)
MCHC RBC AUTO-ENTMCNC: 31.4 G/DL (ref 31.5–35.7)
MCV RBC AUTO: 105.2 FL (ref 79–97)
MONOCYTES # BLD AUTO: 0.33 10*3/MM3 (ref 0.1–0.9)
MONOCYTES NFR BLD AUTO: 11.5 % (ref 5–12)
NEUTROPHILS NFR BLD AUTO: 2.09 10*3/MM3 (ref 1.7–7)
NEUTROPHILS NFR BLD AUTO: 72.8 % (ref 42.7–76)
NRBC BLD AUTO-RTO: 0 /100 WBC (ref 0–0.2)
PHOSPHATE SERPL-MCNC: 2.7 MG/DL (ref 2.5–4.5)
PLATELET # BLD AUTO: 144 10*3/MM3 (ref 140–450)
PMV BLD AUTO: 10 FL (ref 6–12)
POTASSIUM SERPL-SCNC: 3.2 MMOL/L (ref 3.5–5.2)
POTASSIUM SERPL-SCNC: 4.1 MMOL/L (ref 3.5–5.2)
RBC # BLD AUTO: 3.09 10*6/MM3 (ref 3.77–5.28)
SODIUM SERPL-SCNC: 141 MMOL/L (ref 136–145)
WBC NRBC COR # BLD AUTO: 2.87 10*3/MM3 (ref 3.4–10.8)

## 2025-02-11 PROCEDURE — 83735 ASSAY OF MAGNESIUM: CPT | Performed by: INTERNAL MEDICINE

## 2025-02-11 PROCEDURE — 63710000001 PREDNISONE PER 1 MG: Performed by: INTERNAL MEDICINE

## 2025-02-11 PROCEDURE — 84100 ASSAY OF PHOSPHORUS: CPT | Performed by: INTERNAL MEDICINE

## 2025-02-11 PROCEDURE — 84145 PROCALCITONIN (PCT): CPT | Performed by: HOSPITALIST

## 2025-02-11 PROCEDURE — 80048 BASIC METABOLIC PNL TOTAL CA: CPT | Performed by: INTERNAL MEDICINE

## 2025-02-11 PROCEDURE — 94761 N-INVAS EAR/PLS OXIMETRY MLT: CPT

## 2025-02-11 PROCEDURE — 63710000001 PREDNISONE PER 1 MG: Performed by: HOSPITALIST

## 2025-02-11 PROCEDURE — 94664 DEMO&/EVAL PT USE INHALER: CPT

## 2025-02-11 PROCEDURE — 85025 COMPLETE CBC W/AUTO DIFF WBC: CPT | Performed by: INTERNAL MEDICINE

## 2025-02-11 PROCEDURE — 84132 ASSAY OF SERUM POTASSIUM: CPT | Performed by: HOSPITALIST

## 2025-02-11 PROCEDURE — 94799 UNLISTED PULMONARY SVC/PX: CPT

## 2025-02-11 RX ORDER — METHOCARBAMOL 500 MG/1
500 TABLET, FILM COATED ORAL EVERY 6 HOURS PRN
Status: DISCONTINUED | OUTPATIENT
Start: 2025-02-11 | End: 2025-02-12 | Stop reason: HOSPADM

## 2025-02-11 RX ORDER — ASPIRIN 81 MG/1
81 TABLET, CHEWABLE ORAL DAILY
Status: DISCONTINUED | OUTPATIENT
Start: 2025-02-11 | End: 2025-02-12 | Stop reason: HOSPADM

## 2025-02-11 RX ORDER — POTASSIUM CHLORIDE 1.5 G/1.58G
40 POWDER, FOR SOLUTION ORAL EVERY 4 HOURS
Status: COMPLETED | OUTPATIENT
Start: 2025-02-11 | End: 2025-02-11

## 2025-02-11 RX ORDER — POTASSIUM CHLORIDE 1.5 G/1.58G
40 POWDER, FOR SOLUTION ORAL ONCE
Status: DISCONTINUED | OUTPATIENT
Start: 2025-02-11 | End: 2025-02-11

## 2025-02-11 RX ORDER — PREDNISONE 10 MG/1
10 TABLET ORAL DAILY
Status: DISCONTINUED | OUTPATIENT
Start: 2025-02-17 | End: 2025-02-12 | Stop reason: HOSPADM

## 2025-02-11 RX ORDER — PREDNISONE 5 MG/1
5 TABLET ORAL DAILY
Status: DISCONTINUED | OUTPATIENT
Start: 2025-02-20 | End: 2025-02-12 | Stop reason: HOSPADM

## 2025-02-11 RX ORDER — ATORVASTATIN CALCIUM 40 MG/1
40 TABLET, FILM COATED ORAL NIGHTLY
Status: DISCONTINUED | OUTPATIENT
Start: 2025-02-11 | End: 2025-02-12 | Stop reason: HOSPADM

## 2025-02-11 RX ORDER — PREDNISONE 20 MG/1
20 TABLET ORAL DAILY
Status: DISCONTINUED | OUTPATIENT
Start: 2025-02-11 | End: 2025-02-12 | Stop reason: HOSPADM

## 2025-02-11 RX ORDER — CARVEDILOL 3.12 MG/1
3.12 TABLET ORAL 2 TIMES DAILY WITH MEALS
Status: DISCONTINUED | OUTPATIENT
Start: 2025-02-11 | End: 2025-02-12 | Stop reason: HOSPADM

## 2025-02-11 RX ORDER — POTASSIUM CHLORIDE 1500 MG/1
40 TABLET, EXTENDED RELEASE ORAL EVERY 4 HOURS
Status: DISPENSED | OUTPATIENT
Start: 2025-02-11 | End: 2025-02-11

## 2025-02-11 RX ORDER — CLOPIDOGREL BISULFATE 75 MG/1
75 TABLET ORAL DAILY
Status: DISCONTINUED | OUTPATIENT
Start: 2025-02-11 | End: 2025-02-12 | Stop reason: HOSPADM

## 2025-02-11 RX ORDER — OXYCODONE HCL 5 MG/5 ML
5 SOLUTION, ORAL ORAL EVERY 4 HOURS PRN
Status: DISCONTINUED | OUTPATIENT
Start: 2025-02-11 | End: 2025-02-12

## 2025-02-11 RX ORDER — PANTOPRAZOLE SODIUM 40 MG/1
40 TABLET, DELAYED RELEASE ORAL
Status: DISCONTINUED | OUTPATIENT
Start: 2025-02-12 | End: 2025-02-12 | Stop reason: HOSPADM

## 2025-02-11 RX ORDER — ISOSORBIDE MONONITRATE 30 MG/1
30 TABLET, EXTENDED RELEASE ORAL DAILY
Status: DISCONTINUED | OUTPATIENT
Start: 2025-02-11 | End: 2025-02-12 | Stop reason: HOSPADM

## 2025-02-11 RX ADMIN — IPRATROPIUM BROMIDE AND ALBUTEROL SULFATE 3 ML: .5; 3 SOLUTION RESPIRATORY (INHALATION) at 08:07

## 2025-02-11 RX ADMIN — CLOPIDOGREL BISULFATE 75 MG: 75 TABLET ORAL at 14:12

## 2025-02-11 RX ADMIN — POTASSIUM CHLORIDE 40 MEQ: 1.5 POWDER, FOR SOLUTION ORAL at 14:12

## 2025-02-11 RX ADMIN — ISOSORBIDE MONONITRATE 30 MG: 30 TABLET, EXTENDED RELEASE ORAL at 14:12

## 2025-02-11 RX ADMIN — IPRATROPIUM BROMIDE AND ALBUTEROL SULFATE 3 ML: .5; 3 SOLUTION RESPIRATORY (INHALATION) at 11:52

## 2025-02-11 RX ADMIN — ACETAMINOPHEN 650 MG: 325 TABLET, FILM COATED ORAL at 21:43

## 2025-02-11 RX ADMIN — Medication 5 MG: at 21:43

## 2025-02-11 RX ADMIN — ACETAMINOPHEN 650 MG: 325 TABLET, FILM COATED ORAL at 07:31

## 2025-02-11 RX ADMIN — PREDNISONE 20 MG: 20 TABLET ORAL at 14:12

## 2025-02-11 RX ADMIN — ATORVASTATIN CALCIUM 40 MG: 40 TABLET, FILM COATED ORAL at 21:43

## 2025-02-11 RX ADMIN — OSELTAMIVIR PHOSPHATE 75 MG: 75 CAPSULE ORAL at 21:43

## 2025-02-11 RX ADMIN — ASPIRIN 81 MG CHEWABLE TABLET 81 MG: 81 TABLET CHEWABLE at 14:12

## 2025-02-11 RX ADMIN — PREDNISONE 20 MG: 20 TABLET ORAL at 07:32

## 2025-02-11 RX ADMIN — IPRATROPIUM BROMIDE AND ALBUTEROL SULFATE 3 ML: .5; 3 SOLUTION RESPIRATORY (INHALATION) at 19:25

## 2025-02-11 RX ADMIN — POTASSIUM CHLORIDE 40 MEQ: 1.5 POWDER, FOR SOLUTION ORAL at 09:05

## 2025-02-11 RX ADMIN — MIDODRINE HYDROCHLORIDE 5 MG: 5 TABLET ORAL at 07:32

## 2025-02-11 RX ADMIN — OSELTAMIVIR PHOSPHATE 75 MG: 75 CAPSULE ORAL at 07:31

## 2025-02-11 RX ADMIN — Medication 10 ML: at 07:32

## 2025-02-11 RX ADMIN — Medication 10 ML: at 22:00

## 2025-02-11 RX ADMIN — IPRATROPIUM BROMIDE AND ALBUTEROL SULFATE 3 ML: .5; 3 SOLUTION RESPIRATORY (INHALATION) at 15:10

## 2025-02-11 RX ADMIN — MIDODRINE HYDROCHLORIDE 5 MG: 5 TABLET ORAL at 17:27

## 2025-02-11 NOTE — CONSULTS
Nutrition Services    Patient Name: Kandi Fuentes  YOB: 1960  MRN: [id number]  Admission date: 2/9/2025    Comment:    --Severe chronic disease related malnutrition related to hypermetabolic state of lung cancer as evidenced by weight loss greater than 10% x 6 months and severe fat/muscle loss per physical exam.     --Monitor/Encourage PO intake     CLINICAL NUTRITION ASSESSMENT      Reason for Assessment 2/11: BMI less than 19      H&P      Past Medical History:   Diagnosis Date    Cancer     COPD (chronic obstructive pulmonary disease)     Coronary artery disease     Elevated cholesterol     Heart attack     Hypertension     Lung cancer 2024    Tinnitus        Past Surgical History:   Procedure Laterality Date    BACK SURGERY  2004    bone spur on sciatica    BRONCHOSCOPY WITH ION ROBOTIC ASSIST N/A 09/27/2024    Procedure: BRONCHOSCOPY WITH ION ROBOT, fine needle aspiration and tissue biopsy right upper lobe mass, endobronchial ultrasound with fine needle aspiration lymph nodes (Level 10R);  Surgeon: Serafin Choudhary MD;  Location: [facility];  Service: Robotics - Pulmonary;  Laterality: N/A;  post: lung mass with lympadenopathy    CHOLECYSTECTOMY  2021    CORONARY ANGIOPLASTY WITH STENT PLACEMENT  07/2024    x2    ENDOSCOPY N/A 1/9/2025    Procedure: ESOPHAGOGASTRODUODENOSCOPY WITH DILATION (BOUGIE 32, 36, 40);  Surgeon: Jason Black MD;  Location: [facility];  Service: Gastroenterology;  Laterality: N/A;  ESOPHAGEAL STRICTURE, esophageal ulcer    MOLE REMOVAL  1994    forehead    SKIN LESION EXCISION Right 1994    breast    VENOUS ACCESS DEVICE (PORT) INSERTION Right 10/07/2024    Procedure: INSERTION VENOUS ACCESS DEVICE;  Surgeon: Serafin Choudhary MD;  Location: [facility];  Service: Thoracic;  Laterality: Right;        Current Problems   Sepsis due to acute influenza A infection  Acute COPD exacerbation with acute respiratory failure with hypoxia  Metastatic lung  "cancer  CAD  Hypertension       Encounter Information        Trending Narrative     2/11: Pt was admitted with the flu. At my visit pt reports that her intake has much improved since dilation last month. Pt reports that she still has to chew food really well, denies needing a texture modification. Pt does not like nutrition supplements. Pt's potassium stays low, discussed high potassium foods but pt reports that these foods do not help. Recommend supplementation per MD. Per chart review, pt has lost 22# over 4 months (15% loss). NFPE completed, consistent with nutrition diagnosis of malnutrition using AND/ASPEN criteria. See MSA below.       Anthropometrics        Current Height, Weight Height: 170.2 cm (67\")  Weight: 54.9 kg (121 lb) (02/09/25 1107)       Usual Body Weight (UBW) Unknown        Trending Weight Hx     This admission: 2/11: 121# (obtained 2/9)              PTA: 2/11: 22# loss over 4 months (15% loss)     Wt Readings from Last 30 Encounters:   02/09/25 1107 54.9 kg (121 lb)   02/05/25 1316 56.3 kg (124 lb 1.6 oz)   01/27/25 1242 56.2 kg (123 lb 12.8 oz)   01/11/25 0500 56.5 kg (124 lb 9.6 oz)   01/10/25 0600 54.3 kg (119 lb 12.4 oz)   01/09/25 0758 54.6 kg (120 lb 6.4 oz)   01/08/25 1420 54.3 kg (119 lb 11.2 oz)   12/27/24 1946 62.8 kg (138 lb 7.2 oz)   12/27/24 1325 63.5 kg (140 lb)   12/18/24 1100 62.6 kg (138 lb)   12/18/24 1114 62.6 kg (138 lb)   12/17/24 1116 61.2 kg (135 lb)   12/17/24 1025 61.3 kg (135 lb 3.2 oz)   12/16/24 1155 60.4 kg (133 lb 3.2 oz)   11/27/24 0925 61.5 kg (135 lb 9.6 oz)   11/26/24 0935 61.5 kg (135 lb 9.6 oz)   11/26/24 1219 61.2 kg (135 lb)   11/25/24 0912 61.7 kg (136 lb)   11/20/24 1237 61 kg (134 lb 6.4 oz)   11/18/24 1241 60.5 kg (133 lb 6.4 oz)   11/11/24 1253 62.7 kg (138 lb 4.8 oz)   11/07/24 1306 64.7 kg (142 lb 9.6 oz)   11/06/24 0949 64.5 kg (142 lb 4.8 oz)   11/05/24 0943 64.4 kg (141 lb 14.4 oz)   11/04/24 0917 63.9 kg (140 lb 12.8 oz)   10/28/24 1235 64.2 kg " "(141 lb 9.6 oz)   10/23/24 1017 63.5 kg (140 lb)   10/21/24 1237 63 kg (138 lb 12.8 oz)   10/16/24 0903 64.4 kg (142 lb)   10/15/24 0904 64.1 kg (141 lb 6.4 oz)   10/14/24 0924 64.6 kg (142 lb 8 oz)   10/14/24 1337 64.4 kg (142 lb)   10/02/24 1422 64.9 kg (143 lb)      BMI kg/m2 Body mass index is 18.95 kg/m².       Labs        Pertinent Labs Low K+, repletion is ordered   Results from last 7 days   Lab Units 02/11/25  0402 02/10/25  0418 02/09/25  1205   SODIUM mmol/L 141 135* 131*   POTASSIUM mmol/L 3.2* 3.8 3.0*   CHLORIDE mmol/L 99 99 92*   CO2 mmol/L 31.5* 30.4* 31.6*   BUN mg/dL 13 8 8   CREATININE mg/dL 0.38* 0.43* 0.48*   CALCIUM mg/dL 8.8 8.2* 8.7   BILIRUBIN mg/dL  --   --  0.6   ALK PHOS U/L  --   --  64   ALT (SGPT) U/L  --   --  19   AST (SGOT) U/L  --   --  37*   GLUCOSE mg/dL 146* 124* 95     Results from last 7 days   Lab Units 02/11/25  0402 02/10/25  0418 02/09/25  1422 02/09/25  1205   MAGNESIUM mg/dL 1.7 1.7  --  1.8   PHOSPHORUS mg/dL 2.7 3.3  --  3.1   HEMOGLOBIN g/dL 10.2* 9.6*   < >  --    HEMATOCRIT % 32.5* 30.9*   < >  --     < > = values in this interval not displayed.     No results found for: \"HGBA1C\"     Medications    Scheduled Medications ipratropium-albuterol, 3 mL, Nebulization, 4x Daily - RT  midodrine, 5 mg, Oral, BID AC  oseltamivir, 75 mg, Oral, Q12H  potassium chloride ER, 40 mEq, Oral, Q4H  potassium chloride, 40 mEq, Oral, Q4H  predniSONE, 20 mg, Oral, Daily With Breakfast  sodium chloride, 10 mL, Intravenous, Q12H        Infusions Pharmacy to Dose Oseltamivir (TAMIFLU),         PRN Medications   acetaminophen **OR** acetaminophen **OR** acetaminophen    aluminum-magnesium hydroxide-simethicone    senna-docusate sodium **AND** polyethylene glycol **AND** bisacodyl **AND** bisacodyl    Calcium Replacement - Follow Nurse / BPA Driven Protocol    Magnesium Standard Dose Replacement - Follow Nurse / BPA Driven Protocol    melatonin    nitroglycerin    ondansetron ODT **OR** " ondansetron    Pharmacy to Dose Oseltamivir (TAMIFLU)    Phosphorus Replacement - Follow Nurse / BPA Driven Protocol    Potassium Replacement - Follow Nurse / BPA Driven Protocol    sodium chloride    Access VAD **AND** sodium chloride    sodium chloride    sodium chloride     Physical Findings        Trending Physical   Appearance, NFPE 2/11: NFPE completed, consistent with nutrition diagnosis of malnutrition using AND/ASPEN criteria. See MSA below.     --  Edema    No edema    Bowel Function   No BM yet, pt has stool softeners ordered PRN    Tubes   No feeding tube    Chewing/Swallowing   Improved per pt    Skin   No new wounds documented    --  Current Nutrition Orders & Evaluation of Intake       Oral Nutrition     Food Allergies NKFA   Current PO Diet Diet: Cardiac; Healthy Heart (2-3 Na+); Fluid Consistency: Thin (IDDSI 0)   Supplement None ordered    PO Evaluation     Trending % PO Intake 2/11: 50-75%    --  Nutritional Risk Screening        NRS-2002 Score          Nutrition Diagnosis         Nutrition Dx Problem 1 Severe chronic disease related malnutrition related to hypermetabolic state of lung cancer as evidenced by weight loss greater than 10% x 6 months and severe fat/muscle loss per physical exam.          Nutrition Dx Problem 2        Intervention Goal         Intervention Goal(s) To consistently consume % of meals     Nutrition Intervention        RD Action NFPE complete, encourage PO intake      Nutrition Prescription          Diet Prescription Cardiac    Supplement Prescription None ordered    --  Monitor/Evaluation        Monitor PO intake     Malnutrition Severity Assessment      Patient meets criteria for : Severe Malnutrition  Malnutrition Type (Last 8 Hours)       Malnutrition Severity Assessment       Row Name 02/11/25 1032       Malnutrition Severity Assessment    Malnutrition Type Chronic Disease - Related Malnutrition      Row Name 02/11/25 1032       Unintentional Weight Loss      Unintentional Weight Loss Findings Severe    Unintentional Weight Loss  Weight loss greater than 10% in six months      Row Name 02/11/25 1032       Muscle Loss    Loss of Muscle Mass Findings Severe    Zoroastrian Region Severe - deep hollowing/scooping, lack of muscle to touch, facial bones well defined    Clavicle Bone Region Severe - protruding prominent bone    Acromion Bone Region Severe - squared shoulders, bones, and acromion process protrusion prominent    Scapular Bone Region Severe - prominent bones, depressions easily visible between ribs, scapula, spine, shoulders    Dorsal Hand Region Severe - prominent depression    Patellar Region Severe - prominent bone, square looking, very little muscle definition    Anterior Thigh Region Severe - line/depression along thigh, obviously thin    Posterior Calf Region Severe - thin with very little definition/firmness      Row Name 02/11/25 1032       Fat Loss    Subcutaneous Fat Loss Findings Severe    Orbital Region  Severe - pronounced hollowness/depression, dark circles, loose saggy skin    Upper Arm Region Severe - mostly skin, very little space between folds, fingers touch    Thoracic & Lumbar Region Severe - ribs visible with prominent depressions, iliac crest very prominent      Row Name 02/11/25 1032       Criteria Met (Must meet criteria for severity in at least 2 of these categories: M Wasting, Fat Loss, Fluid, Secondary Signs, Wt. Status, Intake)    Patient meets criteria for  Severe Malnutrition                          Electronically signed by:  Jazmine Childress RD  02/11/25 10:25 EST

## 2025-02-11 NOTE — PROGRESS NOTES
[facility] MEDICINE SERVICE  DAILY PROGRESS NOTE    NAME: Kandi Fuentes  : 1960  MRN: [id number]      LOS: 2 days     PROVIDER OF SERVICE: Timothy Duane Brammell, MD    Chief Complaint: Flu    Subjective:     Interval History:  History taken from: patient    Patient still generally feels bad..  Eating poorly.  Does not have oxygen at home chronically.  Denies any severe pain complaints.  Denies any other additional acute issues.        Review of Systems:   Review of Systems   All other systems reviewed and are negative.      Objective:     Vital Signs  Temp:  [96.9 °F (36.1 °C)-98 °F (36.7 °C)] 98 °F (36.7 °C)  Heart Rate:  [73-93] 84  Resp:  [13-28] 16  BP: (117-144)/(63-80) 120/63  Flow (L/min) (Oxygen Therapy):  [1-2] 2   Body mass index is 18.95 kg/m².    Physical Exam  Physical Exam  HENT:      Head: Normocephalic.   Cardiovascular:      Rate and Rhythm: Normal rate and regular rhythm.   Pulmonary:      Effort: Pulmonary effort is normal.      Comments: Poor air movement  Abdominal:      General: Bowel sounds are normal.      Palpations: Abdomen is soft.      Tenderness: There is no abdominal tenderness.   Musculoskeletal:         General: No swelling.   Neurological:      Mental Status: She is alert. Mental status is at baseline.            Diagnostic Data    Results from last 7 days   Lab Units 25  0402 25  1422 25  1205   WBC 10*3/mm3 2.87*   < >  --    HEMOGLOBIN g/dL 10.2*   < >  --    HEMATOCRIT % 32.5*   < >  --    PLATELETS 10*3/mm3 144   < >  --    GLUCOSE mg/dL 146*   < > 95   CREATININE mg/dL 0.38*   < > 0.48*   BUN mg/dL 13   < > 8   SODIUM mmol/L 141   < > 131*   POTASSIUM mmol/L 3.2*   < > 3.0*   AST (SGOT) U/L  --   --  37*   ALT (SGPT) U/L  --   --  19   ALK PHOS U/L  --   --  64   BILIRUBIN mg/dL  --   --  0.6   ANION GAP mmol/L 10.5   < > 7.4    < > = values in this interval not displayed.       No radiology results for the last  day          Assessment:    Acute hypoxemic respiratory failure  Influenza A  COPD  Lung carcinoma  History of coronary artery disease  anemia   Plan.  Transition to oral steroids.  Probable need for home O2 when discharged.  Ongoing nebulization therapy.  Prior CT noted without any bulky disease.      Active and Resolved Problems  Active Hospital Problems    Diagnosis  POA    **Flu [J11.1]  Yes      Resolved Hospital Problems   No resolved problems to display.           VTE Prophylaxis:  Mechanical VTE prophylaxis orders are present.             Disposition Planning:     Barriers to Discharge:respiratory status  Anticipated Date of Discharge: 2/12  Place of Discharge: home      Time: 30 minutes     Code Status and Medical Interventions: CPR (Attempt to Resuscitate); Full Support   Ordered at: 02/10/25 1024     Code Status (Patient has no pulse and is not breathing):    CPR (Attempt to Resuscitate)     Medical Interventions (Patient has pulse or is breathing):    Full Support       Signature: Electronically signed by Timothy Duane Brammell, MD, 02/11/25, 13:18 EST.  [facility]ist Team

## 2025-02-11 NOTE — PLAN OF CARE
Problem: Adult Inpatient Plan of Care  Goal: Absence of Hospital-Acquired Illness or Injury  Intervention: Identify and Manage Fall Risk  Description: Perform standard risk assessment on admission using a validated tool or comprehensive approach appropriate to the patient; reassess fall risk frequently, with change in status or transfer to another level of care.  Communicate risk to interprofessional healthcare team; ensure fall risk visible cue.  Determine need for increased observation, equipment and environmental modification, as well as use of supportive, nonskid footwear.  Adjust safety measures to individual needs and identified risk factors.  Reinforce the importance of active participation with fall risk prevention, safety, and physical activity with the patient and family.  Perform regular intentional rounding to assess need for position change, pain assessment and personal needs, including assistance with toileting.  Recent Flowsheet Documentation  Taken 2/11/2025 0200 by Antoni Kelly LPN  Safety Promotion/Fall Prevention:   safety round/check completed   room organization consistent   nonskid shoes/slippers when out of bed   muscle strengthening facilitated   mobility aid in reach   lighting adjusted   fall prevention program maintained   clutter free environment maintained   assistive device/personal items within reach   activity supervised  Taken 2/11/2025 0000 by Antoni Kelly LPN  Safety Promotion/Fall Prevention:   safety round/check completed   room organization consistent   nonskid shoes/slippers when out of bed   muscle strengthening facilitated   mobility aid in reach   lighting adjusted   fall prevention program maintained   clutter free environment maintained   assistive device/personal items within reach  Taken 2/10/2025 2200 by Antoni Kelly LPN  Safety Promotion/Fall Prevention:   safety round/check completed   room organization consistent   nonskid shoes/slippers  when out of bed   muscle strengthening facilitated   mobility aid in reach   lighting adjusted   fall prevention program maintained   clutter free environment maintained   assistive device/personal items within reach   activity supervised  Taken 2/10/2025 2020 by Antoni Kelly LPN  Safety Promotion/Fall Prevention:   safety round/check completed   room organization consistent   nonskid shoes/slippers when out of bed   muscle strengthening facilitated   mobility aid in reach   lighting adjusted   fall prevention program maintained   clutter free environment maintained   activity supervised   assistive device/personal items within reach  Intervention: Prevent Skin Injury  Description: Perform a screening for skin injury risk, such as pressure or moisture-associated skin damage on admission and at regular intervals throughout hospital stay.  Keep all areas of skin (especially folds) clean and dry.  Maintain adequate skin hydration.  Relieve and redistribute pressure and protect bony prominences and skin at risk for injury; implement measures based on patient-specific risk factors.  Match turning and repositioning schedule to clinical condition.  Encourage weight shift frequently; assist with reposition if unable to complete independently.  Float heels off bed; avoid pressure on the Achilles tendon.  Keep skin free from extended contact with medical devices.  Optimize nutrition and hydration.  Encourage functional activity and mobility, as early as tolerated.  Use aids (e.g., slide boards, mechanical lift) during transfer.  Recent Flowsheet Documentation  Taken 2/11/2025 0200 by Antoni Kelly LPN  Body Position: position changed independently  Taken 2/11/2025 0000 by Antoni Kelly LPN  Body Position: position changed independently  Taken 2/10/2025 2200 by Antoni Kelly LPN  Body Position: position changed independently  Taken 2/10/2025 2020 by Antoni Kelly LPN  Body Position: position  changed independently  Intervention: Prevent Infection  Description: Maintain skin and mucous membrane integrity; promote hand, oral and pulmonary hygiene.  Optimize fluid balance, nutrition, sleep and glycemic control to maximize infection resistance.  Identify potential sources of infection early to prevent or mitigate progression of infection (e.g., wound, lines, devices).  Evaluate ongoing need for invasive devices; remove promptly when no longer indicated.  Review vaccination status.  Recent Flowsheet Documentation  Taken 2/11/2025 0200 by Antoni Kelly LPN  Infection Prevention:   visitors restricted/screened   single patient room provided   rest/sleep promoted   personal protective equipment utilized   hand hygiene promoted  Taken 2/11/2025 0000 by Antoni Kelly LPN  Infection Prevention:   visitors restricted/screened   single patient room provided   personal protective equipment utilized   rest/sleep promoted   hand hygiene promoted  Taken 2/10/2025 2200 by Antoni Kelly LPN  Infection Prevention:   visitors restricted/screened   single patient room provided   rest/sleep promoted   personal protective equipment utilized   hand hygiene promoted  Taken 2/10/2025 2020 by Antoni Kelly LPN  Infection Prevention:   visitors restricted/screened   single patient room provided   rest/sleep promoted   personal protective equipment utilized   hand hygiene promoted  Goal: Optimal Comfort and Wellbeing  Intervention: Monitor Pain and Promote Comfort  Description: Assess pain level, treatment efficacy and patient response at regular intervals using a consistent pain scale.  Consider the presence and impact of preexisting chronic pain.  Encourage patient and caregiver involvement in pain assessment, interventions and safety measures.  Promote activity; balance with sleep and rest to enhance healing.  Recent Flowsheet Documentation  Taken 2/11/2025 0000 by Antoni Kelly LPN  Pain  "Management Interventions:   pain management plan reviewed with patient/caregiver   care clustered  Taken 2/10/2025 2020 by Antoni Kelly LPN  Pain Management Interventions:   pain management plan reviewed with patient/caregiver   pain medication given  Taken 2/10/2025 2011 by Antoni Kelly LPN  Pain Management Interventions:   position adjusted   pain management plan reviewed with patient/caregiver   Goal Outcome Evaluation: plan of care on going, Tamiflu ordered, will switch to oral steroids in am dada pt is on Roomair, requiring supplemental Oxygen during hospital stay, may require a 6\" walk at discharge if pt remain on O2, cont to monitor progress toward goal, port access in uses. Discharge pending pt progress possible 2/11 or 2/12 continue to monitor                                             "

## 2025-02-11 NOTE — CONSULTS
"        Hematology/Oncology Inpatient Consultation    Patient name: Kandi Fuentes  : 1960  MRN: [id number]  Referring Provider: Dr. Forrest  Reason for Consultation: Lung cancer    Chief complaint: Flu    History of present illness:    Kandi Fuentes is a 64 y.o. female who presented to [facility] on 2025 with complaints of worsening dyspnea.  Past medical history of lung cancer status postchemotherapy and currently on immunotherapy, CAD, COPD, hypertension.  Was positive for influenza A in the ED.  Admitted for acute hypoxemic respiratory failure and influenza A.    25  Hematology/Oncology was consulted   10/2/2024: In the office for the first time to stage and treat small cell lung cancer of the right lung.  She reported that between July and 2024 she was seen at the hospital with dyspnea and some chest pains.  She was found to have evidence of coronary artery disease and underwent percutaneous angioplasty and stenting of the affected coronary vessels.  In the process, however, a nodule in the right lung was identified.  Further investigations led to the identification of a 4.4 cm lobulated tumor in the posterior right upper lobe communicating with the right middle lobe concerning for malignancy.  Evidence of severe emphysema was present, as well.  Eventually she had a navigational bronchoscopy and pathology reported small cell carcinoma on the biopsies.  A PET scan and MRI did not reveal any suggestion of metastatic disease.  At the time of this visit she is feeling somewhat better.  Her breathing has improved.  She still has some precordial discomfort that she describes is related to the stents \"that I can still feel\".  She has been eating reasonably well and has not lost much weight.  She is also been afebrile.  No diarrhea or dysuria.  On exam she appears chronically ill but is not in distress.  No jaundice.  The lungs are diminished bilaterally in a significant fashion.  " The heart is regular.  The abdomen is flat and soft.  No palpable tumors.  No edema.  Independently reviewed all the imaging studies and discussed with her.  Treatment with concurrent chemotherapy and radiation followed by immunotherapy is reasonable.  Discussed with her the regimen of concurrent chemotherapy and radiation and in detail discussed with her side effects and potential complications of both treatments.  Ideally we should begin on October 7 if it is at all possible.  I have communicated with Dr. Choudhary and with Dr. Abarca.     10/23/2024: Received the first cycle of chemotherapy without any difficulties. She feels about the same at this time and she has not had any new problems. Able to eat well and no nausea, vomiting or unintended weight loss. Denies chest pain and remains with the same degree of dyspnea. No abdominal pain or diarrhea. She continues to smoke at the same rate. On exam she is oriented and conversant. No jaundice. Poor dentition and no oral lesions. Respirations not labored Lungs diminished bilaterally. Heart regular. Abdomen soft and not tender. No edema. The laboratory exams were reviewed and discussed with her. To continue with the same treatment. She's to see me in 4 weeks.      11/27/2024: Tolerating the treatment with chemotherapy well.  She had chest pain and has been dyspneic since last evening.  She was seen in the emergency room at [facility], in [facility], where she spent several hours.  She was found to have an elevated D-dimer and was offered a CT scan.  However, she could not obtain the test because of difficulties with transportation.  She has been tolerating the chemotherapy well.  On exam today she is alert and conversant.  Oriented and in no distress.  There is no jaundice or pallor.  Poor dentition but no oral lesions.  Lungs markedly diminished bilaterally.  She is tachycardic.  Abdomen soft.  No edema.  To proceed with treatment.  To obtain a CT  angiogram of the chest to ensure no pulmonary embolism, though I doubt it.  She is to see me in 3 weeks.     12/18/2024: Feeling poorly. Weak and more dyspneic with exertion. Still smoking but less than before. More tremulous than before. Her appetite is poor. She has not had much nausea. No chest pain or cough and no abdominal pain. On exam alert and oriented. No distress. No jaundice and no oral lesions. Respirations not labored. Lungs diminished bilaterally and heart regular. Abdomen soft. No edema. Reviewed all the laboratory exams. Reviewed all the imaging studies and discussed with her. She has had a very good response to the treatment. She is to complete this cycle of treatment. She will most likely need a red cell transfusion and will have her laboratory exams on 12/20. She will see me in 4 weeks with new scans and MRI of the brain.      1/9/2025: Ms. Estrada is a patient of mine and has had treatment with concurrent chemotherapy and radiation for lung cancer. She seems to have had a good response. However, she called the office to report that for several days, maybe as many as 21, she had been progressively less able to swallow even liquids. She had been admitted to the hospital with similar but not as severe symptoms and was discharged without resolution of the symptoms. She was found to have dysphagia of even thin liquids. She was given intravenous fluids and was referred for admission to the hospital. She tells me today that she has been feeling better, though she did not sleep well. She has been free of pain but she remains absolutely unable to swallow even water. No dyspnea. No cough or chest pain. On exam she is conversant and oriented. No jaundice or pallor. No oral lesions and respirations not labored. Lungs diminished and abdomen soft. No edema. Reviewed the laboratory exams. She is to be seen by Gastroenterology with the impression of esophageal stenosis resultant from the previous concurrent  chemotherapy and radiation. Discussed with her.      1/11/2025: Indeed on January 9 she underwent an upper gastrointestinal endoscopy that revealed a stricture of the esophagus.  This was dilated successfully and without any difficulties.  Almost immediately she was able to eat better.  She was discharged to continue follow-up as outpatient.     1/27/2025: In the office to review the results of her MRI and CT scans.  She feels much better.  She still has some difficulty swallowing but she can now eat essentially anything that she chews thoroughly.  She has some difficulties with mastication as well and does the number of foods that she has been eating is somewhat limited.  She can drink liquids without any difficulties.  She has no pain.  She continues to have dyspnea with exertion.  She also continues to smoke.  Her gait is not steady and she has been using the wheelchair.  She has had no fevers or nocturnal diaphoresis.  No chest pains or abdominal pain.  On exam chronically ill-appearing.  No distress.  No jaundice.  No pallor.  Very poor dentition.  Respirations not labored.  Lungs markedly diminished bilaterally.  Heart regular.  Abdomen soft.  No edema.  Reviewed the laboratory exams and discussed with her.  Reviewed the images of the scans and reviewed them with her.  Explained the findings.  Discussed the use of immunotherapy in this setting.  It has been proven to result in longer responses and improved overall survival.  She is agreeable to it.  A treatment plan was placed.  I discussed with her the potential complications of the treatment, including life-threatening complications.  She is to see me in 3 weeks after commencement of the immunotherapy.      He/She  has a past medical history of Cancer, COPD (chronic obstructive pulmonary disease), Coronary artery disease, Elevated cholesterol, Heart attack, Hypertension, Lung cancer (2024), and Tinnitus.    PCP: Provider, No Known    History:  Past Medical  History:   Diagnosis Date    Cancer     COPD (chronic obstructive pulmonary disease)     Coronary artery disease     Elevated cholesterol     Heart attack     Hypertension     Lung cancer 2024    Tinnitus    ,   Past Surgical History:   Procedure Laterality Date    BACK SURGERY  2004    bone spur on sciatica    BRONCHOSCOPY WITH ION ROBOTIC ASSIST N/A 09/27/2024    Procedure: BRONCHOSCOPY WITH ION ROBOT, fine needle aspiration and tissue biopsy right upper lobe mass, endobronchial ultrasound with fine needle aspiration lymph nodes (Level 10R);  Surgeon: Serafin Choudhary MD;  Location: [facility] ENDOSCOPY;  Service: Robotics - Pulmonary;  Laterality: N/A;  post: lung mass with lympadenopathy    CHOLECYSTECTOMY  2021    CORONARY ANGIOPLASTY WITH STENT PLACEMENT  07/2024    x2    ENDOSCOPY N/A 1/9/2025    Procedure: ESOPHAGOGASTRODUODENOSCOPY WITH DILATION (BOUGIE 32, 36, 40);  Surgeon: Jason Black MD;  Location: [facility] ENDOSCOPY;  Service: Gastroenterology;  Laterality: N/A;  ESOPHAGEAL STRICTURE, esophageal ulcer    MOLE REMOVAL  1994    forehead    SKIN LESION EXCISION Right 1994    breast    VENOUS ACCESS DEVICE (PORT) INSERTION Right 10/07/2024    Procedure: INSERTION VENOUS ACCESS DEVICE;  Surgeon: Serafin Choudhary MD;  Location: [facility] MAIN OR;  Service: Thoracic;  Laterality: Right;   ,   Family History   Problem Relation Age of Onset    Breast cancer Mother 62    Heart attack Mother     Lung cancer Sister     Throat cancer Sister     Lung cancer Brother    ,   Social History     Tobacco Use    Smoking status: Every Day     Current packs/day: 1.00     Average packs/day: 1 pack/day for 55.1 years (55.1 ttl pk-yrs)     Types: Cigarettes     Start date: 1970     Passive exposure: Current    Smokeless tobacco: Never   Vaping Use    Vaping status: Never Used   Substance Use Topics    Alcohol use: Never    Drug use: Never   ,   Medications Prior to Admission   Medication Sig Dispense Refill Last Dose/Taking     Acetaminophen (Tylenol) 325 MG capsule Take 650 mg by mouth As Needed.   Taking As Needed    albuterol sulfate  (90 Base) MCG/ACT inhaler Inhale 2 puffs As Needed for Wheezing or Shortness of Air.   2025    aspirin 81 MG chewable tablet Chew 1 tablet Daily.   2025 Morning    atorvastatin (LIPITOR) 40 MG tablet Take 1 tablet by mouth Every Night.   2025 Bedtime    carvedilol (COREG) 3.125 MG tablet Take 1 tablet by mouth 2 (Two) Times a Day With Meals.   2025 Morning    clopidogrel (PLAVIX) 75 MG tablet Take 1 tablet by mouth Daily.   2025 Morning    ipratropium-albuterol (DUO-NEB) 0.5-2.5 mg/3 ml nebulizer Take 3 mL by nebulization 4 (Four) Times a Day As Needed for Wheezing or Shortness of Air for up to 120 days. 120 mL 5 2025    isosorbide mononitrate (IMDUR) 30 MG 24 hr tablet Take 1 tablet by mouth Daily.   2025 Morning    lidocaine-prilocaine (EMLA) 2.5-2.5 % cream Apply 1 Application topically to the appropriate area as directed See Admin Instructions. Apply one hour prior to port access 30 g 3 Taking    methocarbamol (ROBAXIN) 500 MG tablet Take 1 tablet by mouth Every 6 (Six) Hours As Needed for Muscle Spasms. 50 tablet 1 Taking As Needed    [] omeprazole (priLOSEC) 40 MG capsule Take 1 capsule by mouth 2 (Two) Times a Day for 30 days. (Patient taking differently: Take 1 capsule by mouth 2 (Two) Times a Day As Needed.) 60 capsule 0 Taking Differently    ondansetron (ZOFRAN) 8 MG tablet Take 1 tablet by mouth 3 (Three) Times a Day As Needed for Nausea or Vomiting. 30 tablet 5 Taking As Needed    mometasone (ELOCON) 0.1 % ointment Apply to affected skin of chest and back 2-3 times daily as needed for itching from radiation treatments. (Patient not taking: Reported on 2025) 50 g 2 Not Taking    oxyCODONE (ROXICODONE) 5 MG/5ML solution Take 5 mL by mouth Every 4 (Four) Hours As Needed for Moderate Pain (Pain with swallowing.). (Patient not taking: Reported on  "2/9/2025) 300 mL 0 Not Taking   , Scheduled Meds:  aspirin, 81 mg, Oral, Daily  atorvastatin, 40 mg, Oral, Nightly  carvedilol, 3.125 mg, Oral, BID With Meals  clopidogrel, 75 mg, Oral, Daily  ipratropium-albuterol, 3 mL, Nebulization, 4x Daily - RT  isosorbide mononitrate, 30 mg, Oral, Daily  midodrine, 5 mg, Oral, BID AC  oseltamivir, 75 mg, Oral, Q12H  [START ON 2/12/2025] pantoprazole, 40 mg, Oral, Q AM  predniSONE, 20 mg, Oral, Daily   Followed by  [START ON 2/14/2025] predniSONE, 15 mg, Oral, Daily   Followed by  [START ON 2/17/2025] predniSONE, 10 mg, Oral, Daily   Followed by  [START ON 2/20/2025] predniSONE, 5 mg, Oral, Daily  sodium chloride, 10 mL, Intravenous, Q12H    , Continuous Infusions:  Pharmacy to Dose Oseltamivir (TAMIFLU),     , PRN Meds:    acetaminophen **OR** acetaminophen **OR** acetaminophen    aluminum-magnesium hydroxide-simethicone    senna-docusate sodium **AND** polyethylene glycol **AND** bisacodyl **AND** bisacodyl    Calcium Replacement - Follow Nurse / BPA Driven Protocol    Magnesium Standard Dose Replacement - Follow Nurse / BPA Driven Protocol    melatonin    methocarbamol    nitroglycerin    ondansetron ODT **OR** ondansetron    oxyCODONE    Pharmacy to Dose Oseltamivir (TAMIFLU)    Phosphorus Replacement - Follow Nurse / BPA Driven Protocol    Potassium Replacement - Follow Nurse / BPA Driven Protocol    sodium chloride    Access VAD **AND** sodium chloride    sodium chloride    sodium chloride   Allergies:  Morphine, Codeine, and Sulfa antibiotics    Subjective     ROS:  Review of Systems     Objective   Vital Signs:   /63 (BP Location: Right arm, Patient Position: Lying)   Pulse 87   Temp 98 °F (36.7 °C) (Oral)   Resp 18   Ht 170.2 cm (67\")   Wt 54.9 kg (121 lb)   SpO2 99%   BMI 18.95 kg/m²     Physical Exam: (performed by MD)  Physical Exam    Results Review:  Lab Results (last 48 hours)       Procedure Component Value Units Date/Time    Blood Culture - Blood, " Chest, Right [[id number]]  (Normal) Collected: 02/09/25 1205    Specimen: Blood from Chest, Right Updated: 02/11/25 1230     Blood Culture No growth at 2 days    Blood Culture - Blood, Arm, Right [[id number]]  (Normal) Collected: 02/09/25 1209    Specimen: Blood from Arm, Right Updated: 02/11/25 1215     Blood Culture No growth at 2 days    Magnesium [[id number]]  (Normal) Collected: 02/11/25 0402    Specimen: Blood Updated: 02/11/25 0537     Magnesium 1.7 mg/dL     Basic Metabolic Panel [[id number]]  (Abnormal) Collected: 02/11/25 0402    Specimen: Blood Updated: 02/11/25 0537     Glucose 146 mg/dL      BUN 13 mg/dL      Creatinine 0.38 mg/dL      Sodium 141 mmol/L      Potassium 3.2 mmol/L      Chloride 99 mmol/L      CO2 31.5 mmol/L      Calcium 8.8 mg/dL      BUN/Creatinine Ratio 34.2     Anion Gap 10.5 mmol/L      eGFR 112.8 mL/min/1.73     Narrative:      GFR Categories in Chronic Kidney Disease (CKD)      GFR Category          GFR (mL/min/1.73)    Interpretation  G1                     90 or greater         Normal or high (1)  G2                      60-89                Mild decrease (1)  G3a                   45-59                Mild to moderate decrease  G3b                   30-44                Moderate to severe decrease  G4                    15-29                Severe decrease  G5                    14 or less           Kidney failure          (1)In the absence of evidence of kidney disease, neither GFR category G1 or G2 fulfill the criteria for CKD.    eGFR calculation 2021 CKD-EPI creatinine equation, which does not include race as a factor    Phosphorus [[id number]]  (Normal) Collected: 02/11/25 0402    Specimen: Blood Updated: 02/11/25 0532     Phosphorus 2.7 mg/dL     CBC & Differential [[id number]]  (Abnormal) Collected: 02/11/25 0402    Specimen: Blood Updated: 02/11/25 0504    Narrative:      The following orders were created for panel order CBC & Differential.  Procedure                                Abnormality         Status                     ---------                               -----------         ------                     CBC Auto Differential[[id number]]        Abnormal            Final result               Scan Slide[[id number]]                                                                    Please view results for these tests on the individual orders.    CBC Auto Differential [[id number]]  (Abnormal) Collected: 02/11/25 0402    Specimen: Blood Updated: 02/11/25 0504     WBC 2.87 10*3/mm3      RBC 3.09 10*6/mm3      Hemoglobin 10.2 g/dL      Hematocrit 32.5 %      .2 fL      MCH 33.0 pg      MCHC 31.4 g/dL      RDW 16.5 %      RDW-SD 64.3 fl      MPV 10.0 fL      Platelets 144 10*3/mm3      Neutrophil % 72.8 %      Lymphocyte % 15.7 %      Monocyte % 11.5 %      Eosinophil % 0.0 %      Basophil % 0.0 %      Immature Grans % 0.0 %      Neutrophils, Absolute 2.09 10*3/mm3      Lymphocytes, Absolute 0.45 10*3/mm3      Monocytes, Absolute 0.33 10*3/mm3      Eosinophils, Absolute 0.00 10*3/mm3      Basophils, Absolute 0.00 10*3/mm3      Immature Grans, Absolute 0.00 10*3/mm3      nRBC 0.0 /100 WBC     POC Glucose Once [[id number]]  (Abnormal) Collected: 02/10/25 2038    Specimen: Blood Updated: 02/10/25 2040     Glucose 185 mg/dL      Comment: Serial Number: [id number]:  [id number]       Basic Metabolic Panel [[id number]]  (Abnormal) Collected: 02/10/25 0418    Specimen: Blood Updated: 02/10/25 0511     Glucose 124 mg/dL      BUN 8 mg/dL      Creatinine 0.43 mg/dL      Sodium 135 mmol/L      Potassium 3.8 mmol/L      Chloride 99 mmol/L      CO2 30.4 mmol/L      Calcium 8.2 mg/dL      BUN/Creatinine Ratio 18.6     Anion Gap 5.6 mmol/L      eGFR 109.4 mL/min/1.73     Narrative:      GFR Categories in Chronic Kidney Disease (CKD)      GFR Category          GFR (mL/min/1.73)    Interpretation  G1                     90 or greater         Normal or high (1)  G2                      60-89                 Mild decrease (1)  G3a                   45-59                Mild to moderate decrease  G3b                   30-44                Moderate to severe decrease  G4                    15-29                Severe decrease  G5                    14 or less           Kidney failure          (1)In the absence of evidence of kidney disease, neither GFR category G1 or G2 fulfill the criteria for CKD.    eGFR calculation 2021 CKD-EPI creatinine equation, which does not include race as a factor    Magnesium [[id number]]  (Normal) Collected: 02/10/25 0418    Specimen: Blood Updated: 02/10/25 0511     Magnesium 1.7 mg/dL     CBC & Differential [[id number]]  (Abnormal) Collected: 02/10/25 0418    Specimen: Blood Updated: 02/10/25 0507    Narrative:      The following orders were created for panel order CBC & Differential.  Procedure                               Abnormality         Status                     ---------                               -----------         ------                     CBC Auto Differential[[id number]]        Abnormal            Final result               Scan Slide[[id number]]                                       Final result                 Please view results for these tests on the individual orders.    Scan Slide [[id number]] Collected: 02/10/25 0418    Specimen: Blood Updated: 02/10/25 0507     Anisocytosis Slight/1+     Macrocytes Slight/1+     WBC Morphology Normal     Platelet Estimate Adequate    CBC Auto Differential [[id number]]  (Abnormal) Collected: 02/10/25 0418    Specimen: Blood Updated: 02/10/25 0507     WBC 3.27 10*3/mm3      RBC 2.88 10*6/mm3      Hemoglobin 9.6 g/dL      Hematocrit 30.9 %      .3 fL      MCH 33.3 pg      MCHC 31.1 g/dL      RDW 16.6 %      RDW-SD 66.5 fl      MPV 9.7 fL      Platelets 141 10*3/mm3      Neutrophil % 77.1 %      Lymphocyte % 16.5 %      Monocyte % 5.5 %      Eosinophil % 0.0 %      Basophil % 0.3 %      Immature Grans % 0.6 %      Neutrophils,  Absolute 2.52 10*3/mm3      Lymphocytes, Absolute 0.54 10*3/mm3      Monocytes, Absolute 0.18 10*3/mm3      Eosinophils, Absolute 0.00 10*3/mm3      Basophils, Absolute 0.01 10*3/mm3      Immature Grans, Absolute 0.02 10*3/mm3      nRBC 0.0 /100 WBC     Phosphorus [[id number]]  (Normal) Collected: 02/10/25 0418    Specimen: Blood Updated: 02/10/25 0503     Phosphorus 3.3 mg/dL     Extra Tubes [[id number]] Collected: 02/10/25 0418    Specimen: Blood, Venous Line Updated: 02/10/25 0430    Narrative:      The following orders were created for panel order Extra Tubes.  Procedure                               Abnormality         Status                     ---------                               -----------         ------                     Green Top (Gel)[[id number]]                                  Final result                 Please view results for these tests on the individual orders.    Green Top (Gel) [[id number]] Collected: 02/10/25 0418    Specimen: Blood Updated: 02/10/25 0430     Extra Tube Hold for add-ons.     Comment: Auto resulted.                Pending Results:     Imaging Reviewed:   XR Chest 1 View    Result Date: 2/9/2025  Impression: Emphysema without evidence of pneumonia. Electronically Signed: Brown Vaughn MD  2/9/2025 12:08 PM EST  Workstation ID: [id number]          Assessment & Plan   ASSESSMENT  Limited stage small cell lung cancer: (cT2b N0 M0) of the right lung.  Completed chemotherapy with carboplatin and etoposide since October 2024 with near complete response and no evidence of metastatic disease.  Started on Durvalumab and received cycle 1 on 2/5/2025.  Plan to continue once clinically improved.  Acute hypoxemic respiratory failure/influenza A: Management supportive care per primary team.    PLAN  As above    Note prepared for Dr. Delgado.  Further recommendations per MD.  Electronically signed by JUANCHO Driscoll, 02/11/25, 3:27 PM EST.        Thank you for this consult. We will  be happy to follow along with you.

## 2025-02-11 NOTE — PLAN OF CARE
Goal Outcome Evaluation:   Pt resting in bed, on 2 liters. Potassium being replaced. Oncology consulted  Refused skin assessment. Education provided         Problem: Adult Inpatient Plan of Care  Goal: Plan of Care Review  Outcome: Progressing  Goal: Patient-Specific Goal (Individualized)  Outcome: Progressing  Goal: Absence of Hospital-Acquired Illness or Injury  Outcome: Progressing  Intervention: Identify and Manage Fall Risk  Goal: Optimal Comfort and Wellbeing  Outcome: Progressing  Intervention: Monitor Pain and Promote Comfort  Goal: Readiness for Transition of Care  Outcome: Progressing     Problem: Fall Injury Risk  Goal: Absence of Fall and Fall-Related Injury  Outcome: Progressing  Intervention: Promote Injury-Free Environment     Problem: Skin Injury Risk Increased  Goal: Skin Health and Integrity  Outcome: Progressing     Problem: Sepsis/Septic Shock  Goal: Optimal Coping  Outcome: Progressing  Goal: Absence of Bleeding  Outcome: Progressing  Goal: Blood Glucose Level Within Target Range  Outcome: Progressing  Goal: Absence of Infection Signs and Symptoms  Outcome: Progressing  Goal: Optimal Nutrition Delivery  Outcome: Progressing     Problem: Malnutrition  Goal: Improved Nutritional Intake  Outcome: Progressing

## 2025-02-12 ENCOUNTER — READMISSION MANAGEMENT (OUTPATIENT)
Dept: CALL CENTER | Facility: HOSPITAL | Age: 65
End: 2025-02-12
Payer: MEDICAID

## 2025-02-12 VITALS
WEIGHT: 121 LBS | OXYGEN SATURATION: 100 % | DIASTOLIC BLOOD PRESSURE: 66 MMHG | HEIGHT: 67 IN | RESPIRATION RATE: 16 BRPM | BODY MASS INDEX: 18.99 KG/M2 | TEMPERATURE: 97.5 F | HEART RATE: 88 BPM | SYSTOLIC BLOOD PRESSURE: 116 MMHG

## 2025-02-12 LAB
ANION GAP SERPL CALCULATED.3IONS-SCNC: 8.2 MMOL/L (ref 5–15)
BASOPHILS # BLD AUTO: 0 10*3/MM3 (ref 0–0.2)
BASOPHILS NFR BLD AUTO: 0 % (ref 0–1.5)
BUN SERPL-MCNC: 14 MG/DL (ref 8–23)
BUN/CREAT SERPL: 35.9 (ref 7–25)
CALCIUM SPEC-SCNC: 8.6 MG/DL (ref 8.6–10.5)
CHLORIDE SERPL-SCNC: 97 MMOL/L (ref 98–107)
CO2 SERPL-SCNC: 30.8 MMOL/L (ref 22–29)
CREAT SERPL-MCNC: 0.39 MG/DL (ref 0.57–1)
DEPRECATED RDW RBC AUTO: 66.1 FL (ref 37–54)
EGFRCR SERPLBLD CKD-EPI 2021: 112.1 ML/MIN/1.73
EOSINOPHIL # BLD AUTO: 0 10*3/MM3 (ref 0–0.4)
EOSINOPHIL NFR BLD AUTO: 0 % (ref 0.3–6.2)
ERYTHROCYTE [DISTWIDTH] IN BLOOD BY AUTOMATED COUNT: 16.9 % (ref 12.3–15.4)
GLUCOSE SERPL-MCNC: 119 MG/DL (ref 65–99)
HCT VFR BLD AUTO: 30 % (ref 34–46.6)
HGB BLD-MCNC: 9.2 G/DL (ref 12–15.9)
IMM GRANULOCYTES # BLD AUTO: 0.01 10*3/MM3 (ref 0–0.05)
IMM GRANULOCYTES NFR BLD AUTO: 0.2 % (ref 0–0.5)
LYMPHOCYTES # BLD AUTO: 0.39 10*3/MM3 (ref 0.7–3.1)
LYMPHOCYTES NFR BLD AUTO: 9.4 % (ref 19.6–45.3)
MAGNESIUM SERPL-MCNC: 1.7 MG/DL (ref 1.6–2.4)
MCH RBC QN AUTO: 32.6 PG (ref 26.6–33)
MCHC RBC AUTO-ENTMCNC: 30.7 G/DL (ref 31.5–35.7)
MCV RBC AUTO: 106.4 FL (ref 79–97)
MONOCYTES # BLD AUTO: 0.38 10*3/MM3 (ref 0.1–0.9)
MONOCYTES NFR BLD AUTO: 9.1 % (ref 5–12)
NEUTROPHILS NFR BLD AUTO: 3.38 10*3/MM3 (ref 1.7–7)
NEUTROPHILS NFR BLD AUTO: 81.3 % (ref 42.7–76)
NRBC BLD AUTO-RTO: 0 /100 WBC (ref 0–0.2)
PHOSPHATE SERPL-MCNC: 2.7 MG/DL (ref 2.5–4.5)
PLATELET # BLD AUTO: 142 10*3/MM3 (ref 140–450)
PMV BLD AUTO: 10 FL (ref 6–12)
POTASSIUM SERPL-SCNC: 4.2 MMOL/L (ref 3.5–5.2)
PROCALCITONIN SERPL-MCNC: 0.16 NG/ML (ref 0–0.25)
RBC # BLD AUTO: 2.82 10*6/MM3 (ref 3.77–5.28)
SODIUM SERPL-SCNC: 136 MMOL/L (ref 136–145)
WBC NRBC COR # BLD AUTO: 4.16 10*3/MM3 (ref 3.4–10.8)

## 2025-02-12 PROCEDURE — 25010000002 HEPARIN LOCK FLUSH PER 10 UNITS: Performed by: HOSPITALIST

## 2025-02-12 PROCEDURE — 94618 PULMONARY STRESS TESTING: CPT

## 2025-02-12 PROCEDURE — 94799 UNLISTED PULMONARY SVC/PX: CPT

## 2025-02-12 PROCEDURE — 63710000001 PREDNISONE PER 1 MG: Performed by: HOSPITALIST

## 2025-02-12 PROCEDURE — 94761 N-INVAS EAR/PLS OXIMETRY MLT: CPT

## 2025-02-12 PROCEDURE — 94664 DEMO&/EVAL PT USE INHALER: CPT

## 2025-02-12 RX ORDER — OMEPRAZOLE 40 MG/1
40 CAPSULE, DELAYED RELEASE ORAL 2 TIMES DAILY
Qty: 60 CAPSULE | Refills: 0 | Status: SHIPPED | OUTPATIENT
Start: 2025-02-12 | End: 2025-04-13

## 2025-02-12 RX ORDER — PREDNISONE 5 MG/1
TABLET ORAL
Qty: 26 TABLET | Refills: 0 | Status: SHIPPED | OUTPATIENT
Start: 2025-02-12 | End: 2025-02-23

## 2025-02-12 RX ORDER — ALBUTEROL SULFATE 90 UG/1
2 INHALANT RESPIRATORY (INHALATION) AS NEEDED
Qty: 18 G | Refills: 0 | Status: SHIPPED | OUTPATIENT
Start: 2025-02-12

## 2025-02-12 RX ORDER — ASPIRIN 81 MG/1
81 TABLET, CHEWABLE ORAL DAILY
Qty: 30 TABLET | Refills: 6 | Status: SHIPPED | OUTPATIENT
Start: 2025-02-12

## 2025-02-12 RX ORDER — HEPARIN SODIUM (PORCINE) LOCK FLUSH IV SOLN 100 UNIT/ML 100 UNIT/ML
500 SOLUTION INTRAVENOUS ONCE
Status: COMPLETED | OUTPATIENT
Start: 2025-02-12 | End: 2025-02-12

## 2025-02-12 RX ORDER — OSELTAMIVIR PHOSPHATE 75 MG/1
75 CAPSULE ORAL EVERY 12 HOURS SCHEDULED
Qty: 5 CAPSULE | Refills: 0 | Status: SHIPPED | OUTPATIENT
Start: 2025-02-12 | End: 2025-02-15

## 2025-02-12 RX ADMIN — IPRATROPIUM BROMIDE AND ALBUTEROL SULFATE 3 ML: .5; 3 SOLUTION RESPIRATORY (INHALATION) at 11:35

## 2025-02-12 RX ADMIN — ISOSORBIDE MONONITRATE 30 MG: 30 TABLET, EXTENDED RELEASE ORAL at 09:07

## 2025-02-12 RX ADMIN — PANTOPRAZOLE SODIUM 40 MG: 40 TABLET, DELAYED RELEASE ORAL at 06:46

## 2025-02-12 RX ADMIN — Medication 10 ML: at 09:08

## 2025-02-12 RX ADMIN — OSELTAMIVIR PHOSPHATE 75 MG: 75 CAPSULE ORAL at 09:07

## 2025-02-12 RX ADMIN — HEPARIN 500 UNITS: 100 SYRINGE at 15:01

## 2025-02-12 RX ADMIN — ASPIRIN 81 MG CHEWABLE TABLET 81 MG: 81 TABLET CHEWABLE at 09:07

## 2025-02-12 RX ADMIN — CLOPIDOGREL BISULFATE 75 MG: 75 TABLET ORAL at 09:08

## 2025-02-12 RX ADMIN — PREDNISONE 20 MG: 20 TABLET ORAL at 09:07

## 2025-02-12 RX ADMIN — IPRATROPIUM BROMIDE AND ALBUTEROL SULFATE 3 ML: .5; 3 SOLUTION RESPIRATORY (INHALATION) at 07:37

## 2025-02-12 NOTE — DISCHARGE SUMMARY
[facility]  Discharge Summary    Date of Service: 2025  Patient Name: Kandi Fuentes  : 1960  MRN: [id number]    Date of Admission: 2025  Discharge Diagnosis:     Acute hypoxemic respiratory failure  Influenza A  COPD with acute exacerbation  Small cell lung carcinoma  History of coronary artery disease  Anemia    Date of Discharge: 2025  Primary Care Physician: Provider, No Known      Presenting Problem:   Flu [J11.1]  Influenza A [J10.1]  Hypoxia [R09.02]  Hypotension, unspecified hypotension type [I95.9]    Active and Resolved Hospital Problems:  Active Hospital Problems    Diagnosis POA    **Flu [J11.1] Yes    Severe protein-calorie malnutrition [E43] Yes      Resolved Hospital Problems   No resolved problems to display.         Hospital Course     HPI:    Patient presented with issues as discussed in history and physical of 2025.  Hospital Course:      This is a 64-year-old older than stated age white female with a history of metastatic small cell lung carcinoma.  She is actively followed by oncology as an outpatient and recently had administration of new monoclonal agents as a maintenance therapy.  She presented with her worsening shortness of breath and was found to have positive influenza findings.  She had a early temperature elevation that resolved with therapy and did not recur.  She required 2 L nasal cannula to maintain oxygen saturations at time of discharge.  Blood pressures were controlled and somewhat on the low side at times.  Initial upper respiratory viral panel was positive for influenza A.  Blood cultures were without growth.  Chest x-ray showed emphysematous changes without discrete infiltrate.  Prior CT scanning of January failed to show any bulky pulmonary process.  Blood gas showed some mild CO2 retention.  C-reactive protein was elevated.  She had some mild noted hyponatremia with decreased potassium that was  supplemented and corrected.  CBC showed chronic  anemia without leukocytosis.  She was treated with nebulization therapy and steroids with improvement.  She was seen in follow-up by oncology consultant.  She was symptomatically improved.  It was thought that she could be discharged with her oxygen therapy and follow-up with cardiology as to oral medication and her blood pressure issues.              Day of Discharge     Vital Signs:  Temp:  [97.5 °F (36.4 °C)-98.4 °F (36.9 °C)] 97.5 °F (36.4 °C)  Heart Rate:  [] 89  Resp:  [15-27] 16  BP: ()/(47-66) 116/66  Flow (L/min) (Oxygen Therapy):  [2] 2    Physical Exam:  Physical Exam  Vitals reviewed.   Constitutional:       General: She is not in acute distress.  HENT:      Head: Normocephalic.   Cardiovascular:      Rate and Rhythm: Normal rate and regular rhythm.   Pulmonary:      Effort: Pulmonary effort is normal.      Breath sounds: Normal breath sounds.      Comments: Poor air movement at baseline  Abdominal:      General: Bowel sounds are normal.      Palpations: Abdomen is soft.      Tenderness: There is no abdominal tenderness.   Musculoskeletal:         General: No swelling.   Neurological:      Mental Status: She is alert.            Pertinent  and/or Most Recent Results     LAB RESULTS:      Lab 02/11/25  2353 02/11/25  0402 02/10/25  0418 02/09/25  1422 02/09/25  1205 02/09/25  1151 02/05/25  1326   WBC 4.16 2.87* 3.27* 4.14  --   --  5.77   HEMOGLOBIN 9.2* 10.2* 9.6* 9.0*  --   --  11.7*   HEMATOCRIT 30.0* 32.5* 30.9* 28.8*  --   --  37.4   PLATELETS 142 144 141 118*  --   --  237   NEUTROS ABS 3.38 2.09 2.52 3.20  --   --  3.70   IMMATURE GRANS (ABS) 0.01 0.00 0.02 0.02  --   --   --    LYMPHS ABS 0.39* 0.45* 0.54* 0.58*  --   --  1.14   MONOS ABS 0.38 0.33 0.18 0.34  --   --  0.91*   EOS ABS 0.00 0.00 0.00 0.00  --   --  0.02   .4* 105.2* 107.3* 105.9*  --   --  108.1*   CRP  --   --   --   --  3.13*  --   --    PROCALCITONIN 0.16  --    --   --   --   --   --    LACTATE  --   --   --   --   --  0.4  --    PROTIME  --   --   --   --  14.8*  --   --    APTT  --   --   --   --  68.3*  --   --          Lab 02/11/25  2353 02/11/25  1557 02/11/25  0402 02/10/25  0418 02/09/25  1205 02/05/25  1331 02/05/25  1326   SODIUM 136  --  141 135* 131*  --   --    POTASSIUM 4.2 4.1 3.2* 3.8 3.0*  --   --    CHLORIDE 97*  --  99 99 92*  --   --    CO2 30.8*  --  31.5* 30.4* 31.6*  --   --    ANION GAP 8.2  --  10.5 5.6 7.4  --   --    BUN 14  --  13 8 8  --   --    CREATININE 0.39*  --  0.38* 0.43* 0.48* 0.50*  --    EGFR 112.1  --  112.8 109.4 106.6 105.5  --    GLUCOSE 119*  --  146* 124* 95  --   --    CALCIUM 8.6  --  8.8 8.2* 8.7  --   --    IONIZED CALCIUM  --   --   --   --  1.15  --   --    MAGNESIUM 1.7  --  1.7 1.7 1.8  --   --    PHOSPHORUS 2.7  --  2.7 3.3 3.1  --   --    TSH  --   --   --   --   --   --  0.926         Lab 02/09/25  1205   TOTAL PROTEIN 5.7*   ALBUMIN 3.5   GLOBULIN 2.2   ALT (SGPT) 19   AST (SGOT) 37*   BILIRUBIN 0.6   ALK PHOS 64         Lab 02/09/25  1205   PROTIME 14.8*   INR 1.16*                 Lab 02/09/25  1135   PH, ARTERIAL 7.417   PCO2, ARTERIAL 50.7*   PO2 .9*   O2 SATURATION ART 98.5*   FIO2 44   HCO3 ART 32.7*   BASE EXCESS ART 6.9*     Brief Urine Lab Results  (Last result in the past 365 days)        Color   Clarity   Blood   Leuk Est   Nitrite   Protein   CREAT   Urine HCG        02/09/25 1302 Yellow   Clear   Trace   Negative   Negative   Negative                 Microbiology Results (last 10 days)       Procedure Component Value - Date/Time    Respiratory Panel PCR w/COVID-19(SARS-CoV-2) [facility] In-House, NP Swab in UTM/VTM, 2 HR TAT - Swab, Nasopharynx [[id number]]  (Abnormal) Collected: 02/09/25 1257    Lab Status: Final result Specimen: Swab from Nasopharynx Updated: 02/09/25 1355     ADENOVIRUS, PCR Not Detected     Coronavirus 229E Not Detected     Coronavirus HKU1 Not Detected      Coronavirus NL63 Not Detected     Coronavirus OC43 Not Detected     COVID19 Not Detected     Human Metapneumovirus Not Detected     Human Rhinovirus/Enterovirus Not Detected     Influenza A H1 2009 PCR Detected     Influenza B PCR Not Detected     Parainfluenza Virus 1 Not Detected     Parainfluenza Virus 2 Not Detected     Parainfluenza Virus 3 Not Detected     Parainfluenza Virus 4 Not Detected     RSV, PCR Not Detected     Bordetella pertussis pcr Not Detected     Bordetella parapertussis PCR Not Detected     Chlamydophila pneumoniae PCR Not Detected     Mycoplasma pneumo by PCR Not Detected    Narrative:      In the setting of a positive respiratory panel with a viral infection PLUS a negative procalcitonin without other underlying concern for bacterial infection, consider observing off antibiotics or discontinuation of antibiotics and continue supportive care. If the respiratory panel is positive for atypical bacterial infection (Bordetella pertussis, Chlamydophila pneumoniae, or Mycoplasma pneumoniae), consider antibiotic de-escalation to target atypical bacterial infection.    Blood Culture - Blood, Arm, Right [[id number]]  (Normal) Collected: 02/09/25 1209    Lab Status: Preliminary result Specimen: Blood from Arm, Right Updated: 02/11/25 1215     Blood Culture No growth at 2 days    Blood Culture - Blood, Chest, Right [[id number]]  (Normal) Collected: 02/09/25 1205    Lab Status: Preliminary result Specimen: Blood from Chest, Right Updated: 02/11/25 1230     Blood Culture No growth at 2 days            XR Chest 1 View    Result Date: 2/9/2025  Impression: Impression: Emphysema without evidence of pneumonia. Electronically Signed: Brown Vaughn MD  2/9/2025 12:08 PM EST  Workstation ID: [id number]    CT Chest With Contrast Diagnostic    Result Date: 1/28/2025  Impression: Impression: 1. No convincing residual malignancy or progressive metastatic disease in the chest, abdomen or pelvis. 2. Small vascular  lesions in the right hepatic lobe, probably small hemangiomas or vascular shunts. These are not a typical appearance of metastatic disease from lung primary. Recommend attention on follow-up contrasted CT restaging exams. 3. Subacute to chronic appearing superior endplate deformities with minimal height loss at T2 and T3 new from 11/24/2024. No visible fracture line or bony retropulsion. 4. Severe emphysematous change with areas of parenchymal scarring. Negative for active infection. 5. Other chronic/ancillary findings as above. Electronically Signed: Carlos Bernard MD  1/28/2025 1:26 PM EST  Workstation ID: [id number]    CT Abdomen Pelvis With Contrast    Result Date: 1/28/2025  Impression: Impression: 1. No convincing residual malignancy or progressive metastatic disease in the chest, abdomen or pelvis. 2. Small vascular lesions in the right hepatic lobe, probably small hemangiomas or vascular shunts. These are not a typical appearance of metastatic disease from lung primary. Recommend attention on follow-up contrasted CT restaging exams. 3. Subacute to chronic appearing superior endplate deformities with minimal height loss at T2 and T3 new from 11/24/2024. No visible fracture line or bony retropulsion. 4. Severe emphysematous change with areas of parenchymal scarring. Negative for active infection. 5. Other chronic/ancillary findings as above. Electronically Signed: Carlos Bernard MD  1/28/2025 1:26 PM EST  Workstation ID: [id number]    MRI Brain With & Without Contrast    Result Date: 1/24/2025  Impression: Impression: No evidence of intracranial metastatic disease. Stable mild chronic and age-related findings as above. Electronically Signed: Edilberto Richard MD  1/24/2025 4:30 PM EST  Workstation ID: [id number]                 Labs Pending at Discharge:  Pending Results       None            Procedures Performed    02/12 0823 Note By: Melanie Henry, CRT      Consults:   Consults       Date and Time Order Name  Status Description    2/11/2025  2:26 PM Hematology & Oncology Inpatient Consult Completed     2/9/2025  3:57 PM Hospitalist (on-call MD unless specified)      1/8/2025 10:11 PM Hematology & Oncology Inpatient Consult Completed               Discharge Details        Discharge Medications        New Medications        Instructions Start Date   oseltamivir 75 MG capsule  Commonly known as: TAMIFLU   75 mg, Oral, Every 12 Hours Scheduled      predniSONE 5 MG tablet  Commonly known as: DELTASONE   Take 4 tablets by mouth Daily for 2 days, THEN 3 tablets Daily for 3 days, THEN 2 tablets Daily for 3 days, THEN 1 tablet Daily for 3 days.   Start Date: February 12, 2025            Changes to Medications        Instructions Start Date   omeprazole 40 MG capsule  Commonly known as: priLOSEC  What changed:   when to take this  reasons to take this   40 mg, Oral, 2 Times Daily             Continue These Medications        Instructions Start Date   Aspirin Low Dose 81 MG chewable tablet  Generic drug: aspirin   81 mg, Oral, Daily      atorvastatin 40 MG tablet  Commonly known as: LIPITOR   40 mg, Nightly      carvedilol 3.125 MG tablet  Commonly known as: COREG   3.125 mg, 2 Times Daily With Meals      clopidogrel 75 MG tablet  Commonly known as: PLAVIX   75 mg, Daily      ipratropium-albuterol 0.5-2.5 mg/3 ml nebulizer  Commonly known as: DUO-NEB   3 mL, Nebulization, 4 Times Daily PRN      isosorbide mononitrate 30 MG 24 hr tablet  Commonly known as: IMDUR   30 mg, Daily      lidocaine-prilocaine 2.5-2.5 % cream  Commonly known as: EMLA   1 Application, Topical, See Admin Instructions, Apply one hour prior to port access      methocarbamol 500 MG tablet  Commonly known as: ROBAXIN   500 mg, Oral, Every 6 Hours PRN      mometasone 0.1 % ointment  Commonly known as: ELOCON   Apply to affected skin of chest and back 2-3 times daily as needed for itching from radiation treatments.      ondansetron 8 MG tablet  Commonly known as:  ZOFRAN   8 mg, Oral, 3 Times Daily PRN      Tylenol 325 MG capsule  Generic drug: Acetaminophen   650 mg, As Needed      Ventolin  (90 Base) MCG/ACT inhaler  Generic drug: albuterol sulfate HFA   2 puffs, Inhalation, As Needed             Stop These Medications      oxyCODONE 5 MG/5ML solution  Commonly known as: ROXICODONE              Allergies   Allergen Reactions    Morphine Hives and Shortness Of Breath    Codeine Hives    Sulfa Antibiotics Hives         Discharge Disposition: home  Home or Self Care    Diet:  Hospital:  Diet Order   Procedures    Diet: Cardiac; Healthy Heart (2-3 Na+); Fluid Consistency: Thin (IDDSI 0)         Discharge Activity:         CODE STATUS:  Code Status and Medical Interventions: CPR (Attempt to Resuscitate); Full Support   Ordered at: 02/10/25 1024     Code Status (Patient has no pulse and is not breathing):    CPR (Attempt to Resuscitate)     Medical Interventions (Patient has pulse or is breathing):    Full Support         Future Appointments   Date Time Provider Department Center   3/5/2025 12:45 PM [facility] INJECTION CHAIR [facility]   3/5/2025  1:00 PM Wilbert Delgado MD [facility]   3/5/2025  1:30 PM INF ROOM 33D - CHAIR [facility]   4/24/2025 10:00 AM [facility] CT 2 [facility]   4/24/2025 10:30 AM [facility] MRI 2 [facility]   4/30/2025  1:30 PM Tereso Abarca MD [facility] None       Additional Instructions for the Follow-ups that You Need to Schedule       Discharge Follow-up with PCP   As directed       Currently Documented PCP:    Provider, No Known    PCP Phone Number:    None     Follow Up Details: one week                Time spent on Discharge including face to face service:  45 minutes    Signature: Electronically signed by Timothy Duane Brammell, MD, 02/12/25, 09:44 EST.  [facility] Hospitalist Team

## 2025-02-12 NOTE — PLAN OF CARE
Goal Outcome Evaluation:              Outcome Evaluation: Pt discharging home with home oxygen, new need. Friend to provide ride home

## 2025-02-12 NOTE — PLAN OF CARE
Problem: Adult Inpatient Plan of Care  Goal: Absence of Hospital-Acquired Illness or Injury  Intervention: Identify and Manage Fall Risk  Description: Perform standard risk assessment on admission using a validated tool or comprehensive approach appropriate to the patient; reassess fall risk frequently, with change in status or transfer to another level of care.  Communicate risk to interprofessional healthcare team; ensure fall risk visible cue.  Determine need for increased observation, equipment and environmental modification, as well as use of supportive, nonskid footwear.  Adjust safety measures to individual needs and identified risk factors.  Reinforce the importance of active participation with fall risk prevention, safety, and physical activity with the patient and family.  Perform regular intentional rounding to assess need for position change, pain assessment and personal needs, including assistance with toileting.  Recent Flowsheet Documentation  Taken 2/12/2025 0400 by Atnoni Kelly LPN  Safety Promotion/Fall Prevention:   safety round/check completed   room organization consistent   nonskid shoes/slippers when out of bed   muscle strengthening facilitated   mobility aid in reach   lighting adjusted   fall prevention program maintained   clutter free environment maintained   assistive device/personal items within reach   activity supervised  Taken 2/12/2025 0200 by Antoni Kelly LPN  Safety Promotion/Fall Prevention:   safety round/check completed   room organization consistent   nonskid shoes/slippers when out of bed   muscle strengthening facilitated   lighting adjusted   mobility aid in reach   fall prevention program maintained   clutter free environment maintained   assistive device/personal items within reach   activity supervised  Taken 2/12/2025 0000 by Antoni Kelly LPN  Safety Promotion/Fall Prevention:   safety round/check completed   room organization consistent    nonskid shoes/slippers when out of bed   muscle strengthening facilitated   mobility aid in reach   lighting adjusted   clutter free environment maintained   assistive device/personal items within reach   activity supervised  Taken 2/11/2025 2200 by Antoni Kelly LPN  Safety Promotion/Fall Prevention:   safety round/check completed   room organization consistent   nonskid shoes/slippers when out of bed   muscle strengthening facilitated   mobility aid in reach   lighting adjusted   fall prevention program maintained   clutter free environment maintained   activity supervised   assistive device/personal items within reach  Taken 2/11/2025 2003 by Antoni Kelly LPN  Safety Promotion/Fall Prevention:   safety round/check completed   room organization consistent   nonskid shoes/slippers when out of bed   muscle strengthening facilitated   mobility aid in reach   lighting adjusted   fall prevention program maintained   clutter free environment maintained   assistive device/personal items within reach   activity supervised  Intervention: Prevent Skin Injury  Description: Perform a screening for skin injury risk, such as pressure or moisture-associated skin damage on admission and at regular intervals throughout hospital stay.  Keep all areas of skin (especially folds) clean and dry.  Maintain adequate skin hydration.  Relieve and redistribute pressure and protect bony prominences and skin at risk for injury; implement measures based on patient-specific risk factors.  Match turning and repositioning schedule to clinical condition.  Encourage weight shift frequently; assist with reposition if unable to complete independently.  Float heels off bed; avoid pressure on the Achilles tendon.  Keep skin free from extended contact with medical devices.  Optimize nutrition and hydration.  Encourage functional activity and mobility, as early as tolerated.  Use aids (e.g., slide boards, mechanical lift) during  transfer.  Recent Flowsheet Documentation  Taken 2/12/2025 0400 by Antoni Kelly LPN  Body Position: position changed independently  Taken 2/12/2025 0200 by Antoni Kelly LPN  Body Position: position changed independently  Taken 2/12/2025 0000 by Antoni Kelly LPN  Body Position: position changed independently  Taken 2/11/2025 2200 by Antoni Kelly LPN  Body Position: position changed independently  Taken 2/11/2025 2003 by Antoni Kelly LPN  Body Position: position changed independently  Intervention: Prevent Infection  Description: Maintain skin and mucous membrane integrity; promote hand, oral and pulmonary hygiene.  Optimize fluid balance, nutrition, sleep and glycemic control to maximize infection resistance.  Identify potential sources of infection early to prevent or mitigate progression of infection (e.g., wound, lines, devices).  Evaluate ongoing need for invasive devices; remove promptly when no longer indicated.  Review vaccination status.  Recent Flowsheet Documentation  Taken 2/12/2025 0400 by Antoni Kelly LPN  Infection Prevention:   visitors restricted/screened   single patient room provided   rest/sleep promoted   personal protective equipment utilized  Taken 2/12/2025 0200 by Antoni Kelly LPN  Infection Prevention:   single patient room provided   visitors restricted/screened   rest/sleep promoted   personal protective equipment utilized   hand hygiene promoted  Taken 2/12/2025 0000 by Antoni Kelly LPN  Infection Prevention: personal protective equipment utilized  Taken 2/11/2025 2200 by Antoni Kelly LPN  Infection Prevention:   visitors restricted/screened   single patient room provided   rest/sleep promoted   personal protective equipment utilized   hand hygiene promoted  Taken 2/11/2025 2003 by Antoni Kelly LPN  Infection Prevention:   visitors restricted/screened   single patient room provided   rest/sleep promoted    personal protective equipment utilized   hand hygiene promoted  Goal: Optimal Comfort and Wellbeing  Intervention: Monitor Pain and Promote Comfort  Description: Assess pain level, treatment efficacy and patient response at regular intervals using a consistent pain scale.  Consider the presence and impact of preexisting chronic pain.  Encourage patient and caregiver involvement in pain assessment, interventions and safety measures.  Promote activity; balance with sleep and rest to enhance healing.  Recent Flowsheet Documentation  Taken 2/12/2025 0400 by Antoni Kelly LPN  Pain Management Interventions:   unnecessary movement minimized   quiet environment facilitated   no interventions per patient request  Taken 2/12/2025 0000 by Antoni Kelly LPN  Pain Management Interventions:   position adjusted   pain medication given   pain management plan reviewed with patient/caregiver  Taken 2/11/2025 2143 by Antoni Kelly LPN  Pain Management Interventions:   position adjusted   pain medication given   pain management plan reviewed with patient/caregiver  Taken 2/11/2025 2003 by Antoni Kelly LPN  Pain Management Interventions:   quiet environment facilitated   no interventions per patient request   pain medication given   pain management plan reviewed with patient/caregiver  Intervention: Provide Person-Centered Care  Description: Use a family-focused approach to care; encourage support system presence and participation.  Develop trust and rapport by proactively providing information, encouraging questions, addressing concerns and offering reassurance.  Acknowledge emotional response to hospitalization.  Recognize and utilize personal coping strategies and strengths; develop goals via shared decision-making.  Honor spiritual and cultural preferences.  Recent Flowsheet Documentation  Taken 2/11/2025 2003 by Antoni Kelly LPN  Trust Relationship/Rapport: care explained   Goal Outcome  Evaluation: plan of care reviewed , isolation for influenza , pending discharge on respiratory status , plan to return home, hem / onc consulted , up with stand by assist , will monitor

## 2025-02-12 NOTE — PROCEDURES
Exercise Oximetry    Patient Name:Kandi Fuentes   MRN: [id number]   Date: 02/12/25             ROOM AIR BASELINE   SpO2% 93   Heart Rate 83   Blood Pressure      EXERCISE ON ROOM AIR SpO2% EXERCISE ON O2 @ 2 LPM SpO2%   1 MINUTE 90 1 MINUTE    2 MINUTES 89 2 MINUTES    3 MINUTES 87 3 MINUTES 89   4 MINUTES  4 MINUTES 92   5 MINUTES  5 MINUTES 93   6 MINUTES  6 MINUTES 93              Distance Walked   Distance Walked   6 min   Dyspnea (Yue Scale)   Dyspnea (Uye Scale)   Fatigue (Yue Scale)   Fatigue (Yue Scale)   SpO2% Post Exercise   SpO2% Post Exercise    93   HR Post Exercise   HR Post Exercise   83   Time to Recovery   Time to Recovery     Comments: pt on ra saturation of 93% o2. Pt began ambulation on ra of saturation of 90%. Pt continued ambulation with saturation decreasing from 89 to 87% o2. Placed pt on 2L with saturation of 89. Pt continued ambulation on 2L with saturation between 92-93% 02. Pt tolerated ambulation fairly.

## 2025-02-13 NOTE — OUTREACH NOTE
Prep Survey      Flowsheet Row Responses   [facility] facility patient discharged from? [facility]   Is LACE score < 7 ? No   Eligibility Readm Mgmt   Discharge diagnosis Flu   Does the patient have one of the following disease processes/diagnoses(primary or secondary)? Other   Does the patient have Home health ordered? No   Is there a DME ordered? Yes   What DME was ordered? [facility] for    Prep survey completed? Yes            DUANE NIETO - Registered Nurse

## 2025-02-13 NOTE — CASE MANAGEMENT/SOCIAL WORK
Case Management Discharge Note      Final Note: Home    Provided Post Acute Provider List?: N/A  Provided Post Acute Provider Quality & Resource List?: N/A          Transportation Services  Private: Car    Final Discharge Disposition Code: 01 - home or self-care

## 2025-02-14 LAB
BACTERIA SPEC AEROBE CULT: NORMAL
BACTERIA SPEC AEROBE CULT: NORMAL

## 2025-02-17 ENCOUNTER — READMISSION MANAGEMENT (OUTPATIENT)
Dept: CALL CENTER | Facility: HOSPITAL | Age: 65
End: 2025-02-17
Payer: MEDICAID

## 2025-02-17 NOTE — OUTREACH NOTE
Medical Week 1 Survey      Flowsheet Row Responses   [facility] patient discharged from? [facility]   Does the patient have one of the following disease processes/diagnoses(primary or secondary)? Other   Week 1 attempt successful? No   Unsuccessful attempts Attempt 1   Revoke Other transitional program  [Pt followed by oncology navigator.]            Pema DURAN - Licensed Nurse

## 2025-02-18 ENCOUNTER — APPOINTMENT (OUTPATIENT)
Dept: CT IMAGING | Facility: HOSPITAL | Age: 65
End: 2025-02-18
Payer: MEDICAID

## 2025-02-18 ENCOUNTER — TELEPHONE (OUTPATIENT)
Dept: ONCOLOGY | Facility: CLINIC | Age: 65
End: 2025-02-18
Payer: MEDICAID

## 2025-02-18 ENCOUNTER — APPOINTMENT (OUTPATIENT)
Dept: GENERAL RADIOLOGY | Facility: HOSPITAL | Age: 65
End: 2025-02-18
Payer: MEDICAID

## 2025-02-18 ENCOUNTER — HOSPITAL ENCOUNTER (INPATIENT)
Facility: HOSPITAL | Age: 65
LOS: 3 days | Discharge: HOME | End: 2025-02-22
Attending: EMERGENCY MEDICINE
Payer: MEDICAID

## 2025-02-18 DIAGNOSIS — R09.02 HYPOXIA: ICD-10-CM

## 2025-02-18 DIAGNOSIS — J10.1 INFLUENZA A: ICD-10-CM

## 2025-02-18 DIAGNOSIS — J18.9 PNEUMONIA OF BOTH LUNGS DUE TO INFECTIOUS ORGANISM, UNSPECIFIED PART OF LUNG: Primary | ICD-10-CM

## 2025-02-18 LAB
ANION GAP SERPL CALCULATED.3IONS-SCNC: 11.9 MMOL/L (ref 5–15)
ARTERIAL PATENCY WRIST A: POSITIVE
ATMOSPHERIC PRESS: ABNORMAL MM[HG]
B PARAPERT DNA SPEC QL NAA+PROBE: NOT DETECTED
B PERT DNA SPEC QL NAA+PROBE: NOT DETECTED
BASE EXCESS BLDA CALC-SCNC: 5.9 MMOL/L (ref 0–3)
BASOPHILS # BLD AUTO: 0.01 10*3/MM3 (ref 0–0.2)
BASOPHILS NFR BLD AUTO: 0.1 % (ref 0–1.5)
BDY SITE: ABNORMAL
BUN SERPL-MCNC: 14 MG/DL (ref 8–23)
BUN/CREAT SERPL: 25.9 (ref 7–25)
C PNEUM DNA NPH QL NAA+NON-PROBE: NOT DETECTED
CALCIUM SPEC-SCNC: 9.7 MG/DL (ref 8.6–10.5)
CHLORIDE SERPL-SCNC: 91 MMOL/L (ref 98–107)
CO2 BLDA-SCNC: 30.6 MMOL/L (ref 22–29)
CO2 SERPL-SCNC: 29.1 MMOL/L (ref 22–29)
CREAT SERPL-MCNC: 0.54 MG/DL (ref 0.57–1)
D-LACTATE SERPL-SCNC: 1.5 MMOL/L (ref 0.3–2)
DEPRECATED RDW RBC AUTO: 62.2 FL (ref 37–54)
EGFRCR SERPLBLD CKD-EPI 2021: 103.6 ML/MIN/1.73
EOSINOPHIL # BLD AUTO: 0 10*3/MM3 (ref 0–0.4)
EOSINOPHIL NFR BLD AUTO: 0 % (ref 0.3–6.2)
ERYTHROCYTE [DISTWIDTH] IN BLOOD BY AUTOMATED COUNT: 16.1 % (ref 12.3–15.4)
FLUAV H1 2009 PAND RNA NPH QL NAA+PROBE: DETECTED
FLUBV RNA ISLT QL NAA+PROBE: NOT DETECTED
GLUCOSE SERPL-MCNC: 113 MG/DL (ref 65–99)
HADV DNA SPEC NAA+PROBE: NOT DETECTED
HCO3 BLDA-SCNC: 29.4 MMOL/L (ref 21–28)
HCOV 229E RNA SPEC QL NAA+PROBE: NOT DETECTED
HCOV HKU1 RNA SPEC QL NAA+PROBE: NOT DETECTED
HCOV NL63 RNA SPEC QL NAA+PROBE: NOT DETECTED
HCOV OC43 RNA SPEC QL NAA+PROBE: NOT DETECTED
HCT VFR BLD AUTO: 36.8 % (ref 34–46.6)
HEMODILUTION: NO
HGB BLD-MCNC: 11.6 G/DL (ref 12–15.9)
HMPV RNA NPH QL NAA+NON-PROBE: NOT DETECTED
HOLD SPECIMEN: NORMAL
HPIV1 RNA ISLT QL NAA+PROBE: NOT DETECTED
HPIV2 RNA SPEC QL NAA+PROBE: NOT DETECTED
HPIV3 RNA NPH QL NAA+PROBE: NOT DETECTED
HPIV4 P GENE NPH QL NAA+PROBE: NOT DETECTED
IMM GRANULOCYTES # BLD AUTO: 0.07 10*3/MM3 (ref 0–0.05)
IMM GRANULOCYTES NFR BLD AUTO: 0.5 % (ref 0–0.5)
INHALED O2 CONCENTRATION: 32 %
LYMPHOCYTES # BLD AUTO: 0.72 10*3/MM3 (ref 0.7–3.1)
LYMPHOCYTES NFR BLD AUTO: 5.4 % (ref 19.6–45.3)
M PNEUMO IGG SER IA-ACNC: NOT DETECTED
MCH RBC QN AUTO: 32.5 PG (ref 26.6–33)
MCHC RBC AUTO-ENTMCNC: 31.5 G/DL (ref 31.5–35.7)
MCV RBC AUTO: 103.1 FL (ref 79–97)
MODALITY: ABNORMAL
MONOCYTES # BLD AUTO: 1.3 10*3/MM3 (ref 0.1–0.9)
MONOCYTES NFR BLD AUTO: 9.7 % (ref 5–12)
NEUTROPHILS NFR BLD AUTO: 11.32 10*3/MM3 (ref 1.7–7)
NEUTROPHILS NFR BLD AUTO: 84.3 % (ref 42.7–76)
NRBC BLD AUTO-RTO: 0 /100 WBC (ref 0–0.2)
NT-PROBNP SERPL-MCNC: 557 PG/ML (ref 0–900)
PCO2 BLDA: 37.7 MM HG (ref 35–48)
PH BLDA: 7.5 PH UNITS (ref 7.35–7.45)
PLATELET # BLD AUTO: 176 10*3/MM3 (ref 140–450)
PMV BLD AUTO: 10 FL (ref 6–12)
PO2 BLD: 238 MM[HG] (ref 0–500)
PO2 BLDA: 76 MM HG (ref 83–108)
POTASSIUM SERPL-SCNC: 3.7 MMOL/L (ref 3.5–5.2)
RBC # BLD AUTO: 3.57 10*6/MM3 (ref 3.77–5.28)
RHINOVIRUS RNA SPEC NAA+PROBE: NOT DETECTED
RSV RNA NPH QL NAA+NON-PROBE: NOT DETECTED
SAO2 % BLDCOA: 96.3 % (ref 94–98)
SARS-COV-2 RNA RESP QL NAA+PROBE: NOT DETECTED
SODIUM SERPL-SCNC: 132 MMOL/L (ref 136–145)
WBC NRBC COR # BLD AUTO: 13.42 10*3/MM3 (ref 3.4–10.8)
WHOLE BLOOD HOLD COAG: NORMAL

## 2025-02-18 PROCEDURE — 85025 COMPLETE CBC W/AUTO DIFF WBC: CPT | Performed by: EMERGENCY MEDICINE

## 2025-02-18 PROCEDURE — 82803 BLOOD GASES ANY COMBINATION: CPT | Performed by: EMERGENCY MEDICINE

## 2025-02-18 PROCEDURE — 0202U NFCT DS 22 TRGT SARS-COV-2: CPT | Performed by: EMERGENCY MEDICINE

## 2025-02-18 PROCEDURE — 36600 WITHDRAWAL OF ARTERIAL BLOOD: CPT | Performed by: EMERGENCY MEDICINE

## 2025-02-18 PROCEDURE — 71045 X-RAY EXAM CHEST 1 VIEW: CPT

## 2025-02-18 PROCEDURE — G0378 HOSPITAL OBSERVATION PER HR: HCPCS

## 2025-02-18 PROCEDURE — 83880 ASSAY OF NATRIURETIC PEPTIDE: CPT | Performed by: EMERGENCY MEDICINE

## 2025-02-18 PROCEDURE — 25510000001 IOPAMIDOL PER 1 ML: Performed by: EMERGENCY MEDICINE

## 2025-02-18 PROCEDURE — 93005 ELECTROCARDIOGRAM TRACING: CPT | Performed by: EMERGENCY MEDICINE

## 2025-02-18 PROCEDURE — 99285 EMERGENCY DEPT VISIT HI MDM: CPT

## 2025-02-18 PROCEDURE — 80048 BASIC METABOLIC PNL TOTAL CA: CPT | Performed by: EMERGENCY MEDICINE

## 2025-02-18 PROCEDURE — 36415 COLL VENOUS BLD VENIPUNCTURE: CPT

## 2025-02-18 PROCEDURE — 71275 CT ANGIOGRAPHY CHEST: CPT

## 2025-02-18 PROCEDURE — 25010000002 METHYLPREDNISOLONE PER 125 MG: Performed by: EMERGENCY MEDICINE

## 2025-02-18 PROCEDURE — 87040 BLOOD CULTURE FOR BACTERIA: CPT | Performed by: EMERGENCY MEDICINE

## 2025-02-18 PROCEDURE — 83605 ASSAY OF LACTIC ACID: CPT

## 2025-02-18 RX ORDER — AZITHROMYCIN 250 MG/1
500 TABLET, FILM COATED ORAL
Status: COMPLETED | OUTPATIENT
Start: 2025-02-18 | End: 2025-02-20

## 2025-02-18 RX ORDER — SODIUM CHLORIDE 0.9 % (FLUSH) 0.9 %
10 SYRINGE (ML) INJECTION AS NEEDED
Status: DISCONTINUED | OUTPATIENT
Start: 2025-02-18 | End: 2025-02-18

## 2025-02-18 RX ORDER — POLYETHYLENE GLYCOL 3350 17 G/17G
17 POWDER, FOR SOLUTION ORAL DAILY PRN
Status: DISCONTINUED | OUTPATIENT
Start: 2025-02-18 | End: 2025-02-22 | Stop reason: HOSPADM

## 2025-02-18 RX ORDER — METHYLPREDNISOLONE SODIUM SUCCINATE 125 MG/2ML
60 INJECTION, POWDER, LYOPHILIZED, FOR SOLUTION INTRAMUSCULAR; INTRAVENOUS DAILY
Status: DISCONTINUED | OUTPATIENT
Start: 2025-02-19 | End: 2025-02-19

## 2025-02-18 RX ORDER — BISACODYL 5 MG/1
5 TABLET, DELAYED RELEASE ORAL DAILY PRN
Status: DISCONTINUED | OUTPATIENT
Start: 2025-02-18 | End: 2025-02-22 | Stop reason: HOSPADM

## 2025-02-18 RX ORDER — SODIUM CHLORIDE 0.9 % (FLUSH) 0.9 %
10 SYRINGE (ML) INJECTION AS NEEDED
Status: DISCONTINUED | OUTPATIENT
Start: 2025-02-18 | End: 2025-02-22 | Stop reason: HOSPADM

## 2025-02-18 RX ORDER — SODIUM CHLORIDE 9 MG/ML
40 INJECTION, SOLUTION INTRAVENOUS AS NEEDED
Status: DISCONTINUED | OUTPATIENT
Start: 2025-02-18 | End: 2025-02-22 | Stop reason: HOSPADM

## 2025-02-18 RX ORDER — SODIUM CHLORIDE 0.9 % (FLUSH) 0.9 %
10 SYRINGE (ML) INJECTION EVERY 12 HOURS SCHEDULED
Status: DISCONTINUED | OUTPATIENT
Start: 2025-02-18 | End: 2025-02-22 | Stop reason: HOSPADM

## 2025-02-18 RX ORDER — IOPAMIDOL 755 MG/ML
100 INJECTION, SOLUTION INTRAVASCULAR
Status: COMPLETED | OUTPATIENT
Start: 2025-02-18 | End: 2025-02-18

## 2025-02-18 RX ORDER — GUAIFENESIN/DEXTROMETHORPHAN 100-10MG/5
5 SYRUP ORAL EVERY 4 HOURS PRN
Status: DISCONTINUED | OUTPATIENT
Start: 2025-02-18 | End: 2025-02-22 | Stop reason: HOSPADM

## 2025-02-18 RX ORDER — AMOXICILLIN 250 MG
2 CAPSULE ORAL 2 TIMES DAILY PRN
Status: DISCONTINUED | OUTPATIENT
Start: 2025-02-18 | End: 2025-02-22 | Stop reason: HOSPADM

## 2025-02-18 RX ORDER — BISACODYL 10 MG
10 SUPPOSITORY, RECTAL RECTAL DAILY PRN
Status: DISCONTINUED | OUTPATIENT
Start: 2025-02-18 | End: 2025-02-22 | Stop reason: HOSPADM

## 2025-02-18 RX ORDER — METHYLPREDNISOLONE SODIUM SUCCINATE 125 MG/2ML
125 INJECTION, POWDER, LYOPHILIZED, FOR SOLUTION INTRAMUSCULAR; INTRAVENOUS ONCE
Status: COMPLETED | OUTPATIENT
Start: 2025-02-18 | End: 2025-02-18

## 2025-02-18 RX ADMIN — AZITHROMYCIN DIHYDRATE 500 MG: 250 TABLET ORAL at 16:32

## 2025-02-18 RX ADMIN — AMOXICILLIN AND CLAVULANATE POTASSIUM 1 TABLET: 875; 125 TABLET, FILM COATED ORAL at 16:32

## 2025-02-18 RX ADMIN — METHYLPREDNISOLONE SODIUM SUCCINATE 125 MG: 125 INJECTION, POWDER, FOR SOLUTION INTRAMUSCULAR; INTRAVENOUS at 13:16

## 2025-02-18 RX ADMIN — IOPAMIDOL 100 ML: 755 INJECTION, SOLUTION INTRAVENOUS at 15:04

## 2025-02-18 RX ADMIN — AMOXICILLIN AND CLAVULANATE POTASSIUM 1 TABLET: 875; 125 TABLET, FILM COATED ORAL at 23:52

## 2025-02-18 NOTE — PAYOR COMM NOTE
"    CLINICAL SUPPORT - [id number]    HOSPITAL OBSERVATION MEDICAL AUTH REQUEST    Initial clinical follows.  Please advise if additional clinical is needed to process this request.  Thank you!    Buffy Gomez  Utilization Review Coordinator  [address]  [address]  Ph: [phone]  Fx: [phone]      Abdelrahman Devries (64 y.o. Female)       Date of Birth   1960    Social Security Number       Address   [address] [place] [address] [address] [place] [address]    Home Phone   [phone]    MRN   [id number]       Oriental orthodox   None    Marital Status                               Admission Date   2/18/25    Admission Type   Emergency    Admitting Provider   Miriam Chow MD    Attending Provider   Miraim Chow MD    Department, Room/Bed   [facility] EMERGENCY DEPARTMENT, 03/03       Discharge Date       Discharge Disposition       Discharge Destination                                 Attending Provider: Miriam Chow MD    Allergies: Morphine, Codeine, Sulfa Antibiotics    Isolation: Droplet   Infection: Influenza (02/09/25)   Code Status: Prior    Ht: 170.2 cm (67\")   Wt: 55 kg (121 lb 4.1 oz)    Admission Cmt: None   Principal Problem: Pneumonia [J18.9]                   Active Insurance as of 2/18/2025       Primary Coverage       Payor Plan Insurance Group Employer/Plan Group    [facility] COMMUNITY PLAN OF [place] [facility] COMMUNITY PLAN PATHWAYS IN        Payor Plan Address Payor Plan Phone Number Payor Plan Fax Number Effective Dates    [address]   11/1/2024 - None Entered    [facility] [address]         Subscriber Name Subscriber Birth Date Member ID       ABDELRAHMAN DEVRIES 1960 [id number]                     Emergency Contacts        (Rel.) Home Phone Work Phone Mobile Phone    EILEEN LOUISE (Grandchild) -- -- [phone]              History & Physical    No notes of this type exist for this encounter.        "   Emergency Department Notes        Alejo Basurto MD at 02/18/25 1531          Subjective   History of Present Illness  Patient is a 64-year-old female complaining of shortness of breath cough and congestion for the past several days.  She was diagnosed with influenza 1 week ago.  She denies fever chest pain vomiting or other complaint      Review of Systems    Past Medical History:   Diagnosis Date    Cancer     COPD (chronic obstructive pulmonary disease)     Coronary artery disease     Elevated cholesterol     Heart attack     Hypertension     Lung cancer 2024    Tinnitus        Allergies   Allergen Reactions    Morphine Hives and Shortness Of Breath    Codeine Hives    Sulfa Antibiotics Hives       Past Surgical History:   Procedure Laterality Date    BACK SURGERY  2004    bone spur on sciatica    BRONCHOSCOPY WITH ION ROBOTIC ASSIST N/A 09/27/2024    Procedure: BRONCHOSCOPY WITH ION ROBOT, fine needle aspiration and tissue biopsy right upper lobe mass, endobronchial ultrasound with fine needle aspiration lymph nodes (Level 10R);  Surgeon: Serafin Choudhary MD;  Location: [facility] ENDOSCOPY;  Service: Robotics - Pulmonary;  Laterality: N/A;  post: lung mass with lympadenopathy    CHOLECYSTECTOMY  2021    CORONARY ANGIOPLASTY WITH STENT PLACEMENT  07/2024    x2    ENDOSCOPY N/A 1/9/2025    Procedure: ESOPHAGOGASTRODUODENOSCOPY WITH DILATION (BOUGIE 32, 36, 40);  Surgeon: Jason Black MD;  Location: [facility] ENDOSCOPY;  Service: Gastroenterology;  Laterality: N/A;  ESOPHAGEAL STRICTURE, esophageal ulcer    MOLE REMOVAL  1994    forehead    SKIN LESION EXCISION Right 1994    breast    VENOUS ACCESS DEVICE (PORT) INSERTION Right 10/07/2024    Procedure: INSERTION VENOUS ACCESS DEVICE;  Surgeon: Serafin Choudhary MD;  Location: [facility] MAIN OR;  Service: Thoracic;  Laterality: Right;       Family History   Problem Relation Age of Onset    Breast cancer Mother 62    Heart attack Mother     Lung cancer Sister     Throat  cancer Sister     Lung cancer Brother        Social History     Socioeconomic History    Marital status:    Tobacco Use    Smoking status: Every Day     Current packs/day: 1.00     Average packs/day: 1 pack/day for 55.1 years (55.1 ttl pk-yrs)     Types: Cigarettes     Start date: 1970     Passive exposure: Current    Smokeless tobacco: Never   Vaping Use    Vaping status: Never Used   Substance and Sexual Activity    Alcohol use: Never    Drug use: Never    Sexual activity: Defer           Objective   Physical Exam  Neck has no adenopathy JVD or bruits her lungs have minimal wheezing.  Heart has regular rhythm without murmur rub or.  Chest is nontender.  Abdomen soft nontender.  Extremities unremarkable.  Procedures    My EKG interpretation was normal sinus rhythm at a rate of 117 with no acute ST change      ED Course      Results for orders placed or performed during the hospital encounter of 02/18/25   Respiratory Panel PCR w/COVID-19(SARS-CoV-2) BO/GAIL/MARCO ANTONIO/PAD/COR/SUJATHA In-House, NP Swab in UTM/VTM, 2 HR TAT - Swab, Nasopharynx    Collection Time: 02/18/25 12:49 PM    Specimen: Nasopharynx; Swab   Result Value Ref Range    ADENOVIRUS, PCR Not Detected Not Detected    Coronavirus 229E Not Detected Not Detected    Coronavirus HKU1 Not Detected Not Detected    Coronavirus NL63 Not Detected Not Detected    Coronavirus OC43 Not Detected Not Detected    COVID19 Not Detected Not Detected - Ref. Range    Human Metapneumovirus Not Detected Not Detected    Human Rhinovirus/Enterovirus Not Detected Not Detected    Influenza A H1 2009 PCR Detected (A) Not Detected    Influenza B PCR Not Detected Not Detected    Parainfluenza Virus 1 Not Detected Not Detected    Parainfluenza Virus 2 Not Detected Not Detected    Parainfluenza Virus 3 Not Detected Not Detected    Parainfluenza Virus 4 Not Detected Not Detected    RSV, PCR Not Detected Not Detected    Bordetella pertussis pcr Not Detected Not Detected    Bordetella  parapertussis PCR Not Detected Not Detected    Chlamydophila pneumoniae PCR Not Detected Not Detected    Mycoplasma pneumo by PCR Not Detected Not Detected   Basic Metabolic Panel    Collection Time: 02/18/25 12:50 PM    Specimen: Blood   Result Value Ref Range    Glucose 113 (H) 65 - 99 mg/dL    BUN 14 8 - 23 mg/dL    Creatinine 0.54 (L) 0.57 - 1.00 mg/dL    Sodium 132 (L) 136 - 145 mmol/L    Potassium 3.7 3.5 - 5.2 mmol/L    Chloride 91 (L) 98 - 107 mmol/L    CO2 29.1 (H) 22.0 - 29.0 mmol/L    Calcium 9.7 8.6 - 10.5 mg/dL    BUN/Creatinine Ratio 25.9 (H) 7.0 - 25.0    Anion Gap 11.9 5.0 - 15.0 mmol/L    eGFR 103.6 >60.0 mL/min/1.73   BNP    Collection Time: 02/18/25 12:50 PM    Specimen: Blood   Result Value Ref Range    proBNP 557.0 0.0 - 900.0 pg/mL   CBC Auto Differential    Collection Time: 02/18/25 12:50 PM    Specimen: Blood   Result Value Ref Range    WBC 13.42 (H) 3.40 - 10.80 10*3/mm3    RBC 3.57 (L) 3.77 - 5.28 10*6/mm3    Hemoglobin 11.6 (L) 12.0 - 15.9 g/dL    Hematocrit 36.8 34.0 - 46.6 %    .1 (H) 79.0 - 97.0 fL    MCH 32.5 26.6 - 33.0 pg    MCHC 31.5 31.5 - 35.7 g/dL    RDW 16.1 (H) 12.3 - 15.4 %    RDW-SD 62.2 (H) 37.0 - 54.0 fl    MPV 10.0 6.0 - 12.0 fL    Platelets 176 140 - 450 10*3/mm3    Neutrophil % 84.3 (H) 42.7 - 76.0 %    Lymphocyte % 5.4 (L) 19.6 - 45.3 %    Monocyte % 9.7 5.0 - 12.0 %    Eosinophil % 0.0 (L) 0.3 - 6.2 %    Basophil % 0.1 0.0 - 1.5 %    Immature Grans % 0.5 0.0 - 0.5 %    Neutrophils, Absolute 11.32 (H) 1.70 - 7.00 10*3/mm3    Lymphocytes, Absolute 0.72 0.70 - 3.10 10*3/mm3    Monocytes, Absolute 1.30 (H) 0.10 - 0.90 10*3/mm3    Eosinophils, Absolute 0.00 0.00 - 0.40 10*3/mm3    Basophils, Absolute 0.01 0.00 - 0.20 10*3/mm3    Immature Grans, Absolute 0.07 (H) 0.00 - 0.05 10*3/mm3    nRBC 0.0 0.0 - 0.2 /100 WBC   Gold Top - SST    Collection Time: 02/18/25 12:50 PM   Result Value Ref Range    Extra Tube Hold for add-ons.    Light Blue Top    Collection Time:  02/18/25 12:50 PM   Result Value Ref Range    Extra Tube Hold for add-ons.    POC Lactate    Collection Time: 02/18/25 12:55 PM    Specimen: Blood   Result Value Ref Range    Lactate 1.5 0.3 - 2.0 mmol/L   Blood Gas, Arterial -    Collection Time: 02/18/25  1:00 PM    Specimen: Arterial Blood   Result Value Ref Range    Site Left Radial     Wang's Test Positive     pH, Arterial 7.501 (H) 7.350 - 7.450 pH units    pCO2, Arterial 37.7 35.0 - 48.0 mm Hg    pO2, Arterial 76.0 (L) 83.0 - 108.0 mm Hg    HCO3, Arterial 29.4 (H) 21.0 - 28.0 mmol/L    Base Excess, Arterial 5.9 (H) 0.0 - 3.0 mmol/L    O2 Saturation, Arterial 96.3 94.0 - 98.0 %    CO2 Content 30.6 (H) 22 - 29 mmol/L    Barometric Pressure for Blood Gas      Modality Cannula     FIO2 32 %    Hemodilution No     PO2/FIO2 238 0 - 500   ECG 12 Lead Dyspnea    Collection Time: 02/18/25  1:05 PM   Result Value Ref Range    QT Interval 331 ms    QTC Interval 463 ms     CT Angiogram Chest Pulmonary Embolism    Result Date: 2/18/2025  Impression: 1.No evidence of pulmonary embolism. 2.Multifocal pneumonia. Electronically Signed: Kenneth Freed MD  2/18/2025 3:11 PM EST  Workstation ID: [id number]    XR Chest 1 View    Result Date: 2/18/2025  Impression: New left lower lobe airspace disease which may represent atelectasis or pneumonia. Electronically Signed: Jorge Alberto Daniel MD  2/18/2025 1:12 PM EST  Workstation ID: [id number]                                                    Medical Decision Making  Chest x-ray interpretation shows questionable basilar infiltrate versus atelectasis bilaterally.  CT scan chest PE protocol shows no pulmonary embolus with multifocal pneumonia noted.  Blood gas on 2 L shows pH be 7.50 pCO2 of 37 pO2 of 76.  CBC shows leukocytosis with no left shift and no anemia.  BNP was 557.  BMP shows no renal sufficiency or electrolyte abnormality.  Strep panel is positive for influenza A.  Patient received a breathing treatment and steroids.  She will be  placed on IV antibiotics and blood cultures were obtained.  She will be admitted for further care.  I did speak the on-call hospitalist.    Amount and/or Complexity of Data Reviewed  Labs: ordered. Decision-making details documented in ED Course.  Radiology: ordered and independent interpretation performed.  ECG/medicine tests: ordered and independent interpretation performed.    Risk  Prescription drug management.  Decision regarding hospitalization.        Final diagnoses:   Pneumonia of both lungs due to infectious organism, unspecified part of lung   Influenza A   Hypoxia       ED Disposition  ED Disposition       ED Disposition   Decision to Admit    Condition   --    Comment   --               No follow-up provider specified.       Medication List      No changes were made to your prescriptions during this visit.            Alejo Basurto MD  02/18/25 1533      Electronically signed by Alejo Basurto MD at 02/18/25 1533       Malorie Zamorano at 02/18/25 1248          EKG requested       Electronically signed by Malorie Zamorano at 02/18/25 1248       Operative/Procedure Notes (last 24 hours)  Notes from 02/17/25 1600 through 02/18/25 1600   No notes of this type exist for this encounter.       Physician Progress Notes (last 24 hours)  Notes from 02/17/25 1600 through 02/18/25 1600   No notes of this type exist for this encounter.       Consult Notes (last 24 hours)  Notes from 02/17/25 1600 through 02/18/25 1600   No notes of this type exist for this encounter.

## 2025-02-18 NOTE — CASE MANAGEMENT/SOCIAL WORK
Case Management/Social Work    Patient Name:  Kandi Fuentes  YOB: 1960  MRN: [id number]  Admit Date:  2/18/2025        Pt reports she is on home O2 @ 2L/min continuously per nasal cannula. She reports she has portable oxygen concentrator, standard home oxygen concentrator and an e-tank in the event her electricity goes out. She requests a moisture pack to go with her oxygen. Facesheet sent in EPIC and [employer] following up with liaison Sandhya.      Lizet Mcdowell RN, [facility]  Office: [phone]  Fax: [phone]  [email or web address]      I met with patient in room wearing PPE: mask,eyewear  Maintained distance greater than six feet and spent </=15 minutes in the room      Electronically signed by:  Lizet Mcdowell RN  02/18/25 13:00 EST

## 2025-02-18 NOTE — TELEPHONE ENCOUNTER
Contacted the patient to discuss her message we received. I informed her that I will let Dr. Delgado know that she is currently admitted again. She confirmed and stated she woke up this morning experiencing difficulty breathing which is why she went to the ER. She stated she was diagnosed with the flu last week and the symptoms have not yet subsided however they are currently telling her that her current episode is due to her heart. She stated they tested her again for the flu however she does not know the results yet. I v/u. I informed her that I will relay this information to Dr. Delgado and I advised for her to contact our office if she has any further questions, needs or concerns. She confirmed.

## 2025-02-18 NOTE — TELEPHONE ENCOUNTER
Caller: Kandi Fuentes    Relationship: Self    Best call back number: [phone]      What was the call regarding: PATIENT WANTED TO LET DR QUINONES KNOW SHE WAS BACK IN THE HOSPITAL.

## 2025-02-18 NOTE — DISCHARGE PLACEMENT REQUEST
"Abdelrahman Devries (64 y.o. Female)       Date of Birth   1960    Social Security Number       Address   [address] [place] [address] [address] [place] [address] [place] [address]    Home Phone   [phone]    MRN   [id number]       Jainism   None    Marital Status                               Admission Date   2/18/25    Admission Type   Emergency    Admitting Provider       Attending Provider   Alejo Basurto MD    Department, Room/Bed   [facility] EMERGENCY DEPARTMENT, 03/03       Discharge Date       Discharge Disposition       Discharge Destination                                 Attending Provider: Alejo Basurto MD    Allergies: Morphine, Codeine, Sulfa Antibiotics    Isolation: Droplet   Infection: Influenza (02/09/25)   Code Status: Prior    Ht: 170.2 cm (67\")   Wt: 55 kg (121 lb 4.1 oz)    Admission Cmt: None   Principal Problem: None                  Active Insurance as of 2/18/2025       Primary Coverage       Payor Plan Insurance Group Employer/Plan Group    [facility] COMMUNITY PLAN OF [place] - [facility] CARE CONNECT [facility] COMMUNITY PLAN PATHWAYS IN        Payor Plan Address Payor Plan Phone Number Payor Plan Fax Number Effective Dates    [address]   11/1/2024 - None Entered    [facility] [address]         Subscriber Name Subscriber Birth Date Member ID       ABDELRAHMAN DEVRIES 1960 [id number]                     Emergency Contacts        (Rel.) Home Phone Work Phone Mobile Phone    ASPENEILEEN (Grandchild) -- -- [phone]                "

## 2025-02-19 PROBLEM — J18.9 PNA (PNEUMONIA): Status: ACTIVE | Noted: 2025-02-19

## 2025-02-19 LAB
ALBUMIN SERPL-MCNC: 3.3 G/DL (ref 3.5–5.2)
ALBUMIN/GLOB SERPL: 1.1 G/DL
ALP SERPL-CCNC: 72 U/L (ref 39–117)
ALT SERPL W P-5'-P-CCNC: 14 U/L (ref 1–33)
ANION GAP SERPL CALCULATED.3IONS-SCNC: 12.6 MMOL/L (ref 5–15)
AST SERPL-CCNC: 14 U/L (ref 1–32)
BASOPHILS # BLD AUTO: 0.01 10*3/MM3 (ref 0–0.2)
BASOPHILS NFR BLD AUTO: 0.1 % (ref 0–1.5)
BILIRUB SERPL-MCNC: 0.4 MG/DL (ref 0–1.2)
BUN SERPL-MCNC: 21 MG/DL (ref 8–23)
BUN/CREAT SERPL: 43.8 (ref 7–25)
CALCIUM SPEC-SCNC: 8.9 MG/DL (ref 8.6–10.5)
CHLORIDE SERPL-SCNC: 91 MMOL/L (ref 98–107)
CO2 SERPL-SCNC: 24.4 MMOL/L (ref 22–29)
CREAT SERPL-MCNC: 0.48 MG/DL (ref 0.57–1)
DEPRECATED RDW RBC AUTO: 62.4 FL (ref 37–54)
EGFRCR SERPLBLD CKD-EPI 2021: 106.6 ML/MIN/1.73
EOSINOPHIL # BLD AUTO: 0 10*3/MM3 (ref 0–0.4)
EOSINOPHIL NFR BLD AUTO: 0 % (ref 0.3–6.2)
ERYTHROCYTE [DISTWIDTH] IN BLOOD BY AUTOMATED COUNT: 16.2 % (ref 12.3–15.4)
GLOBULIN UR ELPH-MCNC: 3 GM/DL
GLUCOSE SERPL-MCNC: 154 MG/DL (ref 65–99)
HCT VFR BLD AUTO: 34.9 % (ref 34–46.6)
HGB BLD-MCNC: 10.8 G/DL (ref 12–15.9)
IMM GRANULOCYTES # BLD AUTO: 0.05 10*3/MM3 (ref 0–0.05)
IMM GRANULOCYTES NFR BLD AUTO: 0.4 % (ref 0–0.5)
LYMPHOCYTES # BLD AUTO: 0.55 10*3/MM3 (ref 0.7–3.1)
LYMPHOCYTES NFR BLD AUTO: 4.5 % (ref 19.6–45.3)
MCH RBC QN AUTO: 32.1 PG (ref 26.6–33)
MCHC RBC AUTO-ENTMCNC: 30.9 G/DL (ref 31.5–35.7)
MCV RBC AUTO: 103.9 FL (ref 79–97)
MONOCYTES # BLD AUTO: 0.42 10*3/MM3 (ref 0.1–0.9)
MONOCYTES NFR BLD AUTO: 3.5 % (ref 5–12)
NEUTROPHILS NFR BLD AUTO: 11.06 10*3/MM3 (ref 1.7–7)
NEUTROPHILS NFR BLD AUTO: 91.5 % (ref 42.7–76)
NRBC BLD AUTO-RTO: 0 /100 WBC (ref 0–0.2)
PLATELET # BLD AUTO: 162 10*3/MM3 (ref 140–450)
PMV BLD AUTO: 9.9 FL (ref 6–12)
POTASSIUM SERPL-SCNC: 4.1 MMOL/L (ref 3.5–5.2)
PROCALCITONIN SERPL-MCNC: 0.07 NG/ML (ref 0–0.25)
PROT SERPL-MCNC: 6.3 G/DL (ref 6–8.5)
QT INTERVAL: 331 MS
QTC INTERVAL: 463 MS
RBC # BLD AUTO: 3.36 10*6/MM3 (ref 3.77–5.28)
SODIUM SERPL-SCNC: 128 MMOL/L (ref 136–145)
WBC NRBC COR # BLD AUTO: 12.09 10*3/MM3 (ref 3.4–10.8)

## 2025-02-19 PROCEDURE — 94799 UNLISTED PULMONARY SVC/PX: CPT

## 2025-02-19 PROCEDURE — 25010000002 METHYLPREDNISOLONE PER 125 MG: Performed by: NURSE PRACTITIONER

## 2025-02-19 PROCEDURE — 97162 PT EVAL MOD COMPLEX 30 MIN: CPT

## 2025-02-19 PROCEDURE — 85025 COMPLETE CBC W/AUTO DIFF WBC: CPT | Performed by: NURSE PRACTITIONER

## 2025-02-19 PROCEDURE — 25010000002 CEFTRIAXONE PER 250 MG

## 2025-02-19 PROCEDURE — 25010000002 METHYLPREDNISOLONE PER 40 MG

## 2025-02-19 PROCEDURE — 94640 AIRWAY INHALATION TREATMENT: CPT

## 2025-02-19 PROCEDURE — 84145 PROCALCITONIN (PCT): CPT

## 2025-02-19 PROCEDURE — 80053 COMPREHEN METABOLIC PANEL: CPT | Performed by: NURSE PRACTITIONER

## 2025-02-19 RX ORDER — IPRATROPIUM BROMIDE AND ALBUTEROL SULFATE 2.5; .5 MG/3ML; MG/3ML
3 SOLUTION RESPIRATORY (INHALATION)
Status: DISCONTINUED | OUTPATIENT
Start: 2025-02-19 | End: 2025-02-22 | Stop reason: HOSPADM

## 2025-02-19 RX ORDER — ACETAMINOPHEN 325 MG/1
650 TABLET ORAL EVERY 6 HOURS PRN
Status: DISCONTINUED | OUTPATIENT
Start: 2025-02-19 | End: 2025-02-22 | Stop reason: HOSPADM

## 2025-02-19 RX ORDER — ATORVASTATIN CALCIUM 40 MG/1
40 TABLET, FILM COATED ORAL NIGHTLY
Status: DISCONTINUED | OUTPATIENT
Start: 2025-02-19 | End: 2025-02-22 | Stop reason: HOSPADM

## 2025-02-19 RX ORDER — CARVEDILOL 3.12 MG/1
3.12 TABLET ORAL 2 TIMES DAILY WITH MEALS
Status: DISCONTINUED | OUTPATIENT
Start: 2025-02-19 | End: 2025-02-22 | Stop reason: HOSPADM

## 2025-02-19 RX ORDER — METHYLPREDNISOLONE SODIUM SUCCINATE 40 MG/ML
40 INJECTION, POWDER, LYOPHILIZED, FOR SOLUTION INTRAMUSCULAR; INTRAVENOUS EVERY 12 HOURS
Status: DISCONTINUED | OUTPATIENT
Start: 2025-02-19 | End: 2025-02-21

## 2025-02-19 RX ORDER — PANTOPRAZOLE SODIUM 40 MG/1
40 TABLET, DELAYED RELEASE ORAL
Status: DISCONTINUED | OUTPATIENT
Start: 2025-02-20 | End: 2025-02-22 | Stop reason: HOSPADM

## 2025-02-19 RX ORDER — ASPIRIN 81 MG/1
81 TABLET, CHEWABLE ORAL DAILY
Status: DISCONTINUED | OUTPATIENT
Start: 2025-02-19 | End: 2025-02-22 | Stop reason: HOSPADM

## 2025-02-19 RX ADMIN — CEFTRIAXONE 2000 MG: 2 INJECTION, POWDER, FOR SOLUTION INTRAMUSCULAR; INTRAVENOUS at 13:42

## 2025-02-19 RX ADMIN — Medication 10 ML: at 20:30

## 2025-02-19 RX ADMIN — METHYLPREDNISOLONE SODIUM SUCCINATE 60 MG: 125 INJECTION, POWDER, FOR SOLUTION INTRAMUSCULAR; INTRAVENOUS at 09:39

## 2025-02-19 RX ADMIN — GUAIFENESIN SYRUP AND DEXTROMETHORPHAN 5 ML: 100; 10 SYRUP ORAL at 00:25

## 2025-02-19 RX ADMIN — Medication 10 MG: at 00:25

## 2025-02-19 RX ADMIN — METHYLPREDNISOLONE SODIUM SUCCINATE 40 MG: 40 INJECTION, POWDER, FOR SOLUTION INTRAMUSCULAR; INTRAVENOUS at 13:42

## 2025-02-19 RX ADMIN — ASPIRIN 81 MG CHEWABLE TABLET 81 MG: 81 TABLET CHEWABLE at 14:21

## 2025-02-19 RX ADMIN — AZITHROMYCIN DIHYDRATE 500 MG: 250 TABLET ORAL at 16:20

## 2025-02-19 RX ADMIN — ACETAMINOPHEN 650 MG: 325 TABLET, FILM COATED ORAL at 17:43

## 2025-02-19 RX ADMIN — AMOXICILLIN AND CLAVULANATE POTASSIUM 1 TABLET: 875; 125 TABLET, FILM COATED ORAL at 09:39

## 2025-02-19 RX ADMIN — GUAIFENESIN SYRUP AND DEXTROMETHORPHAN 5 ML: 100; 10 SYRUP ORAL at 17:43

## 2025-02-19 RX ADMIN — IPRATROPIUM BROMIDE AND ALBUTEROL SULFATE 3 ML: .5; 3 SOLUTION RESPIRATORY (INHALATION) at 14:45

## 2025-02-19 RX ADMIN — Medication 10 ML: at 09:40

## 2025-02-19 RX ADMIN — ATORVASTATIN CALCIUM 40 MG: 40 TABLET, FILM COATED ORAL at 20:30

## 2025-02-19 RX ADMIN — Medication 10 ML: at 00:26

## 2025-02-19 RX ADMIN — CARVEDILOL 3.12 MG: 3.12 TABLET, FILM COATED ORAL at 16:20

## 2025-02-19 RX ADMIN — IPRATROPIUM BROMIDE AND ALBUTEROL SULFATE 3 ML: .5; 3 SOLUTION RESPIRATORY (INHALATION) at 19:45

## 2025-02-19 RX ADMIN — Medication 10 MG: at 20:30

## 2025-02-19 NOTE — DISCHARGE INSTR - APPOINTMENTS
To schedule a new primary care (PCP) appointment you can call one of the following:     [facility]          offices at [phone], option 1    2.   [facility] at [phone]     3.   [facility] at [phone]            [facility] is able to provide financial assistance if uninsured           And will provide application at time of arrival, recommend taking           Income documentation if available

## 2025-02-19 NOTE — CASE MANAGEMENT/SOCIAL WORK
Discharge Planning Assessment   [place]     Patient Name: Kandi Fuentes  MRN: [id number]  Today's Date: 2/19/2025    Admit Date: 2/18/2025    Plan: Return home with family. Home O2 2L-[place]   Discharge Needs Assessment       Row Name 02/19/25 1607       Living Environment    People in Home child(paddy), adult;grandchild(paddy)    Name(s) of People in Home son Carlos and grandchildren    Current Living Arrangements home    Potentially Unsafe Housing Conditions none    In the past 12 months has the electric, gas, oil, or water company threatened to shut off services in your home? No    Primary Care Provided by self    Provides Primary Care For no one    Family Caregiver if Needed child(paddy), adult    Family Caregiver Names jerry Gonzalez    Quality of Family Relationships helpful;involved;supportive    Able to Return to Prior Arrangements yes       Resource/Environmental Concerns    Resource/Environmental Concerns none    Transportation Concerns none       Transportation Needs    In the past 12 months, has lack of transportation kept you from medical appointments or from getting medications? no    In the past 12 months, has lack of transportation kept you from meetings, work, or from getting things needed for daily living? No       Food Insecurity    Within the past 12 months, you worried that your food would run out before you got the money to buy more. Never true    Within the past 12 months, the food you bought just didn't last and you didn't have money to get more. Never true       Transition Planning    Patient/Family Anticipates Transition to home with family    Patient/Family Anticipated Services at Transition none    Transportation Anticipated health plan transportation;family or friend will provide       Discharge Needs Assessment    Equipment Currently Used at Home nebulizer;oxygen;walker, rolling;shower chair    Concerns to be Addressed no discharge needs identified;denies needs/concerns at this time    Do you  want help finding or keeping work or a job? I do not need or want help    Do you want help with school or training? For example, starting or completing job training or getting a high school diploma, GED or equivalent No    Anticipated Changes Related to Illness none    Equipment Needed After Discharge none                   Discharge Plan       Row Name 02/19/25 1609       Plan    Plan Return home with family. Home O2 2L-[place]    Plan Comments CM met with patient at bedside to discuss discharge planning. Patient lives at home with son Carlos and grandchildren. She is independent with ADLs. She does not drive and has friends or health plan transport when needed. She has a rolling walker, shower chair, oxygen and nebulizer at home. Her home O2 is 2L through [place].  Patient states she does not have a PCP and declines CM assisting. CM added information on scheduling to AVS for patient. Pharmacy confirmed, agreeable to M2[facility]. Denies issues affording utilities and food. Denies current HHC or OPPT. Friends will transport at discharge.                  Continued Care and Services - Admitted Since 2/18/2025       Durable Medical Equipment Coordination complete.      Service Provider Request Status Services Address Phone Fax Patient Preferred    [facility] [employer] [facility]  Selected Durable Medical Equipment [address], [place] [address] [phone] --    [facility] [employer] [facility] [place] Pending - Request Sent -- [address], [place] [address] [phone] --              Demographic Summary       Row Name 02/19/25 1603       General Information    Admission Type observation    Arrived From home    Referral Source admission list    Reason for Consult care coordination/care conference;discharge planning    Preferred Language English       Contact Information    Permission Granted to Share Info With                    Functional Status       Row Name 02/19/25  1604       Functional Status    Usual Activity Tolerance moderate    Current Activity Tolerance moderate       Functional Status, IADL    Medications independent    Meal Preparation independent    Housekeeping independent    Laundry independent    Shopping independent    If for any reason you need help with day-to-day activities such as bathing, preparing meals, shopping, managing finances, etc., do you get the help you need? I get all the help I need       Mental Status Summary    Recent Changes in Mental Status/Cognitive Functioning no changes             Lisa Camp RN      [facility]  Office: [phone]  Cell: [phone]  Fax # [phone]

## 2025-02-19 NOTE — PLAN OF CARE
Problem: Adult Inpatient Plan of Care  Goal: Plan of Care Review  Outcome: Progressing  Goal: Patient-Specific Goal (Individualized)  Outcome: Progressing  Goal: Absence of Hospital-Acquired Illness or Injury  Outcome: Progressing  Intervention: Identify and Manage Fall Risk  Recent Flowsheet Documentation  Taken 2/19/2025 1600 by Kassandra Mathis RN  Safety Promotion/Fall Prevention: safety round/check completed  Taken 2/19/2025 1400 by Kassandra Mathis RN  Safety Promotion/Fall Prevention: safety round/check completed  Taken 2/19/2025 1200 by Kassandra Mathis RN  Safety Promotion/Fall Prevention: safety round/check completed  Taken 2/19/2025 1000 by Kassandra Mathis RN  Safety Promotion/Fall Prevention: safety round/check completed  Taken 2/19/2025 0800 by Kassandra Mathis RN  Safety Promotion/Fall Prevention: safety round/check completed  Goal: Optimal Comfort and Wellbeing  Outcome: Progressing  Goal: Readiness for Transition of Care  Outcome: Progressing     Problem: Pain Acute  Goal: Optimal Pain Control and Function  Outcome: Progressing     Problem: Breathing Pattern Ineffective  Goal: Effective Breathing Pattern  Outcome: Progressing     Problem: Fall Injury Risk  Goal: Absence of Fall and Fall-Related Injury  Outcome: Progressing  Intervention: Promote Injury-Free Environment  Recent Flowsheet Documentation  Taken 2/19/2025 1600 by Kassandra Mathis RN  Safety Promotion/Fall Prevention: safety round/check completed  Taken 2/19/2025 1400 by Kassandra Mathis RN  Safety Promotion/Fall Prevention: safety round/check completed  Taken 2/19/2025 1200 by Kassandra Mathis RN  Safety Promotion/Fall Prevention: safety round/check completed  Taken 2/19/2025 1000 by Kassandra Mathis RN  Safety Promotion/Fall Prevention: safety round/check completed  Taken 2/19/2025 0800 by Kassandra Mathis RN  Safety Promotion/Fall Prevention: safety round/check  completed     Problem: Sepsis/Septic Shock  Goal: Optimal Coping  Outcome: Progressing  Goal: Absence of Bleeding  Outcome: Progressing  Goal: Blood Glucose Level Within Target Range  Outcome: Progressing  Goal: Absence of Infection Signs and Symptoms  Outcome: Progressing  Goal: Optimal Nutrition Delivery  Outcome: Progressing     Problem: Malnutrition  Goal: Improved Nutritional Intake  Outcome: Progressing   Goal Outcome Evaluation:

## 2025-02-19 NOTE — CONSULTS
Nutrition Services    Patient Name: Kandi Fuentes  YOB: 1960  MRN: [id number]  Admission date: 2/18/2025    Comment:  -- Continue current diet and encourage good PO intakes    -- Patient declined ONS    -- Severe chronic disease related malnutrition related to hypermetabolic state of lung cancer as evidenced by weight loss greater than 10% x 6 months and severe fat/muscle loss per physical exam.  See MSA below.       CLINICAL NUTRITION ASSESSMENT      Reason for Assessment 2/19: Consult for Nursing Admission Screen, MST of 2, BMI less than 19, history of malnutrition      H&P      Past Medical History:   Diagnosis Date    Cancer     COPD (chronic obstructive pulmonary disease)     Coronary artery disease     Elevated cholesterol     Heart attack     Hypertension     Lung cancer 2024    Tinnitus        Past Surgical History:   Procedure Laterality Date    BACK SURGERY  2004    bone spur on sciatica    BRONCHOSCOPY WITH ION ROBOTIC ASSIST N/A 09/27/2024    Procedure: BRONCHOSCOPY WITH ION ROBOT, fine needle aspiration and tissue biopsy right upper lobe mass, endobronchial ultrasound with fine needle aspiration lymph nodes (Level 10R);  Surgeon: Serafin Choudhary MD;  Location: [facility] ENDOSCOPY;  Service: Robotics - Pulmonary;  Laterality: N/A;  post: lung mass with lympadenopathy    CHOLECYSTECTOMY  2021    CORONARY ANGIOPLASTY WITH STENT PLACEMENT  07/2024    x2    ENDOSCOPY N/A 1/9/2025    Procedure: ESOPHAGOGASTRODUODENOSCOPY WITH DILATION (BOUGIE 32, 36, 40);  Surgeon: Jason Black MD;  Location: [facility] ENDOSCOPY;  Service: Gastroenterology;  Laterality: N/A;  ESOPHAGEAL STRICTURE, esophageal ulcer    MOLE REMOVAL  1994    forehead    SKIN LESION EXCISION Right 1994    breast    VENOUS ACCESS DEVICE (PORT) INSERTION Right 10/07/2024    Procedure: INSERTION VENOUS ACCESS DEVICE;  Surgeon: Serafin Choudhary MD;  Location: [facility] MAIN OR;  Service: Thoracic;  Laterality: Right;        Current  "Problems   Multifocal pneumonia  Acute hypoxic respiratory failure     GERD     HLD     CAD       Encounter Information        Trending Narrative     2/19: Admitted for difficulty breathing.  RD visited patient at bedside.  Patient reports good appetite, no N/V, no issues chewing/swallowing (after per EGD with dilation 1/9/25), reports no recent weight loss.  Patient does not like Boost/Ensure and is pretty adamant she does not want them during admission.  NFPE completed, consistent with nutrition diagnosis of malnutrition using AND/ASPEN criteria. See MSA below.       Anthropometrics        Current Height, Weight Height: 170.2 cm (67\")  Weight: 55 kg (121 lb 4.1 oz) (02/18/25 1232)       Usual Body Weight (UBW) Unable to obtain from patient        Trending Weight Hx     This admission: 2/19: 121#             PTA: 9% weight loss x 3 months - significant     Wt Readings from Last 30 Encounters:   02/18/25 1232 55 kg (121 lb 4.1 oz)   02/09/25 1107 54.9 kg (121 lb)   02/05/25 1316 56.3 kg (124 lb 1.6 oz)   01/27/25 1242 56.2 kg (123 lb 12.8 oz)   01/11/25 0500 56.5 kg (124 lb 9.6 oz)   01/10/25 0600 54.3 kg (119 lb 12.4 oz)   01/09/25 0758 54.6 kg (120 lb 6.4 oz)   01/08/25 1420 54.3 kg (119 lb 11.2 oz)   12/27/24 1946 62.8 kg (138 lb 7.2 oz)   12/27/24 1325 63.5 kg (140 lb)   12/18/24 1100 62.6 kg (138 lb)   12/18/24 1114 62.6 kg (138 lb)   12/17/24 1116 61.2 kg (135 lb)   12/17/24 1025 61.3 kg (135 lb 3.2 oz)   12/16/24 1155 60.4 kg (133 lb 3.2 oz)   11/27/24 0925 61.5 kg (135 lb 9.6 oz)   11/26/24 0935 61.5 kg (135 lb 9.6 oz)   11/26/24 1219 61.2 kg (135 lb)   11/25/24 0912 61.7 kg (136 lb)   11/20/24 1237 61 kg (134 lb 6.4 oz)   11/18/24 1241 60.5 kg (133 lb 6.4 oz)   11/11/24 1253 62.7 kg (138 lb 4.8 oz)   11/07/24 1306 64.7 kg (142 lb 9.6 oz)   11/06/24 0949 64.5 kg (142 lb 4.8 oz)   11/05/24 0943 64.4 kg (141 lb 14.4 oz)   11/04/24 0917 63.9 kg (140 lb 12.8 oz)   10/28/24 1235 64.2 kg (141 lb 9.6 oz)   10/23/24 " "1017 63.5 kg (140 lb)   10/21/24 1237 63 kg (138 lb 12.8 oz)   10/16/24 0903 64.4 kg (142 lb)   10/15/24 0904 64.1 kg (141 lb 6.4 oz)   10/14/24 0924 64.6 kg (142 lb 8 oz)   10/14/24 1337 64.4 kg (142 lb)      BMI kg/m2 Body mass index is 18.99 kg/m².       Labs        Pertinent Labs    Results from last 7 days   Lab Units 02/19/25  0218 02/18/25  1250   SODIUM mmol/L 128* 132*   POTASSIUM mmol/L 4.1 3.7   CHLORIDE mmol/L 91* 91*   CO2 mmol/L 24.4 29.1*   BUN mg/dL 21 14   CREATININE mg/dL 0.48* 0.54*   CALCIUM mg/dL 8.9 9.7   BILIRUBIN mg/dL 0.4  --    ALK PHOS U/L 72  --    ALT (SGPT) U/L 14  --    AST (SGOT) U/L 14  --    GLUCOSE mg/dL 154* 113*     Results from last 7 days   Lab Units 02/19/25  0218   HEMOGLOBIN g/dL 10.8*   HEMATOCRIT % 34.9     No results found for: \"HGBA1C\"     Medications    Scheduled Medications aspirin, 81 mg, Oral, Daily  atorvastatin, 40 mg, Oral, Nightly  azithromycin, 500 mg, Oral, Q24H  carvedilol, 3.125 mg, Oral, BID With Meals  cefTRIAXone, 2,000 mg, Intravenous, Q24H  ipratropium-albuterol, 3 mL, Nebulization, 4x Daily - RT  methylPREDNISolone sodium succinate, 40 mg, Intravenous, Q12H  [START ON 2/20/2025] pantoprazole, 40 mg, Oral, Q AM  sodium chloride, 10 mL, Intravenous, Q12H        Infusions      PRN Medications   senna-docusate sodium **AND** polyethylene glycol **AND** bisacodyl **AND** bisacodyl    Calcium Replacement - Follow Nurse / BPA Driven Protocol    guaiFENesin-dextromethorphan    Magnesium Standard Dose Replacement - Follow Nurse / BPA Driven Protocol    melatonin    Phosphorus Replacement - Follow Nurse / BPA Driven Protocol    Potassium Replacement - Follow Nurse / BPA Driven Protocol    [COMPLETED] Insert Peripheral IV **AND** sodium chloride    sodium chloride    sodium chloride     Physical Findings        Trending Physical   Appearance, NFPE 2/19: NFPE completed, consistent with nutrition diagnosis of malnutrition using AND/ASPEN criteria. See MSA below.    "   --  Edema  No edema documented      Bowel Function No BM since admission (x 1 day ago)     Tubes No feeding tube      Chewing/Swallowing No known issues      Skin Intact      --  Current Nutrition Orders & Evaluation of Intake       Oral Nutrition     Food Allergies NKFA   Current PO Diet Diet: Cardiac; Healthy Heart (2-3 Na+); Fluid Consistency: Thin (IDDSI 0)   Supplement None ordered   PO Evaluation     Trending % PO Intake 2/19: 100% x 1 documented meal since admission    --  Nutritional Risk Screening        NRS-2002 Score          Nutrition Diagnosis         Nutrition Dx Problem 1 Severe chronic disease related malnutrition related to hypermetabolic state of lung cancer as evidenced by weight loss greater than 10% x 6 months and severe fat/muscle loss per physical exam.       Nutrition Dx Problem 2        Intervention Goal         Intervention Goal(s) Stable weight  PO intakes at least 75%     Nutrition Intervention        RD Action Completed NFPE     Nutrition Prescription          Diet Prescription Heart Healthy    Supplement Prescription Patient declined    --  Monitor/Evaluation        Monitor Per protocol, I&O, PO intake, Supplement intake, Pertinent labs, Weight, Swallow function     Malnutrition Severity Assessment      Patient meets criteria for : Severe Malnutrition  Malnutrition Type (Last 8 Hours)       Malnutrition Severity Assessment       Row Name 02/19/25 1334       Malnutrition Severity Assessment    Malnutrition Type Chronic Disease - Related Malnutrition      Row Name 02/19/25 1334       Unintentional Weight Loss     Unintentional Weight Loss Findings Severe    Unintentional Weight Loss  Weight loss greater than 7.5% in three months      Row Name 02/19/25 1334       Muscle Loss    Loss of Muscle Mass Findings Severe    Anabaptism Region Severe - deep hollowing/scooping, lack of muscle to touch, facial bones well defined    Clavicle Bone Region Severe - protruding prominent bone    Acromion Bone  Region Severe - squared shoulders, bones, and acromion process protrusion prominent    Scapular Bone Region Severe - prominent bones, depressions easily visible between ribs, scapula, spine, shoulders    Dorsal Hand Region Severe - prominent depression    Patellar Region Severe - prominent bone, square looking, very little muscle definition    Anterior Thigh Region Severe - line/depression along thigh, obviously thin    Posterior Calf Region Severe - thin with very little definition/firmness      Row Name 02/19/25 1232       Fat Loss    Subcutaneous Fat Loss Findings Severe    Orbital Region  Severe - pronounced hollowness/depression, dark circles, loose saggy skin    Upper Arm Region Severe - mostly skin, very little space between folds, fingers touch    Thoracic & Lumbar Region Severe - ribs visible with prominent depressions, iliac crest very prominent      Row Name 02/19/25 8511       Criteria Met (Must meet criteria for severity in at least 2 of these categories: M Wasting, Fat Loss, Fluid, Secondary Signs, Wt. Status, Intake)    Patient meets criteria for  Severe Malnutrition                     Electronically signed by:  May Ordoñez RD  02/19/25 13:28 EST

## 2025-02-19 NOTE — SIGNIFICANT NOTE
02/19/25 1620   Living Situation   Current Living Arrangements home   Potentially Unsafe Housing Conditions none   Food Insecurity   Within the past 12 months, you worried that your food would run out before you got the money to buy more. Never true   Within the past 12 months, the food you bought just didn't last and you didn't have money to get more. Never true   Transportation Needs   In the past 12 months, has lack of transportation kept you from medical appointments or from getting medications? no   In the past 12 months, has lack of transportation kept you from meetings, work, or from getting things needed for daily living? No   Utilities   In the past 12 months has the electric, gas, oil, or water company threatened to shut off services in your home? No   Abuse Screen (yes response referral indicated)   Feels Unsafe at Home or Work/School no   Feels Threatened by Someone no   Does Anyone Try to Keep You From Having Contact with Others or Doing Things Outside Your Home? no   Physical Signs of Abuse Present no   Financial Resource Strain   How hard is it for you to pay for the very basics like food, housing, medical care, and heating? Not very   Employment   Do you want help finding or keeping work or a job? I do not need or want help   Family and Community Support   If for any reason you need help with day-to-day activities such as bathing, preparing meals, shopping, managing finances, etc., do you get the help you need? I get all the help I need   How often do you feel lonely or isolated from those around you? Never   Education   Preferred Language English   Do you want help with school or training? For example, starting or completing job training or getting a high school diploma, GED or equivalent No   Physical Activity   On average, how many days per week do you engage in moderate to strenuous exercise (like a brisk walk)? 0 days   On average, how many minutes do you engage in exercise at this level? 0  min   Number of minutes of exercise per week (!) 0   Alcohol Use   Q1: How often do you have a drink containing alcohol? Never   Q2: How many drinks containing alcohol do you have on a typical day when you are drinking? None   Q3: How often do you have six or more drinks on one occasion? Never   Stress   Do you feel stress - tense, restless, nervous, or anxious, or unable to sleep at night because your mind is troubled all the time - these days? To some exte   Mental Health   Little interest or pleasure in doing things Not at all   Feeling down, depressed, or hopeless Not at all   Disabilities   Difficulty Concentrating, Remembering or Making Decisions no   Difficulty Managing Errands Independently yes

## 2025-02-19 NOTE — PROGRESS NOTES
[facility] MEDICINE SERVICE  DAILY PROGRESS NOTE    NAME: Kandi Fuentes  : 1960  MRN: [id number]      LOS: 0 days     PROVIDER OF SERVICE: Alexys Chong MD    Chief Complaint: Pneumonia    Subjective:     Interval History:  History taken from: patient    Patient seen and examined at bedside this morning.  Currently on 3.5 L nasal cannula.  States she was discharged last week on home oxygen which was 2 L nasal cannula when she was admitted for flu A however states she continue to use her inhaler and was using her home oxygen but continued to feel shortness of breath which was worsening over the last few days so she decided to come to the hospital again.        Review of Systems: Negative except described above  Review of Systems    Objective:     Vital Signs  Temp:  [97.4 °F (36.3 °C)-98.9 °F (37.2 °C)] 97.4 °F (36.3 °C)  Heart Rate:  [] 89  Resp:  [18-24] 18  BP: (110-144)/() 131/73  Flow (L/min) (Oxygen Therapy):  [3-5] 3   Body mass index is 18.99 kg/m².    Physical Exam  Physical Exam  Constitutional:       Comments: NAD    Cardiovascular:      Comments:  RRR, S1 & S2   Pulmonary:      Comments:  Lungs CTA   Abdominal:      Comments:  ABD soft, NT            Diagnostic Data    Results from last 7 days   Lab Units 25  0218   WBC 10*3/mm3 12.09*   HEMOGLOBIN g/dL 10.8*   HEMATOCRIT % 34.9   PLATELETS 10*3/mm3 162   GLUCOSE mg/dL 154*   CREATININE mg/dL 0.48*   BUN mg/dL 21   SODIUM mmol/L 128*   POTASSIUM mmol/L 4.1   AST (SGOT) U/L 14   ALT (SGPT) U/L 14   ALK PHOS U/L 72   BILIRUBIN mg/dL 0.4   ANION GAP mmol/L 12.6       CT Angiogram Chest Pulmonary Embolism    Result Date: 2025  Impression: 1.No evidence of pulmonary embolism. 2.Multifocal pneumonia. Electronically Signed: Kenneth Freed MD  2025 3:11 PM EST  Workstation ID: [id number]    XR Chest 1 View    Result Date: 2025  Impression: New left lower lobe airspace disease which may represent  atelectasis or pneumonia. Electronically Signed: Jorge Alberto Daniel MD  2/18/2025 1:12 PM EST  Workstation ID: [id number]       I reviewed the patient's new clinical results.    Assessment/Plan:     Active and Resolved Problems  Active Hospital Problems    Diagnosis  POA    **Pneumonia [J18.9]  Yes      Resolved Hospital Problems   No resolved problems to display.       Multifocal pneumonia  Acute hypoxic respiratory failure  COPD exacerbation  -WBC: 13.4  -ABG: pH 7.5, pCO2 37.7, pO2 76, HCO3 29.4, base excess 5.9, CO2 content 30.6  -CXR: New left lower lobe airspace disease which may represent atelectasis or pneumonia  -CT chest: No evidence of pulmonary embolism, multifocal pneumonia  -RVP positive for influenza A however it was initially positive on 2/9 and she has received treatment during her previous hospital course  -Patient on 3.5 L nasal cannula, continue  -Switch Augmentin to Rocephin  -Continue azithromycin  -Continue steroids, DuoNebs     GERD  -Continue PPI      HLD  -Continue Lipitor     CAD  -Continue home regimen    VTE Prophylaxis:  Mechanical VTE prophylaxis orders are present.             Disposition Planning:        Anticipated Date of Discharge: 2/22/2025         Time: 35 minutes     Code Status and Medical Interventions: CPR (Attempt to Resuscitate); Full Support   Ordered at: 02/18/25 1636     Code Status (Patient has no pulse and is not breathing):    CPR (Attempt to Resuscitate)     Medical Interventions (Patient has pulse or is breathing):    Full Support       Signature: Electronically signed by Alexys Chong MD, 02/19/25, 11:16 EST.  [facility] Hospitalist Team

## 2025-02-19 NOTE — PLAN OF CARE
Goal Outcome Evaluation:  Plan of Care Reviewed With: patient           Outcome Evaluation: 65 yo female adm 2/18/25 after d/c home from [facility] one week ago. Pt was previously admitted for flu, and still has active flu. She now has developed acute hypoxic respiratory failure, multifocal pna. CT (-) for PE. At baseline, pt lives w/ son and grandson, who provide 24 hr care. Pt reports she has been using cane since d/c, and has also been on 2LO2. Developed severe soa so returned to ER. Has one stair to enter single level apartment. Today, pt is soa w/ activity. Pt is able to come to sit at eob w/ supervision. Comes to stand and amb w/ rw (due to significant LE weakness) x 30 ft w/ cga. Discussed use of rw at home, as pt has rw. However, she notes that the carpeting in their apartment is very poorly laid, causing the rw to catch on the carpet and cause near fall for pt. Therefore, suggested that until pt is stronger, she should amb w/ 1 hand held when amb at home. Discussed possible HH PT, but pt notes that her insurance will not cover, and pt cannot get to OP PT due to transportation issues. Recommend home w/ 24 hr care at d/c. Will compile written HEP for pt to use at d/c. Will follow.

## 2025-02-19 NOTE — THERAPY EVALUATION
Patient Name: Kandi Fuentes  : 1960    MRN: [id number]                              Today's Date: 2025       Admit Date: 2025    Visit Dx:     ICD-10-CM ICD-9-CM   1. Pneumonia of both lungs due to infectious organism, unspecified part of lung  J18.9 483.8   2. Influenza A  J10.1 487.1   3. 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    Closed nondisplaced fracture of lateral malleolus of left fibula    Fracture of lateral malleolus    Hypokalemia    Pancytopenia due to chemotherapy    Dysphagia    COPD (chronic obstructive pulmonary disease)    Coronary artery disease    Flu    Severe protein-calorie malnutrition    Pneumonia     Past Medical History:   Diagnosis Date    Cancer     COPD (chronic obstructive pulmonary disease)     Coronary artery disease     Elevated cholesterol     Heart attack     Hypertension     Lung cancer     Tinnitus      Past Surgical History:   Procedure Laterality Date    BACK SURGERY      bone spur on sciatica    BRONCHOSCOPY WITH ION ROBOTIC ASSIST N/A 2024    Procedure: BRONCHOSCOPY WITH ION ROBOT, fine needle aspiration and tissue biopsy right upper lobe mass, endobronchial ultrasound with fine needle aspiration lymph nodes (Level 10R);  Surgeon: Serafin Choudhary MD;  Location: [facility] ENDOSCOPY;  Service: Robotics - Pulmonary;  Laterality: N/A;  post: lung mass with lympadenopathy    CHOLECYSTECTOMY      CORONARY ANGIOPLASTY WITH STENT PLACEMENT  07/2024    x2    ENDOSCOPY N/A 2025    Procedure: ESOPHAGOGASTRODUODENOSCOPY WITH DILATION (BOUGIE 32, 36, 40);  Surgeon: Jason Black MD;  Location: [facility] ENDOSCOPY;  Service: Gastroenterology;  Laterality: N/A;  ESOPHAGEAL STRICTURE, esophageal ulcer    MOLE REMOVAL      forehead    SKIN LESION EXCISION Right     breast    VENOUS ACCESS DEVICE (PORT) INSERTION Right 10/07/2024    Procedure: INSERTION  VENOUS ACCESS DEVICE;  Surgeon: Serafin Choudhary MD;  Location: [facility] MAIN OR;  Service: Thoracic;  Laterality: Right;      General Information       Row Name 02/19/25 1543          Physical Therapy Time and Intention    Document Type evaluation  -CM     Mode of Treatment physical therapy  -CM       Row Name 02/19/25 1543          General Information    Patient Profile Reviewed yes  -CM     Prior Level of Function independent:;gait;all household mobility  has been using cane since last admission 1 wk ago; also using 2L home O2  -CM     Existing Precautions/Restrictions oxygen therapy device and L/min  -CM     Barriers to Rehab medically complex;previous functional deficit  -CM       Row Name 02/19/25 1543          Living Environment    People in Home child(paddy), adult;grandchild(paddy)  reports son and grandson provide 24 hr care  -CM       Row Name 02/19/25 1543          Home Main Entrance    Number of Stairs, Main Entrance one  -CM       Row Name 02/19/25 1543          Stairs Within Home, Primary    Number of Stairs, Within Home, Primary none  -CM       Row Name 02/19/25 1543          Cognition    Orientation Status (Cognition) oriented x 4  -CM       Row Name 02/19/25 1543          Safety Issues/Impairments Affecting Functional Mobility    Impairments Affecting Function (Mobility) endurance/activity tolerance;strength;shortness of breath;pain  -CM               User Key  (r) = Recorded By, (t) = Taken By, (c) = Cosigned By      Initials Name Provider Type    CM Mary Ann Russell, PT Physical Therapist                   Mobility       Row Name 02/19/25 1544          Bed Mobility    Bed Mobility bed mobility (all) activities  -CM     All Activities, [place] (Bed Mobility) supervision  -CM     Comment, (Bed Mobility) sitting up in bed when PT arrived; reports she cannot breathe at all if laying flat. Moved from sitting up in bed to eob w/ supervision  -CM       Row Name 02/19/25 1548          Sit-Stand Transfer     Sit-Stand [place] (Transfers) supervision  -CM     Assistive Device (Sit-Stand Transfers) walker, front-wheeled  -CM       Row Name 02/19/25 1544          Gait/Stairs (Locomotion)    [place] Level (Gait) contact guard;1 person assist  -CM     Assistive Device (Gait) walker, front-wheeled  -CM     Patient was able to Ambulate yes  -CM     Distance in Feet (Gait) 30  shuffled gait; normal dalia; mild forward flexed posture.  -CM     Deviations/Abnormal Patterns (Gait) base of support, narrow  -CM               User Key  (r) = Recorded By, (t) = Taken By, (c) = Cosigned By      Initials Name Provider Type    Mary Ann Givens, PT Physical Therapist                   Obj/Interventions       Row Name 02/19/25 1546          Range of Motion Comprehensive    General Range of Motion no range of motion deficits identified  -CM       Row Name 02/19/25 1546          Strength Comprehensive (MMT)    General Manual Muscle Testing (MMT) Assessment lower extremity strength deficits identified;upper extremity strength deficits identified  -CM     Comment, General Manual Muscle Testing (MMT) Assessment BUEs 4-/5, B hips 3/5, other LE mm groups 3+/5  -CM       Row Name 02/19/25 1546          Balance    Balance Assessment sitting static balance;sitting dynamic balance;standing static balance;standing dynamic balance  -CM     Static Sitting Balance independent  -CM     Dynamic Sitting Balance independent  -CM     Position, Sitting Balance unsupported;sitting edge of bed  -CM     Static Standing Balance supervision  -CM     Dynamic Standing Balance supervision  -CM     Position/Device Used, Standing Balance supported;walker, rolling  -CM       Row Name 02/19/25 1546          Sensory Assessment (Somatosensory)    Sensory Assessment (Somatosensory) sensation intact  -CM               User Key  (r) = Recorded By, (t) = Taken By, (c) = Cosigned By      Initials Name Provider Type    Mary Ann Givens, PT Physical Therapist                    Goals/Plan       Row Name 02/19/25 1559          Bed Mobility Goal 1 (PT)    Activity/Assistive Device (Bed Mobility Goal 1, PT) bed mobility activities, all  -CM     [place] Level/Cues Needed (Bed Mobility Goal 1, PT) independent  -CM     Time Frame (Bed Mobility Goal 1, PT) 2 weeks  -CM       Row Name 02/19/25 1559          Transfer Goal 1 (PT)    Activity/Assistive Device (Transfer Goal 1, PT) transfers, all  -CM     [place] Level/Cues Needed (Transfer Goal 1, PT) independent  -CM     Time Frame (Transfer Goal 1, PT) 2 weeks  -CM       Row Name 02/19/25 1559          Gait Training Goal 1 (PT)    Activity/Assistive Device (Gait Training Goal 1, PT) gait (walking locomotion)  -CM     [place] Level (Gait Training Goal 1, PT) contact guard required  -CM     Distance (Gait Training Goal 1, PT) 100 ft w/ one hand held, no soa, O2 sats > 92%  -CM     Time Frame (Gait Training Goal 1, PT) 2 weeks  -CM       Row Name 02/19/25 9469          Therapy Assessment/Plan (PT)    Planned Therapy Interventions (PT) balance training;bed mobility training;gait training;home exercise program;transfer training;strengthening;ROM (range of motion);patient/family education;postural re-education  -CM               User Key  (r) = Recorded By, (t) = Taken By, (c) = Cosigned By      Initials Name Provider Type    Mary Ann Givens, PT Physical Therapist                   Clinical Impression       Row Name 02/19/25 0645          Pain    Pretreatment Pain Rating 4/10  -CM     Posttreatment Pain Rating 4/10  -CM     Pain Location chest;other (see comments)  chest pain due to coughing per pt  -CM     Pain Management Interventions nursing notified;exercise or physical activity utilized;breathing exercises utilized  -CM     Response to Pain Interventions activity level improved;functional ability improved;activity participation with tolerable pain  -CM       Row Name 02/19/25 1426          Plan of Care Review     Plan of Care Reviewed With patient  -CM     Outcome Evaluation 63 yo female adm 2/18/25 after d/c home from [facility] one week ago. Pt was previously admitted for flu, and still has active flu. She now has developed acute hypoxic respiratory failure, multifocal pna. CT (-) for PE. At baseline, pt lives w/ son and grandson, who provide 24 hr care. Pt reports she has been using cane since d/c, and has also been on 2LO2. Developed severe soa so returned to ER. Has one stair to enter single level apartment. Today, pt is soa w/ activity. Pt is able to come to sit at eob w/ supervision. Comes to stand and amb w/ rw (due to significant LE weakness) x 30 ft w/ cga. Discussed use of rw at home, as pt has rw. However, she notes that the carpeting in their apartment is very poorly laid, causing the rw to catch on the carpet and cause near fall for pt. Therefore, suggested that until pt is stronger, she should amb w/ 1 hand held when amb at home. Discussed possible HH PT, but pt notes that her insurance will not cover, and pt cannot get to OP PT due to transportation issues. Recommend home w/ 24 hr care at d/c. Will compile written HEP for pt to use at d/c. Will follow.  -CM       Row Name 02/19/25 2216          Therapy Assessment/Plan (PT)    Rehab Potential (PT) good  -CM     Criteria for Skilled Interventions Met (PT) yes;meets criteria;skilled treatment is necessary  -CM     Therapy Frequency (PT) 3 times/wk  -CM     Predicted Duration of Therapy Intervention (PT) until d/c  -CM       Row Name 02/19/25 7998          Vital Signs    Pre Systolic BP Rehab 131  -CM     Pre Treatment Diastolic BP 69  -CM     Post Systolic BP Rehab 156  -CM     Post Treatment Diastolic BP 73  -CM     Pretreatment Heart Rate (beats/min) 97  -CM     Intratreatment Heart Rate (beats/min) 115  -CM     Posttreatment Heart Rate (beats/min) 90  -CM     Pretreatment Resp Rate (breaths/min) 17  -CM     Posttreatment Resp Rate (breaths/min) 27  -CM     Pre SpO2  (%) 98  -CM     O2 Delivery Pre Treatment supplemental O2  4L  -CM     Intra SpO2 (%) 90  -CM     O2 Delivery Intra Treatment supplemental O2  -CM     Post SpO2 (%) 97  -CM     O2 Delivery Post Treatment supplemental O2  -CM       Row Name 02/19/25 1557          Positioning and Restraints    Pre-Treatment Position in bed  -CM     Post Treatment Position chair  -CM     In Chair notified nsg;sitting;call light within reach;encouraged to call for assist;legs elevated  educated pt on importance of being up in chair for most of day; all meals to be eaten in sitting in chair  -CM               User Key  (r) = Recorded By, (t) = Taken By, (c) = Cosigned By      Initials Name Provider Type    Mary Ann Givens, PT Physical Therapist                   Outcome Measures       Row Name 02/19/25 1600 02/19/25 0749       How much help from another person do you currently need...    Turning from your back to your side while in flat bed without using bedrails? 4  -CM 4  -SO    Moving from lying on back to sitting on the side of a flat bed without bedrails? 4  -CM 4  -SO    Moving to and from a bed to a chair (including a wheelchair)? 4  -CM 4  -SO    Standing up from a chair using your arms (e.g., wheelchair, bedside chair)? 4  -CM 4  -SO    Climbing 3-5 steps with a railing? 3  -CM 3  -SO    To walk in hospital room? 3  -CM 3  -SO    AM-PAC 6 Clicks Score (PT) 22  -CM 22  -SO              User Key  (r) = Recorded By, (t) = Taken By, (c) = Cosigned By      Initials Name Provider Type    Mary Ann Givens, PT Physical Therapist    Kassandra Mcconnell RN Registered Nurse                                 Physical Therapy Education       Title: PT OT SLP Therapies (Done)       Topic: Physical Therapy (Done)       Point: Mobility training (Done)       Learning Progress Summary            Patient Acceptance, E,TB, VU by  at 2/19/2025 1600                      Point: Home exercise program (Done)       Learning Progress  Summary            Patient Acceptance, E,TB, VU by CM at 2/19/2025 1600                      Point: Body mechanics (Done)       Learning Progress Summary            Patient Acceptance, E,TB, VU by CM at 2/19/2025 1600                      Point: Precautions (Done)       Learning Progress Summary            Patient Acceptance, E,TB, VU by CM at 2/19/2025 1600                                      User Key       Initials Effective Dates Name Provider Type Discipline    [place]ING 06/16/21 -  Mary Ann Russell, PT Physical Therapist PT                  PT Recommendation and Plan  Planned Therapy Interventions (PT): balance training, bed mobility training, gait training, home exercise program, transfer training, strengthening, ROM (range of motion), patient/family education, postural re-education  Outcome Evaluation: 63 yo female adm 2/18/25 after d/c home from [facility] one week ago. Pt was previously admitted for flu, and still has active flu. She now has developed acute hypoxic respiratory failure, multifocal pna. CT (-) for PE. At baseline, pt lives w/ son and grandson, who provide 24 hr care. Pt reports she has been using cane since d/c, and has also been on 2LO2. Developed severe soa so returned to ER. Has one stair to enter single level apartment. Today, pt is soa w/ activity. Pt is able to come to sit at eob w/ supervision. Comes to stand and amb w/ rw (due to significant LE weakness) x 30 ft w/ cga. Discussed use of rw at home, as pt has rw. However, she notes that the carpeting in their apartment is very poorly laid, causing the rw to catch on the carpet and cause near fall for pt. Therefore, suggested that until pt is stronger, she should amb w/ 1 hand held when amb at home. Discussed possible HH PT, but pt notes that her insurance will not cover, and pt cannot get to OP PT due to transportation issues. Recommend home w/ 24 hr care at d/c. Will compile written HEP for pt to use at d/c. Will follow.     Time Calculation:    PT Evaluation Complexity  History, PT Evaluation Complexity: 3 or more personal factors and/or comorbidities  Examination of Body Systems (PT Eval Complexity): total of 4 or more elements  Clinical Presentation (PT Evaluation Complexity): evolving  Clinical Decision Making (PT Evaluation Complexity): moderate complexity  Overall Complexity (PT Evaluation Complexity): moderate complexity     PT Charges       Row Name 02/19/25 1600             Time Calculation    Start Time 1404  -CM      Stop Time 1435  -CM      Time Calculation (min) 31 min  -CM      PT Received On 02/19/25  -CM      PT - Next Appointment 02/21/25  -CM      PT Goal Re-Cert Due Date 03/05/25  -CM         Time Calculation- PT    Total Timed Code Minutes- PT 0 minute(s)  -CM                User Key  (r) = Recorded By, (t) = Taken By, (c) = Cosigned By      Initials Name Provider Type    Mary Ann Givens, PT Physical Therapist                  Therapy Charges for Today       Code Description Service Date Service Provider Modifiers Qty    [id number] HC PT EVAL MOD COMPLEXITY 4 2/19/2025 Mary Ann Russell, PT GP 1            PT G-Codes  AM-PAC 6 Clicks Score (PT): 22  PT Discharge Summary  Anticipated Discharge Disposition (PT): home with 24/7 care    Mary Ann Russell, PT  2/19/2025

## 2025-02-19 NOTE — H&P
[facility] Medicine Services  History & Physical    Patient Name: Kandi Fuentes  : 1960  MRN: [id number]  Primary Care Physician:  Carley, No Known  Date of admission: 2025  Date and Time of Service: 2025 at 6:57 PM EST      Subjective      Chief Complaint: Difficulty breathing    History of Present Illness: Kandi Fuentse is a 64 y.o. female with a CMH of COPD, stage III lung cancer, CAD, hypertension who presented to [facility] on 2025 with difficulty breathing.  Patient was recently admitted to this hospital for for influenza, and acute hypoxemic respiratory failure 2025 to 2025 patient states that she was initially feeling better, however patient developed onset of dyspnea last night patient states that it progressively became worse, which prompted patient to return to the emergency department for further evaluation.    While in the emergency department CXR noted: New left lower lobe airspace disease which may represent atelectasis or pneumonia    CT chest: No evidence of pulmonary embolism, multifocal pneumonia      Review of Systems   Constitutional:  Positive for activity change. Negative for appetite change, diaphoresis, fatigue and fever.   HENT:  Positive for congestion.    Respiratory:  Positive for cough and shortness of breath. Negative for wheezing.    Cardiovascular:  Negative for chest pain, palpitations and leg swelling.   Gastrointestinal: Negative.    Musculoskeletal: Negative.    Skin: Negative.    Neurological: Negative.        Personal History     Past Medical History:   Diagnosis Date    Cancer     COPD (chronic obstructive pulmonary disease)     Coronary artery disease     Elevated cholesterol     Heart attack     Hypertension     Lung cancer     Tinnitus        Past Surgical History:   Procedure Laterality Date    BACK SURGERY      bone spur on sciatica    BRONCHOSCOPY WITH ION ROBOTIC ASSIST N/A 2024    Procedure:  BRONCHOSCOPY WITH ION ROBOT, fine needle aspiration and tissue biopsy right upper lobe mass, endobronchial ultrasound with fine needle aspiration lymph nodes (Level 10R);  Surgeon: Serafin Choudhary MD;  Location: [facility] ENDOSCOPY;  Service: Robotics - Pulmonary;  Laterality: N/A;  post: lung mass with lympadenopathy    CHOLECYSTECTOMY  2021    CORONARY ANGIOPLASTY WITH STENT PLACEMENT  07/2024    x2    ENDOSCOPY N/A 1/9/2025    Procedure: ESOPHAGOGASTRODUODENOSCOPY WITH DILATION (BOUGIE 32, 36, 40);  Surgeon: Jason Black MD;  Location: [facility] ENDOSCOPY;  Service: Gastroenterology;  Laterality: N/A;  ESOPHAGEAL STRICTURE, esophageal ulcer    MOLE REMOVAL  1994    forehead    SKIN LESION EXCISION Right 1994    breast    VENOUS ACCESS DEVICE (PORT) INSERTION Right 10/07/2024    Procedure: INSERTION VENOUS ACCESS DEVICE;  Surgeon: Serafin Choudhary MD;  Location: [facility] MAIN OR;  Service: Thoracic;  Laterality: Right;       Family History: family history includes Breast cancer (age of onset: 62) in her mother; Heart attack in her mother; Lung cancer in her brother and sister; Throat cancer in her sister. Otherwise pertinent FHx was reviewed and not pertinent to current issue.    Social History:  reports that she has been smoking cigarettes. She started smoking about 55 years ago. She has a 55.1 pack-year smoking history. She has been exposed to tobacco smoke. She has never used smokeless tobacco. She reports that she does not drink alcohol and does not use drugs.    Home Medications:  Prior to Admission Medications       Prescriptions Last Dose Informant Patient Reported? Taking?    Acetaminophen (Tylenol) 325 MG capsule   Yes No    Take 650 mg by mouth As Needed.    albuterol sulfate  (90 Base) MCG/ACT inhaler   No No    Inhale 2 puffs As Needed for Wheezing or Shortness of Air.    aspirin 81 MG chewable tablet   No No    Chew 1 tablet Daily.    atorvastatin (LIPITOR) 40 MG tablet   Yes No    Take 1 tablet by  mouth Every Night.    carvedilol (COREG) 3.125 MG tablet   Yes No    Take 1 tablet by mouth 2 (Two) Times a Day With Meals.    clopidogrel (PLAVIX) 75 MG tablet   Yes No    Take 1 tablet by mouth Daily.    ipratropium-albuterol (DUO-NEB) 0.5-2.5 mg/3 ml nebulizer   No No    Take 3 mL by nebulization 4 (Four) Times a Day As Needed for Wheezing or Shortness of Air for up to 120 days.    isosorbide mononitrate (IMDUR) 30 MG 24 hr tablet   Yes No    Take 1 tablet by mouth Daily.    lidocaine-prilocaine (EMLA) 2.5-2.5 % cream   No No    Apply 1 Application topically to the appropriate area as directed See Admin Instructions. Apply one hour prior to port access    methocarbamol (ROBAXIN) 500 MG tablet   No No    Take 1 tablet by mouth Every 6 (Six) Hours As Needed for Muscle Spasms.    mometasone (ELOCON) 0.1 % ointment   No No    Apply to affected skin of chest and back 2-3 times daily as needed for itching from radiation treatments.    omeprazole (priLOSEC) 40 MG capsule   No No    Take 1 capsule by mouth 2 (Two) Times a Day for 30 days.    ondansetron (ZOFRAN) 8 MG tablet   No No    Take 1 tablet by mouth 3 (Three) Times a Day As Needed for Nausea or Vomiting.    predniSONE (DELTASONE) 5 MG tablet   No No    Take 4 tablets by mouth Daily for 2 days, THEN 3 tablets Daily for 3 days, THEN 2 tablets Daily for 3 days, THEN 1 tablet Daily for 3 days.              Allergies:  Allergies   Allergen Reactions    Morphine Hives and Shortness Of Breath    Codeine Hives    Sulfa Antibiotics Hives       Objective      Vitals:   Temp:  [98.9 °F (37.2 °C)] 98.9 °F (37.2 °C)  Heart Rate:  [] 86  Resp:  [20-24] 20  BP: (110-134)/(66-85) 122/85  Body mass index is 18.99 kg/m².  Physical Exam    PHYSICAL EXAM  Constitutional:  Well-developed, well-nourished, no acute distress, nontoxic appearance   Eyes:  PERRL, conjunctivae normal, EOMI   HENT:  Atraumatic, external ears normal, nose normal, oropharynx moist, no pharyngeal  exudates. Neck-normal range of motion, no tenderness, supple, trachea midline  Respiratory:  ctab, diminished, nonlabored respirations without accessory muscle use  Cardiovascular:  Normal rate, normal rhythm, no murmurs, no gallops, no rubs   GI:  Soft, nondistended, normal bowel sounds, nontender, no organomegaly, no mass, no rebound, no guarding   :  No costovertebral angle tenderness   Musculoskeletal:  No edema, no tenderness, no deformities  Integument:  Well hydrated, no rash   Lymphatic:  No lymphadenopathy noted   Neurologic:  Alert & oriented x 3, CN 2-12 normal, normal motor function, normal sensory function, no focal deficits noted   Psychiatric:  Speech and behavior appropriate     Diagnostic Data:  Lab Results (last 24 hours)       Procedure Component Value Units Date/Time    Blood Culture - Blood, Arm, Left [[id number]] Collected: 02/18/25 1622    Specimen: Blood from Arm, Left Updated: 02/18/25 1626    Blood Culture - Blood, Arm, Right [[id number]] Collected: 02/18/25 1546    Specimen: Blood from Arm, Right Updated: 02/18/25 1550    Respiratory Panel PCR w/COVID-19(SARS-CoV-2) BO/GAIL/MARCO ANTONIO/PAD/COR/SUJATHA In-House, NP Swab in UTM/VTM, 2 HR TAT - Swab, Nasopharynx [[id number]]  (Abnormal) Collected: 02/18/25 1249    Specimen: Swab from Nasopharynx Updated: 02/18/25 1349     ADENOVIRUS, PCR Not Detected     Coronavirus 229E Not Detected     Coronavirus HKU1 Not Detected     Coronavirus NL63 Not Detected     Coronavirus OC43 Not Detected     COVID19 Not Detected     Human Metapneumovirus Not Detected     Human Rhinovirus/Enterovirus Not Detected     Influenza A H1 2009 PCR Detected     Influenza B PCR Not Detected     Parainfluenza Virus 1 Not Detected     Parainfluenza Virus 2 Not Detected     Parainfluenza Virus 3 Not Detected     Parainfluenza Virus 4 Not Detected     RSV, PCR Not Detected     Bordetella pertussis pcr Not Detected     Bordetella parapertussis PCR Not Detected     Chlamydophila pneumoniae  PCR Not Detected     Mycoplasma pneumo by PCR Not Detected    Narrative:      In the setting of a positive respiratory panel with a viral infection PLUS a negative procalcitonin without other underlying concern for bacterial infection, consider observing off antibiotics or discontinuation of antibiotics and continue supportive care. If the respiratory panel is positive for atypical bacterial infection (Bordetella pertussis, Chlamydophila pneumoniae, or Mycoplasma pneumoniae), consider antibiotic de-escalation to target atypical bacterial infection.    Basic Metabolic Panel [[id number]]  (Abnormal) Collected: 02/18/25 1250    Specimen: Blood Updated: 02/18/25 1332     Glucose 113 mg/dL      BUN 14 mg/dL      Creatinine 0.54 mg/dL      Sodium 132 mmol/L      Potassium 3.7 mmol/L      Chloride 91 mmol/L      CO2 29.1 mmol/L      Calcium 9.7 mg/dL      BUN/Creatinine Ratio 25.9     Anion Gap 11.9 mmol/L      eGFR 103.6 mL/min/1.73     Narrative:      GFR Categories in Chronic Kidney Disease (CKD)      GFR Category          GFR (mL/min/1.73)    Interpretation  G1                     90 or greater         Normal or high (1)  G2                      60-89                Mild decrease (1)  G3a                   45-59                Mild to moderate decrease  G3b                   30-44                Moderate to severe decrease  G4                    15-29                Severe decrease  G5                    14 or less           Kidney failure          (1)In the absence of evidence of kidney disease, neither GFR category G1 or G2 fulfill the criteria for CKD.    eGFR calculation 2021 CKD-EPI creatinine equation, which does not include race as a factor    BNP [[id number]]  (Normal) Collected: 02/18/25 1250    Specimen: Blood Updated: 02/18/25 1332     proBNP 557.0 pg/mL     Narrative:      This assay is used as an aid in the diagnosis of individuals suspected of having heart failure. It can be used as an aid in the diagnosis  of acute decompensated heart failure (ADHF) in patients presenting with signs and symptoms of ADHF to the emergency department (ED). In addition, NT-proBNP of <300 pg/mL indicates ADHF is not likely.    Age Range Result Interpretation  NT-proBNP Concentration (pg/mL:      <50             Positive            >450                   Gray                 300-450                    Negative             <300    50-75           Positive            >900                  Gray                300-900                  Negative            <300      >75             Positive            >1800                  Gray                300-1800                  Negative            <300    Extra Tubes [[id number]] Collected: 02/18/25 1250    Specimen: Blood, Venous Line Updated: 02/18/25 1315    Narrative:      The following orders were created for panel order Extra Tubes.  Procedure                               Abnormality         Status                     ---------                               -----------         ------                     Gold Top - SST[[id number]]                                   Final result               Light Blue Top[[id number]]                                   Final result                 Please view results for these tests on the individual orders.    Gold Top - SST [[id number]] Collected: 02/18/25 1250    Specimen: Blood Updated: 02/18/25 1315     Extra Tube Hold for add-ons.     Comment: Auto resulted.       Light Blue Top [[id number]] Collected: 02/18/25 1250    Specimen: Blood Updated: 02/18/25 1315     Extra Tube Hold for add-ons.     Comment: Auto resulted       CBC & Differential [[id number]]  (Abnormal) Collected: 02/18/25 1250    Specimen: Blood Updated: 02/18/25 1306    Narrative:      The following orders were created for panel order CBC & Differential.  Procedure                               Abnormality         Status                     ---------                               -----------          ------                     CBC Auto Differential[[id number]]        Abnormal            Final result                 Please view results for these tests on the individual orders.    CBC Auto Differential [[id number]]  (Abnormal) Collected: 02/18/25 1250    Specimen: Blood Updated: 02/18/25 1306     WBC 13.42 10*3/mm3      RBC 3.57 10*6/mm3      Hemoglobin 11.6 g/dL      Hematocrit 36.8 %      .1 fL      MCH 32.5 pg      MCHC 31.5 g/dL      RDW 16.1 %      RDW-SD 62.2 fl      MPV 10.0 fL      Platelets 176 10*3/mm3      Neutrophil % 84.3 %      Lymphocyte % 5.4 %      Monocyte % 9.7 %      Eosinophil % 0.0 %      Basophil % 0.1 %      Immature Grans % 0.5 %      Neutrophils, Absolute 11.32 10*3/mm3      Lymphocytes, Absolute 0.72 10*3/mm3      Monocytes, Absolute 1.30 10*3/mm3      Eosinophils, Absolute 0.00 10*3/mm3      Basophils, Absolute 0.01 10*3/mm3      Immature Grans, Absolute 0.07 10*3/mm3      nRBC 0.0 /100 WBC     Blood Gas, Arterial - [[id number]]  (Abnormal) Collected: 02/18/25 1300    Specimen: Arterial Blood Updated: 02/18/25 1302     Site Left Radial     Wang's Test Positive     pH, Arterial 7.501 pH units      pCO2, Arterial 37.7 mm Hg      pO2, Arterial 76.0 mm Hg      HCO3, Arterial 29.4 mmol/L      Base Excess, Arterial 5.9 mmol/L      Comment: Serial Number: [id number]:  [id number]        O2 Saturation, Arterial 96.3 %      CO2 Content 30.6 mmol/L      Barometric Pressure for Blood Gas --     Comment: N/A        Modality Cannula     FIO2 32 %      Hemodilution No     PO2/FIO2 238    POC Lactate [[id number]]  (Normal) Collected: 02/18/25 1255    Specimen: Blood Updated: 02/18/25 1258     Lactate 1.5 mmol/L      Comment: Serial Number: [id number]:  [id number]                Imaging Results (Last 24 Hours)       Procedure Component Value Units Date/Time    CT Angiogram Chest Pulmonary Embolism [[id number]] Collected: 02/18/25 1509     Updated: 02/18/25 1513    Narrative:      CT ANGIOGRAM  CHEST PULMONARY EMBOLISM    Date of Exam: 2/18/2025 3:03 PM EST    Indication: Dyspnea.    Comparison: Noncontrast chest CT 1/24/2025, CT pulmonary angiogram 11/24/2024    Technique: Axial CT images were obtained of the chest after the uneventful intravenous administration of iodinated contrast utilizing pulmonary embolism protocol.  In addition, a 3-D volume rendered image was created for interpretation.  Sagittal and   coronal reconstructions were performed.  Automated exposure control and iterative reconstruction methods were used.      Findings:  PULMONARY VASCULATURE: Pulmonary arteries are widely patent without evidence of embolus.  Main pulmonary artery is normal in size. No evidence of right heart strain.    MEDIASTINUM:Unremarkable. Aortic and heart size are normal. No aortic dissection identified. No mass nor pericardial effusion.  CORONARY ARTERIES: There is calcified atherosclerotic disease.  LUNGS: There is diffuse nodular left lung airspace disease with some confluent airspace disease within the medial left lower lobe consistent with pneumonia. Similar scarring within the right lung apex with calcification. There are few adjacent   groundglass nodules within the anterior right lower lobe, 1 of which is cavitary (image 120, series 7), suggestive of additional foci of infection.  PLEURAL SPACE: No effusion, mass, nor pneumothorax.  LYMPH NODES: There are no pathologically enlarged lymph nodes.    UPPER ABDOMEN: Unremarkable    OSSEOUS STRUCTURES: Appropriate for age with no acute process identified.      Impression:      Impression:  1.No evidence of pulmonary embolism.  2.Multifocal pneumonia.        Electronically Signed: Kenneth Freed MD    2/18/2025 3:11 PM EST    Workstation ID: [id number]    XR Chest 1 View [[id number]] Collected: 02/18/25 1311     Updated: 02/18/25 1314    Narrative:      XR CHEST 1 VW    Date of Exam: 2/18/2025 1:09 PM EST    Indication: Simple Sepsis Triage Protocol    Comparison:  2/9/2025    Findings:  Heart size is within normal limits. There is a port in place overlying the right chest with the tip in the SVC. There is new airspace disease in the left lower lobe. The lungs are otherwise clear.      Impression:      Impression:  New left lower lobe airspace disease which may represent atelectasis or pneumonia.        Electronically Signed: Jorge Alberto Daniel MD    2/18/2025 1:12 PM EST    Workstation ID: [id number]              Assessment & Plan        This is a 64 y.o. female with:    Active and Resolved Problems  Active Hospital Problems    Diagnosis  POA    **Pneumonia [J18.9]  Yes      Resolved Hospital Problems   No resolved problems to display.       Multifocal pneumonia  Acute hypoxic respiratory failure  -WBC: 13.4  -ABG: pH 7.5, pCO2 37.7, pO2 76, HCO3 29.4, base excess 5.9, CO2 content 30.6  -CXR: New left lower lobe airspace disease which may represent atelectasis or pneumonia  -CT chest: No evidence of pulmonary embolism, multifocal pneumonia  -Patient on 3 L nasal cannula, continue  -Continue oral Augmentin  -Continue azithromycin  -Continue steroids, DuoNebs    GERD  -Continue PPI once reconciled    HLD  -Continue Lipitor    CAD  -Continue home regimen        VTE Prophylaxis:  Mechanical VTE prophylaxis orders are present.        The patient desires to be as follows:    CODE STATUS:    Code Status (Patient has no pulse and is not breathing): CPR (Attempt to Resuscitate)  Medical Interventions (Patient has pulse or is breathing): Full Support        Farhad Garcia, who can be contacted at [phone], is the designated person to make medical decisions on the patient's behalf if She is incapable of doing so. This was clarified with patient and/or next of kin on 2/18/2025 during the course of this H&P.    Admission Status:  I believe this patient meets observation status.    Expected Length of Stay: <2 MN    PDMP and Medication Dispenses via Sidebar reviewed and consistent with patient  reported medications.    I discussed the patient's findings and my recommendations with patient.      Signature:     This document has been electronically signed by JUANCHO Jordan on February 18, 2025 19:57 EST   [facility]

## 2025-02-20 LAB — GLUCOSE BLDC GLUCOMTR-MCNC: 138 MG/DL (ref 70–105)

## 2025-02-20 PROCEDURE — 82948 REAGENT STRIP/BLOOD GLUCOSE: CPT

## 2025-02-20 PROCEDURE — 25010000002 CEFTRIAXONE PER 250 MG

## 2025-02-20 PROCEDURE — 94799 UNLISTED PULMONARY SVC/PX: CPT

## 2025-02-20 PROCEDURE — 94761 N-INVAS EAR/PLS OXIMETRY MLT: CPT

## 2025-02-20 PROCEDURE — 25010000002 METHYLPREDNISOLONE PER 40 MG

## 2025-02-20 RX ADMIN — Medication 10 ML: at 09:09

## 2025-02-20 RX ADMIN — IPRATROPIUM BROMIDE AND ALBUTEROL SULFATE 3 ML: .5; 3 SOLUTION RESPIRATORY (INHALATION) at 11:21

## 2025-02-20 RX ADMIN — ATORVASTATIN CALCIUM 40 MG: 40 TABLET, FILM COATED ORAL at 20:48

## 2025-02-20 RX ADMIN — CEFTRIAXONE 2000 MG: 2 INJECTION, POWDER, FOR SOLUTION INTRAMUSCULAR; INTRAVENOUS at 11:38

## 2025-02-20 RX ADMIN — METHYLPREDNISOLONE SODIUM SUCCINATE 40 MG: 40 INJECTION, POWDER, FOR SOLUTION INTRAMUSCULAR; INTRAVENOUS at 11:38

## 2025-02-20 RX ADMIN — CARVEDILOL 3.12 MG: 3.12 TABLET, FILM COATED ORAL at 09:09

## 2025-02-20 RX ADMIN — ASPIRIN 81 MG CHEWABLE TABLET 81 MG: 81 TABLET CHEWABLE at 09:09

## 2025-02-20 RX ADMIN — AZITHROMYCIN DIHYDRATE 500 MG: 250 TABLET ORAL at 15:36

## 2025-02-20 RX ADMIN — IPRATROPIUM BROMIDE AND ALBUTEROL SULFATE 3 ML: .5; 3 SOLUTION RESPIRATORY (INHALATION) at 15:55

## 2025-02-20 RX ADMIN — IPRATROPIUM BROMIDE AND ALBUTEROL SULFATE 3 ML: .5; 3 SOLUTION RESPIRATORY (INHALATION) at 23:35

## 2025-02-20 RX ADMIN — ACETAMINOPHEN 650 MG: 325 TABLET, FILM COATED ORAL at 11:38

## 2025-02-20 RX ADMIN — PANTOPRAZOLE SODIUM 40 MG: 40 TABLET, DELAYED RELEASE ORAL at 06:16

## 2025-02-20 RX ADMIN — Medication 10 MG: at 22:01

## 2025-02-20 RX ADMIN — METHYLPREDNISOLONE SODIUM SUCCINATE 40 MG: 40 INJECTION, POWDER, FOR SOLUTION INTRAMUSCULAR; INTRAVENOUS at 00:19

## 2025-02-20 RX ADMIN — CARVEDILOL 3.12 MG: 3.12 TABLET, FILM COATED ORAL at 17:17

## 2025-02-20 RX ADMIN — IPRATROPIUM BROMIDE AND ALBUTEROL SULFATE 3 ML: .5; 3 SOLUTION RESPIRATORY (INHALATION) at 20:21

## 2025-02-20 RX ADMIN — Medication 10 ML: at 20:48

## 2025-02-20 NOTE — PROGRESS NOTES
[facility] MEDICINE SERVICE  DAILY PROGRESS NOTE    NAME: Kandi Fuentes  : 1960  MRN: [id number]      LOS: 1 day     PROVIDER OF SERVICE: Alexys Chong MD    Chief Complaint: Pneumonia    Subjective:     Interval History:  History taken from: patient    Patient seen and examined at bedside this morning.  Currently on 2 L nasal cannula        Review of Systems: Negative except as stated above  Review of Systems    Objective:     Vital Signs  Temp:  [97.5 °F (36.4 °C)-98.4 °F (36.9 °C)] 97.8 °F (36.6 °C)  Heart Rate:  [] 98  Resp:  [17-25] 25  BP: ()/(60-66) 127/66  Flow (L/min) (Oxygen Therapy):  [2-3] 2   Body mass index is 18.99 kg/m².    Physical Exam  Physical Exam  Constitutional:       Comments: NAD    Cardiovascular:      Comments:  RRR, S1 & S2   Pulmonary:      Comments:  Lungs CTA   Abdominal:      Comments:  ABD soft, NT            Diagnostic Data    Results from last 7 days   Lab Units 25  0218   WBC 10*3/mm3 12.09*   HEMOGLOBIN g/dL 10.8*   HEMATOCRIT % 34.9   PLATELETS 10*3/mm3 162   GLUCOSE mg/dL 154*   CREATININE mg/dL 0.48*   BUN mg/dL 21   SODIUM mmol/L 128*   POTASSIUM mmol/L 4.1   AST (SGOT) U/L 14   ALT (SGPT) U/L 14   ALK PHOS U/L 72   BILIRUBIN mg/dL 0.4   ANION GAP mmol/L 12.6       CT Angiogram Chest Pulmonary Embolism    Result Date: 2025  Impression: 1.No evidence of pulmonary embolism. 2.Multifocal pneumonia. Electronically Signed: Kenneth Freed MD  2025 3:11 PM EST  Workstation ID: [id number]    XR Chest 1 View    Result Date: 2025  Impression: New left lower lobe airspace disease which may represent atelectasis or pneumonia. Electronically Signed: Jorge Alberto Daniel MD  2025 1:12 PM EST  Workstation ID: [id number]       I reviewed the patient's new clinical results.    Assessment/Plan:     Active and Resolved Problems  Active Hospital Problems    Diagnosis  POA    **Pneumonia [J18.9]  Yes    PNA (pneumonia) [J18.9]  Yes       Resolved Hospital Problems   No resolved problems to display.       Multifocal pneumonia  Acute hypoxic respiratory failure  COPD exacerbation    -CXR: New left lower lobe airspace disease which may represent atelectasis or pneumonia  -CT chest: No evidence of pulmonary embolism, multifocal pneumonia  -RVP positive for influenza A however it was initially positive on 2/9 and she has received treatment during her previous hospital course  -Patient on 2 L nasal cannula, continue  -Continue Rocephin  -Continue azithromycin  -Continue steroids, DuoNebs  -Blood culture showed no growth  -Legionella and strep pneumo urine antigen ordered     GERD  -Continue PPI      HLD  -Continue Lipitor     CAD  -Continue home regimen    VTE Prophylaxis:  Mechanical VTE prophylaxis orders are present.             Disposition Planning:        Anticipated Date of Discharge: 2/22/2025         Time: 35 minutes     Code Status and Medical Interventions: CPR (Attempt to Resuscitate); Full Support   Ordered at: 02/18/25 1636     Code Status (Patient has no pulse and is not breathing):    CPR (Attempt to Resuscitate)     Medical Interventions (Patient has pulse or is breathing):    Full Support       Signature: Electronically signed by Alexys Chong MD, 02/20/25, 09:34 EST.  [facility]ist Team

## 2025-02-20 NOTE — SIGNIFICANT NOTE
02/20/25 1600   Readmission Indications   Is the patient and/or family able to complete the readmission assessment questions? Yes   Is this hospitalization related to the prior hospital diagnosis? No   Recommendation for rehospitalization   Did you speak with your physician prior to coming to the hospital No   Follow-up Appointments   Do you have a PCP? No   Did you have an appointment with PCP after your hospitalization? No   Did you have an appointment with a Specialist? No   Did you go to appointment? No   Are you current with the Pulmonary Clinic? No   Are you current with the CHF Clinic? No   Medications   Did you have newly prescribed medications at discharge? Yes  (Start: prednisone, Ventolin, Tamiflu, omeprazole, aspirin Stop: oxycodone)   Did you understand the reasons for your medications at discharge and how to take them? Yes   Did you understand the side effects of your medications? Yes   Are you taking all of you prescribed medications? Yes   What pharmacy was used to fill prescription(s)? [facility]   Were medications picked up? Yes   Discharge Instructions   Did you understand your discharge instructions? Yes   Did your family/caregiver hear your instructions? Yes   Were you told to eat a special diet? Yes   Did you adhere to the diet? Yes   Were you given a number of someone to call if you had questions or concerns? Yes   Index discharge location/services   Where did you go upon discharge? Home   Do you have supportive family or friends in the home? Yes   What services were arranged at discharge? DME   Discharge Readiness   On a scale of 1-5 (5 being well prepared), how ready were you for discharge 5   Recommendation based on interview Education on diagnosis/self management;Other (comment)  (Patient did not have any f/u appts but has refused assistance with getting a PCP. She is followed closely by hem/onc who manage her care since she is currently getting CA tx and seeing radiation onc)   Palliative  Care/Hospice   Are you current with Palliative Care? No   Are you current with Hospice Care? No   Advance Directives (For Healthcare)   Pre-existing AND/MOST/POLST Order No   Advance Directive Status Patient does not have advance directive   Have you reviewed your Advance Directive and is it valid for this stay? No   Literature Provided on Advance Directives No   Patient Requests Assistance on Advance Directives Patient Declined   Readmission Assessment Final Comments   Final Comments Previous: Adm for influenza A. PMH of lung CA, COPD/CAD. C/O worsening dyspnea after chemo. 2L O2, + influenza A, K 3, CXR with emphysema, soft BPs, treated with IVF bolus/steroids and midodrine. Transitioned to PO steroids, had 6 min walk with RT & required 2L new home O2 for DC. Current: Adm for PNA/COPD exac. C/O dyspnea that became worse after previous dc. CXR noted new LLL PNA. 3.5L O2, WBC 13.4, CT chest multifocal PNA, Abx switched to Rocephin & azithromycin, steroids, duonebs. LACE 12

## 2025-02-20 NOTE — PLAN OF CARE
Problem: Adult Inpatient Plan of Care  Goal: Plan of Care Review  Outcome: Progressing  Flowsheets  Taken 2/20/2025 1730 by Shawna Johansen RN  Progress: improving  Taken 2/20/2025 0357 by Guanaco Maynard LPN  Plan of Care Reviewed With: patient  Goal: Patient-Specific Goal (Individualized)  Outcome: Progressing  Goal: Absence of Hospital-Acquired Illness or Injury  Outcome: Progressing  Intervention: Identify and Manage Fall Risk  Description: Perform standard risk assessment on admission using a validated tool or comprehensive approach appropriate to the patient; reassess fall risk frequently, with change in status or transfer to another level of care.  Communicate risk to interprofessional healthcare team; ensure fall risk visible cue.  Determine need for increased observation, equipment and environmental modification, as well as use of supportive, nonskid footwear.  Adjust safety measures to individual needs and identified risk factors.  Reinforce the importance of active participation with fall risk prevention, safety, and physical activity with the patient and family.  Perform regular intentional rounding to assess need for position change, pain assessment and personal needs, including assistance with toileting.  Recent Flowsheet Documentation  Taken 2/20/2025 1613 by Shawna Johansen, RN  Safety Promotion/Fall Prevention:   activity supervised   assistive device/personal items within reach   clutter free environment maintained   fall prevention program maintained   gait belt   lighting adjusted   mobility aid in reach   muscle strengthening facilitated   nonskid shoes/slippers when out of bed   room organization consistent   safety round/check completed   toileting scheduled  Intervention: Prevent Skin Injury  Description: Perform a screening for skin injury risk, such as pressure or moisture-associated skin damage on admission and at regular intervals throughout hospital stay.  Keep all areas of skin  (especially folds) clean and dry.  Maintain adequate skin hydration.  Relieve and redistribute pressure and protect bony prominences and skin at risk for injury; implement measures based on patient-specific risk factors.  Match turning and repositioning schedule to clinical condition.  Encourage weight shift frequently; assist with reposition if unable to complete independently.  Float heels off bed; avoid pressure on the Achilles tendon.  Keep skin free from extended contact with medical devices.  Optimize nutrition and hydration.  Encourage functional activity and mobility, as early as tolerated.  Use aids (e.g., slide boards, mechanical lift) during transfer.  Recent Flowsheet Documentation  Taken 2/20/2025 1613 by Shawna Johansen RN  Skin Protection:   pulse oximeter probe site changed   incontinence pads utilized  Intervention: Prevent and Manage VTE (Venous Thromboembolism) Risk  Description: Assess for VTE (venous thromboembolism) risk.  Promote early mobilization; encourage both active and passive leg exercises, if unable to ambulate.  Initiate and maintain compression or other therapy, as indicated, based on identified risk in accordance with organizational protocol and provider order.  Recognize the patient's individual risk for bleeding before initiating pharmacologic thromboprophylaxis.  Recent Flowsheet Documentation  Taken 2/20/2025 1613 by Shawna Johansen, RN  VTE Prevention/Management: (Pt mobile) other (see comments)  Intervention: Prevent Infection  Description: Maintain skin and mucous membrane integrity; promote hand, oral and pulmonary hygiene.  Optimize fluid balance, nutrition, sleep and glycemic control to maximize infection resistance.  Identify potential sources of infection early to prevent or mitigate progression of infection (e.g., wound, lines, devices).  Evaluate ongoing need for invasive devices; remove promptly when no longer indicated.  Review vaccination status.  Recent  Flowsheet Documentation  Taken 2/20/2025 1613 by Shawna Johansen RN  Infection Prevention:   environmental surveillance performed   equipment surfaces disinfected   hand hygiene promoted   personal protective equipment utilized   rest/sleep promoted   single patient room provided  Goal: Optimal Comfort and Wellbeing  Outcome: Progressing  Intervention: Provide Person-Centered Care  Description: Use a family-focused approach to care; encourage support system presence and participation.  Develop trust and rapport by proactively providing information, encouraging questions, addressing concerns and offering reassurance.  Acknowledge emotional response to hospitalization.  Recognize and utilize personal coping strategies and strengths; develop goals via shared decision-making.  Honor spiritual and cultural preferences.  Recent Flowsheet Documentation  Taken 2/20/2025 1613 by Shawna Johansen RN  Trust Relationship/Rapport:   care explained   choices provided   emotional support provided   empathic listening provided   questions answered   questions encouraged   reassurance provided   thoughts/feelings acknowledged  Goal: Readiness for Transition of Care  Outcome: Progressing     Problem: Pain Acute  Goal: Optimal Pain Control and Function  Outcome: Progressing  Intervention: Optimize Psychosocial Wellbeing  Description: Facilitate patient's self-control over pain by providing pain information and allowing choices in treatment.  Consider and address emotional response to pain; express compassion and reassurance.  Explore and promote use of coping strategies; address barriers to successful coping.  Evaluate and assist with psychosocial, cultural and spiritual factors impacting pain.  Assess for risk factors for developing chronic pain, such as depression, fear, pain avoidance and pain catastrophizing.  Modify pain perception using techniques, such as distraction, mindfulness, guided imagery, meditation or  music.  Consider referral for ongoing coping support, such as education, relaxation training and role of thoughts.  Recent Flowsheet Documentation  Taken 2/20/2025 1613 by Shawna Johansen RN  Supportive Measures: active listening utilized  Diversional Activities:   television   smartphone  Intervention: Prevent or Manage Pain  Description: Evaluate pain level, effect of treatment and patient response at regular intervals.  Minimize painful stimuli; coordinate care and adjust environment (e.g., light, noise, unnecessary movement); promote sleep/rest.  Match pharmacologic analgesia to severity and type of pain mechanism (e.g., neuropathic, muscle, inflammatory); consider multimodal approach.  Provide medication at regular intervals; titrate to patient response; premedicate for painful procedures.  Manage breakthrough pain with additional doses; consider rotation or switching medication.  Monitor for signs of substance tolerance (increased dose to reach desired effect, decreased effect with same dose).  Manage medication-induced adverse events and side effects.  Provide multimodal interventions, such as physical activity, therapeutic exercise, yoga, TENS (transcutaneous electrical nerve stimulation) and manual therapy.  Train in functional activity modifications, such as body mechanics, posture, ergonomics, energy conservation and activity pacing.  Consider addition of complementary or alternative therapy, such as acupuncture, hypnosis or therapeutic touch.  Recent Flowsheet Documentation  Taken 2/20/2025 1613 by Shawna Johansen RN  Sensory Stimulation Regulation:   auditory stimulation minimized   care clustered   quiet environment promoted  Medication Review/Management:   medications reviewed   high-risk medications identified     Problem: Breathing Pattern Ineffective  Goal: Effective Breathing Pattern  Outcome: Progressing  Intervention: Promote Improved Breathing Pattern  Description: Assess and monitor  airway, breathing and circulation; maintain close surveillance for deterioration.  Use a validated screening tool to identify risk for JACKIE (obstructive sleep apnea).  Maintain head of bed elevation with regular position changes to minimize ventilation-perfusion mismatch and breathlessness.  Evaluate psychosocial factors that may contribute to the anxiety-breathlessness cycle; acknowledge, normalize and validate patient and support system response to patient's breathlessness.  Utilize nonpharmacologic measures, such as controlled breathing and relaxation techniques, in addition to medication, to reduce pain and anxiety.  Promote early mobility or ambulation; match activity to ability and tolerance.  Initiate cough-enhancement and airway-clearance techniques with instruction.  Provide oxygen therapy judiciously to avoid hyperoxemia; adjust to achieve oxygenation goal.  Consider positive pressure ventilation to enhance oxygenation, ventilation and reduce work of breathing.  Recent Flowsheet Documentation  Taken 2/20/2025 1613 by Shawna Johansen RN  Supportive Measures: active listening utilized     Problem: Fall Injury Risk  Goal: Absence of Fall and Fall-Related Injury  Outcome: Progressing  Intervention: Identify and Manage Contributors  Description: Develop a fall prevention plan, considering patient-centered interventions and family/caregiver involvement; identify and address patient's facilitators and barriers.  Provide reorientation, appropriate sensory stimulation and routines with changes in mental status to decrease risk of fall.  Promote use of personal vision and auditory aids.  Assess assistance level required for safe and effective self-care; provide support as needed, such as toileting and mobilization. For age 65 and older, implement timed toileting with assistance.  Encourage physical activity, such as performance of mobility and self-care at highest level of patient ability, multicomponent exercise  program and provision of appropriate assistive devices.  If fall occurs, assess the severity of injury; implement fall injury protocol. Determine the cause and revise fall injury prevention plan.  Regularly review and advocate for medication adjustment to decrease fall risk; consider administration times, polypharmacy and age.  Balance adequate pain management with potential for oversedation.  Recent Flowsheet Documentation  Taken 2/20/2025 1613 by Shawna Johansen RN  Medication Review/Management:   medications reviewed   high-risk medications identified  Intervention: Promote Injury-Free Environment  Description: Provide a safe, barrier-free environment that encourages independent activity.  Keep care area uncluttered and well-lighted.  Determine need for increased observation or monitoring.  Avoid use of devices that minimize mobility, such as restraints or indwelling urinary catheter.  Recent Flowsheet Documentation  Taken 2/20/2025 1613 by Shawna Johansen RN  Safety Promotion/Fall Prevention:   activity supervised   assistive device/personal items within reach   clutter free environment maintained   fall prevention program maintained   gait belt   lighting adjusted   mobility aid in reach   muscle strengthening facilitated   nonskid shoes/slippers when out of bed   room organization consistent   safety round/check completed   toileting scheduled     Problem: Sepsis/Septic Shock  Goal: Optimal Coping  Outcome: Progressing  Intervention: Support Patient and Family Response  Description: Acknowledge, normalize, validate intensity and complexity of patient and support system response to situation.  Provide opportunity for expression of thoughts, feelings and concerns; respond with compassion and reassurance.  Decrease stress and anxiety by providing information about patient's status and treatment.  Facilitate support system presence and participation in care; consider providing a diary in intensive care  situation.  Support coping by recognizing current coping strategies; provide aid in developing new strategies.  Assess and monitor for signs and symptoms of psychologic distress, anxiety and depression.  Utilize complementary therapy, such as music, massage or guided imagery.  Engage in proactive and ongoing discussion about goals of care and facilitate shared decision-making.  Connect with community resources for ongoing support, such as counseling and group support.  Recent Flowsheet Documentation  Taken 2/20/2025 1613 by Shawna Johansen RN  Supportive Measures: active listening utilized  Goal: Absence of Bleeding  Outcome: Progressing  Goal: Blood Glucose Level Within Target Range  Outcome: Progressing  Goal: Absence of Infection Signs and Symptoms  Outcome: Progressing  Intervention: Initiate Sepsis Management  Description: Provide fluid therapy, such as crystalloid or albumin, to increase intravascular volume, organ perfusion and oxygen delivery.  Provide respiratory support, such as oxygen therapy, noninvasive or invasive positive pressure ventilation, to achieve oxygenation and ventilation goal; avoid hyperoxemia.  Obtain cultures prior to initiating antimicrobial therapy when possible. Do not delay treatment for laboratory results in the presence of high suspicion or clinical indicators.  Administer intravenous broad-spectrum antimicrobial therapy promptly.  Implement hemodynamic monitoring to guide intravascular support based on individual targeted parameters.  Determine and address underlying source of infection aggressively; implement transmission-based precautions and isolation, as indicated.  Recent Flowsheet Documentation  Taken 2/20/2025 1613 by Shawna Johansen RN  Infection Prevention:   environmental surveillance performed   equipment surfaces disinfected   hand hygiene promoted   personal protective equipment utilized   rest/sleep promoted   single patient room provided  Isolation  Precautions:   contact   droplet  Goal: Optimal Nutrition Delivery  Outcome: Progressing     Problem: Malnutrition  Goal: Improved Nutritional Intake  Outcome: Progressing   Goal Outcome Evaluation:           Progress: improving

## 2025-02-21 LAB
ALBUMIN SERPL-MCNC: 3.6 G/DL (ref 3.5–5.2)
ALBUMIN/GLOB SERPL: 1.2 G/DL
ALP SERPL-CCNC: 74 U/L (ref 39–117)
ALT SERPL W P-5'-P-CCNC: 73 U/L (ref 1–33)
ANION GAP SERPL CALCULATED.3IONS-SCNC: 9.7 MMOL/L (ref 5–15)
AST SERPL-CCNC: 40 U/L (ref 1–32)
BILIRUB SERPL-MCNC: 0.3 MG/DL (ref 0–1.2)
BUN SERPL-MCNC: 23 MG/DL (ref 8–23)
BUN/CREAT SERPL: 46 (ref 7–25)
CALCIUM SPEC-SCNC: 9.2 MG/DL (ref 8.6–10.5)
CHLORIDE SERPL-SCNC: 95 MMOL/L (ref 98–107)
CO2 SERPL-SCNC: 33.3 MMOL/L (ref 22–29)
CREAT SERPL-MCNC: 0.5 MG/DL (ref 0.57–1)
DEPRECATED RDW RBC AUTO: 62.8 FL (ref 37–54)
EGFRCR SERPLBLD CKD-EPI 2021: 105.5 ML/MIN/1.73
ERYTHROCYTE [DISTWIDTH] IN BLOOD BY AUTOMATED COUNT: 16.1 % (ref 12.3–15.4)
GLOBULIN UR ELPH-MCNC: 2.9 GM/DL
GLUCOSE SERPL-MCNC: 177 MG/DL (ref 65–99)
HCT VFR BLD AUTO: 34.9 % (ref 34–46.6)
HGB BLD-MCNC: 10.9 G/DL (ref 12–15.9)
MCH RBC QN AUTO: 32.5 PG (ref 26.6–33)
MCHC RBC AUTO-ENTMCNC: 31.2 G/DL (ref 31.5–35.7)
MCV RBC AUTO: 104.2 FL (ref 79–97)
PLATELET # BLD AUTO: 208 10*3/MM3 (ref 140–450)
PMV BLD AUTO: 10.3 FL (ref 6–12)
POTASSIUM SERPL-SCNC: 4.4 MMOL/L (ref 3.5–5.2)
PROT SERPL-MCNC: 6.5 G/DL (ref 6–8.5)
RBC # BLD AUTO: 3.35 10*6/MM3 (ref 3.77–5.28)
SODIUM SERPL-SCNC: 138 MMOL/L (ref 136–145)
WBC NRBC COR # BLD AUTO: 14.15 10*3/MM3 (ref 3.4–10.8)

## 2025-02-21 PROCEDURE — 25010000002 CEFTRIAXONE PER 250 MG

## 2025-02-21 PROCEDURE — 94799 UNLISTED PULMONARY SVC/PX: CPT

## 2025-02-21 PROCEDURE — 94664 DEMO&/EVAL PT USE INHALER: CPT

## 2025-02-21 PROCEDURE — 80053 COMPREHEN METABOLIC PANEL: CPT

## 2025-02-21 PROCEDURE — 25010000002 METHYLPREDNISOLONE PER 40 MG

## 2025-02-21 PROCEDURE — 85027 COMPLETE CBC AUTOMATED: CPT

## 2025-02-21 PROCEDURE — 94761 N-INVAS EAR/PLS OXIMETRY MLT: CPT

## 2025-02-21 PROCEDURE — 25010000002 HEPARIN (PORCINE) PER 1000 UNITS

## 2025-02-21 RX ORDER — HEPARIN SODIUM 5000 [USP'U]/ML
5000 INJECTION, SOLUTION INTRAVENOUS; SUBCUTANEOUS EVERY 8 HOURS SCHEDULED
Status: DISCONTINUED | OUTPATIENT
Start: 2025-02-21 | End: 2025-02-22 | Stop reason: HOSPADM

## 2025-02-21 RX ORDER — PREDNISONE 20 MG/1
40 TABLET ORAL
Status: DISCONTINUED | OUTPATIENT
Start: 2025-02-22 | End: 2025-02-22 | Stop reason: HOSPADM

## 2025-02-21 RX ADMIN — IPRATROPIUM BROMIDE AND ALBUTEROL SULFATE 3 ML: .5; 3 SOLUTION RESPIRATORY (INHALATION) at 14:54

## 2025-02-21 RX ADMIN — METHYLPREDNISOLONE SODIUM SUCCINATE 40 MG: 40 INJECTION, POWDER, FOR SOLUTION INTRAMUSCULAR; INTRAVENOUS at 00:28

## 2025-02-21 RX ADMIN — Medication 10 ML: at 08:08

## 2025-02-21 RX ADMIN — METHYLPREDNISOLONE SODIUM SUCCINATE 40 MG: 40 INJECTION, POWDER, FOR SOLUTION INTRAMUSCULAR; INTRAVENOUS at 14:46

## 2025-02-21 RX ADMIN — IPRATROPIUM BROMIDE AND ALBUTEROL SULFATE 3 ML: .5; 3 SOLUTION RESPIRATORY (INHALATION) at 20:11

## 2025-02-21 RX ADMIN — CARVEDILOL 3.12 MG: 3.12 TABLET, FILM COATED ORAL at 08:07

## 2025-02-21 RX ADMIN — CARVEDILOL 3.12 MG: 3.12 TABLET, FILM COATED ORAL at 18:06

## 2025-02-21 RX ADMIN — ACETAMINOPHEN 650 MG: 325 TABLET, FILM COATED ORAL at 21:49

## 2025-02-21 RX ADMIN — GUAIFENESIN SYRUP AND DEXTROMETHORPHAN 5 ML: 100; 10 SYRUP ORAL at 14:49

## 2025-02-21 RX ADMIN — IPRATROPIUM BROMIDE AND ALBUTEROL SULFATE 3 ML: .5; 3 SOLUTION RESPIRATORY (INHALATION) at 09:45

## 2025-02-21 RX ADMIN — ASPIRIN 81 MG CHEWABLE TABLET 81 MG: 81 TABLET CHEWABLE at 08:07

## 2025-02-21 RX ADMIN — SENNOSIDES AND DOCUSATE SODIUM 2 TABLET: 50; 8.6 TABLET ORAL at 18:19

## 2025-02-21 RX ADMIN — HEPARIN SODIUM 5000 UNITS: 5000 INJECTION INTRAVENOUS; SUBCUTANEOUS at 14:46

## 2025-02-21 RX ADMIN — ACETAMINOPHEN 650 MG: 325 TABLET, FILM COATED ORAL at 14:49

## 2025-02-21 RX ADMIN — Medication 10 ML: at 21:09

## 2025-02-21 RX ADMIN — ATORVASTATIN CALCIUM 40 MG: 40 TABLET, FILM COATED ORAL at 20:57

## 2025-02-21 RX ADMIN — PANTOPRAZOLE SODIUM 40 MG: 40 TABLET, DELAYED RELEASE ORAL at 06:20

## 2025-02-21 RX ADMIN — MUPIROCIN 1 APPLICATION: 20 OINTMENT TOPICAL at 20:57

## 2025-02-21 RX ADMIN — CEFTRIAXONE 2000 MG: 2 INJECTION, POWDER, FOR SOLUTION INTRAMUSCULAR; INTRAVENOUS at 14:45

## 2025-02-21 RX ADMIN — Medication 10 MG: at 21:49

## 2025-02-21 NOTE — PROGRESS NOTES
[facility] MEDICINE SERVICE  DAILY PROGRESS NOTE    NAME: Kandi Fuentes  : 1960  MRN: [id number]      LOS: 2 days     PROVIDER OF SERVICE: Alexys Chong MD    Chief Complaint: Pneumonia    Subjective:     Interval History:  History taken from: patient    Patient seen and examined at bedside this morning.  States that the breathing treatments are helping her a lot        Review of Systems: Negative except described above  Review of Systems    Objective:     Vital Signs  Temp:  [96.8 °F (36 °C)-98.2 °F (36.8 °C)] 98.1 °F (36.7 °C)  Heart Rate:  [] 100  Resp:  [15-25] 19  BP: (118-133)/(52-73) 132/73  Flow (L/min) (Oxygen Therapy):  [2] 2   Body mass index is 18.99 kg/m².    Physical Exam  Physical Exam  Constitutional:       Comments: NAD    Cardiovascular:      Comments:  RRR, S1 & S2   Pulmonary:      Comments:  Lungs CTA   Abdominal:      Comments:  ABD soft, NT            Diagnostic Data    Results from last 7 days   Lab Units 25  0218   WBC 10*3/mm3 12.09*   HEMOGLOBIN g/dL 10.8*   HEMATOCRIT % 34.9   PLATELETS 10*3/mm3 162   GLUCOSE mg/dL 154*   CREATININE mg/dL 0.48*   BUN mg/dL 21   SODIUM mmol/L 128*   POTASSIUM mmol/L 4.1   AST (SGOT) U/L 14   ALT (SGPT) U/L 14   ALK PHOS U/L 72   BILIRUBIN mg/dL 0.4   ANION GAP mmol/L 12.6       No radiology results for the last day      I reviewed the patient's new clinical results.    Assessment/Plan:     Active and Resolved Problems  Active Hospital Problems    Diagnosis  POA    **Pneumonia [J18.9]  Yes    PNA (pneumonia) [J18.9]  Yes      Resolved Hospital Problems   No resolved problems to display.       Multifocal pneumonia  Acute hypoxic respiratory failure  COPD exacerbation     -CXR: New left lower lobe airspace disease which may represent atelectasis or pneumonia  -CT chest: No evidence of pulmonary embolism, multifocal pneumonia  -RVP positive for influenza A however it was initially positive on  and she has received  treatment during her previous hospital course  -Continue Rocephin  -Continue azithromycin  -Continue steroids, DuoNebs  -Blood culture showed no growth  -Legionella and strep pneumo urine antigen ordered  -Will need walking oximetry prior to discharge     GERD  -Continue PPI      HLD  -Continue Lipitor     CAD  -Continue home regimen    VTE Prophylaxis:  Mechanical VTE prophylaxis orders are present.             Disposition Planning:        Anticipated Date of Discharge:  2/23/2025         Time: 35 minutes     Code Status and Medical Interventions: CPR (Attempt to Resuscitate); Full Support   Ordered at: 02/18/25 1636     Code Status (Patient has no pulse and is not breathing):    CPR (Attempt to Resuscitate)     Medical Interventions (Patient has pulse or is breathing):    Full Support       Signature: Electronically signed by Alexys Chong MD, 02/21/25, 09:20 [facility].  [facility] Hospitalist Team

## 2025-02-21 NOTE — PROGRESS NOTES
Nutrition Services  Patient Name: Kandi Fuentes  YOB: 1960  MRN: [id number]  Admission date: 2/18/2025    PROGRESS NOTE      Nutrition Intervention Updates: Continue current diet and encourage good PO intakes.       Encounter Information: Check on for PO intakes.         PO Diet: Diet: Cardiac; Healthy Heart (2-3 Na+); Fluid Consistency: Thin (IDDSI 0)   PO Supplements: Patient declined ONS   PO Intake:  Mostly 100% (75% x 1 meal)       Current nutrition support:    Nutrition support review:        Labs (reviewed below): Reviewed, management per attending        GI Function:  No documented BM since admission (x 3 days ago)       Brief weight review   Wt Readings from Last 10 Encounters:   02/18/25 1232 55 kg (121 lb 4.1 oz)   02/09/25 1107 54.9 kg (121 lb)   02/05/25 1316 56.3 kg (124 lb 1.6 oz)   01/27/25 1242 56.2 kg (123 lb 12.8 oz)   01/11/25 0500 56.5 kg (124 lb 9.6 oz)   01/10/25 0600 54.3 kg (119 lb 12.4 oz)   01/09/25 0758 54.6 kg (120 lb 6.4 oz)   01/08/25 1420 54.3 kg (119 lb 11.2 oz)   12/27/24 1946 62.8 kg (138 lb 7.2 oz)   12/27/24 1325 63.5 kg (140 lb)   12/18/24 1100 62.6 kg (138 lb)   12/18/24 1114 62.6 kg (138 lb)   12/17/24 1116 61.2 kg (135 lb)        Results from last 7 days   Lab Units 02/19/25  0218 02/18/25  1250   SODIUM mmol/L 128* 132*   POTASSIUM mmol/L 4.1 3.7   CHLORIDE mmol/L 91* 91*   CO2 mmol/L 24.4 29.1*   BUN mg/dL 21 14   CREATININE mg/dL 0.48* 0.54*   CALCIUM mg/dL 8.9 9.7   BILIRUBIN mg/dL 0.4  --    ALK PHOS U/L 72  --    ALT (SGPT) U/L 14  --    AST (SGOT) U/L 14  --    GLUCOSE mg/dL 154* 113*     Results from last 7 days   Lab Units 02/19/25  0218   HEMOGLOBIN g/dL 10.8*   HEMATOCRIT % 34.9       RD to follow up per protocol.    Electronically signed by:  May Ordoñez RD  02/21/25 07:06 EST

## 2025-02-22 ENCOUNTER — READMISSION MANAGEMENT (OUTPATIENT)
Dept: CALL CENTER | Facility: HOSPITAL | Age: 65
End: 2025-02-22
Payer: MEDICAID

## 2025-02-22 VITALS
OXYGEN SATURATION: 99 % | WEIGHT: 121.25 LBS | TEMPERATURE: 98.7 F | HEIGHT: 67 IN | RESPIRATION RATE: 21 BRPM | HEART RATE: 88 BPM | SYSTOLIC BLOOD PRESSURE: 134 MMHG | BODY MASS INDEX: 19.03 KG/M2 | DIASTOLIC BLOOD PRESSURE: 61 MMHG

## 2025-02-22 LAB
ALBUMIN SERPL-MCNC: 3.3 G/DL (ref 3.5–5.2)
ALBUMIN/GLOB SERPL: 1.3 G/DL
ALP SERPL-CCNC: 71 U/L (ref 39–117)
ALT SERPL W P-5'-P-CCNC: 86 U/L (ref 1–33)
ANION GAP SERPL CALCULATED.3IONS-SCNC: 9.2 MMOL/L (ref 5–15)
AST SERPL-CCNC: 28 U/L (ref 1–32)
BILIRUB SERPL-MCNC: 0.2 MG/DL (ref 0–1.2)
BUN SERPL-MCNC: 19 MG/DL (ref 8–23)
BUN/CREAT SERPL: 48.7 (ref 7–25)
CALCIUM SPEC-SCNC: 9 MG/DL (ref 8.6–10.5)
CHLORIDE SERPL-SCNC: 96 MMOL/L (ref 98–107)
CO2 SERPL-SCNC: 30.8 MMOL/L (ref 22–29)
CREAT SERPL-MCNC: 0.39 MG/DL (ref 0.57–1)
DEPRECATED RDW RBC AUTO: 61.1 FL (ref 37–54)
EGFRCR SERPLBLD CKD-EPI 2021: 112.1 ML/MIN/1.73
ERYTHROCYTE [DISTWIDTH] IN BLOOD BY AUTOMATED COUNT: 16 % (ref 12.3–15.4)
GLOBULIN UR ELPH-MCNC: 2.6 GM/DL
GLUCOSE SERPL-MCNC: 131 MG/DL (ref 65–99)
HCT VFR BLD AUTO: 32.4 % (ref 34–46.6)
HGB BLD-MCNC: 10.3 G/DL (ref 12–15.9)
MCH RBC QN AUTO: 32.8 PG (ref 26.6–33)
MCHC RBC AUTO-ENTMCNC: 31.8 G/DL (ref 31.5–35.7)
MCV RBC AUTO: 103.2 FL (ref 79–97)
PLATELET # BLD AUTO: 174 10*3/MM3 (ref 140–450)
PMV BLD AUTO: 9.8 FL (ref 6–12)
POTASSIUM SERPL-SCNC: 3.8 MMOL/L (ref 3.5–5.2)
PROT SERPL-MCNC: 5.9 G/DL (ref 6–8.5)
RBC # BLD AUTO: 3.14 10*6/MM3 (ref 3.77–5.28)
SODIUM SERPL-SCNC: 136 MMOL/L (ref 136–145)
WBC NRBC COR # BLD AUTO: 11.74 10*3/MM3 (ref 3.4–10.8)

## 2025-02-22 PROCEDURE — 94799 UNLISTED PULMONARY SVC/PX: CPT

## 2025-02-22 PROCEDURE — 63710000001 PREDNISONE PER 1 MG

## 2025-02-22 PROCEDURE — 25010000002 CEFTRIAXONE PER 250 MG

## 2025-02-22 PROCEDURE — 85027 COMPLETE CBC AUTOMATED: CPT

## 2025-02-22 PROCEDURE — 80053 COMPREHEN METABOLIC PANEL: CPT

## 2025-02-22 PROCEDURE — 25010000002 HEPARIN (PORCINE) PER 1000 UNITS

## 2025-02-22 PROCEDURE — 94664 DEMO&/EVAL PT USE INHALER: CPT

## 2025-02-22 PROCEDURE — 94761 N-INVAS EAR/PLS OXIMETRY MLT: CPT

## 2025-02-22 RX ORDER — BUDESONIDE 0.5 MG/2ML
0.5 INHALANT ORAL
Status: SHIPPED | OUTPATIENT
Start: 2025-02-22

## 2025-02-22 RX ORDER — IPRATROPIUM BROMIDE AND ALBUTEROL SULFATE 2.5; .5 MG/3ML; MG/3ML
3 SOLUTION RESPIRATORY (INHALATION) 4 TIMES DAILY PRN
Qty: 120 ML | Refills: 5 | Status: SHIPPED | OUTPATIENT
Start: 2025-02-22 | End: 2025-06-22

## 2025-02-22 RX ORDER — CEFDINIR 300 MG/1
300 CAPSULE ORAL 2 TIMES DAILY
Qty: 14 CAPSULE | Refills: 0 | Status: SHIPPED | OUTPATIENT
Start: 2025-02-22

## 2025-02-22 RX ADMIN — GUAIFENESIN SYRUP AND DEXTROMETHORPHAN 5 ML: 100; 10 SYRUP ORAL at 08:33

## 2025-02-22 RX ADMIN — HEPARIN SODIUM 5000 UNITS: 5000 INJECTION INTRAVENOUS; SUBCUTANEOUS at 05:08

## 2025-02-22 RX ADMIN — CEFTRIAXONE 2000 MG: 2 INJECTION, POWDER, FOR SOLUTION INTRAMUSCULAR; INTRAVENOUS at 11:29

## 2025-02-22 RX ADMIN — MUPIROCIN 1 APPLICATION: 20 OINTMENT TOPICAL at 08:28

## 2025-02-22 RX ADMIN — CARVEDILOL 3.12 MG: 3.12 TABLET, FILM COATED ORAL at 08:28

## 2025-02-22 RX ADMIN — ACETAMINOPHEN 650 MG: 325 TABLET, FILM COATED ORAL at 08:32

## 2025-02-22 RX ADMIN — ASPIRIN 81 MG CHEWABLE TABLET 81 MG: 81 TABLET CHEWABLE at 08:28

## 2025-02-22 RX ADMIN — PREDNISONE 40 MG: 20 TABLET ORAL at 08:28

## 2025-02-22 RX ADMIN — PANTOPRAZOLE SODIUM 40 MG: 40 TABLET, DELAYED RELEASE ORAL at 05:08

## 2025-02-22 RX ADMIN — IPRATROPIUM BROMIDE AND ALBUTEROL SULFATE 3 ML: .5; 3 SOLUTION RESPIRATORY (INHALATION) at 08:08

## 2025-02-22 RX ADMIN — Medication 10 ML: at 08:33

## 2025-02-22 RX ADMIN — IPRATROPIUM BROMIDE AND ALBUTEROL SULFATE 3 ML: .5; 3 SOLUTION RESPIRATORY (INHALATION) at 12:08

## 2025-02-22 NOTE — DISCHARGE SUMMARY
[facility] Medicine Services  Discharge Summary    Date of Service: 2025  Patient Name: Kandi Fuentes  : 1960  MRN: [id number]    Date of Admission: 2025  Discharge Diagnosis: Pneumonia  Date of Discharge: 2025  Primary Care Physician: Provider, No Known      Presenting Problem:   Pneumonia [J18.9]  Influenza A [J10.1]  Hypoxia [R09.02]  Pneumonia of both lungs due to infectious organism, unspecified part of lung [J18.9]  PNA (pneumonia) [J18.9]    Active and Resolved Hospital Problems:  Active Hospital Problems    Diagnosis POA    **Pneumonia [J18.9] Yes    PNA (pneumonia) [J18.9] Yes      Resolved Hospital Problems   No resolved problems to display.       Multifocal pneumonia  Acute hypoxic respiratory failure  COPD exacerbation     -CXR: New left lower lobe airspace disease which may represent atelectasis or pneumonia  -CT chest: No evidence of pulmonary embolism, multifocal pneumonia  -RVP positive for influenza A however it was initially positive on  and she has received treatment during her previous hospital course  -tx Rocephin+ azithromycin  -Continue steroids, DuoNebs  -Blood culture showed no growth  -Legionella and strep pneumo urine antigen ordered  -Will need walking oximetry prior to discharge     GERD  -Continue PPI      HLD  -Continue Lipitor     CAD  -Continue home regimen    Hospital Course     History of Present Illness: Kandi Fuentes is a 64 y.o. female with a CMH of COPD, stage III lung cancer, CAD, hypertension who presented to [facility] on 2025 with difficulty breathing.  Patient was recently admitted to this hospital for for influenza, and acute hypoxemic respiratory failure 2025 to 2025 patient states that she was initially feeling better, however patient developed onset of dyspnea last night patient states that it progressively became worse, which prompted patient to return to the emergency department for further  "evaluation.     While in the emergency department CXR noted: New left lower lobe airspace disease which may represent atelectasis or pneumonia     CT chest: No evidence of pulmonary embolism, multifocal pneumonia\"    2/22 doing better today, anxious to go home. Will dc home, condition on dc stable        DISCHARGE Follow Up Recommendations for labs and diagnostics: follow with pcp in one week         Day of Discharge     Vital Signs:  Temp:  [97.7 °F (36.5 °C)-98 °F (36.7 °C)] 98 °F (36.7 °C)  Heart Rate:  [] 103  Resp:  [15-26] 26  BP: (107-157)/(54-84) 134/61  Flow (L/min) (Oxygen Therapy):  [2] 2      Physical Exam Constitutional:       Comments: NAD    Cardiovascular:      Comments:  RRR, S1 & S2   Pulmonary:      Comments:  Lungs CTA   Abdominal:      Comments:  ABD soft, NT       Pertinent  and/or Most Recent Results     LAB RESULTS:      Lab 02/22/25  0521 02/21/25  0936 02/19/25  0218 02/18/25  1255 02/18/25  1250   WBC 11.74* 14.15* 12.09*  --  13.42*   HEMOGLOBIN 10.3* 10.9* 10.8*  --  11.6*   HEMATOCRIT 32.4* 34.9 34.9  --  36.8   PLATELETS 174 208 162  --  176   NEUTROS ABS  --   --  11.06*  --  11.32*   IMMATURE GRANS (ABS)  --   --  0.05  --  0.07*   LYMPHS ABS  --   --  0.55*  --  0.72   MONOS ABS  --   --  0.42  --  1.30*   EOS ABS  --   --  0.00  --  0.00   .2* 104.2* 103.9*  --  103.1*   PROCALCITONIN  --   --  0.07  --   --    LACTATE  --   --   --  1.5  --          Lab 02/22/25  0521 02/21/25  0936 02/19/25  0218 02/18/25  1250   SODIUM 136 138 128* 132*   POTASSIUM 3.8 4.4 4.1 3.7   CHLORIDE 96* 95* 91* 91*   CO2 30.8* 33.3* 24.4 29.1*   ANION GAP 9.2 9.7 12.6 11.9   BUN 19 23 21 14   CREATININE 0.39* 0.50* 0.48* 0.54*   EGFR 112.1 105.5 106.6 103.6   GLUCOSE 131* 177* 154* 113*   CALCIUM 9.0 9.2 8.9 9.7         Lab 02/22/25  0521 02/21/25  0936 02/19/25  0218   TOTAL PROTEIN 5.9* 6.5 6.3   ALBUMIN 3.3* 3.6 3.3*   GLOBULIN 2.6 2.9 3.0   ALT (SGPT) 86* 73* 14   AST (SGOT) 28 40* 14 "   BILIRUBIN 0.2 0.3 0.4   ALK PHOS 71 74 72         Lab 02/18/25  1250   PROBNP 557.0                 Lab 02/18/25  1300   PH, ARTERIAL 7.501*   PCO2, ARTERIAL 37.7   PO2 ART 76.0*   O2 SATURATION ART 96.3   FIO2 32   HCO3 ART 29.4*   BASE EXCESS ART 5.9*     Brief Urine Lab Results  (Last result in the past 365 days)        Color   Clarity   Blood   Leuk Est   Nitrite   Protein   CREAT   Urine HCG        02/09/25 1302 Yellow   Clear   Trace   Negative   Negative   Negative                 Microbiology Results (last 10 days)       Procedure Component Value - Date/Time    Blood Culture - Blood, Arm, Left [[id number]]  (Normal) Collected: 02/18/25 1622    Lab Status: Preliminary result Specimen: Blood from Arm, Left Updated: 02/21/25 1631     Blood Culture No growth at 3 days    Blood Culture - Blood, Arm, Right [[id number]]  (Normal) Collected: 02/18/25 1546    Lab Status: Preliminary result Specimen: Blood from Arm, Right Updated: 02/21/25 1600     Blood Culture No growth at 3 days    Respiratory Panel PCR w/COVID-19(SARS-CoV-2) [facility] In-House, NP Swab in UTM/VTM, 2 HR TAT - Swab, Nasopharynx [[id number]]  (Abnormal) Collected: 02/18/25 1249    Lab Status: Final result Specimen: Swab from Nasopharynx Updated: 02/18/25 1349     ADENOVIRUS, PCR Not Detected     Coronavirus 229E Not Detected     Coronavirus HKU1 Not Detected     Coronavirus NL63 Not Detected     Coronavirus OC43 Not Detected     COVID19 Not Detected     Human Metapneumovirus Not Detected     Human Rhinovirus/Enterovirus Not Detected     Influenza A H1 2009 PCR Detected     Influenza B PCR Not Detected     Parainfluenza Virus 1 Not Detected     Parainfluenza Virus 2 Not Detected     Parainfluenza Virus 3 Not Detected     Parainfluenza Virus 4 Not Detected     RSV, PCR Not Detected     Bordetella pertussis pcr Not Detected     Bordetella parapertussis PCR Not Detected     Chlamydophila pneumoniae PCR Not Detected     Mycoplasma pneumo by  PCR Not Detected    Narrative:      In the setting of a positive respiratory panel with a viral infection PLUS a negative procalcitonin without other underlying concern for bacterial infection, consider observing off antibiotics or discontinuation of antibiotics and continue supportive care. If the respiratory panel is positive for atypical bacterial infection (Bordetella pertussis, Chlamydophila pneumoniae, or Mycoplasma pneumoniae), consider antibiotic de-escalation to target atypical bacterial infection.            CT Angiogram Chest Pulmonary Embolism    Result Date: 2/18/2025  Impression: Impression: 1.No evidence of pulmonary embolism. 2.Multifocal pneumonia. Electronically Signed: Kenneth Freed MD  2/18/2025 3:11 PM EST  Workstation ID: [id number]    XR Chest 1 View    Result Date: 2/18/2025  Impression: Impression: New left lower lobe airspace disease which may represent atelectasis or pneumonia. Electronically Signed: Jorge Alberto Daniel MD  2/18/2025 1:12 PM EST  Workstation ID: [id number]    XR Chest 1 View    Result Date: 2/9/2025  Impression: Impression: Emphysema without evidence of pneumonia. Electronically Signed: Brown Vaughn MD  2/9/2025 12:08 PM EST  Workstation ID: [id number]    CT Chest With Contrast Diagnostic    Result Date: 1/28/2025  Impression: Impression: 1. No convincing residual malignancy or progressive metastatic disease in the chest, abdomen or pelvis. 2. Small vascular lesions in the right hepatic lobe, probably small hemangiomas or vascular shunts. These are not a typical appearance of metastatic disease from lung primary. Recommend attention on follow-up contrasted CT restaging exams. 3. Subacute to chronic appearing superior endplate deformities with minimal height loss at T2 and T3 new from 11/24/2024. No visible fracture line or bony retropulsion. 4. Severe emphysematous change with areas of parenchymal scarring. Negative for active infection. 5. Other chronic/ancillary findings as  above. Electronically Signed: Carlos Bernard MD  1/28/2025 1:26 PM EST  Workstation ID: [id number]    CT Abdomen Pelvis With Contrast    Result Date: 1/28/2025  Impression: Impression: 1. No convincing residual malignancy or progressive metastatic disease in the chest, abdomen or pelvis. 2. Small vascular lesions in the right hepatic lobe, probably small hemangiomas or vascular shunts. These are not a typical appearance of metastatic disease from lung primary. Recommend attention on follow-up contrasted CT restaging exams. 3. Subacute to chronic appearing superior endplate deformities with minimal height loss at T2 and T3 new from 11/24/2024. No visible fracture line or bony retropulsion. 4. Severe emphysematous change with areas of parenchymal scarring. Negative for active infection. 5. Other chronic/ancillary findings as above. Electronically Signed: Carlos Bernard MD  1/28/2025 1:26 PM EST  Workstation ID: [id number]    MRI Brain With & Without Contrast    Result Date: 1/24/2025  Impression: Impression: No evidence of intracranial metastatic disease. Stable mild chronic and age-related findings as above. Electronically Signed: Edilberto Richard MD  1/24/2025 4:30 PM EST  Workstation ID: [id number]                 Labs Pending at Discharge:  Pending Results       Procedure [Order ID] Specimen - Date/Time    Legionella Antigen, Urine - Urine, Urine, Clean Catch [[id number]]     Specimen: Urine, Clean Catch     S. Pneumo Ag Urine or CSF - Urine, Urine, Clean Catch [[id number]]     Specimen: Urine, Clean Catch             Procedures Performed           Consults:   Consults       Date and Time Order Name Status Description    2/18/2025  3:23 PM Hospitalist (on-call MD unless specified)      2/11/2025  2:26 PM Hematology & Oncology Inpatient Consult Completed               Discharge Details        Discharge Medications        New Medications        Instructions Start Date   cefdinir 300 MG capsule  Commonly known as: OMNICEF    300 mg, Oral, 2 Times Daily             Continue These Medications        Instructions Start Date   Aspirin Low Dose 81 MG chewable tablet  Generic drug: aspirin   81 mg, Oral, Daily      atorvastatin 40 MG tablet  Commonly known as: LIPITOR   40 mg, Nightly      carvedilol 3.125 MG tablet  Commonly known as: COREG   3.125 mg, 2 Times Daily With Meals      ipratropium-albuterol 0.5-2.5 mg/3 ml nebulizer  Commonly known as: DUO-NEB   3 mL, Nebulization, 4 Times Daily PRN      isosorbide mononitrate 30 MG 24 hr tablet  Commonly known as: IMDUR   30 mg, Daily      lidocaine-prilocaine 2.5-2.5 % cream  Commonly known as: EMLA   1 Application, Topical, See Admin Instructions, Apply one hour prior to port access      methocarbamol 500 MG tablet  Commonly known as: ROBAXIN   500 mg, Oral, Every 6 Hours PRN      mometasone 0.1 % ointment  Commonly known as: ELOCON   Apply to affected skin of chest and back 2-3 times daily as needed for itching from radiation treatments.      omeprazole 40 MG capsule  Commonly known as: priLOSEC   40 mg, Oral, 2 Times Daily      ondansetron 8 MG tablet  Commonly known as: ZOFRAN   8 mg, Oral, 3 Times Daily PRN      predniSONE 5 MG tablet  Commonly known as: DELTASONE   Take 4 tablets by mouth Daily for 2 days, THEN 3 tablets Daily for 3 days, THEN 2 tablets Daily for 3 days, THEN 1 tablet Daily for 3 days.   Start Date: February 12, 2025     Tylenol 325 MG capsule  Generic drug: Acetaminophen   650 mg, As Needed      Ventolin  (90 Base) MCG/ACT inhaler  Generic drug: albuterol sulfate HFA   2 puffs, Inhalation, As Needed               Allergies   Allergen Reactions    Morphine Hives and Shortness Of Breath    Codeine Hives    Sulfa Antibiotics Hives         Discharge Disposition:   Home or Self Care    Diet:  Hospital:  Diet Order   Procedures    Diet: Cardiac; Healthy Heart (2-3 Na+); Fluid Consistency: Thin (IDDSI 0)         Discharge Activity:   Activity Instructions        Gradually Increase Activity Until at Pre-Hospitalization Level                CODE STATUS:  Code Status and Medical Interventions: CPR (Attempt to Resuscitate); Full Support   Ordered at: 02/18/25 1636     Code Status (Patient has no pulse and is not breathing):    CPR (Attempt to Resuscitate)     Medical Interventions (Patient has pulse or is breathing):    Full Support         Future Appointments   Date Time Provider Department Center   3/5/2025 12:45 PM [facility] INJECTION CHAIR [facility]   3/5/2025  1:00 PM Wilbert Delgado MD [facility]   3/5/2025  1:30 PM INF ROOM 33D - CHAIR [facility]   4/24/2025 10:00 AM [facility] CT 2 [facility]   4/24/2025 10:30 AM [facility] MRI 2 [facility]   4/30/2025  1:30 PM Tereso Abarca MD [facility] None       Additional Instructions for the Follow-ups that You Need to Schedule       Discharge Follow-up with PCP   As directed       Currently Documented PCP:    Provider, No Known    PCP Phone Number:    None     Follow Up Details: 1 week                Time spent on Discharge including face to face service:  35 minutes    Signature: Electronically signed by Octavio Guerra MD, 02/22/25, 11:20 EST.  [facility] Hospitalist Team

## 2025-02-23 LAB
BACTERIA SPEC AEROBE CULT: NORMAL
BACTERIA SPEC AEROBE CULT: NORMAL

## 2025-02-23 NOTE — OUTREACH NOTE
Prep Survey      Flowsheet Row Responses   [facility] facility patient discharged from? [facility]   Is LACE score < 7 ? No   Eligibility Readm Mgmt   Discharge diagnosis Pneumonia   Does the patient have one of the following disease processes/diagnoses(primary or secondary)? Pneumonia   Does the patient have Home health ordered? No   Is there a DME ordered? Yes   What DME was ordered? [facility] for home O[facility]   Prep survey completed? Yes            DUANE NIETO - Registered Nurse

## 2025-02-23 NOTE — CASE MANAGEMENT/SOCIAL WORK
Case Management Discharge Note      Final Note: Home with family. Current home 02 at 2L through [employer].        Durable Medical Equipment Coordination complete.      Service Provider Services Address Phone Fax Patient Preferred    [facility] [employer] [facility] [employer] [address], [place] [id number] [phone]               Transportation Services  Private: Car    Final Discharge Disposition Code: 01 - home or self-care

## 2025-02-27 ENCOUNTER — READMISSION MANAGEMENT (OUTPATIENT)
Dept: CALL CENTER | Facility: HOSPITAL | Age: 65
End: 2025-02-27
Payer: MEDICAID

## 2025-02-27 NOTE — OUTREACH NOTE
COPD/PN Week 1 Survey      Flowsheet Row Responses   [facility] day facility patient discharged from? [facility]   Does the patient have one of the following disease processes/diagnoses(primary or secondary)? Pneumonia   Week 1 attempt successful? No   Unsuccessful attempts Attempt 1            Sneha Mayes Registered Nurse

## 2025-03-04 ENCOUNTER — READMISSION MANAGEMENT (OUTPATIENT)
Dept: CALL CENTER | Facility: HOSPITAL | Age: 65
End: 2025-03-04
Payer: MEDICAID

## 2025-03-04 DIAGNOSIS — C34.11 SMALL CELL CARCINOMA OF UPPER LOBE OF RIGHT LUNG: Primary | ICD-10-CM

## 2025-03-04 NOTE — OUTREACH NOTE
COPD/PN Week 1 Survey      Flowsheet Row Responses   [facility]apt[facility] facility patient discharged from? [facility]   Does the patient have one of the following disease processes/diagnoses(primary or secondary)? Pneumonia   Week 1 attempt successful? No   Unsuccessful attempts Attempt 2            MAYO FITZPATRICK - Registered Nurse

## 2025-03-04 NOTE — PROGRESS NOTES
"                         HEMATOLOGY ONCOLOGY OUTPATIENT FOLLOWUP       Patient name: Kandi Fuentes  : 1960  MRN: [id number]  Primary Care Physician: Provider, No Known  Referring Physician: Provider, No Known  Reason For Consult: Limited stage small cell lung cancer.    History of Present Illness:    10/2/2024: In the office for the first time to stage and treat small cell lung cancer of the right lung.  She reported that between July and 2024 she was seen at the hospital with dyspnea and some chest pains.  She was found to have evidence of coronary artery disease and underwent percutaneous angioplasty and stenting of the affected coronary vessels.  In the process, however, a nodule in the right lung was identified.  Further investigations led to the identification of a 4.4 cm lobulated tumor in the posterior right upper lobe communicating with the right middle lobe concerning for malignancy.  Evidence of severe emphysema was present, as well.  Eventually she had a navigational bronchoscopy and pathology reported small cell carcinoma on the biopsies.  A PET scan and MRI did not reveal any suggestion of metastatic disease.  At the time of this visit she is feeling somewhat better.  Her breathing has improved.  She still has some precordial discomfort that she describes is related to the stents \"that I can still feel\".  She has been eating reasonably well and has not lost much weight.  She is also been afebrile.  No diarrhea or dysuria.  On exam she appears chronically ill but is not in distress.  No jaundice.  The lungs are diminished bilaterally in a significant fashion.  The heart is regular.  The abdomen is flat and soft.  No palpable tumors.  No edema.  Independently reviewed all the imaging studies and discussed with her.  Treatment with concurrent chemotherapy and radiation followed by immunotherapy is reasonable.  Discussed with her the regimen of concurrent chemotherapy and radiation and in " detail discussed with her side effects and potential complications of both treatments.  Ideally we should begin on October 7 if it is at all possible.  I have communicated with Dr. Choudhary and with Dr. Abarca.    10/23/2024: Received the first cycle of chemotherapy without any difficulties. She feels about the same at this time and she has not had any new problems. Able to eat well and no nausea, vomiting or unintended weight loss. Denies chest pain and remains with the same degree of dyspnea. No abdominal pain or diarrhea. She continues to smoke at the same rate. On exam she is oriented and conversant. No jaundice. Poor dentition and no oral lesions. Respirations not labored Lungs diminished bilaterally. Heart regular. Abdomen soft and not tender. No edema. The laboratory exams were reviewed and discussed with her. To continue with the same treatment. She's to see me in 4 weeks.     11/27/2024: Tolerating the treatment with chemotherapy well.  She had chest pain and has been dyspneic since last evening.  She was seen in the emergency room at [facility], in [facility], where she spent several hours.  She was found to have an elevated D-dimer and was offered a CT scan.  However, she could not obtain the test because of difficulties with transportation.  She has been tolerating the chemotherapy well.  On exam today she is alert and conversant.  Oriented and in no distress.  There is no jaundice or pallor.  Poor dentition but no oral lesions.  Lungs markedly diminished bilaterally.  She is tachycardic.  Abdomen soft.  No edema.  To proceed with treatment.  To obtain a CT angiogram of the chest to ensure no pulmonary embolism, though I doubt it.  She is to see me in 3 weeks.    12/18/2024: Feeling poorly. Weak and more dyspneic with exertion. Still smoking but less than before. More tremulous than before. Her appetite is poor. She has not had much nausea. No chest pain or cough and no abdominal pain. On exam  alert and oriented. No distress. No jaundice and no oral lesions. Respirations not labored. Lungs diminished bilaterally and heart regular. Abdomen soft. No edema. Reviewed all the laboratory exams. Reviewed all the imaging studies and discussed with her. She has had a very good response to the treatment. She is to complete this cycle of treatment. She will most likely need a red cell transfusion and will have her laboratory exams on 12/20. She will see me in 4 weeks with new scans and MRI of the brain.     1/9/2025: Ms. Estrada is a patient of [facility] and has had treatment with concurrent chemotherapy and radiation for lung cancer. She seems to have had a good response. However, she called the office to report that for several days, maybe as many as 21, she had been progressively less able to swallow even liquids. She had been admitted to the hospital with similar but not as severe symptoms and was discharged without resolution of the symptoms. She was found to have dysphagia of even thin liquids. She was given intravenous fluids and was referred for admission to the hospital. She tells me today that she has been feeling better, though she did not sleep well. She has been free of pain but she remains absolutely unable to swallow even water. No dyspnea. No cough or chest pain. On exam she is conversant and oriented. No jaundice or pallor. No oral lesions and respirations not labored. Lungs diminished and abdomen soft. No edema. Reviewed the laboratory exams. She is to be seen by Gastroenterology with the impression of esophageal stenosis resultant from the previous concurrent chemotherapy and radiation. Discussed with her.     1/11/2025: Indeed on January 9 she underwent an upper gastrointestinal endoscopy that revealed a stricture of the esophagus.  This was dilated successfully and without any difficulties.  Almost immediately she was able to eat better.  She was discharged to continue follow-up as  outpatient.    1/27/2025: In the office to review the results of her MRI and CT scans.  She feels much better.  She still has some difficulty swallowing but she can now eat essentially anything that she chews thoroughly.  She has some difficulties with mastication as well and does the number of foods that she has been eating is somewhat limited.  She can drink liquids without any difficulties.  She has no pain.  She continues to have dyspnea with exertion.  She also continues to smoke.  Her gait is not steady and she has been using the wheelchair.  She has had no fevers or nocturnal diaphoresis.  No chest pains or abdominal pain.  On exam chronically ill-appearing.  No distress.  No jaundice.  No pallor.  Very poor dentition.  Respirations not labored.  Lungs markedly diminished bilaterally.  Heart regular.  Abdomen soft.  No edema.  Reviewed the laboratory exams and discussed with her.  Reviewed the images of the scans and reviewed them with her.  Explained the findings.  Discussed the use of immunotherapy in this setting.  It has been proven to result in longer responses and improved overall survival.  She is agreeable to it.  A treatment plan was placed.  I discussed with her the potential complications of the treatment, including life-threatening complications.  She is to see me in 3 weeks after commencement of the immunotherapy.    3/5/2025: Feels better than at the hospital.  Still very tired and very dyspneic.  Still coughing frequently but has not been able to smoke much.  She is eating some.  No fevers.  She has some pain on the right side of the chest.  No abdominal pain.  No diarrhea or dysuria.  Exam she appears ill.  She is pursed lip breathing constantly.  There is bilateral wheezing.  The heart is regular.  The abdomen is soft.  There is no edema.  Laboratory exams reviewed.  Reviewed the most recent scans.  Continue treatment with durvalumab.    Past Medical History:   Diagnosis Date    Cancer      COPD (chronic obstructive pulmonary disease)     Coronary artery disease     Elevated cholesterol     Heart attack     Hypertension     Lung cancer 2024    Tinnitus      Past Surgical History:   Procedure Laterality Date    BACK SURGERY  2004    bone spur on sciatica    BRONCHOSCOPY WITH ION ROBOTIC ASSIST N/A 09/27/2024    Procedure: BRONCHOSCOPY WITH ION ROBOT, fine needle aspiration and tissue biopsy right upper lobe mass, endobronchial ultrasound with fine needle aspiration lymph nodes (Level 10R);  Surgeon: Serafin Choudhary MD;  Location: [facility] ENDOSCOPY;  Service: Robotics - Pulmonary;  Laterality: N/A;  post: lung mass with lympadenopathy    CHOLECYSTECTOMY  2021    CORONARY ANGIOPLASTY WITH STENT PLACEMENT  07/2024    x2    ENDOSCOPY N/A 1/9/2025    Procedure: ESOPHAGOGASTRODUODENOSCOPY WITH DILATION (BOUGIE 32, 36, 40);  Surgeon: Jason Black MD;  Location: [facility] ENDOSCOPY;  Service: Gastroenterology;  Laterality: N/A;  ESOPHAGEAL STRICTURE, esophageal ulcer    MOLE REMOVAL  1994    forehead    SKIN LESION EXCISION Right 1994    breast    VENOUS ACCESS DEVICE (PORT) INSERTION Right 10/07/2024    Procedure: INSERTION VENOUS ACCESS DEVICE;  Surgeon: Serafin Choudhary MD;  Location: [facility] MAIN OR;  Service: Thoracic;  Laterality: Right;       Current Outpatient Medications:     Acetaminophen (Tylenol) 325 MG capsule, Take 650 mg by mouth As Needed., Disp: , Rfl:     albuterol sulfate  (90 Base) MCG/ACT inhaler, Inhale 2 puffs As Needed for Wheezing or Shortness of Air., Disp: 18 g, Rfl: 0    aspirin 81 MG chewable tablet, Chew 1 tablet Daily., Disp: 30 tablet, Rfl: 6    atorvastatin (LIPITOR) 40 MG tablet, Take 1 tablet by mouth Every Night., Disp: , Rfl:     carvedilol (COREG) 3.125 MG tablet, Take 1 tablet by mouth 2 (Two) Times a Day With Meals., Disp: , Rfl:     clopidogrel (PLAVIX) 75 MG tablet, , Disp: , Rfl:     ipratropium-albuterol (DUO-NEB) 0.5-2.5 mg/3 ml nebulizer, Take 3 mL by  nebulization 4 (Four) Times a Day As Needed for Wheezing or Shortness of Air for up to 120 days., Disp: 120 mL, Rfl: 5    isosorbide mononitrate (IMDUR) 30 MG 24 hr tablet, Take 1 tablet by mouth Daily., Disp: , Rfl:     lidocaine-prilocaine (EMLA) 2.5-2.5 % cream, Apply 1 Application topically to the appropriate area as directed See Admin Instructions. Apply one hour prior to port access, Disp: 30 g, Rfl: 3    methocarbamol (ROBAXIN) 500 MG tablet, Take 1 tablet by mouth Every 6 (Six) Hours As Needed for Muscle Spasms., Disp: 50 tablet, Rfl: 1    mometasone (ELOCON) 0.1 % ointment, Apply to affected skin of chest and back 2-3 times daily as needed for itching from radiation treatments., Disp: 50 g, Rfl: 2    omeprazole (priLOSEC) 40 MG capsule, Take 1 capsule by mouth 2 (Two) Times a Day for 30 days., Disp: 60 capsule, Rfl: 0    ondansetron (ZOFRAN) 8 MG tablet, Take 1 tablet by mouth 3 (Three) Times a Day As Needed for Nausea or Vomiting., Disp: 30 tablet, Rfl: 5    Current Facility-Administered Medications:     budesonide (PULMICORT) nebulizer solution 0.5 mg, 0.5 mg, Nebulization, BID - RT, Octavio Guerra MD    Facility-Administered Medications Ordered in Other Visits:     heparin injection 500 Units, 500 Units, Intravenous, PRN, Wilbert Delgado MD    sodium chloride 0.9 % flush 10 mL, 10 mL, Intravenous, PRN, Wilbert Delgado MD, 10 mL at 03/05/25 1230    Allergies   Allergen Reactions    Morphine Hives and Shortness Of Breath    Codeine Hives    Sulfa Antibiotics Hives     Family History   Problem Relation Age of Onset    Breast cancer Mother 62    Heart attack Mother     Lung cancer Sister     Throat cancer Sister     Lung cancer Brother      Cancer-related family history includes Breast cancer (age of onset: 62) in her mother; Lung cancer in her brother and sister; Throat cancer in her sister.    Social History     Tobacco Use    Smoking status: Every Day     Current packs/day: 1.00     Average  packs/day: 1 pack/day for 55.2 years (55.2 ttl pk-yrs)     Types: Cigarettes     Start date: 1970     Passive exposure: Current    Smokeless tobacco: Never   Vaping Use    Vaping status: Never Used   Substance Use Topics    Alcohol use: Never    Drug use: Never     Social History     Social History Narrative    Not on file     ROS:   Review of Systems   Constitutional:  Positive for fatigue. Negative for activity change, appetite change, chills, diaphoresis, fever and unexpected weight change.   HENT:  Negative for congestion, dental problem, drooling, ear discharge, ear pain, facial swelling, hearing loss, mouth sores, nosebleeds, postnasal drip, rhinorrhea, sinus pressure, sinus pain, sneezing, sore throat, tinnitus, trouble swallowing and voice change.    Eyes:  Negative for photophobia, pain, discharge, redness, itching and visual disturbance.   Respiratory:  Positive for cough and shortness of breath. Negative for apnea, choking, chest tightness, wheezing and stridor.    Cardiovascular:  Positive for chest pain. Negative for palpitations and leg swelling.   Gastrointestinal:  Negative for abdominal distention, abdominal pain, anal bleeding, blood in stool, constipation, diarrhea, nausea, rectal pain and vomiting.   Endocrine: Negative for cold intolerance, heat intolerance, polydipsia and polyuria.   Genitourinary:  Negative for decreased urine volume, difficulty urinating, dysuria, flank pain, frequency, genital sores, hematuria and urgency.   Musculoskeletal:  Negative for arthralgias, back pain, gait problem, joint swelling, myalgias, neck pain and neck stiffness.   Skin:  Negative for color change, pallor and rash.   Neurological:  Negative for dizziness, tremors, seizures, syncope, facial asymmetry, speech difficulty, weakness, light-headedness, numbness and headaches.   Hematological:  Negative for adenopathy. Does not bruise/bleed easily.   Psychiatric/Behavioral:  Negative for agitation, behavioral  "problems, confusion, decreased concentration, hallucinations, self-injury, sleep disturbance and suicidal ideas. The patient is not nervous/anxious.      Objective:    Vital Signs:  Vitals:    03/05/25 1304   BP: 136/62   Pulse: 82   Resp: 17   Temp: 98.3 °F (36.8 °C)   SpO2: 97%   Weight: 55.3 kg (122 lb)   Height: 170.2 cm (67\")   PainSc: 0-No pain     Body mass index is 19.11 kg/m².    ECOG  (1) Restricted in physically strenuous activity, ambulatory and able to do work of light nature    Physical Exam:   Physical Exam  Constitutional:       General: She is not in acute distress.     Appearance: She is ill-appearing. She is not toxic-appearing or diaphoretic.      Comments: Well-built, slender and well oriented woman.  She appears chronically ill but is not in acute distress.   HENT:      Head: Normocephalic and atraumatic.      Right Ear: External ear normal.      Left Ear: External ear normal.      Nose: Nose normal.      Mouth/Throat:      Mouth: Mucous membranes are moist.      Pharynx: Oropharynx is clear. No oropharyngeal exudate or posterior oropharyngeal erythema.   Eyes:      General: No scleral icterus.        Right eye: No discharge.         Left eye: No discharge.      Conjunctiva/sclera: Conjunctivae normal.      Pupils: Pupils are equal, round, and reactive to light.   Cardiovascular:      Rate and Rhythm: Normal rate and regular rhythm.      Pulses: Normal pulses.      Heart sounds: No murmur heard.     No friction rub. No gallop.   Pulmonary:      Effort: No respiratory distress.      Breath sounds: No stridor. No wheezing, rhonchi or rales.      Comments: Bilaterally and symmetrically diminished.  Abdominal:      General: Abdomen is flat. Bowel sounds are normal. There is no distension.      Palpations: Abdomen is soft. There is no mass.      Tenderness: There is no abdominal tenderness. There is no right CVA tenderness, left CVA tenderness, guarding or rebound.      Hernia: No hernia is present. "   Musculoskeletal:         General: No tenderness, deformity or signs of injury.      Cervical back: No rigidity.      Right lower leg: No edema.      Left lower leg: No edema.   Lymphadenopathy:      Cervical: No cervical adenopathy.   Skin:     Coloration: Skin is not jaundiced or pale.      Findings: No bruising, lesion or rash.   Neurological:      General: No focal deficit present.      Mental Status: She is alert and oriented to person, place, and time.      Cranial Nerves: No cranial nerve deficit.   Psychiatric:         Mood and Affect: Mood normal.         Behavior: Behavior normal.         Thought Content: Thought content normal.         Judgment: Judgment normal.     NATHALIE Delgado MD performed the physical exam on 3/5/2025 as documented above    Lab Results - Last 18 Months   Lab Units 03/05/25  1229 02/22/25  0521 02/21/25  0936   WBC 10*3/mm3 6.90 11.74* 14.15*   HEMOGLOBIN g/dL 9.5* 10.3* 10.9*   HEMATOCRIT % 31.1* 32.4* 34.9   PLATELETS 10*3/mm3 166 174 208   MCV fL 107.2* 103.2* 104.2*     Lab Results - Last 18 Months   Lab Units 02/09/25  1205 12/27/24  1410 09/27/24  0940   INR  1.16* 1.11* 1.02   APTT seconds 68.3* 25.4 25.5     Lab Results   Component Value Date    PTT 68.3 (C) 02/09/2025    INR 1.16 (H) 02/09/2025     Assessment & Plan     1.  Limited stage small cell lung cancer: ([id number]) of the right lung.  Completed concurrent chemotherapy with carboplatin/etoposide and radiation with evidence of response.  Started treatment with durvalumab.  Tolerating adequately.  2.  Anemia: Worsened after the recent admission with influenza and pneumonia.  3.  Coronary artery disease: No evidence of problems at this time.  4.  Chronic obstructive pulmonary disease: Has reduced significantly the number of cigarettes she consumes.  5.  Cigarette smoker: Discussed with her I have urged her to remain abstinent.  6.  Reviewed all laboratory exams.  Discussed with her.  7.  Proceed with treatment today.   See me in approximately 6 weeks.    Wilbert Delgado MD on 3/5/2025 at 1351.

## 2025-03-05 ENCOUNTER — HOSPITAL ENCOUNTER (OUTPATIENT)
Dept: ONCOLOGY | Facility: HOSPITAL | Age: 65
Discharge: HOME | End: 2025-03-05
Payer: MEDICAID

## 2025-03-05 ENCOUNTER — OFFICE VISIT (OUTPATIENT)
Dept: ONCOLOGY | Facility: CLINIC | Age: 65
End: 2025-03-05
Payer: MEDICAID

## 2025-03-05 VITALS
DIASTOLIC BLOOD PRESSURE: 62 MMHG | WEIGHT: 122 LBS | BODY MASS INDEX: 19.15 KG/M2 | OXYGEN SATURATION: 97 % | RESPIRATION RATE: 17 BRPM | SYSTOLIC BLOOD PRESSURE: 136 MMHG | HEIGHT: 67 IN | HEART RATE: 82 BPM | TEMPERATURE: 98.3 F

## 2025-03-05 DIAGNOSIS — C34.11 SMALL CELL CARCINOMA OF UPPER LOBE OF RIGHT LUNG: Primary | ICD-10-CM

## 2025-03-05 DIAGNOSIS — R91.8 LUNG MASS: ICD-10-CM

## 2025-03-05 LAB
ALP BLD-CCNC: 123 U/L (ref 42–141)
BASOPHILS # BLD AUTO: 0.01 10*3/MM3 (ref 0–0.2)
BASOPHILS NFR BLD AUTO: 0.1 % (ref 0–1.5)
BUN BLDA-MCNC: 15 MG/DL (ref 7–22)
CALCIUM BLD QL: 9.5 MG/DL (ref 8–10.3)
CHLORIDE BLDA-SCNC: 97 MMOL/L (ref 98–108)
CO2 BLDA-SCNC: 28 MMOL/L (ref 18–33)
CREAT BLDA-MCNC: 0.4 MG/DL (ref 0.6–1.2)
DEPRECATED RDW RBC AUTO: 63 FL (ref 37–54)
EGFRCR SERPLBLD CKD-EPI 2021: 111.4 ML/MIN/1.73
EOSINOPHIL # BLD AUTO: 0.01 10*3/MM3 (ref 0–0.4)
EOSINOPHIL NFR BLD AUTO: 0.1 % (ref 0.3–6.2)
ERYTHROCYTE [DISTWIDTH] IN BLOOD BY AUTOMATED COUNT: 16.3 % (ref 12.3–15.4)
GLUCOSE BLDC GLUCOMTR-MCNC: 103 MG/DL (ref 73–118)
HCT VFR BLD AUTO: 31.1 % (ref 34–46.6)
HGB BLD-MCNC: 9.5 G/DL (ref 12–15.9)
LYMPHOCYTES # BLD AUTO: 0.72 10*3/MM3 (ref 0.7–3.1)
LYMPHOCYTES NFR BLD AUTO: 10.4 % (ref 19.6–45.3)
MCH RBC QN AUTO: 32.8 PG (ref 26.6–33)
MCHC RBC AUTO-ENTMCNC: 30.5 G/DL (ref 31.5–35.7)
MCV RBC AUTO: 107.2 FL (ref 79–97)
MONOCYTES # BLD AUTO: 0.76 10*3/MM3 (ref 0.1–0.9)
MONOCYTES NFR BLD AUTO: 11 % (ref 5–12)
NEUTROPHILS NFR BLD AUTO: 5.4 10*3/MM3 (ref 1.7–7)
NEUTROPHILS NFR BLD AUTO: 78.4 % (ref 42.7–76)
PLATELET # BLD AUTO: 166 10*3/MM3 (ref 140–450)
PMV BLD AUTO: 9.2 FL (ref 6–12)
POC ALBUMIN: 2.8 G/L (ref 3.3–5.5)
POC ALT (SGPT): 48 U/L (ref 10–47)
POC AST (SGOT): 34 U/L (ref 11–38)
POC TOTAL BILIRUBIN: 0.6 MG/DL (ref 0.2–1.6)
POC TOTAL PROTEIN: 6.9 G/DL (ref 6.4–8.1)
POTASSIUM BLDA-SCNC: 3.7 MMOL/L (ref 3.6–5.1)
RBC # BLD AUTO: 2.9 10*6/MM3 (ref 3.77–5.28)
SODIUM BLD-SCNC: 134 MMOL/L (ref 128–145)
T4 FREE SERPL-MCNC: 1.01 NG/DL (ref 0.93–1.7)
TSH SERPL DL<=0.05 MIU/L-ACNC: 0.61 UIU/ML (ref 0.27–4.2)
WBC NRBC COR # BLD AUTO: 6.9 10*3/MM3 (ref 3.4–10.8)

## 2025-03-05 PROCEDURE — 80050 GENERAL HEALTH PANEL: CPT | Performed by: INTERNAL MEDICINE

## 2025-03-05 PROCEDURE — 25010000002 DURVALUMAB 50 MG/ML SOLUTION 10 ML VIAL: Performed by: INTERNAL MEDICINE

## 2025-03-05 PROCEDURE — 25010000002 HEPARIN LOCK FLUSH PER 10 UNITS: Performed by: INTERNAL MEDICINE

## 2025-03-05 PROCEDURE — 84439 ASSAY OF FREE THYROXINE: CPT | Performed by: INTERNAL MEDICINE

## 2025-03-05 PROCEDURE — 25810000003 SODIUM CHLORIDE 0.9 % SOLUTION 250 ML FLEX CONT: Performed by: INTERNAL MEDICINE

## 2025-03-05 PROCEDURE — 96413 CHEMO IV INFUSION 1 HR: CPT

## 2025-03-05 RX ORDER — SODIUM CHLORIDE 0.9 % (FLUSH) 0.9 %
10 SYRINGE (ML) INJECTION AS NEEDED
Status: DISCONTINUED | OUTPATIENT
Start: 2025-03-05 | End: 2025-03-06 | Stop reason: HOSPADM

## 2025-03-05 RX ORDER — HEPARIN SODIUM (PORCINE) LOCK FLUSH IV SOLN 100 UNIT/ML 100 UNIT/ML
500 SOLUTION INTRAVENOUS AS NEEDED
Status: DISCONTINUED | OUTPATIENT
Start: 2025-03-05 | End: 2025-03-06 | Stop reason: HOSPADM

## 2025-03-05 RX ORDER — HEPARIN SODIUM (PORCINE) LOCK FLUSH IV SOLN 100 UNIT/ML 100 UNIT/ML
500 SOLUTION INTRAVENOUS AS NEEDED
OUTPATIENT
Start: 2025-03-05

## 2025-03-05 RX ORDER — SODIUM CHLORIDE 9 MG/ML
20 INJECTION, SOLUTION INTRAVENOUS ONCE
Status: DISCONTINUED | OUTPATIENT
Start: 2025-03-05 | End: 2025-03-06 | Stop reason: HOSPADM

## 2025-03-05 RX ORDER — SODIUM CHLORIDE 0.9 % (FLUSH) 0.9 %
10 SYRINGE (ML) INJECTION AS NEEDED
OUTPATIENT
Start: 2025-03-05

## 2025-03-05 RX ORDER — CLOPIDOGREL BISULFATE 75 MG/1
TABLET ORAL
COMMUNITY
Start: 2025-02-27

## 2025-03-05 RX ORDER — SODIUM CHLORIDE 9 MG/ML
20 INJECTION, SOLUTION INTRAVENOUS ONCE
Status: CANCELLED | OUTPATIENT
Start: 2025-03-05

## 2025-03-05 RX ADMIN — Medication 10 ML: at 12:30

## 2025-03-05 RX ADMIN — HEPARIN 500 UNITS: 100 SYRINGE at 15:41

## 2025-03-05 RX ADMIN — SODIUM CHLORIDE 1500 MG: 9 INJECTION, SOLUTION INTRAVENOUS at 14:37

## 2025-03-05 RX ADMIN — Medication 10 ML: at 15:41

## 2025-03-05 NOTE — PROGRESS NOTES
Pt to the clinic for port flush/labs,Dr. Delgado f/u, and infusion. Port accessed using sterile technique with positive blood return noted. 10cc of blood wasted prior to obtaining lab specimen per orders and flushed with 20cc normal saline.  Pt tolerated well.  Pt sent to lobby to wait to be called back for MD appt.

## 2025-03-05 NOTE — PROGRESS NOTES
Pt here for tx after MD f/u. Pt port accessed already with good blood return noted.Pt tolerated infusion well.Pt  discharged and avs given.

## 2025-03-13 ENCOUNTER — READMISSION MANAGEMENT (OUTPATIENT)
Dept: CALL CENTER | Facility: HOSPITAL | Age: 65
End: 2025-03-13
Payer: MEDICAID

## 2025-03-13 NOTE — OUTREACH NOTE
COPD/PN Week 3 Survey      Flowsheet Row Responses   [facility] facility patient discharged from? [facility]   Does the patient have one of the following disease processes/diagnoses(primary or secondary)? Pneumonia   Week 3 attempt successful? No   Unsuccessful attempts Attempt 1   Revoke Other transitional program  [followed by oncology navigator]            GAIL ROJO - Registered Nurse

## 2025-04-02 ENCOUNTER — HOSPITAL ENCOUNTER (OUTPATIENT)
Dept: ONCOLOGY | Facility: HOSPITAL | Age: 65
Discharge: HOME | End: 2025-04-02
Payer: MEDICAID

## 2025-04-02 VITALS
WEIGHT: 127 LBS | HEART RATE: 112 BPM | TEMPERATURE: 98.4 F | HEIGHT: 67 IN | SYSTOLIC BLOOD PRESSURE: 129 MMHG | BODY MASS INDEX: 19.93 KG/M2 | OXYGEN SATURATION: 93 % | DIASTOLIC BLOOD PRESSURE: 74 MMHG | RESPIRATION RATE: 16 BRPM

## 2025-04-02 DIAGNOSIS — E87.6 HYPOKALEMIA: ICD-10-CM

## 2025-04-02 DIAGNOSIS — E87.6 HYPOKALEMIA: Primary | ICD-10-CM

## 2025-04-02 DIAGNOSIS — R91.8 LUNG MASS: ICD-10-CM

## 2025-04-02 DIAGNOSIS — C34.11 SMALL CELL CARCINOMA OF UPPER LOBE OF RIGHT LUNG: Primary | ICD-10-CM

## 2025-04-02 LAB
ALBUMIN SERPL-MCNC: 3.7 G/DL (ref 3.5–5.2)
ALBUMIN/GLOB SERPL: 1.2 G/DL
ALP BLD-CCNC: 90 U/L (ref 42–141)
ALP SERPL-CCNC: 99 U/L (ref 39–117)
ALT SERPL W P-5'-P-CCNC: 8 U/L (ref 1–33)
ANION GAP SERPL CALCULATED.3IONS-SCNC: 10.6 MMOL/L (ref 5–15)
AST SERPL-CCNC: 15 U/L (ref 1–32)
BASOPHILS # BLD AUTO: 0.01 10*3/MM3 (ref 0–0.2)
BASOPHILS NFR BLD AUTO: 0.1 % (ref 0–1.5)
BILIRUB SERPL-MCNC: 0.3 MG/DL (ref 0–1.2)
BUN BLDA-MCNC: 6 MG/DL (ref 7–22)
BUN SERPL-MCNC: 5 MG/DL (ref 8–23)
BUN/CREAT SERPL: 11.1 (ref 7–25)
CALCIUM BLD QL: 9.6 MG/DL (ref 8–10.3)
CALCIUM SPEC-SCNC: 9.4 MG/DL (ref 8.6–10.5)
CHLORIDE BLDA-SCNC: 97 MMOL/L (ref 98–108)
CHLORIDE SERPL-SCNC: 94 MMOL/L (ref 98–107)
CO2 BLDA-SCNC: 31 MMOL/L (ref 18–33)
CO2 SERPL-SCNC: 31.4 MMOL/L (ref 22–29)
CREAT BLDA-MCNC: 0.6 MG/DL (ref 0.6–1.2)
CREAT SERPL-MCNC: 0.45 MG/DL (ref 0.57–1)
DEPRECATED RDW RBC AUTO: 58.5 FL (ref 37–54)
EGFRCR SERPLBLD CKD-EPI 2021: 101 ML/MIN/1.73
EGFRCR SERPLBLD CKD-EPI 2021: 108.3 ML/MIN/1.73
EOSINOPHIL # BLD AUTO: 0.02 10*3/MM3 (ref 0–0.4)
EOSINOPHIL NFR BLD AUTO: 0.2 % (ref 0.3–6.2)
ERYTHROCYTE [DISTWIDTH] IN BLOOD BY AUTOMATED COUNT: 15.4 % (ref 12.3–15.4)
GLOBULIN UR ELPH-MCNC: 3 GM/DL
GLUCOSE BLDC GLUCOMTR-MCNC: 105 MG/DL (ref 73–118)
GLUCOSE SERPL-MCNC: 100 MG/DL (ref 65–99)
HCT VFR BLD AUTO: 35.1 % (ref 34–46.6)
HGB BLD-MCNC: 10.5 G/DL (ref 12–15.9)
LYMPHOCYTES # BLD AUTO: 1.23 10*3/MM3 (ref 0.7–3.1)
LYMPHOCYTES NFR BLD AUTO: 13.5 % (ref 19.6–45.3)
MAGNESIUM SERPL-MCNC: 1.9 MG/DL (ref 1.6–2.4)
MCH RBC QN AUTO: 31.3 PG (ref 26.6–33)
MCHC RBC AUTO-ENTMCNC: 29.9 G/DL (ref 31.5–35.7)
MCV RBC AUTO: 104.5 FL (ref 79–97)
MONOCYTES # BLD AUTO: 1.01 10*3/MM3 (ref 0.1–0.9)
MONOCYTES NFR BLD AUTO: 11.1 % (ref 5–12)
NEUTROPHILS NFR BLD AUTO: 6.84 10*3/MM3 (ref 1.7–7)
NEUTROPHILS NFR BLD AUTO: 75.1 % (ref 42.7–76)
PLATELET # BLD AUTO: 308 10*3/MM3 (ref 140–450)
PMV BLD AUTO: 8.8 FL (ref 6–12)
POC ALBUMIN: 3.1 G/L (ref 3.3–5.5)
POC ALT (SGPT): 11 U/L (ref 10–47)
POC AST (SGOT): 18 U/L (ref 11–38)
POC TOTAL BILIRUBIN: 0.6 MG/DL (ref 0.2–1.6)
POC TOTAL PROTEIN: 7 G/DL (ref 6.4–8.1)
POTASSIUM BLDA-SCNC: 3.2 MMOL/L (ref 3.6–5.1)
POTASSIUM SERPL-SCNC: 2.9 MMOL/L (ref 3.5–5.2)
PROT SERPL-MCNC: 6.7 G/DL (ref 6–8.5)
RBC # BLD AUTO: 3.36 10*6/MM3 (ref 3.77–5.28)
SODIUM BLD-SCNC: 142 MMOL/L (ref 128–145)
SODIUM SERPL-SCNC: 136 MMOL/L (ref 136–145)
T4 FREE SERPL-MCNC: 0.94 NG/DL (ref 0.93–1.7)
TSH SERPL DL<=0.05 MIU/L-ACNC: 0.56 UIU/ML (ref 0.27–4.2)
WBC NRBC COR # BLD AUTO: 9.11 10*3/MM3 (ref 3.4–10.8)

## 2025-04-02 PROCEDURE — 84439 ASSAY OF FREE THYROXINE: CPT | Performed by: INTERNAL MEDICINE

## 2025-04-02 PROCEDURE — 25010000002 HEPARIN LOCK FLUSH PER 10 UNITS: Performed by: INTERNAL MEDICINE

## 2025-04-02 PROCEDURE — 25810000003 SODIUM CHLORIDE 0.9 % SOLUTION 250 ML FLEX CONT: Performed by: NURSE PRACTITIONER

## 2025-04-02 PROCEDURE — 80050 GENERAL HEALTH PANEL: CPT | Performed by: INTERNAL MEDICINE

## 2025-04-02 PROCEDURE — 25010000002 POTASSIUM CHLORIDE 10 MEQ/100ML SOLUTION

## 2025-04-02 PROCEDURE — 96367 TX/PROPH/DG ADDL SEQ IV INF: CPT

## 2025-04-02 PROCEDURE — 25810000003 SODIUM CHLORIDE 0.9 % SOLUTION: Performed by: NURSE PRACTITIONER

## 2025-04-02 PROCEDURE — 96375 TX/PRO/DX INJ NEW DRUG ADDON: CPT

## 2025-04-02 PROCEDURE — 80053 COMPREHEN METABOLIC PANEL: CPT | Performed by: INTERNAL MEDICINE

## 2025-04-02 PROCEDURE — 83735 ASSAY OF MAGNESIUM: CPT | Performed by: NURSE PRACTITIONER

## 2025-04-02 PROCEDURE — 96413 CHEMO IV INFUSION 1 HR: CPT

## 2025-04-02 PROCEDURE — 25010000002 DURVALUMAB 50 MG/ML SOLUTION 10 ML VIAL: Performed by: NURSE PRACTITIONER

## 2025-04-02 RX ORDER — HEPARIN SODIUM (PORCINE) LOCK FLUSH IV SOLN 100 UNIT/ML 100 UNIT/ML
500 SOLUTION INTRAVENOUS AS NEEDED
OUTPATIENT
Start: 2025-04-02

## 2025-04-02 RX ORDER — SODIUM CHLORIDE 0.9 % (FLUSH) 0.9 %
10 SYRINGE (ML) INJECTION AS NEEDED
OUTPATIENT
Start: 2025-04-02

## 2025-04-02 RX ORDER — HEPARIN SODIUM (PORCINE) LOCK FLUSH IV SOLN 100 UNIT/ML 100 UNIT/ML
500 SOLUTION INTRAVENOUS AS NEEDED
Status: DISCONTINUED | OUTPATIENT
Start: 2025-04-02 | End: 2025-04-03 | Stop reason: HOSPADM

## 2025-04-02 RX ORDER — SODIUM CHLORIDE 9 MG/ML
20 INJECTION, SOLUTION INTRAVENOUS ONCE
Status: COMPLETED | OUTPATIENT
Start: 2025-04-02 | End: 2025-04-02

## 2025-04-02 RX ORDER — SODIUM CHLORIDE 9 MG/ML
20 INJECTION, SOLUTION INTRAVENOUS ONCE
Status: CANCELLED | OUTPATIENT
Start: 2025-04-02

## 2025-04-02 RX ORDER — POTASSIUM CHLORIDE 7.45 MG/ML
10 INJECTION INTRAVENOUS ONCE
Status: COMPLETED | OUTPATIENT
Start: 2025-04-02 | End: 2025-04-02

## 2025-04-02 RX ORDER — POTASSIUM CHLORIDE 1500 MG/1
TABLET, EXTENDED RELEASE ORAL
Qty: 4 TABLET | Refills: 0 | Status: SHIPPED | OUTPATIENT
Start: 2025-04-02 | End: 2025-04-05

## 2025-04-02 RX ORDER — SODIUM CHLORIDE 9 MG/ML
20 INJECTION, SOLUTION INTRAVENOUS ONCE
Status: DISCONTINUED | OUTPATIENT
Start: 2025-04-02 | End: 2025-04-03 | Stop reason: HOSPADM

## 2025-04-02 RX ORDER — SODIUM CHLORIDE 0.9 % (FLUSH) 0.9 %
10 SYRINGE (ML) INJECTION AS NEEDED
Status: DISCONTINUED | OUTPATIENT
Start: 2025-04-02 | End: 2025-04-03 | Stop reason: HOSPADM

## 2025-04-02 RX ORDER — POTASSIUM CHLORIDE 7.45 MG/ML
INJECTION INTRAVENOUS
Status: COMPLETED
Start: 2025-04-02 | End: 2025-04-02

## 2025-04-02 RX ADMIN — POTASSIUM CHLORIDE 10 MEQ: 7.45 INJECTION INTRAVENOUS at 15:22

## 2025-04-02 RX ADMIN — Medication 500 UNITS: at 16:28

## 2025-04-02 RX ADMIN — SODIUM CHLORIDE 20 ML/HR: 9 INJECTION, SOLUTION INTRAVENOUS at 15:22

## 2025-04-02 RX ADMIN — SODIUM CHLORIDE 1500 MG: 9 INJECTION, SOLUTION INTRAVENOUS at 14:18

## 2025-04-02 RX ADMIN — Medication 10 ML: at 16:28

## 2025-04-02 RX ADMIN — POTASSIUM CHLORIDE 10 MEQ: 10 INJECTION, SOLUTION INTRAVENOUS at 15:22

## 2025-04-02 NOTE — PROGRESS NOTES
Patient is here for C3D1 Imfin[facility] without new complaints. Patient states she still has lingering cough and SOB following her Flu A and PNA earlier in the year. Request for plan to be signed sent to NP and PA.    Potassium 2.9, per IKE Pereira give 10 MEQ IV and she will also send in oral supplementation. Patient verbalized understanding. Kelli wanted a recheck next week but patient states she will not have transportation to get here. She will be here for her apt on 4/16, Kelli notified.    Patient denies further needs today, AVS given.

## 2025-04-15 NOTE — PROGRESS NOTES
"                         HEMATOLOGY ONCOLOGY OUTPATIENT FOLLOWUP       Patient name: Kandi Fuentes  : 1960  MRN: [id number]  Primary Care Physician: Provider, No Known  Referring Physician: No ref. provider found  Reason For Consult: Limited stage small cell lung cancer.    History of Present Illness:    10/2/2024: In the office for the first time to stage and treat small cell lung cancer of the right lung.  She reported that between July and 2024 she was seen at the hospital with dyspnea and some chest pains.  She was found to have evidence of coronary artery disease and underwent percutaneous angioplasty and stenting of the affected coronary vessels.  In the process, however, a nodule in the right lung was identified.  Further investigations led to the identification of a 4.4 cm lobulated tumor in the posterior right upper lobe communicating with the right middle lobe concerning for malignancy.  Evidence of severe emphysema was present, as well.  Eventually she had a navigational bronchoscopy and pathology reported small cell carcinoma on the biopsies.  A PET scan and MRI did not reveal any suggestion of metastatic disease.  At the time of this visit she is feeling somewhat better.  Her breathing has improved.  She still has some precordial discomfort that she describes is related to the stents \"that I can still feel\".  She has been eating reasonably well and has not lost much weight.  She is also been afebrile.  No diarrhea or dysuria.  On exam she appears chronically ill but is not in distress.  No jaundice.  The lungs are diminished bilaterally in a significant fashion.  The heart is regular.  The abdomen is flat and soft.  No palpable tumors.  No edema.  Independently reviewed all the imaging studies and discussed with her.  Treatment with concurrent chemotherapy and radiation followed by immunotherapy is reasonable.  Discussed with her the regimen of concurrent chemotherapy and radiation and " in detail discussed with her side effects and potential complications of both treatments.  Ideally we should begin on October 7 if it is at all possible.  I have communicated with Dr. Choudhary and with Dr. Abarca.    10/23/2024: Received the first cycle of chemotherapy without any difficulties. She feels about the same at this time and she has not had any new problems. Able to eat well and no nausea, vomiting or unintended weight loss. Denies chest pain and remains with the same degree of dyspnea. No abdominal pain or diarrhea. She continues to smoke at the same rate. On exam she is oriented and conversant. No jaundice. Poor dentition and no oral lesions. Respirations not labored Lungs diminished bilaterally. Heart regular. Abdomen soft and not tender. No edema. The laboratory exams were reviewed and discussed with her. To continue with the same treatment. She's to see me in 4 weeks.     11/27/2024: Tolerating the treatment with chemotherapy well.  She had chest pain and has been dyspneic since last evening.  She was seen in the emergency room at [facility], in [facility], where she spent several hours.  She was found to have an elevated D-dimer and was offered a CT scan.  However, she could not obtain the test because of difficulties with transportation.  She has been tolerating the chemotherapy well.  On exam today she is alert and conversant.  Oriented and in no distress.  There is no jaundice or pallor.  Poor dentition but no oral lesions.  Lungs markedly diminished bilaterally.  She is tachycardic.  Abdomen soft.  No edema.  To proceed with treatment.  To obtain a CT angiogram of the chest to ensure no pulmonary embolism, though I doubt it.  She is to see me in 3 weeks.    12/18/2024: Feeling poorly. Weak and more dyspneic with exertion. Still smoking but less than before. More tremulous than before. Her appetite is poor. She has not had much nausea. No chest pain or cough and no abdominal pain. On  exam alert and oriented. No distress. No jaundice and no oral lesions. Respirations not labored. Lungs diminished bilaterally and heart regular. Abdomen soft. No edema. Reviewed all the laboratory exams. Reviewed all the imaging studies and discussed with her. She has had a very good response to the treatment. She is to complete this cycle of treatment. She will most likely need a red cell transfusion and will have her laboratory exams on 12/20. She will see me in 4 weeks with new scans and MRI of the brain.     1/9/2025: Ms. Estrada is a patient of [facility] and has had treatment with concurrent chemotherapy and radiation for lung cancer. She seems to have had a good response. However, she called the office to report that for several days, maybe as many as 21, she had been progressively less able to swallow even liquids. She had been admitted to the hospital with similar but not as severe symptoms and was discharged without resolution of the symptoms. She was found to have dysphagia of even thin liquids. She was given intravenous fluids and was referred for admission to the hospital. She tells me today that she has been feeling better, though she did not sleep well. She has been free of pain but she remains absolutely unable to swallow even water. No dyspnea. No cough or chest pain. On exam she is conversant and oriented. No jaundice or pallor. No oral lesions and respirations not labored. Lungs diminished and abdomen soft. No edema. Reviewed the laboratory exams. She is to be seen by Gastroenterology with the impression of esophageal stenosis resultant from the previous concurrent chemotherapy and radiation. Discussed with her.     1/11/2025: Indeed on January 9 she underwent an upper gastrointestinal endoscopy that revealed a stricture of the esophagus.  This was dilated successfully and without any difficulties.  Almost immediately she was able to eat better.  She was discharged to continue follow-up as  outpatient.    1/27/2025: In the office to review the results of her MRI and CT scans.  She feels much better.  She still has some difficulty swallowing but she can now eat essentially anything that she chews thoroughly.  She has some difficulties with mastication as well and does the number of foods that she has been eating is somewhat limited.  She can drink liquids without any difficulties.  She has no pain.  She continues to have dyspnea with exertion.  She also continues to smoke.  Her gait is not steady and she has been using the wheelchair.  She has had no fevers or nocturnal diaphoresis.  No chest pains or abdominal pain.  On exam chronically ill-appearing.  No distress.  No jaundice.  No pallor.  Very poor dentition.  Respirations not labored.  Lungs markedly diminished bilaterally.  Heart regular.  Abdomen soft.  No edema.  Reviewed the laboratory exams and discussed with her.  Reviewed the images of the scans and reviewed them with her.  Explained the findings.  Discussed the use of immunotherapy in this setting.  It has been proven to result in longer responses and improved overall survival.  She is agreeable to it.  A treatment plan was placed.  I discussed with her the potential complications of the treatment, including life-threatening complications.  She is to see me in 3 weeks after commencement of the immunotherapy.    3/5/2025: Feels better than at the hospital.  Still very tired and very dyspneic.  Still coughing frequently but has not been able to smoke much.  She is eating some.  No fevers.  She has some pain on the right side of the chest.  No abdominal pain.  No diarrhea or dysuria.  Exam she appears ill.  She is pursed lip breathing constantly.  There is bilateral wheezing.  The heart is regular.  The abdomen is soft.  There is no edema.  Laboratory exams reviewed.  Reviewed the most recent scans.  Continue treatment with durvalumab.    4/16/2025: Does not feel very well. Has continued to be  tired and dyspneic. She has been coughing more and the medication she has been receiving is not helping much. She has no chest pain. No abdominal pain or diarrhea. No dysuria. No edema. No skin rash. On exam ill appearing but in no distress. No jaundice. No oral lesions and respirations not labored. Lungs markedly diminished bilaterally and heart regular. Abdomen soft and not tender. No edema. No skin rash. Reviewed the laboratory exams. Persists with hypokalemia. She is to have intravenous potassium and sent a new prescription for potassium chloride. To check her magnesium. Continue same treatment. See me in approximately 4 weeks.     Past Medical History:   Diagnosis Date    Cancer     COPD (chronic obstructive pulmonary disease)     Coronary artery disease     Elevated cholesterol     Heart attack     Hypertension     Lung cancer 2024    Tinnitus      Past Surgical History:   Procedure Laterality Date    BACK SURGERY  2004    bone spur on sciatica    BRONCHOSCOPY WITH ION ROBOTIC ASSIST N/A 09/27/2024    Procedure: BRONCHOSCOPY WITH ION ROBOT, fine needle aspiration and tissue biopsy right upper lobe mass, endobronchial ultrasound with fine needle aspiration lymph nodes (Level 10R);  Surgeon: Serafin Choudhary MD;  Location: [facility] ENDOSCOPY;  Service: Robotics - Pulmonary;  Laterality: N/A;  post: lung mass with lympadenopathy    CHOLECYSTECTOMY  2021    CORONARY ANGIOPLASTY WITH STENT PLACEMENT  07/2024    x2    ENDOSCOPY N/A 1/9/2025    Procedure: ESOPHAGOGASTRODUODENOSCOPY WITH DILATION (BOUGIE 32, 36, 40);  Surgeon: Jason Black MD;  Location: [facility] ENDOSCOPY;  Service: Gastroenterology;  Laterality: N/A;  ESOPHAGEAL STRICTURE, esophageal ulcer    MOLE REMOVAL  1994    forehead    SKIN LESION EXCISION Right 1994    breast    VENOUS ACCESS DEVICE (PORT) INSERTION Right 10/07/2024    Procedure: INSERTION VENOUS ACCESS DEVICE;  Surgeon: Serafin Choudhary MD;  Location: [facility] MAIN OR;  Service: Thoracic;   Laterality: Right;       Current Outpatient Medications:     Acetaminophen (Tylenol) 325 MG capsule, Take 650 mg by mouth As Needed., Disp: , Rfl:     albuterol sulfate  (90 Base) MCG/ACT inhaler, Inhale 2 puffs As Needed for Wheezing or Shortness of Air., Disp: 18 g, Rfl: 0    aspirin 81 MG chewable tablet, Chew 1 tablet Daily., Disp: 30 tablet, Rfl: 6    atorvastatin (LIPITOR) 40 MG tablet, Take 1 tablet by mouth Every Night., Disp: , Rfl:     carvedilol (COREG) 3.125 MG tablet, Take 1 tablet by mouth 2 (Two) Times a Day With Meals., Disp: , Rfl:     clopidogrel (PLAVIX) 75 MG tablet, , Disp: , Rfl:     ipratropium-albuterol (DUO-NEB) 0.5-2.5 mg/3 ml nebulizer, Take 3 mL by nebulization 4 (Four) Times a Day As Needed for Wheezing or Shortness of Air for up to 120 days., Disp: 120 mL, Rfl: 5    isosorbide mononitrate (IMDUR) 30 MG 24 hr tablet, Take 1 tablet by mouth Daily., Disp: , Rfl:     lidocaine-prilocaine (EMLA) 2.5-2.5 % cream, Apply 1 Application topically to the appropriate area as directed See Admin Instructions. Apply one hour prior to port access, Disp: 30 g, Rfl: 3    methocarbamol (ROBAXIN) 500 MG tablet, Take 1 tablet by mouth Every 6 (Six) Hours As Needed for Muscle Spasms., Disp: 50 tablet, Rfl: 1    mometasone (ELOCON) 0.1 % ointment, Apply to affected skin of chest and back 2-3 times daily as needed for itching from radiation treatments., Disp: 50 g, Rfl: 2    ondansetron (ZOFRAN) 8 MG tablet, Take 1 tablet by mouth 3 (Three) Times a Day As Needed for Nausea or Vomiting., Disp: 30 tablet, Rfl: 5    benzonatate (TESSALON) 200 MG capsule, Take 1 capsule by mouth 3 (Three) Times a Day As Needed for Cough., Disp: 30 capsule, Rfl: 2    potassium chloride ER (K-TAB) 20 MEQ tablet controlled-release ER tablet, Take 1 tablet by mouth 2 (Two) Times a Day., Disp: 60 tablet, Rfl: 11    Current Facility-Administered Medications:     budesonide (PULMICORT) nebulizer solution 0.5 mg, 0.5 mg,  Nebulization, BID - RT, Octavio Guerra MD    Facility-Administered Medications Ordered in Other Visits:     heparin injection 500 Units, 500 Units, Intravenous, PRN, Wilbert Delgado MD, 500 Units at 04/16/25 1228    potassium chloride 10 mEq in 100 mL IVPB, 10 mEq, Intravenous, Q1H, Wilbert Delgado MD, Last Rate: 100 mL/hr at 04/16/25 1404, 10 mEq at 04/16/25 1404    sodium chloride 0.9 % flush 10 mL, 10 mL, Intravenous, PRN, Wilbert Delgado MD, 10 mL at 04/16/25 1228    Allergies   Allergen Reactions    Morphine Hives and Shortness Of Breath    Codeine Hives    Sulfa Antibiotics Hives     Family History   Problem Relation Age of Onset    Breast cancer Mother 62    Heart attack Mother     Lung cancer Sister     Throat cancer Sister     Lung cancer Brother      Cancer-related family history includes Breast cancer (age of onset: 62) in her mother; Lung cancer in her brother and sister; Throat cancer in her sister.    Social History     Tobacco Use    Smoking status: Every Day     Current packs/day: 1.00     Average packs/day: 1 pack/day for 55.3 years (55.3 ttl pk-yrs)     Types: Cigarettes     Start date: 1970     Passive exposure: Current    Smokeless tobacco: Never   Vaping Use    Vaping status: Never Used   Substance Use Topics    Alcohol use: Never    Drug use: Never     Social History     Social History Narrative    Not on file     ROS:   Review of Systems   Constitutional:  Positive for fatigue. Negative for activity change, appetite change, chills, diaphoresis, fever and unexpected weight change.   HENT:  Negative for congestion, dental problem, drooling, ear discharge, ear pain, facial swelling, hearing loss, mouth sores, nosebleeds, postnasal drip, rhinorrhea, sinus pressure, sinus pain, sneezing, sore throat, tinnitus, trouble swallowing and voice change.    Eyes:  Negative for photophobia, pain, discharge, redness, itching and visual disturbance.   Respiratory:  Positive for cough and shortness  "of breath. Negative for apnea, choking, chest tightness, wheezing and stridor.    Cardiovascular:  Positive for chest pain. Negative for palpitations and leg swelling.   Gastrointestinal:  Negative for abdominal distention, abdominal pain, anal bleeding, blood in stool, constipation, diarrhea, nausea, rectal pain and vomiting.   Endocrine: Negative for cold intolerance, heat intolerance, polydipsia and polyuria.   Genitourinary:  Negative for decreased urine volume, difficulty urinating, dysuria, flank pain, frequency, genital sores, hematuria and urgency.   Musculoskeletal:  Negative for arthralgias, back pain, gait problem, joint swelling, myalgias, neck pain and neck stiffness.   Skin:  Negative for color change, pallor and rash.   Neurological:  Negative for dizziness, tremors, seizures, syncope, facial asymmetry, speech difficulty, weakness, light-headedness, numbness and headaches.   Hematological:  Negative for adenopathy. Does not bruise/bleed easily.   Psychiatric/Behavioral:  Negative for agitation, behavioral problems, confusion, decreased concentration, hallucinations, self-injury, sleep disturbance and suicidal ideas. The patient is not nervous/anxious.      Objective:    Vital Signs:  Vitals:    04/16/25 1247   BP: 112/69   Pulse: 87   Resp: 14   Temp: 97.2 °F (36.2 °C)   SpO2: 98%   Weight: 57.6 kg (127 lb)   Height: 170.2 cm (67\")   PainSc: 0-No pain     Body mass index is 19.89 kg/m².    ECOG  (1) Restricted in physically strenuous activity, ambulatory and able to do work of light nature    Physical Exam:   Physical Exam  Constitutional:       General: She is not in acute distress.     Appearance: She is ill-appearing. She is not toxic-appearing or diaphoretic.      Comments: Well-built, slender and well oriented woman.  She appears chronically ill but is not in acute distress.   HENT:      Head: Normocephalic and atraumatic.      Right Ear: External ear normal.      Left Ear: External ear normal. "      Nose: Nose normal.      Mouth/Throat:      Mouth: Mucous membranes are moist.      Pharynx: Oropharynx is clear. No oropharyngeal exudate or posterior oropharyngeal erythema.   Eyes:      General: No scleral icterus.        Right eye: No discharge.         Left eye: No discharge.      Conjunctiva/sclera: Conjunctivae normal.      Pupils: Pupils are equal, round, and reactive to light.   Cardiovascular:      Rate and Rhythm: Normal rate and regular rhythm.      Pulses: Normal pulses.      Heart sounds: No murmur heard.     No friction rub. No gallop.   Pulmonary:      Effort: No respiratory distress.      Breath sounds: No stridor. No wheezing, rhonchi or rales.      Comments: Bilaterally and symmetrically diminished.  Abdominal:      General: Abdomen is flat. Bowel sounds are normal. There is no distension.      Palpations: Abdomen is soft. There is no mass.      Tenderness: There is no abdominal tenderness. There is no right CVA tenderness, left CVA tenderness, guarding or rebound.      Hernia: No hernia is present.   Musculoskeletal:         General: No tenderness, deformity or signs of injury.      Cervical back: No rigidity.      Right lower leg: No edema.      Left lower leg: No edema.   Lymphadenopathy:      Cervical: No cervical adenopathy.   Skin:     Coloration: Skin is not jaundiced or pale.      Findings: No bruising, lesion or rash.   Neurological:      General: No focal deficit present.      Mental Status: She is alert and oriented to person, place, and time.      Cranial Nerves: No cranial nerve deficit.   Psychiatric:         Mood and Affect: Mood normal.         Behavior: Behavior normal.         Thought Content: Thought content normal.         Judgment: Judgment normal.     I Wilbert Delgado MD performed the physical exam on 4/16/2025 as documented above.     Lab Results - Last 18 Months   Lab Units 04/16/25  1227 04/02/25  1307 03/05/25  1229   WBC 10*3/mm3 7.89 9.11 6.90   HEMOGLOBIN g/dL  10.6* 10.5* 9.5*   HEMATOCRIT % 35.6 35.1 31.1*   PLATELETS 10*3/mm3 296 308 166   MCV fL 102.9* 104.5* 107.2*     Lab Results - Last 18 Months   Lab Units 02/09/25  1205 12/27/24  1410 09/27/24  0940   INR  1.16* 1.11* 1.02   APTT seconds 68.3* 25.4 25.5     Lab Results   Component Value Date    PTT 68.3 (C) 02/09/2025    INR 1.16 (H) 02/09/2025     Assessment & Plan     1.  Limited stage small cell lung cancer: ([id number]) of the right lung.  Completed concurrent chemotherapy with carboplatin/etoposide and radiation with evidence of response.  Started treatment with durvalumab.  Continues to tolerate well. Continue same treatment.   2.  Anemia: Improved.   3.  Coronary artery disease: Remains asymptomatic.   4.  Chronic obstructive pulmonary disease: No changes. Progressively more symptomatic.   5.  Cigarette smoker: Continues to smoke, though she reports she is not smoke as much.   6.  Reviewed the laboratory exams and discussed with her.   7.  Intravenous potassium. Sent new prescriptions. Will check magnesium. See me in 4 weeks.     Wilbert Delgado MD on 4/16/2025 at 14:29

## 2025-04-16 ENCOUNTER — HOSPITAL ENCOUNTER (OUTPATIENT)
Dept: ONCOLOGY | Facility: HOSPITAL | Age: 65
Discharge: HOME | End: 2025-04-16
Payer: MEDICAID

## 2025-04-16 ENCOUNTER — OFFICE VISIT (OUTPATIENT)
Dept: ONCOLOGY | Facility: CLINIC | Age: 65
End: 2025-04-16
Payer: MEDICAID

## 2025-04-16 VITALS
BODY MASS INDEX: 19.93 KG/M2 | OXYGEN SATURATION: 98 % | DIASTOLIC BLOOD PRESSURE: 69 MMHG | RESPIRATION RATE: 14 BRPM | HEART RATE: 87 BPM | SYSTOLIC BLOOD PRESSURE: 112 MMHG | TEMPERATURE: 97.2 F | HEIGHT: 67 IN | WEIGHT: 127 LBS

## 2025-04-16 DIAGNOSIS — E87.6 HYPOKALEMIA: Primary | ICD-10-CM

## 2025-04-16 DIAGNOSIS — R91.8 LUNG MASS: Primary | ICD-10-CM

## 2025-04-16 DIAGNOSIS — C34.11 SMALL CELL CARCINOMA OF UPPER LOBE OF RIGHT LUNG: ICD-10-CM

## 2025-04-16 LAB
ALBUMIN SERPL-MCNC: 4 G/DL (ref 3.5–5.2)
ALBUMIN/GLOB SERPL: 1.4 G/DL
ALP SERPL-CCNC: 95 U/L (ref 39–117)
ALT SERPL W P-5'-P-CCNC: 9 U/L (ref 1–33)
ANION GAP SERPL CALCULATED.3IONS-SCNC: 8.9 MMOL/L (ref 5–15)
AST SERPL-CCNC: 15 U/L (ref 1–32)
BASOPHILS # BLD AUTO: 0.01 10*3/MM3 (ref 0–0.2)
BASOPHILS NFR BLD AUTO: 0.1 % (ref 0–1.5)
BILIRUB SERPL-MCNC: 0.5 MG/DL (ref 0–1.2)
BUN SERPL-MCNC: 7 MG/DL (ref 8–23)
BUN/CREAT SERPL: 15.2 (ref 7–25)
CALCIUM SPEC-SCNC: 9.8 MG/DL (ref 8.6–10.5)
CHLORIDE SERPL-SCNC: 97 MMOL/L (ref 98–107)
CO2 SERPL-SCNC: 31.1 MMOL/L (ref 22–29)
CREAT SERPL-MCNC: 0.46 MG/DL (ref 0.57–1)
DEPRECATED RDW RBC AUTO: 56.5 FL (ref 37–54)
EGFRCR SERPLBLD CKD-EPI 2021: 107.7 ML/MIN/1.73
EOSINOPHIL # BLD AUTO: 0.03 10*3/MM3 (ref 0–0.4)
EOSINOPHIL NFR BLD AUTO: 0.4 % (ref 0.3–6.2)
ERYTHROCYTE [DISTWIDTH] IN BLOOD BY AUTOMATED COUNT: 15.1 % (ref 12.3–15.4)
GLOBULIN UR ELPH-MCNC: 2.9 GM/DL
GLUCOSE SERPL-MCNC: 103 MG/DL (ref 65–99)
HCT VFR BLD AUTO: 35.6 % (ref 34–46.6)
HGB BLD-MCNC: 10.6 G/DL (ref 12–15.9)
LYMPHOCYTES # BLD AUTO: 1.06 10*3/MM3 (ref 0.7–3.1)
LYMPHOCYTES NFR BLD AUTO: 13.4 % (ref 19.6–45.3)
MAGNESIUM SERPL-MCNC: 2 MG/DL (ref 1.6–2.4)
MCH RBC QN AUTO: 30.6 PG (ref 26.6–33)
MCHC RBC AUTO-ENTMCNC: 29.8 G/DL (ref 31.5–35.7)
MCV RBC AUTO: 102.9 FL (ref 79–97)
MONOCYTES # BLD AUTO: 0.92 10*3/MM3 (ref 0.1–0.9)
MONOCYTES NFR BLD AUTO: 11.7 % (ref 5–12)
NEUTROPHILS NFR BLD AUTO: 5.87 10*3/MM3 (ref 1.7–7)
NEUTROPHILS NFR BLD AUTO: 74.4 % (ref 42.7–76)
PLATELET # BLD AUTO: 296 10*3/MM3 (ref 140–450)
PMV BLD AUTO: 8.5 FL (ref 6–12)
POTASSIUM SERPL-SCNC: 2.9 MMOL/L (ref 3.5–5.2)
PROT SERPL-MCNC: 6.9 G/DL (ref 6–8.5)
RBC # BLD AUTO: 3.46 10*6/MM3 (ref 3.77–5.28)
SODIUM SERPL-SCNC: 137 MMOL/L (ref 136–145)
WBC NRBC COR # BLD AUTO: 7.89 10*3/MM3 (ref 3.4–10.8)

## 2025-04-16 PROCEDURE — 1159F MED LIST DOCD IN RCRD: CPT | Performed by: INTERNAL MEDICINE

## 2025-04-16 PROCEDURE — 36591 DRAW BLOOD OFF VENOUS DEVICE: CPT

## 2025-04-16 PROCEDURE — 96365 THER/PROPH/DIAG IV INF INIT: CPT

## 2025-04-16 PROCEDURE — 1160F RVW MEDS BY RX/DR IN RCRD: CPT | Performed by: INTERNAL MEDICINE

## 2025-04-16 PROCEDURE — 25010000002 HEPARIN LOCK FLUSH PER 10 UNITS: Performed by: INTERNAL MEDICINE

## 2025-04-16 PROCEDURE — 83735 ASSAY OF MAGNESIUM: CPT | Performed by: INTERNAL MEDICINE

## 2025-04-16 PROCEDURE — 25010000002 POTASSIUM CHLORIDE 10 MEQ/100ML SOLUTION: Performed by: INTERNAL MEDICINE

## 2025-04-16 PROCEDURE — 80053 COMPREHEN METABOLIC PANEL: CPT | Performed by: INTERNAL MEDICINE

## 2025-04-16 PROCEDURE — 96366 THER/PROPH/DIAG IV INF ADDON: CPT

## 2025-04-16 PROCEDURE — 99214 OFFICE O/P EST MOD 30 MIN: CPT | Performed by: INTERNAL MEDICINE

## 2025-04-16 PROCEDURE — 85025 COMPLETE CBC W/AUTO DIFF WBC: CPT | Performed by: INTERNAL MEDICINE

## 2025-04-16 PROCEDURE — 1126F AMNT PAIN NOTED NONE PRSNT: CPT | Performed by: INTERNAL MEDICINE

## 2025-04-16 RX ORDER — SODIUM CHLORIDE 0.9 % (FLUSH) 0.9 %
10 SYRINGE (ML) INJECTION AS NEEDED
OUTPATIENT
Start: 2025-04-16

## 2025-04-16 RX ORDER — POTASSIUM CHLORIDE 7.45 MG/ML
10 INJECTION INTRAVENOUS
Status: COMPLETED | OUTPATIENT
Start: 2025-04-16 | End: 2025-04-16

## 2025-04-16 RX ORDER — SODIUM CHLORIDE 0.9 % (FLUSH) 0.9 %
10 SYRINGE (ML) INJECTION AS NEEDED
Status: DISCONTINUED | OUTPATIENT
Start: 2025-04-16 | End: 2025-04-17 | Stop reason: HOSPADM

## 2025-04-16 RX ORDER — BENZONATATE 200 MG/1
200 CAPSULE ORAL 3 TIMES DAILY PRN
Qty: 30 CAPSULE | Refills: 2 | Status: SHIPPED | OUTPATIENT
Start: 2025-04-16

## 2025-04-16 RX ORDER — HEPARIN SODIUM (PORCINE) LOCK FLUSH IV SOLN 100 UNIT/ML 100 UNIT/ML
500 SOLUTION INTRAVENOUS AS NEEDED
OUTPATIENT
Start: 2025-04-16

## 2025-04-16 RX ORDER — HEPARIN SODIUM (PORCINE) LOCK FLUSH IV SOLN 100 UNIT/ML 100 UNIT/ML
500 SOLUTION INTRAVENOUS AS NEEDED
Status: DISCONTINUED | OUTPATIENT
Start: 2025-04-16 | End: 2025-04-17 | Stop reason: HOSPADM

## 2025-04-16 RX ORDER — POTASSIUM CHLORIDE 1500 MG/1
20 TABLET, EXTENDED RELEASE ORAL 2 TIMES DAILY
Qty: 60 TABLET | Refills: 11 | Status: SHIPPED | OUTPATIENT
Start: 2025-04-16

## 2025-04-16 RX ADMIN — Medication 10 ML: at 16:15

## 2025-04-16 RX ADMIN — HEPARIN 500 UNITS: 100 SYRINGE at 16:15

## 2025-04-16 RX ADMIN — POTASSIUM CHLORIDE 10 MEQ: 7.46 INJECTION, SOLUTION INTRAVENOUS at 15:21

## 2025-04-16 RX ADMIN — Medication 10 ML: at 12:28

## 2025-04-16 RX ADMIN — HEPARIN 500 UNITS: 100 SYRINGE at 12:28

## 2025-04-16 RX ADMIN — POTASSIUM CHLORIDE 10 MEQ: 7.46 INJECTION, SOLUTION INTRAVENOUS at 14:04

## 2025-04-16 NOTE — PROGRESS NOTES
Port accessed and flushed with good blood return noted. 10cc of blood wasted prior to specimen collection. Blood specimen obtained and sent to lab for processing per protocol. Order for BMP on 4/9/25. CMP ordered for today so order changed to stat cmp. Port flushed with saline and heparin prior to needle removal.

## 2025-04-29 NOTE — PROGRESS NOTES
[facility] RADIATION ONCOLOGY  FOLLOW-UP    Name: Kandi Fuentes  YOB: 1960  MRN #: [id number]  Date of Service: 4/30/2025    Chief Complaint:    Follow-up for small cell lung cancer    Diagnosis:   Encounter Diagnosis   Name Primary?    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 Yes          Radiation Treatment Course       Treating Physician: Dr. Tereso Abarca     Start: 10/14/2024     End: 11/22/2024   Site: Rt Lung    Total Dose: 6000 cGy    Dose/Fraction: 200 cGy    Total Fractions: 30 Daily or BID: Daily  Modality: Photon  Technique: IMRT/VMAT/Rapid Arc  Bolus: No         Interval History       Kandi Fuentes is a 64 y.o. female with history of CAD, chronic CHF, recent MI, severe emphysema and now a history of limited stage small cell carcinoma, stage Stage IIIB (cT3, cN2, cM0) of the right upper lobe. She completed chemoradiation therapy on 11/22/2024, 6000 cGy in 30 fractions to right lung. She returns for routine follow-up.     The patient was admitted for pneumonia in February.  When she was discharged, the patient's breathing has not significantly improved.  She feels like she has continued to struggle.  She missed her brain MRI because she felt she could not sit still.  Her last CT scan was from February.  She also is struggling financially to afford things even like medication.      Imaging         CT Angiogram Chest Pulmonary Embolism  Result Date: 2/18/2025  Impression: 1.No evidence of pulmonary embolism. 2.Multifocal pneumonia. Electronically Signed: Kenneth Freed MD  2/18/2025 3:11 PM EST  Workstation ID: [id number]    CT Chest With Contrast Diagnostic  Result Date: 1/28/2025  Impression: 1. No convincing residual malignancy or progressive metastatic disease in the chest, abdomen or pelvis. 2. Small vascular lesions in the right hepatic lobe, probably small hemangiomas or vascular shunts. These are not a typical appearance of metastatic disease from lung  primary. Recommend attention on follow-up contrasted CT restaging exams. 3. Subacute to chronic appearing superior endplate deformities with minimal height loss at T2 and T3 new from 11/24/2024. No visible fracture line or bony retropulsion. 4. Severe emphysematous change with areas of parenchymal scarring. Negative for active infection. 5. Other chronic/ancillary findings as above. Electronically Signed: Carlos Bernard MD  1/28/2025 1:26 PM EST  Workstation ID: [id number]    CT Abdomen Pelvis With Contrast  Result Date: 1/28/2025  Impression: 1. No convincing residual malignancy or progressive metastatic disease in the chest, abdomen or pelvis. 2. Small vascular lesions in the right hepatic lobe, probably small hemangiomas or vascular shunts. These are not a typical appearance of metastatic disease from lung primary. Recommend attention on follow-up contrasted CT restaging exams. 3. Subacute to chronic appearing superior endplate deformities with minimal height loss at T2 and T3 new from 11/24/2024. No visible fracture line or bony retropulsion. 4. Severe emphysematous change with areas of parenchymal scarring. Negative for active infection. 5. Other chronic/ancillary findings as above. Electronically Signed: Carlos Bernard MD  1/28/2025 1:26 PM EST  Workstation ID: [id number]        Pathology       No new pathology to review.      Labs       Hematology:  WBC   Date Value Ref Range Status   04/30/2025 5.03 3.40 - 10.80 10*3/mm3 Final     RBC   Date Value Ref Range Status   04/30/2025 3.46 (L) 3.77 - 5.28 10*6/mm3 Final     Hemoglobin   Date Value Ref Range Status   04/30/2025 10.3 (L) 12.0 - 15.9 g/dL Final     Hematocrit   Date Value Ref Range Status   04/30/2025 34.4 34.0 - 46.6 % Final     Platelets   Date Value Ref Range Status   04/30/2025 197 140 - 450 10*3/mm3 Final     Chemistry:  Lab Results   Component Value Date    GLUCOSE 108 (H) 04/30/2025    BUN 7 (L) 04/30/2025    CREATININE 0.38 (L) 04/30/2025    BCR  18.4 04/30/2025    K 2.2 (C) 04/30/2025    CO2 34.5 (H) 04/30/2025    CALCIUM 9.6 04/30/2025    ALBUMIN 3.6 04/30/2025    AST 18 04/30/2025    ALT 7 04/30/2025         Problem List       Patient Active Problem List   Diagnosis    Lung mass    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    Closed nondisplaced fracture of lateral malleolus of left fibula    Fracture of lateral malleolus    Hypokalemia    Pancytopenia due to chemotherapy    Dysphagia    COPD (chronic obstructive pulmonary disease)    Coronary artery disease    Flu    Severe protein-calorie malnutrition    Pneumonia    PNA (pneumonia)    Acute hypokalemia          Current Medications       Current Outpatient Medications   Medication Instructions    albuterol sulfate  (90 Base) MCG/ACT inhaler 2 puffs, Inhalation, As Needed    amoxicillin-clavulanate (AUGMENTIN) 875-125 MG per tablet 1 tablet, Oral, 2 Times Daily    Aspirin Low Dose 81 mg, Oral, Daily    atorvastatin (LIPITOR) 40 mg, Nightly    azithromycin (ZITHROMAX) 250 MG tablet Take 2 tablets by mouth Daily for 1 day, THEN 1 tablet Daily for 6 days.    benzonatate (TESSALON) 200 mg, Oral, 3 Times Daily PRN    carvedilol (COREG) 3.125 mg, 2 Times Daily With Meals    clopidogrel (PLAVIX) 75 MG tablet     ipratropium-albuterol (DUO-NEB) 0.5-2.5 mg/3 ml nebulizer 3 mL, Nebulization, 4 Times Daily PRN    isosorbide mononitrate (IMDUR) 30 mg, Daily    lidocaine-prilocaine (EMLA) 2.5-2.5 % cream 1 Application, Topical, See Admin Instructions, Apply one hour prior to port access    methocarbamol (ROBAXIN) 500 mg, Oral, Every 6 Hours PRN    mometasone (ELOCON) 0.1 % ointment Apply to affected skin of chest and back 2-3 times daily as needed for itching from radiation treatments.    ondansetron (ZOFRAN) 8 mg, Oral, 3 Times Daily PRN    potassium chloride ER (K-TAB) 20 MEQ tablet controlled-release ER tablet 20 mEq, Oral, 2 Times Daily    Tylenol 650 mg, As Needed          Allergies       Allergies    Allergen Reactions    Morphine Hives and Shortness Of Breath    Codeine Hives    Sulfa Antibiotics Hives           Review of Systems       Review of Systems   Constitutional:  Positive for activity change and fatigue.   HENT:  Positive for trouble swallowing.    Respiratory:  Positive for cough, chest tightness and shortness of breath.    Neurological:  Positive for weakness.        Vitals:    04/30/25 1319   BP: 113/67   Pulse: 106   Resp: 18   SpO2: 94%      Physical Exam  Constitutional:       General: She is not in acute distress.     Comments: Patient in wheel chair. She is cachectic in appearance.    HENT:      Head: Normocephalic and atraumatic.   Pulmonary:      Effort: Pulmonary effort is normal. No respiratory distress.      Comments: Patient on supplemental O2  Neurological:      Mental Status: She is alert and oriented to person, place, and time. Mental status is at baseline.   Psychiatric:         Mood and Affect: Mood normal.         Behavior: Behavior normal.          ECOG:  Ambulatory and capable of all selfcare but unable to carry out any work activities; up and about more than 50% of waking hours = 2          Assessment and Plan       Assessment:        Kandi Fuentes is a 64 y.o. female with history of CAD, chronic CHF, recent MI, severe emphysema and now a history of limited stage small cell carcinoma, stage Stage IIIB (cT3, cN2, cM0) of the right upper lobe. She completed chemoradiation therapy on 11/22/2024, 6000 cGy in 30 fractions to right lung. She returns for routine follow-up.     Diagnoses and all orders for this visit:    1. 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 (Primary)    Other orders  -     azithromycin (ZITHROMAX) 250 MG tablet; Take 2 tablets by mouth Daily for 1 day, THEN 1 tablet Daily for 6 days.  Dispense: 8 tablet; Refill: 0  -     amoxicillin-clavulanate (AUGMENTIN) 875-125 MG per tablet; Take 1 tablet by mouth 2 (Two) Times a Day for 7 days.  Dispense: 14  tablet; Refill: 0       Plan:      Orders:  - Prescribed course of antibiotics, discussed with Dr. Delgado, no plans for steroids at this time as patient is on immunotherapy.   - Discussed imaging plans, patient states she will not do imaging at this time. She wants more time to recover. She agrees to repeat imaging in another 3 months    The patient is not completing her recent imaging.  We cannot assess her disease status at this time.  Patient reports she is not in a position to proceed with imaging at this time.  She is willing to try and undergo imaging in another 3 months.  Patient has had significant shortness of breath since her recent hospitalization.  Will prescribe course of outpatient antibiotics.  Initially discussed plans for steroids with the patient.  Because she is on immunotherapy and after discussion with Dr. Delgado, will not prescribe any steroids at this time. Follow-up with patient in 3 months with CT and MRI imaging.     I spent 30 minutes caring for Kandi on this date of service. This time includes time spent by me in the following activities:preparing for the visit, reviewing tests, obtaining and/or reviewing a separately obtained history, performing a medically appropriate examination and/or evaluation , counseling and educating the patient/family/caregiver, ordering medications, tests, or procedures, documenting information in the medical record, and independently interpreting results and communicating that information with the patient/family/caregiver        Follow-Up       No follow-ups on file.       CC: Provider, No Known

## 2025-04-30 ENCOUNTER — HOSPITAL ENCOUNTER (OUTPATIENT)
Dept: ONCOLOGY | Facility: HOSPITAL | Age: 65
Discharge: HOME | End: 2025-04-30
Admitting: INTERNAL MEDICINE
Payer: MEDICAID

## 2025-04-30 ENCOUNTER — HOSPITAL ENCOUNTER (OUTPATIENT)
Facility: HOSPITAL | Age: 65
Setting detail: OBSERVATION
Discharge: HOME | End: 2025-05-01
Attending: EMERGENCY MEDICINE | Admitting: EMERGENCY MEDICINE
Payer: MEDICAID

## 2025-04-30 ENCOUNTER — OFFICE VISIT (OUTPATIENT)
Dept: RADIATION ONCOLOGY | Facility: HOSPITAL | Age: 65
End: 2025-04-30
Payer: MEDICAID

## 2025-04-30 VITALS
WEIGHT: 120 LBS | BODY MASS INDEX: 18.83 KG/M2 | HEART RATE: 113 BPM | OXYGEN SATURATION: 95 % | SYSTOLIC BLOOD PRESSURE: 104 MMHG | RESPIRATION RATE: 14 BRPM | TEMPERATURE: 98.2 F | HEIGHT: 67 IN | DIASTOLIC BLOOD PRESSURE: 66 MMHG

## 2025-04-30 VITALS
OXYGEN SATURATION: 94 % | WEIGHT: 127 LBS | SYSTOLIC BLOOD PRESSURE: 113 MMHG | HEART RATE: 106 BPM | DIASTOLIC BLOOD PRESSURE: 67 MMHG | BODY MASS INDEX: 19.89 KG/M2 | RESPIRATION RATE: 18 BRPM

## 2025-04-30 DIAGNOSIS — C34.11 SMALL CELL CARCINOMA OF UPPER LOBE OF RIGHT LUNG: Primary | ICD-10-CM

## 2025-04-30 DIAGNOSIS — E87.6 ACUTE HYPOKALEMIA: Primary | ICD-10-CM

## 2025-04-30 DIAGNOSIS — E87.6 HYPOKALEMIA: Primary | ICD-10-CM

## 2025-04-30 DIAGNOSIS — Z85.118 HISTORY OF LUNG CANCER: ICD-10-CM

## 2025-04-30 LAB
ALBUMIN SERPL-MCNC: 3.6 G/DL (ref 3.5–5.2)
ALBUMIN/GLOB SERPL: 1.3 G/DL
ALP BLD-CCNC: 70 U/L (ref 42–141)
ALP SERPL-CCNC: 78 U/L (ref 39–117)
ALT SERPL W P-5'-P-CCNC: 7 U/L (ref 1–33)
ANION GAP SERPL CALCULATED.3IONS-SCNC: 8.5 MMOL/L (ref 5–15)
AST SERPL-CCNC: 18 U/L (ref 1–32)
BASOPHILS # BLD AUTO: 0.01 10*3/MM3 (ref 0–0.2)
BASOPHILS # BLD AUTO: 0.01 10*3/MM3 (ref 0–0.2)
BASOPHILS NFR BLD AUTO: 0.2 % (ref 0–1.5)
BASOPHILS NFR BLD AUTO: 0.2 % (ref 0–1.5)
BILIRUB SERPL-MCNC: 0.6 MG/DL (ref 0–1.2)
BUN BLDA-MCNC: 8 MG/DL (ref 7–22)
BUN SERPL-MCNC: 7 MG/DL (ref 8–23)
BUN/CREAT SERPL: 18.4 (ref 7–25)
CALCIUM BLD QL: 9.6 MG/DL (ref 8–10.3)
CALCIUM SPEC-SCNC: 9.6 MG/DL (ref 8.6–10.5)
CHLORIDE BLDA-SCNC: 96 MMOL/L (ref 98–108)
CHLORIDE SERPL-SCNC: 94 MMOL/L (ref 98–107)
CO2 BLDA-SCNC: 31 MMOL/L (ref 18–33)
CO2 SERPL-SCNC: 34.5 MMOL/L (ref 22–29)
CREAT BLDA-MCNC: 0.4 MG/DL (ref 0.6–1.2)
CREAT SERPL-MCNC: 0.38 MG/DL (ref 0.57–1)
DEPRECATED RDW RBC AUTO: 52.9 FL (ref 37–54)
DEPRECATED RDW RBC AUTO: 53 FL (ref 37–54)
EGFRCR SERPLBLD CKD-EPI 2021: 111.4 ML/MIN/1.73
EGFRCR SERPLBLD CKD-EPI 2021: 112.8 ML/MIN/1.73
EOSINOPHIL # BLD AUTO: 0.05 10*3/MM3 (ref 0–0.4)
EOSINOPHIL # BLD AUTO: 0.06 10*3/MM3 (ref 0–0.4)
EOSINOPHIL NFR BLD AUTO: 1 % (ref 0.3–6.2)
EOSINOPHIL NFR BLD AUTO: 1.1 % (ref 0.3–6.2)
ERYTHROCYTE [DISTWIDTH] IN BLOOD BY AUTOMATED COUNT: 14.6 % (ref 12.3–15.4)
ERYTHROCYTE [DISTWIDTH] IN BLOOD BY AUTOMATED COUNT: 14.6 % (ref 12.3–15.4)
GLOBULIN UR ELPH-MCNC: 2.8 GM/DL
GLUCOSE BLDC GLUCOMTR-MCNC: 115 MG/DL (ref 73–118)
GLUCOSE SERPL-MCNC: 108 MG/DL (ref 65–99)
HCT VFR BLD AUTO: 34.4 % (ref 34–46.6)
HCT VFR BLD AUTO: 35.8 % (ref 34–46.6)
HGB BLD-MCNC: 10.3 G/DL (ref 12–15.9)
HGB BLD-MCNC: 10.9 G/DL (ref 12–15.9)
IMM GRANULOCYTES # BLD AUTO: 0.01 10*3/MM3 (ref 0–0.05)
IMM GRANULOCYTES NFR BLD AUTO: 0.2 % (ref 0–0.5)
LYMPHOCYTES # BLD AUTO: 0.99 10*3/MM3 (ref 0.7–3.1)
LYMPHOCYTES # BLD AUTO: 1.08 10*3/MM3 (ref 0.7–3.1)
LYMPHOCYTES NFR BLD AUTO: 18.9 % (ref 19.6–45.3)
LYMPHOCYTES NFR BLD AUTO: 19.7 % (ref 19.6–45.3)
MAGNESIUM SERPL-MCNC: 1.8 MG/DL (ref 1.6–2.4)
MCH RBC QN AUTO: 29.8 PG (ref 26.6–33)
MCH RBC QN AUTO: 30.9 PG (ref 26.6–33)
MCHC RBC AUTO-ENTMCNC: 29.9 G/DL (ref 31.5–35.7)
MCHC RBC AUTO-ENTMCNC: 30.4 G/DL (ref 31.5–35.7)
MCV RBC AUTO: 101.4 FL (ref 79–97)
MCV RBC AUTO: 99.4 FL (ref 79–97)
MONOCYTES # BLD AUTO: 0.64 10*3/MM3 (ref 0.1–0.9)
MONOCYTES # BLD AUTO: 0.66 10*3/MM3 (ref 0.1–0.9)
MONOCYTES NFR BLD AUTO: 11.2 % (ref 5–12)
MONOCYTES NFR BLD AUTO: 13.1 % (ref 5–12)
NEUTROPHILS NFR BLD AUTO: 3.31 10*3/MM3 (ref 1.7–7)
NEUTROPHILS NFR BLD AUTO: 3.91 10*3/MM3 (ref 1.7–7)
NEUTROPHILS NFR BLD AUTO: 65.8 % (ref 42.7–76)
NEUTROPHILS NFR BLD AUTO: 68.6 % (ref 42.7–76)
NRBC BLD AUTO-RTO: 0 /100 WBC (ref 0–0.2)
PHOSPHATE SERPL-MCNC: 3.5 MG/DL (ref 2.5–4.5)
PLATELET # BLD AUTO: 197 10*3/MM3 (ref 140–450)
PLATELET # BLD AUTO: 208 10*3/MM3 (ref 140–450)
PMV BLD AUTO: 8.5 FL (ref 6–12)
PMV BLD AUTO: 8.6 FL (ref 6–12)
POC ALBUMIN: 3.3 G/L (ref 3.3–5.5)
POC ALT (SGPT): 7 U/L (ref 10–47)
POC AST (SGOT): 21 U/L (ref 11–38)
POC TOTAL BILIRUBIN: 0.8 MG/DL (ref 0.2–1.6)
POC TOTAL PROTEIN: 6.7 G/DL (ref 6.4–8.1)
POTASSIUM BLDA-SCNC: 2.5 MMOL/L (ref 3.6–5.1)
POTASSIUM SERPL-SCNC: 2.2 MMOL/L (ref 3.5–5.2)
PROT SERPL-MCNC: 6.4 G/DL (ref 6–8.5)
RBC # BLD AUTO: 3.46 10*6/MM3 (ref 3.77–5.28)
RBC # BLD AUTO: 3.53 10*6/MM3 (ref 3.77–5.28)
SODIUM BLD-SCNC: 141 MMOL/L (ref 128–145)
SODIUM SERPL-SCNC: 137 MMOL/L (ref 136–145)
T4 FREE SERPL-MCNC: 1.38 NG/DL (ref 0.93–1.7)
TSH SERPL DL<=0.05 MIU/L-ACNC: 0.03 UIU/ML (ref 0.27–4.2)
WBC NRBC COR # BLD AUTO: 5.03 10*3/MM3 (ref 3.4–10.8)
WBC NRBC COR # BLD AUTO: 5.7 10*3/MM3 (ref 3.4–10.8)

## 2025-04-30 PROCEDURE — 93005 ELECTROCARDIOGRAM TRACING: CPT | Performed by: EMERGENCY MEDICINE

## 2025-04-30 PROCEDURE — 96375 TX/PRO/DX INJ NEW DRUG ADDON: CPT

## 2025-04-30 PROCEDURE — 25010000002 POTASSIUM CHLORIDE 10 MEQ/100ML SOLUTION: Performed by: EMERGENCY MEDICINE

## 2025-04-30 PROCEDURE — 93005 ELECTROCARDIOGRAM TRACING: CPT

## 2025-04-30 PROCEDURE — G0378 HOSPITAL OBSERVATION PER HR: HCPCS

## 2025-04-30 PROCEDURE — 36415 COLL VENOUS BLD VENIPUNCTURE: CPT

## 2025-04-30 PROCEDURE — 83735 ASSAY OF MAGNESIUM: CPT | Performed by: EMERGENCY MEDICINE

## 2025-04-30 PROCEDURE — 84100 ASSAY OF PHOSPHORUS: CPT | Performed by: EMERGENCY MEDICINE

## 2025-04-30 PROCEDURE — 99285 EMERGENCY DEPT VISIT HI MDM: CPT

## 2025-04-30 PROCEDURE — 25010000002 HYDROCORTISONE SOD SUC (PF) 250 MG RECONSTITUTED SOLUTION: Performed by: EMERGENCY MEDICINE

## 2025-04-30 PROCEDURE — 25810000003 SODIUM CHLORIDE 0.9 % SOLUTION: Performed by: EMERGENCY MEDICINE

## 2025-04-30 PROCEDURE — 80053 COMPREHEN METABOLIC PANEL: CPT | Performed by: EMERGENCY MEDICINE

## 2025-04-30 PROCEDURE — 84439 ASSAY OF FREE THYROXINE: CPT | Performed by: INTERNAL MEDICINE

## 2025-04-30 PROCEDURE — 94799 UNLISTED PULMONARY SVC/PX: CPT

## 2025-04-30 PROCEDURE — 25810000003 LACTATED RINGERS SOLUTION: Performed by: EMERGENCY MEDICINE

## 2025-04-30 PROCEDURE — 80050 GENERAL HEALTH PANEL: CPT | Performed by: INTERNAL MEDICINE

## 2025-04-30 PROCEDURE — G0463 HOSPITAL OUTPT CLINIC VISIT: HCPCS | Performed by: INTERNAL MEDICINE

## 2025-04-30 PROCEDURE — 96365 THER/PROPH/DIAG IV INF INIT: CPT

## 2025-04-30 PROCEDURE — 96366 THER/PROPH/DIAG IV INF ADDON: CPT

## 2025-04-30 PROCEDURE — 85025 COMPLETE CBC W/AUTO DIFF WBC: CPT | Performed by: EMERGENCY MEDICINE

## 2025-04-30 PROCEDURE — 36591 DRAW BLOOD OFF VENOUS DEVICE: CPT

## 2025-04-30 RX ORDER — POTASSIUM CHLORIDE 7.45 MG/ML
10 INJECTION INTRAVENOUS ONCE
Status: COMPLETED | OUTPATIENT
Start: 2025-05-01 | End: 2025-05-01

## 2025-04-30 RX ORDER — POTASSIUM CHLORIDE 1500 MG/1
40 TABLET, EXTENDED RELEASE ORAL ONCE
Status: COMPLETED | OUTPATIENT
Start: 2025-04-30 | End: 2025-04-30

## 2025-04-30 RX ORDER — IPRATROPIUM BROMIDE AND ALBUTEROL SULFATE 2.5; .5 MG/3ML; MG/3ML
3 SOLUTION RESPIRATORY (INHALATION)
Status: DISCONTINUED | OUTPATIENT
Start: 2025-04-30 | End: 2025-05-01 | Stop reason: HOSPADM

## 2025-04-30 RX ORDER — POTASSIUM CHLORIDE 7.45 MG/ML
10 INJECTION INTRAVENOUS ONCE
Status: COMPLETED | OUTPATIENT
Start: 2025-04-30 | End: 2025-04-30

## 2025-04-30 RX ORDER — AZITHROMYCIN 250 MG/1
TABLET, FILM COATED ORAL
Qty: 8 TABLET | Refills: 0 | Status: SHIPPED | OUTPATIENT
Start: 2025-04-30 | End: 2025-05-01 | Stop reason: HOSPADM

## 2025-04-30 RX ORDER — ONDANSETRON 2 MG/ML
4 INJECTION INTRAMUSCULAR; INTRAVENOUS EVERY 6 HOURS PRN
Status: DISCONTINUED | OUTPATIENT
Start: 2025-04-30 | End: 2025-05-01 | Stop reason: HOSPADM

## 2025-04-30 RX ORDER — POTASSIUM CHLORIDE 7.45 MG/ML
10 INJECTION INTRAVENOUS ONCE
Status: COMPLETED | OUTPATIENT
Start: 2025-04-30 | End: 2025-05-01

## 2025-04-30 RX ORDER — SODIUM CHLORIDE 0.9 % (FLUSH) 0.9 %
10 SYRINGE (ML) INJECTION EVERY 12 HOURS SCHEDULED
Status: DISCONTINUED | OUTPATIENT
Start: 2025-04-30 | End: 2025-05-01 | Stop reason: HOSPADM

## 2025-04-30 RX ORDER — SODIUM CHLORIDE 9 MG/ML
125 INJECTION, SOLUTION INTRAVENOUS CONTINUOUS
Status: DISPENSED | OUTPATIENT
Start: 2025-04-30 | End: 2025-05-01

## 2025-04-30 RX ORDER — SODIUM CHLORIDE 0.9 % (FLUSH) 0.9 %
10 SYRINGE (ML) INJECTION AS NEEDED
Status: DISCONTINUED | OUTPATIENT
Start: 2025-04-30 | End: 2025-05-01 | Stop reason: HOSPADM

## 2025-04-30 RX ORDER — SODIUM CHLORIDE 9 MG/ML
40 INJECTION, SOLUTION INTRAVENOUS AS NEEDED
Status: DISCONTINUED | OUTPATIENT
Start: 2025-04-30 | End: 2025-05-01 | Stop reason: HOSPADM

## 2025-04-30 RX ADMIN — POTASSIUM CHLORIDE 10 MEQ: 7.46 INJECTION, SOLUTION INTRAVENOUS at 22:51

## 2025-04-30 RX ADMIN — POTASSIUM CHLORIDE 10 MEQ: 7.46 INJECTION, SOLUTION INTRAVENOUS at 21:19

## 2025-04-30 RX ADMIN — POTASSIUM CHLORIDE 40 MEQ: 1500 TABLET, EXTENDED RELEASE ORAL at 18:05

## 2025-04-30 RX ADMIN — SODIUM CHLORIDE, SODIUM LACTATE, POTASSIUM CHLORIDE, AND CALCIUM CHLORIDE 1000 ML: .6; .31; .03; .02 INJECTION, SOLUTION INTRAVENOUS at 18:09

## 2025-04-30 RX ADMIN — HYDROCORTISONE SODIUM SUCCINATE 250 MG: 250 INJECTION, POWDER, FOR SOLUTION INTRAMUSCULAR; INTRAVENOUS at 21:19

## 2025-04-30 RX ADMIN — SODIUM CHLORIDE 125 ML/HR: 9 INJECTION, SOLUTION INTRAVENOUS at 21:20

## 2025-04-30 RX ADMIN — Medication 10 ML: at 23:49

## 2025-04-30 RX ADMIN — IPRATROPIUM BROMIDE AND ALBUTEROL SULFATE 3 ML: .5; 3 SOLUTION RESPIRATORY (INHALATION) at 21:10

## 2025-04-30 RX ADMIN — POTASSIUM CHLORIDE 10 MEQ: 7.46 INJECTION, SOLUTION INTRAVENOUS at 23:50

## 2025-04-30 NOTE — PROGRESS NOTES
Pt to the clinic for tx. Port accessed using sterile technique with positive blood return noted. 10cc of blood wasted prior to obtaining lab specimen per orders and flushed with 20cc normal saline.  Pt tolerated well.   Pt states she feels about the same as normal.  She has never fully recovered from having the flu and pneumonia in Feb. With using continuous oxygen and having nose/ear congestion and coughing up phlegm.  States she thinks the phlegm has increased some lately with it being sometimes yellow or clear/white in color. Pt reports she has not been taking the potassium Dr. Delgado prescribed due to not being able to afford the cost of it.  States she doesn't think they work anyway and the only thing that has worked for her is a powder potassium supplement they gave her in the hospital.  Message sent to Angi and Dr. Delgado.  Pt potassium 2.5 today.  Unable to give sufficient IV replacement here and ACU unable to give today.  Dr. Delgado at chairside speaking with pt.  Pt to got to ER for replacement.  Gilson at chairside and states pt potassium should be free from the pharmacy.  Pt states she will call to reschedule her treatment for next week.  Pt d/c to ER.  Report called to ER triage and port left accessed for use.

## 2025-04-30 NOTE — ED NOTES
Patient was seen at the cancer center today and was told her Potassium was critically low. Patient says she has been feeling week for about a week.

## 2025-05-01 ENCOUNTER — READMISSION MANAGEMENT (OUTPATIENT)
Dept: CALL CENTER | Facility: HOSPITAL | Age: 65
End: 2025-05-01
Payer: MEDICAID

## 2025-05-01 VITALS
DIASTOLIC BLOOD PRESSURE: 59 MMHG | WEIGHT: 120.3 LBS | HEART RATE: 102 BPM | HEIGHT: 67 IN | BODY MASS INDEX: 18.88 KG/M2 | TEMPERATURE: 97.7 F | SYSTOLIC BLOOD PRESSURE: 128 MMHG | OXYGEN SATURATION: 100 % | RESPIRATION RATE: 18 BRPM

## 2025-05-01 LAB
ANION GAP SERPL CALCULATED.3IONS-SCNC: 8.3 MMOL/L (ref 5–15)
BASOPHILS # BLD AUTO: 0.01 10*3/MM3 (ref 0–0.2)
BASOPHILS NFR BLD AUTO: 0.2 % (ref 0–1.5)
BUN SERPL-MCNC: 7 MG/DL (ref 8–23)
BUN/CREAT SERPL: 14.6 (ref 7–25)
CALCIUM SPEC-SCNC: 9.2 MG/DL (ref 8.6–10.5)
CHLORIDE SERPL-SCNC: 101 MMOL/L (ref 98–107)
CO2 SERPL-SCNC: 31.7 MMOL/L (ref 22–29)
CREAT SERPL-MCNC: 0.48 MG/DL (ref 0.57–1)
DEPRECATED RDW RBC AUTO: 54.3 FL (ref 37–54)
EGFRCR SERPLBLD CKD-EPI 2021: 106.6 ML/MIN/1.73
EOSINOPHIL # BLD AUTO: 0 10*3/MM3 (ref 0–0.4)
EOSINOPHIL NFR BLD AUTO: 0 % (ref 0.3–6.2)
ERYTHROCYTE [DISTWIDTH] IN BLOOD BY AUTOMATED COUNT: 14.6 % (ref 12.3–15.4)
GLUCOSE SERPL-MCNC: 173 MG/DL (ref 65–99)
HCT VFR BLD AUTO: 34.4 % (ref 34–46.6)
HGB BLD-MCNC: 10.3 G/DL (ref 12–15.9)
IMM GRANULOCYTES # BLD AUTO: 0.01 10*3/MM3 (ref 0–0.05)
IMM GRANULOCYTES NFR BLD AUTO: 0.2 % (ref 0–0.5)
LYMPHOCYTES # BLD AUTO: 0.68 10*3/MM3 (ref 0.7–3.1)
LYMPHOCYTES NFR BLD AUTO: 16.1 % (ref 19.6–45.3)
MCH RBC QN AUTO: 30.1 PG (ref 26.6–33)
MCHC RBC AUTO-ENTMCNC: 29.9 G/DL (ref 31.5–35.7)
MCV RBC AUTO: 100.6 FL (ref 79–97)
MONOCYTES # BLD AUTO: 0.1 10*3/MM3 (ref 0.1–0.9)
MONOCYTES NFR BLD AUTO: 2.4 % (ref 5–12)
NEUTROPHILS NFR BLD AUTO: 3.43 10*3/MM3 (ref 1.7–7)
NEUTROPHILS NFR BLD AUTO: 81.1 % (ref 42.7–76)
NRBC BLD AUTO-RTO: 0 /100 WBC (ref 0–0.2)
PLATELET # BLD AUTO: 209 10*3/MM3 (ref 140–450)
PMV BLD AUTO: 9 FL (ref 6–12)
POTASSIUM SERPL-SCNC: 3.7 MMOL/L (ref 3.5–5.2)
QT INTERVAL: 369 MS
QTC INTERVAL: 493 MS
RBC # BLD AUTO: 3.42 10*6/MM3 (ref 3.77–5.28)
SODIUM SERPL-SCNC: 141 MMOL/L (ref 136–145)
WBC NRBC COR # BLD AUTO: 4.23 10*3/MM3 (ref 3.4–10.8)

## 2025-05-01 PROCEDURE — 25010000002 HEPARIN LOCK FLUSH PER 10 UNITS: Performed by: PHYSICIAN ASSISTANT

## 2025-05-01 PROCEDURE — 94799 UNLISTED PULMONARY SVC/PX: CPT

## 2025-05-01 PROCEDURE — 96366 THER/PROPH/DIAG IV INF ADDON: CPT

## 2025-05-01 PROCEDURE — 85025 COMPLETE CBC W/AUTO DIFF WBC: CPT | Performed by: EMERGENCY MEDICINE

## 2025-05-01 PROCEDURE — 94664 DEMO&/EVAL PT USE INHALER: CPT

## 2025-05-01 PROCEDURE — G0378 HOSPITAL OBSERVATION PER HR: HCPCS

## 2025-05-01 PROCEDURE — 25010000002 POTASSIUM CHLORIDE 10 MEQ/100ML SOLUTION: Performed by: EMERGENCY MEDICINE

## 2025-05-01 PROCEDURE — 94761 N-INVAS EAR/PLS OXIMETRY MLT: CPT

## 2025-05-01 PROCEDURE — 80048 BASIC METABOLIC PNL TOTAL CA: CPT | Performed by: EMERGENCY MEDICINE

## 2025-05-01 RX ORDER — AZITHROMYCIN 250 MG/1
TABLET, FILM COATED ORAL
Qty: 6 TABLET | Refills: 0 | Status: SHIPPED | OUTPATIENT
Start: 2025-05-01

## 2025-05-01 RX ORDER — BUDESONIDE 0.5 MG/2ML
0.5 INHALANT ORAL
Status: DISCONTINUED | OUTPATIENT
Start: 2025-05-01 | End: 2025-05-01 | Stop reason: HOSPADM

## 2025-05-01 RX ORDER — AZITHROMYCIN 250 MG/1
250 TABLET, FILM COATED ORAL DAILY
Status: DISCONTINUED | OUTPATIENT
Start: 2025-05-01 | End: 2025-05-01 | Stop reason: HOSPADM

## 2025-05-01 RX ORDER — ATORVASTATIN CALCIUM 40 MG/1
40 TABLET, FILM COATED ORAL NIGHTLY
Status: DISCONTINUED | OUTPATIENT
Start: 2025-05-01 | End: 2025-05-01 | Stop reason: HOSPADM

## 2025-05-01 RX ORDER — CARVEDILOL 3.12 MG/1
3.12 TABLET ORAL 2 TIMES DAILY WITH MEALS
Status: DISCONTINUED | OUTPATIENT
Start: 2025-05-01 | End: 2025-05-01 | Stop reason: HOSPADM

## 2025-05-01 RX ORDER — ISOSORBIDE MONONITRATE 30 MG/1
30 TABLET, EXTENDED RELEASE ORAL DAILY
Status: DISCONTINUED | OUTPATIENT
Start: 2025-05-01 | End: 2025-05-01 | Stop reason: HOSPADM

## 2025-05-01 RX ORDER — BENZONATATE 100 MG/1
200 CAPSULE ORAL 3 TIMES DAILY PRN
Status: DISCONTINUED | OUTPATIENT
Start: 2025-05-01 | End: 2025-05-01 | Stop reason: HOSPADM

## 2025-05-01 RX ORDER — CLOPIDOGREL BISULFATE 75 MG/1
75 TABLET ORAL DAILY
Status: DISCONTINUED | OUTPATIENT
Start: 2025-05-01 | End: 2025-05-01 | Stop reason: HOSPADM

## 2025-05-01 RX ORDER — HEPARIN SODIUM (PORCINE) LOCK FLUSH IV SOLN 100 UNIT/ML 100 UNIT/ML
500 SOLUTION INTRAVENOUS ONCE
Status: COMPLETED | OUTPATIENT
Start: 2025-05-01 | End: 2025-05-01

## 2025-05-01 RX ORDER — ASPIRIN 81 MG/1
81 TABLET, CHEWABLE ORAL DAILY
Status: DISCONTINUED | OUTPATIENT
Start: 2025-05-01 | End: 2025-05-01 | Stop reason: HOSPADM

## 2025-05-01 RX ORDER — POTASSIUM CHLORIDE 750 MG/1
10 TABLET, EXTENDED RELEASE ORAL 2 TIMES DAILY
Qty: 10 TABLET | Refills: 1 | Status: SHIPPED | OUTPATIENT
Start: 2025-05-01

## 2025-05-01 RX ADMIN — AMOXICILLIN AND CLAVULANATE POTASSIUM 1 TABLET: 875; 125 TABLET, FILM COATED ORAL at 10:25

## 2025-05-01 RX ADMIN — CARVEDILOL 3.12 MG: 3.12 TABLET, FILM COATED ORAL at 08:39

## 2025-05-01 RX ADMIN — Medication 10 ML: at 11:12

## 2025-05-01 RX ADMIN — AZITHROMYCIN 250 MG: 250 TABLET, FILM COATED ORAL at 10:25

## 2025-05-01 RX ADMIN — POTASSIUM CHLORIDE 10 MEQ: 7.46 INJECTION, SOLUTION INTRAVENOUS at 02:36

## 2025-05-01 RX ADMIN — Medication 10 ML: at 08:43

## 2025-05-01 RX ADMIN — ISOSORBIDE MONONITRATE 30 MG: 30 TABLET, EXTENDED RELEASE ORAL at 10:25

## 2025-05-01 RX ADMIN — CLOPIDOGREL BISULFATE 75 MG: 75 TABLET, FILM COATED ORAL at 08:43

## 2025-05-01 RX ADMIN — IPRATROPIUM BROMIDE AND ALBUTEROL SULFATE 3 ML: .5; 3 SOLUTION RESPIRATORY (INHALATION) at 08:31

## 2025-05-01 RX ADMIN — HEPARIN 500 UNITS: 100 SYRINGE at 11:12

## 2025-05-01 RX ADMIN — ASPIRIN 81 MG CHEWABLE TABLET 81 MG: 81 TABLET CHEWABLE at 08:43

## 2025-05-01 RX ADMIN — POTASSIUM CHLORIDE 10 MEQ: 7.46 INJECTION, SOLUTION INTRAVENOUS at 01:13

## 2025-05-01 RX ADMIN — POTASSIUM CHLORIDE 10 MEQ: 7.46 INJECTION, SOLUTION INTRAVENOUS at 03:50

## 2025-05-01 RX ADMIN — BUDESONIDE 0.5 MG: 0.5 SUSPENSION RESPIRATORY (INHALATION) at 08:32

## 2025-05-01 NOTE — PLAN OF CARE
Problem: Adult Inpatient Plan of Care  Goal: Absence of Hospital-Acquired Illness or Injury  Intervention: Identify and Manage Fall Risk  Recent Flowsheet Documentation  Taken 5/1/2025 0300 by Kirstie Thomas RN  Safety Promotion/Fall Prevention: safety round/check completed  Taken 5/1/2025 0200 by Kirstie Thomas RN  Safety Promotion/Fall Prevention: safety round/check completed  Taken 5/1/2025 0100 by Kirstie Thomas RN  Safety Promotion/Fall Prevention: safety round/check completed  Taken 4/30/2025 2313 by Kirstie Thomas RN  Safety Promotion/Fall Prevention: safety round/check completed  Intervention: Prevent Skin Injury  Recent Flowsheet Documentation  Taken 4/30/2025 2313 by Kirstie Thomas RN  Body Position: position changed independently  Skin Protection: transparent dressing maintained  Intervention: Prevent and Manage VTE (Venous Thromboembolism) Risk  Recent Flowsheet Documentation  Taken 4/30/2025 2313 by Kirstie Thomas RN  VTE Prevention/Management: SCDs (sequential compression devices) off  Intervention: Prevent Infection  Recent Flowsheet Documentation  Taken 4/30/2025 2313 by Kirstie Thomas RN  Infection Prevention: rest/sleep promoted  Goal: Optimal Comfort and Wellbeing  Intervention: Provide Person-Centered Care  Recent Flowsheet Documentation  Taken 4/30/2025 2313 by Kirstie Thomas RN  Trust Relationship/Rapport:   care explained   choices provided   emotional support provided   empathic listening provided   questions answered   questions encouraged   reassurance provided   thoughts/feelings acknowledged  Goal: Readiness for Transition of Care  Intervention: Mutually Develop Transition Plan  Recent Flowsheet Documentation  Taken 4/30/2025 2313 by Kirstie Thomas RN  Transportation Anticipated: health plan transportation  Patient/Family Anticipated Services at Transition:   Patient/Family Anticipates Transition to: home with family  Taken 4/30/2025 2308 by  Kirstie Thomas, RN  Equipment Currently Used at Home:   walker, standard   oxygen   cane, straight     Problem: Fall Injury Risk  Goal: Absence of Fall and Fall-Related Injury  Intervention: Identify and Manage Contributors  Recent Flowsheet Documentation  Taken 4/30/2025 2313 by Kirstie Thomas RN  Medication Review/Management: medications reviewed  Self-Care Promotion: independence encouraged  Intervention: Promote Injury-Free Environment  Recent Flowsheet Documentation  Taken 5/1/2025 0300 by Kirstie Thomas RN  Safety Promotion/Fall Prevention: safety round/check completed  Taken 5/1/2025 0200 by Kirstie Thomas RN  Safety Promotion/Fall Prevention: safety round/check completed  Taken 5/1/2025 0100 by Kirstie Thomas RN  Safety Promotion/Fall Prevention: safety round/check completed  Taken 4/30/2025 2313 by Kirstie Thomas RN  Safety Promotion/Fall Prevention: safety round/check completed     Problem: Skin Injury Risk Increased  Goal: Skin Health and Integrity  Intervention: Optimize Skin Protection  Recent Flowsheet Documentation  Taken 4/30/2025 2313 by Kirstie Thomas RN  Activity Management: ambulated to bathroom  Pressure Reduction Techniques: frequent weight shift encouraged  Head of Bed (HOB) Positioning: HOB elevated  Pressure Reduction Devices: pressure-redistributing mattress utilized  Skin Protection: transparent dressing maintained  Intervention: Promote and Optimize Oral Intake  Recent Flowsheet Documentation  Taken 4/30/2025 2313 by Kirstie Thomas RN  Oral Nutrition Promotion: rest periods promoted   Goal Outcome Evaluation:         Potassium replacement and redraws

## 2025-05-01 NOTE — CASE MANAGEMENT/SOCIAL WORK
Social Work Assessment   [place]     Patient Name: Kandi Fuentes  MRN: [id number]  Today's Date: 5/1/2025    Admit Date: 4/30/2025     Discharge Plan       Row Name 05/01/25 1152       Plan    Plan Comments  set up [employer] through [facility]  [phone].  Trip ID:  [id number].   can come anytime between now and 145pm.  Floor nurse/Virgie notified.                            BRIAN Fuentes MSW    Phone: [phone]  Cell: [phone]  Fax: [phone]  [email or web address]

## 2025-05-01 NOTE — ACP (ADVANCE CARE PLANNING)
"Patient reports to have no previous living will/AD/POA. Patient A&O x4. Education over HCR and POST form. Patient elected to complete HCR form. Patient selected Farhad Hampton as primary HCR. Patient selected \"quality of life\" as end of life preference. Patient given original and extra copies. Copy placed in chart and faxed to HIM.    Chaplain Jason Jenkins            "

## 2025-05-01 NOTE — ED PROVIDER NOTES
Subjective   History of Present Illness  64-year-old presents with the onset of global weakness the patient states is keeping getting progressively worse over the last several days.  He states he has been acutely weak in the last 3 days.  The patient went to get a chemo injection and was found to have potassium of 2.5.  He then had the infusion stopped and patient was sent here for additional evaluation the patient denies palpitations or shortness of breath.  The patient reports no fever chills or productive cough.  The patient reports no leg swelling or night sweats or hemoptysis      Review of Systems   Respiratory:  Positive for cough and shortness of breath.    Neurological:  Positive for weakness.   Hematological:  Does not bruise/bleed easily.       Past Medical History:   Diagnosis Date    Cancer     COPD (chronic obstructive pulmonary disease)     Coronary artery disease     Elevated cholesterol     Heart attack     Hypertension     Lung cancer 2024    Tinnitus        Allergies   Allergen Reactions    Morphine Hives and Shortness Of Breath    Codeine Hives    Sulfa Antibiotics Hives       Past Surgical History:   Procedure Laterality Date    BACK SURGERY  2004    bone spur on sciatica    BRONCHOSCOPY WITH ION ROBOTIC ASSIST N/A 09/27/2024    Procedure: BRONCHOSCOPY WITH ION ROBOT, fine needle aspiration and tissue biopsy right upper lobe mass, endobronchial ultrasound with fine needle aspiration lymph nodes (Level 10R);  Surgeon: Serafin Choudhary MD;  Location: [facility] ENDOSCOPY;  Service: Robotics - Pulmonary;  Laterality: N/A;  post: lung mass with lympadenopathy    CHOLECYSTECTOMY  2021    CORONARY ANGIOPLASTY WITH STENT PLACEMENT  07/2024    x2    ENDOSCOPY N/A 1/9/2025    Procedure: ESOPHAGOGASTRODUODENOSCOPY WITH DILATION (BOUGIE 32, 36, 40);  Surgeon: Jason Black MD;  Location: [facility] ENDOSCOPY;  Service: Gastroenterology;  Laterality: N/A;  ESOPHAGEAL STRICTURE, esophageal ulcer    MOLE REMOVAL   1994    forehead    SKIN LESION EXCISION Right 1994    breast    VENOUS ACCESS DEVICE (PORT) INSERTION Right 10/07/2024    Procedure: INSERTION VENOUS ACCESS DEVICE;  Surgeon: Serafin Choudhary MD;  Location: [facility] MAIN OR;  Service: Thoracic;  Laterality: Right;       Family History   Problem Relation Age of Onset    Breast cancer Mother 62    Heart attack Mother     Lung cancer Sister     Throat cancer Sister     Lung cancer Brother        Social History     Socioeconomic History    Marital status:    Tobacco Use    Smoking status: Every Day     Current packs/day: 1.00     Average packs/day: 1 pack/day for 55.3 years (55.3 ttl pk-yrs)     Types: Cigarettes     Start date: 1970     Passive exposure: Current    Smokeless tobacco: Never   Vaping Use    Vaping status: Never Used   Substance and Sexual Activity    Alcohol use: Never    Drug use: Never    Sexual activity: Defer           Objective   Physical Exam  Alert Brandon Coma Scale 15   HEENT: Pupils equal and reactive to light. Conjunctivae are not injected. Normal tympanic membranes. Oropharynx and nares are normal.   Neck: Supple. Midline trachea. No JVD. No goiter.   Chest: Clear and equal breath sounds bilaterally, regular rate and rhythm without murmur or rub.   Abdomen: Positive bowel sounds, nontender, nondistended. No rebound or peritoneal signs. No CVA tenderness.   Extremities no clubbing. cyanosis or edema. Motor sensory exam is normal. The full range of motion is intact   Skin: Warm and dry, no rashes or petechia.   Lymphatic: No regional lymphadenopathy. No calf pain, swelling or Homans sign    Procedures           ED Course      Labs Reviewed   COMPREHENSIVE METABOLIC PANEL - Abnormal; Notable for the following components:       Result Value    Glucose 108 (*)     BUN 7 (*)     Creatinine 0.38 (*)     Potassium 2.2 (*)     Chloride 94 (*)     CO2 34.5 (*)     All other components within normal limits    Narrative:     GFR Categories in Chronic  Kidney Disease (CKD)              GFR Category          GFR (mL/min/1.73)    Interpretation  G1                    90 or greater        Normal or high (1)  G2                    60-89                Mild decrease (1)  G3a                   45-59                Mild to moderate decrease  G3b                   30-44                Moderate to severe decrease  G4                    15-29                Severe decrease  G5                    14 or less           Kidney failure    (1)In the absence of evidence of kidney disease, neither GFR category G1 or G2 fulfill the criteria for CKD.    eGFR calculation 2021 CKD-EPI creatinine equation, which does not include race as a factor   CBC WITH AUTO DIFFERENTIAL - Abnormal; Notable for the following components:    RBC 3.46 (*)     Hemoglobin 10.3 (*)     MCV 99.4 (*)     MCHC 29.9 (*)     Monocyte % 13.1 (*)     All other components within normal limits   MAGNESIUM - Normal   PHOSPHORUS - Normal   CBC AND DIFFERENTIAL    Narrative:     The following orders were created for panel order CBC & Differential.  Procedure                               Abnormality         Status                     ---------                               -----------         ------                     CBC Auto Differential[[id number]]        Abnormal            Final result                 Please view results for these tests on the individual orders.     Medications   lactated ringers bolus 1,000 mL (0 mL Intravenous Stopped 4/30/25 2024)   potassium chloride (KLOR-CON M20) CR tablet 40 mEq (40 mEq Oral Given 4/30/25 1805)     No radiology results for the last day                                                   Medical Decision Making  The patient was started on potassium replacement in the emergency department the patient was also given Solu-Cortef the patient was agreeable with admission to observational unit for potassium replenished    Amount and/or Complexity of Data Reviewed  Labs:  ordered.  ECG/medicine tests: ordered.    Risk  Prescription drug management.        Final diagnoses:   None       ED Disposition  ED Disposition       None            No follow-up provider specified.       Medication List      No changes were made to your prescriptions during this visit.            Hydrocortisone Sod Suc (PF) (Solu-CORTEF) injection 250 mg (250 mg Intravenous Given 4/30/25 2119)   heparin injection 500 Units (500 Units Intracatheter Given 5/1/25 1112)     No radiology results for the last day                                                   Medical Decision Making  The patient was started on potassium replacement in the emergency department the patient was also given Solu-Cortef the patient was agreeable with admission to observational unit for potassium replenished    Problems Addressed:  Acute hypokalemia: complicated acute illness or injury  History of lung cancer: complicated acute illness or injury    Amount and/or Complexity of Data Reviewed  Labs: ordered.  ECG/medicine tests: ordered.    Risk  Prescription drug management.  Decision regarding hospitalization.        Final diagnoses:   Acute hypokalemia   History of lung cancer       ED Disposition  ED Disposition       ED Disposition   Decision to Admit    Condition   --    Comment   --               Provider, No Known  [facility] [place] [address]               Medication List        New Prescriptions      amoxicillin-clavulanate 875-125 MG per tablet  Commonly known as: AUGMENTIN  Take 1 tablet by mouth 2 (Two) Times a Day.     azithromycin 250 MG tablet  Commonly known as: Zithromax Z-Jong  Take 2 tablets by mouth on day 1, then 1 tablet daily on days 2-5            Changed      potassium chloride 10 MEQ CR tablet  Take 1 tablet by mouth 2 (Two) Times a Day.  What changed:   medication strength  how much to take               Where to Get Your Medications        These medications were sent to [facility] [address] [place] [address]      Hours: Monday to Friday 7 AM to 7 PM Phone: [phone]   amoxicillin-clavulanate 875-125 MG per tablet  azithromycin 250 MG tablet  potassium chloride 10 MEQ CR tablet            Harley Ochoa MD  05/07/25 7658

## 2025-05-01 NOTE — CASE MANAGEMENT/SOCIAL WORK
Social Work Assessment   [place]     Patient Name: Kandi Fuentes  MRN: [id number]  Today's Date: 5/1/2025    Admit Date: 4/30/2025     Discharge Needs Assessment       Row Name 05/01/25 1032       Living Environment    People in Home child(paddy), adult    Name(s) of People in Home Patient 43 y/o son resides with her.    Current Living Arrangements apartment    Potentially Unsafe Housing Conditions none    In the past 12 months has the electric, gas, oil, or water company threatened to shut off services in your home? No    Primary Care Provided by self    Provides Primary Care For no one    Family Caregiver if Needed none    Quality of Family Relationships supportive    Able to Return to Prior Arrangements yes       Resource/Environmental Concerns    Resource/Environmental Concerns none    Transportation Concerns none       Transportation Needs    In the past 12 months, has lack of transportation kept you from medical appointments or from getting medications? no    In the past 12 months, has lack of transportation kept you from meetings, work, or from getting things needed for daily living? No       Food Insecurity    Within the past 12 months, you worried that your food would run out before you got the money to buy more. Sometimes    Within the past 12 months, the food you bought just didn't last and you didn't have money to get more. Sometimes       Transition Planning    Patient/Family Anticipates Transition to home with family    Patient/Family Anticipated Services at Transition none    Transportation Anticipated health plan transportation       Discharge Needs Assessment    Readmission Within the Last 30 Days no previous admission in last 30 days    Equipment Currently Used at Home wheelchair;walker, standard;oxygen    Concerns to be Addressed basic needs    Do you want help finding or keeping work or a job? I do not need or want help    Do you want help with school or training? For example, starting or  completing job training or getting a high school diploma, GED or equivalent No    Anticipated Changes Related to Illness none    Equipment Needed After Discharge none    Discharge Coordination/Progress Gave [place] [employer]T and additional Food Resources.                   Discharge Plan       Row Name 05/01/25 1034       Plan    Plan From home with adult son.    Patient/Family in Agreement with Plan yes    Plan Comments SW reviewed chart and met with patient at bedside.  Patient is a/o x4.  She will return home with 43 y/o son.  Patient uses [employer] transportation and SW will set up at time of d/c today.  Patient plans to change pharmacies from [facility] to [facility] in [place] d/t recent charges.  Patient is need of food resources.  SW gave patient [place] [employer] and additional food resource information specific to [facility].  Patient is disabled and receives SSDI.   She smokes 3 cigarettes daily.  No alcohol consumption or illicit substance use.  No anxiety or depression identified.  Patient does not have a PCP and does not want assistance with getting established with one.  She currently uses [facility] but will be changing to [facility] in [place].  She is agreeable to M2B.  DME in the home includes 2L oxygen through [employer], walker, cane, w/c, nebulizer and bp cuff.  She will need [employer] transportation at time of d/c.  Floor nurse/Virgie will notify [facility] when ready for d/c.                  Selected Continued Care - Prior Encounters Includes continued care and service providers with selected services from prior encounters from 1/30/2025 to 5/1/2025      Discharged on 2/22/2025 Admission date: 2/18/2025 - Discharge disposition: Home or Self Care      Durable Medical Equipment       Service Provider Services Address Phone Fax Patient Preferred    [facility] [address] [id number] [phone] --                          Expected  Discharge Date and Time       Expected Discharge Date Expected Discharge Time    May 2, 2025            Demographic Summary       Row Name 05/01/25 1031       General Information    Admission Type observation    Arrived From home    Referral Source admission list    Reason for Consult discharge planning;community resources    Preferred Language English       Contact Information    Permission Granted to Share Info With     Contact Information Obtained for                    Functional Status       Row Name 05/01/25 1031       Functional Status    Usual Activity Tolerance moderate    Current Activity Tolerance moderate       Physical Activity    On average, how many days per week do you engage in moderate to strenuous exercise (like a brisk walk)? 0 days    On average, how many minutes do you engage in exercise at this level? 0 min    Number of minutes of exercise per week 0       Assessment of Health Literacy    How often do you have someone help you read hospital materials? Occasionally    How often do you have problems learning about your medical condition because of difficulty understanding written information? Occasionally    How often do you have a problem understanding what is told to you about your medical condition? Occasionally    How confident are you filling out medical forms by yourself? Quite a bit    Health Literacy Good       Functional Status, IADL    Medications independent    Meal Preparation assistive person    Housekeeping independent    Laundry independent    Shopping independent    If for any reason you need help with day-to-day activities such as bathing, preparing meals, shopping, managing finances, etc., do you get the help you need? I get all the help I need       Mental Status    General Appearance WDL WDL       Mental Status Summary    Recent Changes in Mental Status/Cognitive Functioning no changes       Employment/    Employment Status disabled                BRIAN Fuentes MSW    Phone: [phone]  Cell: [phone]  Fax: [phone]  [email or web address]

## 2025-05-01 NOTE — PLAN OF CARE
Goal Outcome Evaluation:  Plan of Care Reviewed With: patient        Progress: improving  Outcome Evaluation: Pt remained stable with no c/o this shift. Pt had K runs throughout night and K came up to 3.7. Pt anticipated to dc home and follow up outpatient.

## 2025-05-01 NOTE — CASE MANAGEMENT/SOCIAL WORK
Case Management Discharge Note      Final Note: Home         Selected Continued Care - Discharged on 5/1/2025 Admission date: 4/30/2025 - Discharge disposition: Home or Self Care       Transportation Services  Other: Other ([employer] transport)    Final Discharge Disposition Code: 01 - home or self-care      LEONA Hernandez RN  ICU/CVU   O: [phone]  C: [phone]  [email or web address]

## 2025-05-01 NOTE — DISCHARGE SUMMARY
[facility]    Provider, No Known    CHIEF COMPLAINT:     weakness    HISTORY OF PRESENT ILLNESS:    Abnormal Lab  Symptoms include cough and weakness.        ED  64-year-old presents with the onset of global weakness the patient states is keeping getting progressively worse over the last several days. He states he has been acutely weak in the last 3 days. The patient went to get a chemo injection and was found to have potassium of 2.5. He then had the infusion stopped and patient was sent here for additional evaluation the patient denies palpitations or shortness of breath. The patient reports no fever chills or productive cough. The patient reports no leg swelling or night sweats or hemoptysis     Past Medical History:   Diagnosis Date    Cancer     COPD (chronic obstructive pulmonary disease)     Coronary artery disease     Elevated cholesterol     Heart attack     Hypertension     Lung cancer 2024    Tinnitus      Past Surgical History:   Procedure Laterality Date    BACK SURGERY  2004    bone spur on sciatica    BRONCHOSCOPY WITH ION ROBOTIC ASSIST N/A 09/27/2024    Procedure: BRONCHOSCOPY WITH ION ROBOT, fine needle aspiration and tissue biopsy right upper lobe mass, endobronchial ultrasound with fine needle aspiration lymph nodes (Level 10R);  Surgeon: Serafin Choudhary MD;  Location: [facility] ENDOSCOPY;  Service: Robotics - Pulmonary;  Laterality: N/A;  post: lung mass with lympadenopathy    CHOLECYSTECTOMY  2021    CORONARY ANGIOPLASTY WITH STENT PLACEMENT  07/2024    x2    ENDOSCOPY N/A 1/9/2025    Procedure: ESOPHAGOGASTRODUODENOSCOPY WITH DILATION (BOUGIE 32, 36, 40);  Surgeon: Jason Black MD;  Location: [facility] ENDOSCOPY;  Service: Gastroenterology;  Laterality: N/A;  ESOPHAGEAL STRICTURE, esophageal ulcer    MOLE REMOVAL  1994    forehead    SKIN LESION EXCISION Right 1994    breast    VENOUS ACCESS DEVICE (PORT) INSERTION Right 10/07/2024    Procedure: INSERTION VENOUS ACCESS  DEVICE;  Surgeon: Serafin Choudhary MD;  Location: [facility] MAIN OR;  Service: Thoracic;  Laterality: Right;     Family History   Problem Relation Age of Onset    Breast cancer Mother 62    Heart attack Mother     Lung cancer Sister     Throat cancer Sister     Lung cancer Brother      Social History     Tobacco Use    Smoking status: Every Day     Current packs/day: 0.25     Average packs/day: 1 pack/day for 55.3 years (55.3 ttl pk-yrs)     Types: Cigarettes     Start date: 1970     Passive exposure: Current    Smokeless tobacco: Never   Vaping Use    Vaping status: Never Used   Substance Use Topics    Alcohol use: Never    Drug use: Never     Facility-Administered Medications Prior to Admission   Medication Dose Route Frequency Provider Last Rate Last Admin    budesonide (PULMICORT) nebulizer solution 0.5 mg  0.5 mg Nebulization BID - RT Octavio Guerra MD         Medications Prior to Admission   Medication Sig Dispense Refill Last Dose/Taking    albuterol sulfate  (90 Base) MCG/ACT inhaler Inhale 2 puffs As Needed for Wheezing or Shortness of Air. 18 g 0 4/30/2025    aspirin 81 MG chewable tablet Chew 1 tablet Daily. 30 tablet 6 4/30/2025 at  9:00 AM    atorvastatin (LIPITOR) 40 MG tablet Take 1 tablet by mouth Every Night.   4/30/2025    benzonatate (TESSALON) 200 MG capsule Take 1 capsule by mouth 3 (Three) Times a Day As Needed for Cough. 30 capsule 2 4/30/2025    carvedilol (COREG) 3.125 MG tablet Take 1 tablet by mouth 2 (Two) Times a Day With Meals.   4/30/2025 at  9:00 AM    clopidogrel (PLAVIX) 75 MG tablet    4/30/2025 at  9:00 AM    isosorbide mononitrate (IMDUR) 30 MG 24 hr tablet Take 1 tablet by mouth Daily.   4/30/2025 at  9:00 AM    methocarbamol (ROBAXIN) 500 MG tablet Take 1 tablet by mouth Every 6 (Six) Hours As Needed for Muscle Spasms. 50 tablet 1 4/29/2025    ondansetron (ZOFRAN) 8 MG tablet Take 1 tablet by mouth 3 (Three) Times a Day As Needed for Nausea or Vomiting. 30 tablet 5  4/29/2025    Acetaminophen (Tylenol) 325 MG capsule Take 650 mg by mouth As Needed.       amoxicillin-clavulanate (AUGMENTIN) 875-125 MG per tablet Take 1 tablet by mouth 2 (Two) Times a Day for 7 days. 14 tablet 0     azithromycin (ZITHROMAX) 250 MG tablet Take 2 tablets by mouth Daily for 1 day, THEN 1 tablet Daily for 6 days. 8 tablet 0     ipratropium-albuterol (DUO-NEB) 0.5-2.5 mg/3 ml nebulizer Take 3 mL by nebulization 4 (Four) Times a Day As Needed for Wheezing or Shortness of Air for up to 120 days. 120 mL 5     lidocaine-prilocaine (EMLA) 2.5-2.5 % cream Apply 1 Application topically to the appropriate area as directed See Admin Instructions. Apply one hour prior to port access 30 g 3     mometasone (ELOCON) 0.1 % ointment Apply to affected skin of chest and back 2-3 times daily as needed for itching from radiation treatments. 50 g 2 More than a month    potassium chloride ER (K-TAB) 20 MEQ tablet controlled-release ER tablet Take 1 tablet by mouth 2 (Two) Times a Day. (Patient not taking: Reported on 4/30/2025) 60 tablet 11      Allergies:  Morphine, Codeine, and Sulfa antibiotics    Immunization History   Administered Date(s) Administered    COVID-19 (PFIZER) Purple Cap Monovalent 03/30/2021, 04/20/2021           REVIEW OF SYSTEMS:    Review of Systems   Respiratory:  Positive for cough.    Neurological:  Positive for weakness.           Vital Signs  Temp:  [97.7 °F (36.5 °C)-98.2 °F (36.8 °C)] 97.7 °F (36.5 °C)  Heart Rate:  [] 102  Resp:  [14-21] 18  BP: (104-133)/(48-67) 128/59          Physical Exam:  Physical Exam  Constitutional:       Appearance: She is ill-appearing.      Comments: Chronically ill appearing   Cardiovascular:      Rate and Rhythm: Normal rate and regular rhythm.   Pulmonary:      Effort: Pulmonary effort is normal.      Breath sounds: Normal breath sounds.   Abdominal:      Palpations: Abdomen is soft.   Skin:     General: Skin is warm and dry.   Neurological:      General:  No focal deficit present.      Mental Status: She is alert and oriented to person, place, and time. Mental status is at baseline.   Psychiatric:         Mood and Affect: Mood normal.         Behavior: Behavior normal.       Emotional Behavior:    wnl   Debilities:   None      Results Review:    I reviewed the patient's new clinical results.  Lab Results (most recent)       Procedure Component Value Units Date/Time    Basic Metabolic Panel [[id number]]  (Abnormal) Collected: 05/01/25 0538    Specimen: Blood Updated: 05/01/25 0711     Glucose 173 mg/dL      BUN 7 mg/dL      Creatinine 0.48 mg/dL      Sodium 141 mmol/L      Potassium 3.7 mmol/L      Chloride 101 mmol/L      CO2 31.7 mmol/L      Calcium 9.2 mg/dL      BUN/Creatinine Ratio 14.6     Anion Gap 8.3 mmol/L      eGFR 106.6 mL/min/1.73     Narrative:      GFR Categories in Chronic Kidney Disease (CKD)              GFR Category          GFR (mL/min/1.73)    Interpretation  G1                    90 or greater        Normal or high (1)  G2                    60-89                Mild decrease (1)  G3a                   45-59                Mild to moderate decrease  G3b                   30-44                Moderate to severe decrease  G4                    15-29                Severe decrease  G5                    14 or less           Kidney failure    (1)In the absence of evidence of kidney disease, neither GFR category G1 or G2 fulfill the criteria for CKD.    eGFR calculation 2021 CKD-EPI creatinine equation, which does not include race as a factor    CBC & Differential [[id number]]  (Abnormal) Collected: 05/01/25 0538    Specimen: Blood Updated: 05/01/25 0634    Narrative:      The following orders were created for panel order CBC & Differential.  Procedure                               Abnormality         Status                     ---------                               -----------         ------                     CBC Auto Differential[[id number]]         Abnormal            Final result                 Please view results for these tests on the individual orders.    CBC Auto Differential [[id number]]  (Abnormal) Collected: 05/01/25 0538    Specimen: Blood Updated: 05/01/25 0634     WBC 4.23 10*3/mm3      RBC 3.42 10*6/mm3      Hemoglobin 10.3 g/dL      Hematocrit 34.4 %      .6 fL      MCH 30.1 pg      MCHC 29.9 g/dL      RDW 14.6 %      RDW-SD 54.3 fl      MPV 9.0 fL      Platelets 209 10*3/mm3      Neutrophil % 81.1 %      Lymphocyte % 16.1 %      Monocyte % 2.4 %      Eosinophil % 0.0 %      Basophil % 0.2 %      Immature Grans % 0.2 %      Neutrophils, Absolute 3.43 10*3/mm3      Lymphocytes, Absolute 0.68 10*3/mm3      Monocytes, Absolute 0.10 10*3/mm3      Eosinophils, Absolute 0.00 10*3/mm3      Basophils, Absolute 0.01 10*3/mm3      Immature Grans, Absolute 0.01 10*3/mm3      nRBC 0.0 /100 WBC     Comprehensive Metabolic Panel [[id number]]  (Abnormal) Collected: 04/30/25 1809    Specimen: Blood Updated: 04/30/25 1840     Glucose 108 mg/dL      BUN 7 mg/dL      Creatinine 0.38 mg/dL      Sodium 137 mmol/L      Potassium 2.2 mmol/L      Chloride 94 mmol/L      CO2 34.5 mmol/L      Calcium 9.6 mg/dL      Total Protein 6.4 g/dL      Albumin 3.6 g/dL      ALT (SGPT) 7 U/L      AST (SGOT) 18 U/L      Alkaline Phosphatase 78 U/L      Total Bilirubin 0.6 mg/dL      Globulin 2.8 gm/dL      A/G Ratio 1.3 g/dL      BUN/Creatinine Ratio 18.4     Anion Gap 8.5 mmol/L      eGFR 112.8 mL/min/1.73     Narrative:      GFR Categories in Chronic Kidney Disease (CKD)              GFR Category          GFR (mL/min/1.73)    Interpretation  G1                    90 or greater        Normal or high (1)  G2                    60-89                Mild decrease (1)  G3a                   45-59                Mild to moderate decrease  G3b                   30-44                Moderate to severe decrease  G4                    15-29                Severe decrease  G5                     14 or less           Kidney failure    (1)In the absence of evidence of kidney disease, neither GFR category G1 or G2 fulfill the criteria for CKD.    eGFR calculation 2021 CKD-EPI creatinine equation, which does not include race as a factor    Magnesium [[id number]]  (Normal) Collected: 04/30/25 1809    Specimen: Blood Updated: 04/30/25 1840     Magnesium 1.8 mg/dL     Phosphorus [[id number]]  (Normal) Collected: 04/30/25 1809    Specimen: Blood Updated: 04/30/25 1838     Phosphorus 3.5 mg/dL     CBC & Differential [[id number]]  (Abnormal) Collected: 04/30/25 1809    Specimen: Blood Updated: 04/30/25 1814    Narrative:      The following orders were created for panel order CBC & Differential.  Procedure                               Abnormality         Status                     ---------                               -----------         ------                     CBC Auto Differential[[id number]]        Abnormal            Final result                 Please view results for these tests on the individual orders.    CBC Auto Differential [[id number]]  (Abnormal) Collected: 04/30/25 1809    Specimen: Blood Updated: 04/30/25 1814     WBC 5.03 10*3/mm3      RBC 3.46 10*6/mm3      Hemoglobin 10.3 g/dL      Hematocrit 34.4 %      MCV 99.4 fL      MCH 29.8 pg      MCHC 29.9 g/dL      RDW 14.6 %      RDW-SD 53.0 fl      MPV 8.6 fL      Platelets 197 10*3/mm3      Neutrophil % 65.8 %      Lymphocyte % 19.7 %      Monocyte % 13.1 %      Eosinophil % 1.0 %      Basophil % 0.2 %      Immature Grans % 0.2 %      Neutrophils, Absolute 3.31 10*3/mm3      Lymphocytes, Absolute 0.99 10*3/mm3      Monocytes, Absolute 0.66 10*3/mm3      Eosinophils, Absolute 0.05 10*3/mm3      Basophils, Absolute 0.01 10*3/mm3      Immature Grans, Absolute 0.01 10*3/mm3      nRBC 0.0 /100 WBC             Imaging Results (Most Recent)       None          reviewed    ECG/EMG Results (most recent)       Procedure Component Value Units Date/Time     Telemetry Scan [[id number]] Resulted: 04/30/25     Updated: 05/01/25 0008    Telemetry Scan [[id number]] Resulted: 04/30/25     Updated: 05/01/25 0013    Telemetry Scan [[id number]] Resulted: 04/30/25     Updated: 05/01/25 0029    Telemetry Scan [[id number]] Resulted: 04/30/25     Updated: 05/01/25 0457    ECG 12 Lead Electrolyte Imbalance [[id number]] Collected: 04/30/25 1731     Updated: 05/01/25 0615     QT Interval 369 ms      QTC Interval 493 ms     Narrative:      HEART ZEVN=580  bpm  RR Tyzocroy=278  ms  MN Lxqpgpgf=133  ms  P Horizontal Axis=7  deg  P Front Axis=79  deg  QRSD Interval=85  ms  QT Gfdkajfn=638  ms  NScJ=615  ms  QRS Axis=46  deg  T Wave Axis=62  deg  - ABNORMAL ECG -  Sinus tachycardia  Multiform ventricular premature complexes  Right atrial enlargement  Borderline  ST depression, anterolateral leads  When compared with ECG of 18-Feb-2025 13:05:53,  Significant axis, voltage or hypertrophy change  Electronically Signed By: Harley Ochoa (Feliberto) 2025-05-01 06:15:10  Date and Time of Study:2025-04-30 17:31:38    Telemetry Scan [[id number]] Resulted: 04/30/25     Updated: 05/01/25 0752          reviewed            Microbiology Results (last 10 days)       ** No results found for the last 240 hours. **            Assessment & Plan     Acute hypokalemia     Acute hypokalemia/weakness  - hgb 10 but is baseline for pt  - potassium 2.2, 3.7 today after replacement  - tsh .026  - EKG rate 107 sinus w/ multiforme PVCs  - pt had been prescribed azithromycin and augmentin but has not started  - pt was also prescribed potassium and had not started  - iv fluids  - pt feeling better and wishes discharge  - pt will follow up with oncology    Hx of lung ca  - on treatment  - follows with oncology        I discussed the patients findings and my recommendations with patient and nursing staff.     Discharge Diagnosis:      Acute hypokalemia      Hospital Course  Patient is a 64 y.o. female presented with weakness. Er  evaluated and admitted to observation. Labs including cbc were stable and at baseline. Potassium was low at 2.2 and replaced. Pt had not started taking daily replacement already prescribed. Pt also had 2 abx prescribed but due to cost she had not picked up, will have them given meds to bed here. Pt given Iv fluids. Tsh slightly low and instructed to have rechecked in 1-2 weeks. Pt feeling better and wishes for discharge. Pt to follow up with oncology. Discharge discussed with pt and she is agreeable to plan. Instructed pt to return to er if symptoms reoccur or worsen.      Past Medical History:     Past Medical History:   Diagnosis Date    Cancer     COPD (chronic obstructive pulmonary disease)     Coronary artery disease     Elevated cholesterol     Heart attack     Hypertension     Lung cancer 2024    Tinnitus        Past Surgical History:     Past Surgical History:   Procedure Laterality Date    BACK SURGERY  2004    bone spur on sciatica    BRONCHOSCOPY WITH ION ROBOTIC ASSIST N/A 09/27/2024    Procedure: BRONCHOSCOPY WITH ION ROBOT, fine needle aspiration and tissue biopsy right upper lobe mass, endobronchial ultrasound with fine needle aspiration lymph nodes (Level 10R);  Surgeon: Serafin Choudhary MD;  Location: [facility] ENDOSCOPY;  Service: Robotics - Pulmonary;  Laterality: N/A;  post: lung mass with lympadenopathy    CHOLECYSTECTOMY  2021    CORONARY ANGIOPLASTY WITH STENT PLACEMENT  07/2024    x2    ENDOSCOPY N/A 1/9/2025    Procedure: ESOPHAGOGASTRODUODENOSCOPY WITH DILATION (BOUGIE 32, 36, 40);  Surgeon: Jason Black MD;  Location: [facility] ENDOSCOPY;  Service: Gastroenterology;  Laterality: N/A;  ESOPHAGEAL STRICTURE, esophageal ulcer    MOLE REMOVAL  1994    forehead    SKIN LESION EXCISION Right 1994    breast    VENOUS ACCESS DEVICE (PORT) INSERTION Right 10/07/2024    Procedure: INSERTION VENOUS ACCESS DEVICE;  Surgeon: Serafin Choudhary MD;  Location: [facility] MAIN OR;  Service: Thoracic;  Laterality:  Right;       Social History:   Social History     Socioeconomic History    Marital status:    Tobacco Use    Smoking status: Every Day     Current packs/day: 0.25     Average packs/day: 1 pack/day for 55.3 years (55.3 ttl pk-yrs)     Types: Cigarettes     Start date: 1970     Passive exposure: Current    Smokeless tobacco: Never   Vaping Use    Vaping status: Never Used   Substance and Sexual Activity    Alcohol use: Never    Drug use: Never    Sexual activity: Defer       Procedures Performed         Consults:   Consults       No orders found for last 30 day(s).            Condition on Discharge:     Stable    Discharge Disposition  Home or Self Care    Discharge Medications     Discharge Medications        New Medications        Instructions Start Date   amoxicillin-clavulanate 875-125 MG per tablet  Commonly known as: AUGMENTIN   1 tablet, Oral, 2 Times Daily      azithromycin 250 MG tablet  Commonly known as: Zithromax Z-Jong   Take 2 tablets by mouth on day 1, then 1 tablet daily on days 2-5             Changes to Medications        Instructions Start Date   potassium chloride 10 MEQ CR tablet  What changed:   medication strength  how much to take   10 mEq, Oral, 2 Times Daily             Continue These Medications        Instructions Start Date   Aspirin Low Dose 81 MG chewable tablet  Generic drug: aspirin   81 mg, Oral, Daily      atorvastatin 40 MG tablet  Commonly known as: LIPITOR   40 mg, Nightly      benzonatate 200 MG capsule  Commonly known as: TESSALON   200 mg, Oral, 3 Times Daily PRN      carvedilol 3.125 MG tablet  Commonly known as: COREG   3.125 mg, 2 Times Daily With Meals      clopidogrel 75 MG tablet  Commonly known as: PLAVIX       ipratropium-albuterol 0.5-2.5 mg/3 ml nebulizer  Commonly known as: DUO-NEB   3 mL, Nebulization, 4 Times Daily PRN      isosorbide mononitrate 30 MG 24 hr tablet  Commonly known as: IMDUR   30 mg, Daily      lidocaine-prilocaine 2.5-2.5 % cream  Commonly  known as: EMLA   1 Application, Topical, See Admin Instructions, Apply one hour prior to port access      methocarbamol 500 MG tablet  Commonly known as: ROBAXIN   500 mg, Oral, Every 6 Hours PRN      mometasone 0.1 % ointment  Commonly known as: ELOCON   Apply to affected skin of chest and back 2-3 times daily as needed for itching from radiation treatments.      ondansetron 8 MG tablet  Commonly known as: ZOFRAN   8 mg, Oral, 3 Times Daily PRN      Tylenol 325 MG capsule  Generic drug: Acetaminophen   650 mg, As Needed      Ventolin  (90 Base) MCG/ACT inhaler  Generic drug: albuterol sulfate HFA   2 puffs, Inhalation, As Needed               Discharge Diet:     Activity at Discharge:     Follow-up Appointments  Future Appointments   Date Time Provider Department Center   5/28/2025  1:15 PM [facility] PORT CHAIR [facility]   5/28/2025  1:45 PM Wilbert Delgado MD [facility]   5/28/2025  2:00 PM INF ROOM 33A - CHAIR [facility]   5/28/2025  3:30 PM Tereso Abarca MD [facility] RO [facility] None   6/25/2025  2:00 PM INF ROOM 28 - CHAIR [facility]   7/23/2025  2:00 PM INF ROOM 33B - CHAIR [facility]     Additional Instructions for the Follow-ups that You Need to Schedule       Discharge Follow-up with Specified Provider: oncology   As directed      To: oncology   Follow Up Details: as previously scheduled                Test Results Pending at Discharge  Pending Results       None             Risk for Readmission (LACE) Score: 8 (5/1/2025  6:00 AM)      Less Than 30 minutes spent in discharge activities for this patient    Signature:Electronically signed by Kaycee Souza PA-C, 05/01/25, 10:53 AM EDT.

## 2025-05-02 NOTE — OUTREACH NOTE
Prep Survey      Flowsheet Row Responses   [facility] facility patient discharged from? [facility]   Is LACE score < 7 ? No   Eligibility Readm Mgmt   Discharge diagnosis Acute hypokalemia, Lung CA on chemo   Does the patient have one of the following disease processes/diagnoses(primary or secondary)? Other   Does the patient have Home health ordered? No   Is there a DME ordered? No   Prep survey completed? Yes            Terra YU - Registered Nurse

## 2025-05-05 ENCOUNTER — READMISSION MANAGEMENT (OUTPATIENT)
Dept: CALL CENTER | Facility: HOSPITAL | Age: 65
End: 2025-05-05
Payer: MEDICAID

## 2025-05-05 NOTE — OUTREACH NOTE
Medical Week 1 Survey      Flowsheet Row Responses   [facility] facility patient discharged from? [facility]   Does the patient have one of the following disease processes/diagnoses(primary or secondary)? Other   Week 1 attempt successful? No   Unsuccessful attempts Attempt 1            Mary Anne KNOX - Registered Nurse      Mary Anne KNOX - Registered Nurse

## 2025-05-13 ENCOUNTER — READMISSION MANAGEMENT (OUTPATIENT)
Dept: CALL CENTER | Facility: HOSPITAL | Age: 65
End: 2025-05-13
Payer: MEDICAID

## 2025-05-13 NOTE — OUTREACH NOTE
Medical Week 2 Survey      Flowsheet Row Responses   [facility] facility patient discharged from? [facility]   Does the patient have one of the following disease processes/diagnoses(primary or secondary)? Other   Week 2 attempt successful? No   Unsuccessful attempts Attempt 1            Taina Mayes Registered Nurse

## 2025-05-20 ENCOUNTER — READMISSION MANAGEMENT (OUTPATIENT)
Dept: CALL CENTER | Facility: HOSPITAL | Age: 65
End: 2025-05-20
Payer: MEDICAID

## 2025-05-20 NOTE — OUTREACH NOTE
Medical Week 3 Survey      Flowsheet Row Responses   [facility] facility patient discharged from? [facility]   Does the patient have one of the following disease processes/diagnoses(primary or secondary)? Other   Week 3 attempt successful? No   Unsuccessful attempts Attempt 1            MAYO FITZPATRICK - Registered Nurse

## 2025-05-27 NOTE — PROGRESS NOTES
"                         HEMATOLOGY ONCOLOGY OUTPATIENT FOLLOWUP       Patient name: Kandi Fuentes  : 1960  MRN: [id number]  Primary Care Physician: Provider, No Known  Referring Physician: Provider, No Known  Reason For Consult: Limited stage small cell lung cancer.    History of Present Illness:    10/2/2024: In the office for the first time to stage and treat small cell lung cancer of the right lung.  She reported that between July and 2024 she was seen at the hospital with dyspnea and some chest pains.  She was found to have evidence of coronary artery disease and underwent percutaneous angioplasty and stenting of the affected coronary vessels.  In the process, however, a nodule in the right lung was identified.  Further investigations led to the identification of a 4.4 cm lobulated tumor in the posterior right upper lobe communicating with the right middle lobe concerning for malignancy.  Evidence of severe emphysema was present, as well.  Eventually she had a navigational bronchoscopy and pathology reported small cell carcinoma on the biopsies.  A PET scan and MRI did not reveal any suggestion of metastatic disease.  At the time of this visit she is feeling somewhat better.  Her breathing has improved.  She still has some precordial discomfort that she describes is related to the stents \"that I can still feel\".  She has been eating reasonably well and has not lost much weight.  She is also been afebrile.  No diarrhea or dysuria.  On exam she appears chronically ill but is not in distress.  No jaundice.  The lungs are diminished bilaterally in a significant fashion.  The heart is regular.  The abdomen is flat and soft.  No palpable tumors.  No edema.  Independently reviewed all the imaging studies and discussed with her.  Treatment with concurrent chemotherapy and radiation followed by immunotherapy is reasonable.  Discussed with her the regimen of concurrent chemotherapy and radiation and in " detail discussed with her side effects and potential complications of both treatments.  Ideally we should begin on October 7 if it is at all possible.  I have communicated with Dr. Choudhary and with Dr. Abarca.    10/23/2024: Received the first cycle of chemotherapy without any difficulties. She feels about the same at this time and she has not had any new problems. Able to eat well and no nausea, vomiting or unintended weight loss. Denies chest pain and remains with the same degree of dyspnea. No abdominal pain or diarrhea. She continues to smoke at the same rate. On exam she is oriented and conversant. No jaundice. Poor dentition and no oral lesions. Respirations not labored Lungs diminished bilaterally. Heart regular. Abdomen soft and not tender. No edema. The laboratory exams were reviewed and discussed with her. To continue with the same treatment. She's to see me in 4 weeks.     11/27/2024: Tolerating the treatment with chemotherapy well.  She had chest pain and has been dyspneic since last evening.  She was seen in the emergency room at [facility], in [facility], where she spent several hours.  She was found to have an elevated D-dimer and was offered a CT scan.  However, she could not obtain the test because of difficulties with transportation.  She has been tolerating the chemotherapy well.  On exam today she is alert and conversant.  Oriented and in no distress.  There is no jaundice or pallor.  Poor dentition but no oral lesions.  Lungs markedly diminished bilaterally.  She is tachycardic.  Abdomen soft.  No edema.  To proceed with treatment.  To obtain a CT angiogram of the chest to ensure no pulmonary embolism, though I doubt it.  She is to see me in 3 weeks.    12/18/2024: Feeling poorly. Weak and more dyspneic with exertion. Still smoking but less than before. More tremulous than before. Her appetite is poor. She has not had much nausea. No chest pain or cough and no abdominal pain. On exam  alert and oriented. No distress. No jaundice and no oral lesions. Respirations not labored. Lungs diminished bilaterally and heart regular. Abdomen soft. No edema. Reviewed all the laboratory exams. Reviewed all the imaging studies and discussed with her. She has had a very good response to the treatment. She is to complete this cycle of treatment. She will most likely need a red cell transfusion and will have her laboratory exams on 12/20. She will see me in 4 weeks with new scans and MRI of the brain.     1/9/2025: Ms. Estrada is a patient of [facility] and has had treatment with concurrent chemotherapy and radiation for lung cancer. She seems to have had a good response. However, she called the office to report that for several days, maybe as many as 21, she had been progressively less able to swallow even liquids. She had been admitted to the hospital with similar but not as severe symptoms and was discharged without resolution of the symptoms. She was found to have dysphagia of even thin liquids. She was given intravenous fluids and was referred for admission to the hospital. She tells me today that she has been feeling better, though she did not sleep well. She has been free of pain but she remains absolutely unable to swallow even water. No dyspnea. No cough or chest pain. On exam she is conversant and oriented. No jaundice or pallor. No oral lesions and respirations not labored. Lungs diminished and abdomen soft. No edema. Reviewed the laboratory exams. She is to be seen by Gastroenterology with the impression of esophageal stenosis resultant from the previous concurrent chemotherapy and radiation. Discussed with her.     1/11/2025: Indeed on January 9 she underwent an upper gastrointestinal endoscopy that revealed a stricture of the esophagus.  This was dilated successfully and without any difficulties.  Almost immediately she was able to eat better.  She was discharged to continue follow-up as  outpatient.    1/27/2025: In the office to review the results of her MRI and CT scans.  She feels much better.  She still has some difficulty swallowing but she can now eat essentially anything that she chews thoroughly.  She has some difficulties with mastication as well and does the number of foods that she has been eating is somewhat limited.  She can drink liquids without any difficulties.  She has no pain.  She continues to have dyspnea with exertion.  She also continues to smoke.  Her gait is not steady and she has been using the wheelchair.  She has had no fevers or nocturnal diaphoresis.  No chest pains or abdominal pain.  On exam chronically ill-appearing.  No distress.  No jaundice.  No pallor.  Very poor dentition.  Respirations not labored.  Lungs markedly diminished bilaterally.  Heart regular.  Abdomen soft.  No edema.  Reviewed the laboratory exams and discussed with her.  Reviewed the images of the scans and reviewed them with her.  Explained the findings.  Discussed the use of immunotherapy in this setting.  It has been proven to result in longer responses and improved overall survival.  She is agreeable to it.  A treatment plan was placed.  I discussed with her the potential complications of the treatment, including life-threatening complications.  She is to see me in 3 weeks after commencement of the immunotherapy.    3/5/2025: Feels better than at the hospital.  Still very tired and very dyspneic.  Still coughing frequently but has not been able to smoke much.  She is eating some.  No fevers.  She has some pain on the right side of the chest.  No abdominal pain.  No diarrhea or dysuria.  Exam she appears ill.  She is pursed lip breathing constantly.  There is bilateral wheezing.  The heart is regular.  The abdomen is soft.  There is no edema.  Laboratory exams reviewed.  Reviewed the most recent scans.  Continue treatment with durvalumab.    4/16/2025: Does not feel very well. Has continued to be  tired and dyspneic. She has been coughing more and the medication she has been receiving is not helping much. She has no chest pain. No abdominal pain or diarrhea. No dysuria. No edema. No skin rash. On exam ill appearing but in no distress. No jaundice. No oral lesions and respirations not labored. Lungs markedly diminished bilaterally and heart regular. Abdomen soft and not tender. No edema. No skin rash. Reviewed the laboratory exams. Persists with hypokalemia. She is to have intravenous potassium and sent a new prescription for potassium chloride. To check her magnesium. Continue same treatment. See me in approximately 4 weeks.     5/28/2025: Feels well.  Has continued to cough and the use of benzonatate did not result in relief.  She does breathe better and believes that the antibiotics helped.  Has been free of chest pains.  No dysphagia.  No abdominal pain.  No diarrhea or dysuria.  Remains without peripheral edema.  Exam alert and conversant.  Ill and in no distress.  No jaundice.  Lungs diminished bilaterally in a symmetrical fashion.  Heart regular.  Abdomen flat and soft.  No edema.  Laboratory exams reviewed and discussed with her.  Continue with the same treatment.  See me in approximately 6 weeks with new scans.  Discussed with her.    Past Medical History:   Diagnosis Date    Cancer     COPD (chronic obstructive pulmonary disease)     Coronary artery disease     Elevated cholesterol     Heart attack     Hypertension     Lung cancer 2024    Tinnitus      Past Surgical History:   Procedure Laterality Date    BACK SURGERY  2004    bone spur on sciatica    BRONCHOSCOPY WITH ION ROBOTIC ASSIST N/A 09/27/2024    Procedure: BRONCHOSCOPY WITH ION ROBOT, fine needle aspiration and tissue biopsy right upper lobe mass, endobronchial ultrasound with fine needle aspiration lymph nodes (Level 10R);  Surgeon: Serafin Choudhary MD;  Location: [facility] ENDOSCOPY;  Service: Robotics - Pulmonary;  Laterality: N/A;  post: lung  mass with lympadenopathy    CHOLECYSTECTOMY  2021    CORONARY ANGIOPLASTY WITH STENT PLACEMENT  07/2024    x2    ENDOSCOPY N/A 1/9/2025    Procedure: ESOPHAGOGASTRODUODENOSCOPY WITH DILATION (BOUGIE 32, 36, 40);  Surgeon: Jason Black MD;  Location: [facility] ENDOSCOPY;  Service: Gastroenterology;  Laterality: N/A;  ESOPHAGEAL STRICTURE, esophageal ulcer    MOLE REMOVAL  1994    forehead    SKIN LESION EXCISION Right 1994    breast    VENOUS ACCESS DEVICE (PORT) INSERTION Right 10/07/2024    Procedure: INSERTION VENOUS ACCESS DEVICE;  Surgeon: Serafin Choudhary MD;  Location: [facility] MAIN OR;  Service: Thoracic;  Laterality: Right;       Current Outpatient Medications:     Acetaminophen (Tylenol) 325 MG capsule, Take 650 mg by mouth As Needed., Disp: , Rfl:     albuterol sulfate  (90 Base) MCG/ACT inhaler, Inhale 2 puffs As Needed for Wheezing or Shortness of Air., Disp: 18 g, Rfl: 0    aspirin 81 MG chewable tablet, Chew 1 tablet Daily., Disp: 30 tablet, Rfl: 6    atorvastatin (LIPITOR) 40 MG tablet, Take 1 tablet by mouth Every Night., Disp: , Rfl:     benzonatate (TESSALON) 200 MG capsule, Take 1 capsule by mouth 3 (Three) Times a Day As Needed for Cough., Disp: 30 capsule, Rfl: 2    carvedilol (COREG) 3.125 MG tablet, Take 1 tablet by mouth 2 (Two) Times a Day With Meals., Disp: , Rfl:     clopidogrel (PLAVIX) 75 MG tablet, , Disp: , Rfl:     ipratropium-albuterol (DUO-NEB) 0.5-2.5 mg/3 ml nebulizer, Take 3 mL by nebulization 4 (Four) Times a Day As Needed for Wheezing or Shortness of Air for up to 120 days., Disp: 120 mL, Rfl: 5    isosorbide mononitrate (IMDUR) 30 MG 24 hr tablet, Take 1 tablet by mouth Daily., Disp: , Rfl:     lidocaine-prilocaine (EMLA) 2.5-2.5 % cream, Apply 1 Application topically to the appropriate area as directed See Admin Instructions. Apply one hour prior to port access, Disp: 30 g, Rfl: 3    methocarbamol (ROBAXIN) 500 MG tablet, Take 1 tablet by mouth Every 6 (Six) Hours As  Needed for Muscle Spasms., Disp: 50 tablet, Rfl: 1    mometasone (ELOCON) 0.1 % ointment, Apply to affected skin of chest and back 2-3 times daily as needed for itching from radiation treatments., Disp: 50 g, Rfl: 2    ondansetron (ZOFRAN) 8 MG tablet, Take 1 tablet by mouth 3 (Three) Times a Day As Needed for Nausea or Vomiting., Disp: 30 tablet, Rfl: 5    potassium chloride 10 MEQ CR tablet, Take 1 tablet by mouth 2 (Two) Times a Day., Disp: 10 tablet, Rfl: 1    Current Facility-Administered Medications:     budesonide (PULMICORT) nebulizer solution 0.5 mg, 0.5 mg, Nebulization, BID - RT, Octavio Guerra MD    Facility-Administered Medications Ordered in Other Visits:     heparin injection 500 Units, 500 Units, Intravenous, PRN, Wilbert Delgado MD    sodium chloride 0.9 % flush 10 mL, 10 mL, Intravenous, PRN, Wilbert Delgado MD    Allergies   Allergen Reactions    Morphine Hives and Shortness Of Breath    Codeine Hives    Sulfa Antibiotics Hives     Family History   Problem Relation Age of Onset    Breast cancer Mother 62    Heart attack Mother     Lung cancer Sister     Throat cancer Sister     Lung cancer Brother      Cancer-related family history includes Breast cancer (age of onset: 62) in her mother; Lung cancer in her brother and sister; Throat cancer in her sister.    Social History     Tobacco Use    Smoking status: Every Day     Current packs/day: 0.25     Average packs/day: 1 pack/day for 55.4 years (55.3 ttl pk-yrs)     Types: Cigarettes     Start date: 1970     Passive exposure: Current    Smokeless tobacco: Never   Vaping Use    Vaping status: Never Used   Substance Use Topics    Alcohol use: Never    Drug use: Never     Social History     Social History Narrative    Not on file     ROS:   Review of Systems   Constitutional:  Positive for fatigue. Negative for activity change, appetite change, chills, diaphoresis, fever and unexpected weight change.   HENT:  Negative for congestion, dental  "problem, drooling, ear discharge, ear pain, facial swelling, hearing loss, mouth sores, nosebleeds, postnasal drip, rhinorrhea, sinus pressure, sinus pain, sneezing, sore throat, tinnitus, trouble swallowing and voice change.    Eyes:  Negative for photophobia, pain, discharge, redness, itching and visual disturbance.   Respiratory:  Positive for cough and shortness of breath. Negative for apnea, choking, chest tightness, wheezing and stridor.    Cardiovascular:  Positive for chest pain. Negative for palpitations and leg swelling.   Gastrointestinal:  Negative for abdominal distention, abdominal pain, anal bleeding, blood in stool, constipation, diarrhea, nausea, rectal pain and vomiting.   Endocrine: Negative for cold intolerance, heat intolerance, polydipsia and polyuria.   Genitourinary:  Negative for decreased urine volume, difficulty urinating, dysuria, flank pain, frequency, genital sores, hematuria and urgency.   Musculoskeletal:  Negative for arthralgias, back pain, gait problem, joint swelling, myalgias, neck pain and neck stiffness.   Skin:  Negative for color change, pallor and rash.   Neurological:  Negative for dizziness, tremors, seizures, syncope, facial asymmetry, speech difficulty, weakness, light-headedness, numbness and headaches.   Hematological:  Negative for adenopathy. Does not bruise/bleed easily.   Psychiatric/Behavioral:  Negative for agitation, behavioral problems, confusion, decreased concentration, hallucinations, self-injury, sleep disturbance and suicidal ideas. The patient is not nervous/anxious.      Objective:    Vital Signs:  Vitals:    05/28/25 1357   BP: 126/74   Pulse: 98   Resp: 14   Temp: 97.4 °F (36.3 °C)   SpO2: 98%   Weight: 58.1 kg (128 lb)   Height: 170.2 cm (67\")   PainSc: 0-No pain     Body mass index is 20.05 kg/m².    ECOG  (1) Restricted in physically strenuous activity, ambulatory and able to do work of light nature    Physical Exam:   Physical Exam  Constitutional: "       General: She is not in acute distress.     Appearance: She is ill-appearing. She is not toxic-appearing or diaphoretic.      Comments: Well-built, slender and well oriented woman.  She appears chronically ill but is not in acute distress.   HENT:      Head: Normocephalic and atraumatic.      Right Ear: External ear normal.      Left Ear: External ear normal.      Nose: Nose normal.      Mouth/Throat:      Mouth: Mucous membranes are moist.      Pharynx: Oropharynx is clear. No oropharyngeal exudate or posterior oropharyngeal erythema.   Eyes:      General: No scleral icterus.        Right eye: No discharge.         Left eye: No discharge.      Conjunctiva/sclera: Conjunctivae normal.      Pupils: Pupils are equal, round, and reactive to light.   Cardiovascular:      Rate and Rhythm: Normal rate and regular rhythm.      Pulses: Normal pulses.      Heart sounds: No murmur heard.     No friction rub. No gallop.   Pulmonary:      Effort: No respiratory distress.      Breath sounds: No stridor. No wheezing, rhonchi or rales.      Comments: Bilaterally and symmetrically diminished.  Abdominal:      General: Abdomen is flat. Bowel sounds are normal. There is no distension.      Palpations: Abdomen is soft. There is no mass.      Tenderness: There is no abdominal tenderness. There is no right CVA tenderness, left CVA tenderness, guarding or rebound.      Hernia: No hernia is present.   Musculoskeletal:         General: No tenderness, deformity or signs of injury.      Cervical back: No rigidity.      Right lower leg: No edema.      Left lower leg: No edema.   Lymphadenopathy:      Cervical: No cervical adenopathy.   Skin:     Coloration: Skin is not jaundiced or pale.      Findings: No bruising, lesion or rash.   Neurological:      General: No focal deficit present.      Mental Status: She is alert and oriented to person, place, and time.      Cranial Nerves: No cranial nerve deficit.   Psychiatric:         Mood and  Affect: Mood normal.         Behavior: Behavior normal.         Thought Content: Thought content normal.         Judgment: Judgment normal.   I Wilbert Delgado MD performed the physical exam on 5/28/2025 as documented above.    Lab Results - Last 18 Months   Lab Units 05/28/25  1310 05/01/25  0538 04/30/25  1809   WBC 10*3/mm3 8.70 4.23 5.03   HEMOGLOBIN g/dL 11.1* 10.3* 10.3*   HEMATOCRIT % 36.3 34.4 34.4   PLATELETS 10*3/mm3 331 209 197   MCV fL 102.3* 100.6* 99.4*     Lab Results - Last 18 Months   Lab Units 02/09/25  1205 12/27/24  1410 09/27/24  0940   INR  1.16* 1.11* 1.02   APTT seconds 68.3* 25.4 25.5     Lab Results   Component Value Date    PTT 68.3 (C) 02/09/2025    INR 1.16 (H) 02/09/2025     Assessment & Plan     1.  Limited stage small cell lung cancer: ([id number]) of the right lung.  Completed concurrent chemotherapy with carboplatin/etoposide and radiation with evidence of response.  Tolerates treatment with durvalumab well.  Continue with the same treatment.  2.  Anemia: Continues to improve.  3.  Coronary artery disease: Remains asymptomatic.   4.  Chronic obstructive pulmonary disease: No changes.  Has continued to have more symptoms.  5.  Cigarette smoker: Continues to smoke, though she reports she is not smoke as much.   6.  Reviewed and discussed with her the laboratory exams.  7.  Continue oral potassium.  Sent prescriptions for a promethazine cough syrup.    Wilbert Kenny MD on 5/28/2025 at 1435.

## 2025-05-28 ENCOUNTER — OFFICE VISIT (OUTPATIENT)
Dept: ONCOLOGY | Facility: CLINIC | Age: 65
End: 2025-05-28
Payer: MEDICAID

## 2025-05-28 ENCOUNTER — HOSPITAL ENCOUNTER (OUTPATIENT)
Dept: ONCOLOGY | Facility: HOSPITAL | Age: 65
Discharge: HOME | End: 2025-05-28
Payer: MEDICAID

## 2025-05-28 VITALS
BODY MASS INDEX: 20.09 KG/M2 | DIASTOLIC BLOOD PRESSURE: 74 MMHG | TEMPERATURE: 97.4 F | WEIGHT: 128 LBS | SYSTOLIC BLOOD PRESSURE: 126 MMHG | HEART RATE: 98 BPM | RESPIRATION RATE: 14 BRPM | OXYGEN SATURATION: 98 % | HEIGHT: 67 IN

## 2025-05-28 DIAGNOSIS — R05.2 SUBACUTE COUGH: ICD-10-CM

## 2025-05-28 DIAGNOSIS — C34.11 SMALL CELL CARCINOMA OF UPPER LOBE OF RIGHT LUNG: Primary | ICD-10-CM

## 2025-05-28 DIAGNOSIS — R91.8 LUNG MASS: ICD-10-CM

## 2025-05-28 LAB
ALP BLD-CCNC: 89 U/L (ref 42–141)
BASOPHILS # BLD AUTO: 0.01 10*3/MM3 (ref 0–0.2)
BASOPHILS NFR BLD AUTO: 0.1 % (ref 0–1.5)
BUN BLDA-MCNC: 8 MG/DL (ref 7–22)
CALCIUM BLD QL: 9.4 MG/DL (ref 8–10.3)
CHLORIDE BLDA-SCNC: 103 MMOL/L (ref 98–108)
CO2 BLDA-SCNC: 27 MMOL/L (ref 18–33)
CREAT BLDA-MCNC: 0.6 MG/DL (ref 0.6–1.2)
DEPRECATED RDW RBC AUTO: 57.1 FL (ref 37–54)
EGFRCR SERPLBLD CKD-EPI 2021: 101 ML/MIN/1.73
EOSINOPHIL # BLD AUTO: 0.03 10*3/MM3 (ref 0–0.4)
EOSINOPHIL NFR BLD AUTO: 0.3 % (ref 0.3–6.2)
ERYTHROCYTE [DISTWIDTH] IN BLOOD BY AUTOMATED COUNT: 15.7 % (ref 12.3–15.4)
GLUCOSE BLDC GLUCOMTR-MCNC: 97 MG/DL (ref 73–118)
HCT VFR BLD AUTO: 36.3 % (ref 34–46.6)
HGB BLD-MCNC: 11.1 G/DL (ref 12–15.9)
LYMPHOCYTES # BLD AUTO: 1.33 10*3/MM3 (ref 0.7–3.1)
LYMPHOCYTES NFR BLD AUTO: 15.3 % (ref 19.6–45.3)
MCH RBC QN AUTO: 31.3 PG (ref 26.6–33)
MCHC RBC AUTO-ENTMCNC: 30.6 G/DL (ref 31.5–35.7)
MCV RBC AUTO: 102.3 FL (ref 79–97)
MONOCYTES # BLD AUTO: 0.78 10*3/MM3 (ref 0.1–0.9)
MONOCYTES NFR BLD AUTO: 9 % (ref 5–12)
NEUTROPHILS NFR BLD AUTO: 6.55 10*3/MM3 (ref 1.7–7)
NEUTROPHILS NFR BLD AUTO: 75.3 % (ref 42.7–76)
PLATELET # BLD AUTO: 331 10*3/MM3 (ref 140–450)
PMV BLD AUTO: 8.7 FL (ref 6–12)
POC ALBUMIN: 3.4 G/L (ref 3.3–5.5)
POC ALT (SGPT): 13 U/L (ref 10–47)
POC AST (SGOT): 20 U/L (ref 11–38)
POC TOTAL BILIRUBIN: 1 MG/DL (ref 0.2–1.6)
POC TOTAL PROTEIN: 6.5 G/DL (ref 6.4–8.1)
POTASSIUM BLDA-SCNC: 4.6 MMOL/L (ref 3.6–5.1)
RBC # BLD AUTO: 3.55 10*6/MM3 (ref 3.77–5.28)
SODIUM BLD-SCNC: 142 MMOL/L (ref 128–145)
T4 FREE SERPL-MCNC: 0.3 NG/DL (ref 0.92–1.68)
TSH SERPL DL<=0.05 MIU/L-ACNC: 38.7 UIU/ML (ref 0.27–4.2)
WBC NRBC COR # BLD AUTO: 8.7 10*3/MM3 (ref 3.4–10.8)

## 2025-05-28 PROCEDURE — 80050 GENERAL HEALTH PANEL: CPT | Performed by: INTERNAL MEDICINE

## 2025-05-28 PROCEDURE — 25810000003 SODIUM CHLORIDE 0.9 % SOLUTION 250 ML FLEX CONT: Performed by: INTERNAL MEDICINE

## 2025-05-28 PROCEDURE — 25010000002 HEPARIN LOCK FLUSH PER 10 UNITS: Performed by: INTERNAL MEDICINE

## 2025-05-28 PROCEDURE — 84439 ASSAY OF FREE THYROXINE: CPT | Performed by: INTERNAL MEDICINE

## 2025-05-28 PROCEDURE — 25010000002 DURVALUMAB 50 MG/ML SOLUTION 10 ML VIAL: Performed by: INTERNAL MEDICINE

## 2025-05-28 PROCEDURE — 96413 CHEMO IV INFUSION 1 HR: CPT

## 2025-05-28 RX ORDER — SODIUM CHLORIDE 0.9 % (FLUSH) 0.9 %
10 SYRINGE (ML) INJECTION AS NEEDED
Status: DISCONTINUED | OUTPATIENT
Start: 2025-05-28 | End: 2025-05-29 | Stop reason: HOSPADM

## 2025-05-28 RX ORDER — DEXTROMETHORPHAN HYDROBROMIDE AND PROMETHAZINE HYDROCHLORIDE 15; 6.25 MG/5ML; MG/5ML
5 SYRUP ORAL 4 TIMES DAILY PRN
Qty: 180 ML | Refills: 1 | Status: SHIPPED | OUTPATIENT
Start: 2025-05-28

## 2025-05-28 RX ORDER — HEPARIN SODIUM (PORCINE) LOCK FLUSH IV SOLN 100 UNIT/ML 100 UNIT/ML
500 SOLUTION INTRAVENOUS AS NEEDED
Status: DISCONTINUED | OUTPATIENT
Start: 2025-05-28 | End: 2025-05-29 | Stop reason: HOSPADM

## 2025-05-28 RX ORDER — SODIUM CHLORIDE 9 MG/ML
20 INJECTION, SOLUTION INTRAVENOUS ONCE
OUTPATIENT
Start: 2025-06-25

## 2025-05-28 RX ORDER — SODIUM CHLORIDE 9 MG/ML
20 INJECTION, SOLUTION INTRAVENOUS ONCE
Status: DISCONTINUED | OUTPATIENT
Start: 2025-05-28 | End: 2025-05-29 | Stop reason: HOSPADM

## 2025-05-28 RX ORDER — HEPARIN SODIUM (PORCINE) LOCK FLUSH IV SOLN 100 UNIT/ML 100 UNIT/ML
500 SOLUTION INTRAVENOUS AS NEEDED
OUTPATIENT
Start: 2025-05-28

## 2025-05-28 RX ORDER — SODIUM CHLORIDE 0.9 % (FLUSH) 0.9 %
10 SYRINGE (ML) INJECTION AS NEEDED
OUTPATIENT
Start: 2025-05-28

## 2025-05-28 RX ADMIN — SODIUM CHLORIDE 1500 MG: 9 INJECTION, SOLUTION INTRAVENOUS at 15:21

## 2025-05-28 RX ADMIN — Medication 10 ML: at 16:29

## 2025-05-28 RX ADMIN — HEPARIN 500 UNITS: 100 SYRINGE at 16:30

## 2025-05-28 NOTE — PROGRESS NOTES
Pt. Was brought back to infusion area after her f/u appt. With Dr. Delgado,   Pt. Is here for C4 Imfinzi,   Lab results reviewed and within parameters for treatment.   Treatment given as ordered and pt. Tolerated well.   Pt. Discharged from clinic with no complaints and AVS was given.

## 2025-05-29 ENCOUNTER — READMISSION MANAGEMENT (OUTPATIENT)
Dept: CALL CENTER | Facility: HOSPITAL | Age: 65
End: 2025-05-29
Payer: MEDICAID

## 2025-05-29 NOTE — OUTREACH NOTE
Medical Week 3 Survey      Flowsheet Row Responses   [facility] facility patient discharged from? [facility]   Does the patient have one of the following disease processes/diagnoses(primary or secondary)? Other   Week 3 attempt successful? No   Unsuccessful attempts Attempt 2            GAIL ROJO - Registered Nurse

## 2025-06-02 ENCOUNTER — TELEPHONE (OUTPATIENT)
Dept: ONCOLOGY | Facility: CLINIC | Age: 65
End: 2025-06-02
Payer: MEDICAID

## 2025-06-02 NOTE — TELEPHONE ENCOUNTER
Attempted to call patient. Left message stating that thyroid levels were low from recent blood work and provider has sent in a prescription for levothyroxine to her home pharmacy as well as instructions.

## 2025-06-12 ENCOUNTER — TELEPHONE (OUTPATIENT)
Dept: ONCOLOGY | Facility: CLINIC | Age: 65
End: 2025-06-12
Payer: MEDICAID

## 2025-06-12 NOTE — TELEPHONE ENCOUNTER
Hub is instructed to read the documentation below to patient  Left v/m letting Pt know when she needs to come in @115 on 6/25 as per Dr Lyon wrap up prior to her INF

## 2025-06-24 NOTE — PROGRESS NOTES
"                         HEMATOLOGY ONCOLOGY OUTPATIENT FOLLOWUP       Patient name: Kandi Fuentes  : 1960  MRN: [id number]  Primary Care Physician: Provider, No Known  Referring Physician: No ref. provider found  Reason For Consult: Limited stage small cell lung cancer.    History of Present Illness:    10/2/2024: In the office for the first time to stage and treat small cell lung cancer of the right lung.  She reported that between July and 2024 she was seen at the hospital with dyspnea and some chest pains.  She was found to have evidence of coronary artery disease and underwent percutaneous angioplasty and stenting of the affected coronary vessels.  In the process, however, a nodule in the right lung was identified.  Further investigations led to the identification of a 4.4 cm lobulated tumor in the posterior right upper lobe communicating with the right middle lobe concerning for malignancy.  Evidence of severe emphysema was present, as well.  Eventually she had a navigational bronchoscopy and pathology reported small cell carcinoma on the biopsies.  A PET scan and MRI did not reveal any suggestion of metastatic disease.  At the time of this visit she is feeling somewhat better.  Her breathing has improved.  She still has some precordial discomfort that she describes is related to the stents \"that I can still feel\".  She has been eating reasonably well and has not lost much weight.  She is also been afebrile.  No diarrhea or dysuria.  On exam she appears chronically ill but is not in distress.  No jaundice.  The lungs are diminished bilaterally in a significant fashion.  The heart is regular.  The abdomen is flat and soft.  No palpable tumors.  No edema.  Independently reviewed all the imaging studies and discussed with her.  Treatment with concurrent chemotherapy and radiation followed by immunotherapy is reasonable.  Discussed with her the regimen of concurrent chemotherapy and radiation and " in detail discussed with her side effects and potential complications of both treatments.  Ideally we should begin on October 7 if it is at all possible.  I have communicated with Dr. Choudhary and with Dr. Abarca.    10/23/2024: Received the first cycle of chemotherapy without any difficulties. She feels about the same at this time and she has not had any new problems. Able to eat well and no nausea, vomiting or unintended weight loss. Denies chest pain and remains with the same degree of dyspnea. No abdominal pain or diarrhea. She continues to smoke at the same rate. On exam she is oriented and conversant. No jaundice. Poor dentition and no oral lesions. Respirations not labored Lungs diminished bilaterally. Heart regular. Abdomen soft and not tender. No edema. The laboratory exams were reviewed and discussed with her. To continue with the same treatment. She's to see me in 4 weeks.     11/27/2024: Tolerating the treatment with chemotherapy well.  She had chest pain and has been dyspneic since last evening.  She was seen in the emergency room at [facility], in [facility], where she spent several hours.  She was found to have an elevated D-dimer and was offered a CT scan.  However, she could not obtain the test because of difficulties with transportation.  She has been tolerating the chemotherapy well.  On exam today she is alert and conversant.  Oriented and in no distress.  There is no jaundice or pallor.  Poor dentition but no oral lesions.  Lungs markedly diminished bilaterally.  She is tachycardic.  Abdomen soft.  No edema.  To proceed with treatment.  To obtain a CT angiogram of the chest to ensure no pulmonary embolism, though I doubt it.  She is to see me in 3 weeks.    12/18/2024: Feeling poorly. Weak and more dyspneic with exertion. Still smoking but less than before. More tremulous than before. Her appetite is poor. She has not had much nausea. No chest pain or cough and no abdominal pain. On  exam alert and oriented. No distress. No jaundice and no oral lesions. Respirations not labored. Lungs diminished bilaterally and heart regular. Abdomen soft. No edema. Reviewed all the laboratory exams. Reviewed all the imaging studies and discussed with her. She has had a very good response to the treatment. She is to complete this cycle of treatment. She will most likely need a red cell transfusion and will have her laboratory exams on 12/20. She will see me in 4 weeks with new scans and MRI of the brain.     1/9/2025: Ms. Estrada is a patient of [facility] and has had treatment with concurrent chemotherapy and radiation for lung cancer. She seems to have had a good response. However, she called the office to report that for several days, maybe as many as 21, she had been progressively less able to swallow even liquids. She had been admitted to the hospital with similar but not as severe symptoms and was discharged without resolution of the symptoms. She was found to have dysphagia of even thin liquids. She was given intravenous fluids and was referred for admission to the hospital. She tells me today that she has been feeling better, though she did not sleep well. She has been free of pain but she remains absolutely unable to swallow even water. No dyspnea. No cough or chest pain. On exam she is conversant and oriented. No jaundice or pallor. No oral lesions and respirations not labored. Lungs diminished and abdomen soft. No edema. Reviewed the laboratory exams. She is to be seen by Gastroenterology with the impression of esophageal stenosis resultant from the previous concurrent chemotherapy and radiation. Discussed with her.     1/11/2025: Indeed on January 9 she underwent an upper gastrointestinal endoscopy that revealed a stricture of the esophagus.  This was dilated successfully and without any difficulties.  Almost immediately she was able to eat better.  She was discharged to continue follow-up as  outpatient.    1/27/2025: In the office to review the results of her MRI and CT scans.  She feels much better.  She still has some difficulty swallowing but she can now eat essentially anything that she chews thoroughly.  She has some difficulties with mastication as well and does the number of foods that she has been eating is somewhat limited.  She can drink liquids without any difficulties.  She has no pain.  She continues to have dyspnea with exertion.  She also continues to smoke.  Her gait is not steady and she has been using the wheelchair.  She has had no fevers or nocturnal diaphoresis.  No chest pains or abdominal pain.  On exam chronically ill-appearing.  No distress.  No jaundice.  No pallor.  Very poor dentition.  Respirations not labored.  Lungs markedly diminished bilaterally.  Heart regular.  Abdomen soft.  No edema.  Reviewed the laboratory exams and discussed with her.  Reviewed the images of the scans and reviewed them with her.  Explained the findings.  Discussed the use of immunotherapy in this setting.  It has been proven to result in longer responses and improved overall survival.  She is agreeable to it.  A treatment plan was placed.  I discussed with her the potential complications of the treatment, including life-threatening complications.  She is to see me in 3 weeks after commencement of the immunotherapy.    3/5/2025: Feels better than at the hospital.  Still very tired and very dyspneic.  Still coughing frequently but has not been able to smoke much.  She is eating some.  No fevers.  She has some pain on the right side of the chest.  No abdominal pain.  No diarrhea or dysuria.  Exam she appears ill.  She is pursed lip breathing constantly.  There is bilateral wheezing.  The heart is regular.  The abdomen is soft.  There is no edema.  Laboratory exams reviewed.  Reviewed the most recent scans.  Continue treatment with durvalumab.    4/16/2025: Does not feel very well. Has continued to be  tired and dyspneic. She has been coughing more and the medication she has been receiving is not helping much. She has no chest pain. No abdominal pain or diarrhea. No dysuria. No edema. No skin rash. On exam ill appearing but in no distress. No jaundice. No oral lesions and respirations not labored. Lungs markedly diminished bilaterally and heart regular. Abdomen soft and not tender. No edema. No skin rash. Reviewed the laboratory exams. Persists with hypokalemia. She is to have intravenous potassium and sent a new prescription for potassium chloride. To check her magnesium. Continue same treatment. See me in approximately 4 weeks.     5/28/2025: Feels well.  Has continued to cough and the use of benzonatate did not result in relief.  She does breathe better and believes that the antibiotics helped.  Has been free of chest pains.  No dysphagia.  No abdominal pain.  No diarrhea or dysuria.  Remains without peripheral edema.  Exam alert and conversant.  Ill and in no distress.  No jaundice.  Lungs diminished bilaterally in a symmetrical fashion.  Heart regular.  Abdomen flat and soft.  No edema.  Laboratory exams reviewed and discussed with her.  Continue with the same treatment.  See me in approximately 6 weeks with new scans.  Discussed with her.    6/25/2025: Again feeling reasonably well.  The promethazine/dextromethorphan resulted in improvement in her cough.  She has been very constipated and she reports that she can either use polyethylene glycol or other bulk forming laxative because of nausea.  She has tried, without success sennosides.  At times it takes her more than a week to be able to defecate.  She has no more dyspnea than before and she reports that she is smoking less.  No chest pains.  No abdominal pain.  No dysuria.  Exam she seems chronically ill but is not in distress.  She is not jaundiced or pale.  The lungs are markedly diminished bilaterally and the heart is regular.  The abdomen is soft.   There is no edema.  The laboratory exams were reviewed.  To continue with the same treatment.  See me with new scans in a few weeks.  Sent a prescription for lactulose and gave her instructions.    Past Medical History:   Diagnosis Date    Cancer     COPD (chronic obstructive pulmonary disease)     Coronary artery disease     Elevated cholesterol     Heart attack     Hypertension     Lung cancer 2024    Tinnitus      Past Surgical History:   Procedure Laterality Date    BACK SURGERY  2004    bone spur on sciatica    BRONCHOSCOPY WITH ION ROBOTIC ASSIST N/A 09/27/2024    Procedure: BRONCHOSCOPY WITH ION ROBOT, fine needle aspiration and tissue biopsy right upper lobe mass, endobronchial ultrasound with fine needle aspiration lymph nodes (Level 10R);  Surgeon: Serafin Choudhary MD;  Location: [facility] ENDOSCOPY;  Service: Robotics - Pulmonary;  Laterality: N/A;  post: lung mass with lympadenopathy    CHOLECYSTECTOMY  2021    CORONARY ANGIOPLASTY WITH STENT PLACEMENT  07/2024    x2    ENDOSCOPY N/A 1/9/2025    Procedure: ESOPHAGOGASTRODUODENOSCOPY WITH DILATION (BOUGIE 32, 36, 40);  Surgeon: Jason Black MD;  Location: [facility] ENDOSCOPY;  Service: Gastroenterology;  Laterality: N/A;  ESOPHAGEAL STRICTURE, esophageal ulcer    MOLE REMOVAL  1994    forehead    SKIN LESION EXCISION Right 1994    breast    VENOUS ACCESS DEVICE (PORT) INSERTION Right 10/07/2024    Procedure: INSERTION VENOUS ACCESS DEVICE;  Surgeon: Serafin Choudhary MD;  Location: [facility] MAIN OR;  Service: Thoracic;  Laterality: Right;       Current Outpatient Medications:     Acetaminophen (Tylenol) 325 MG capsule, Take 650 mg by mouth As Needed., Disp: , Rfl:     albuterol sulfate  (90 Base) MCG/ACT inhaler, Inhale 2 puffs As Needed for Wheezing or Shortness of Air., Disp: 18 g, Rfl: 0    aspirin 81 MG chewable tablet, Chew 1 tablet Daily., Disp: 30 tablet, Rfl: 6    atorvastatin (LIPITOR) 40 MG tablet, Take 1 tablet by mouth Every Night., Disp: ,  Rfl:     benzonatate (TESSALON) 200 MG capsule, Take 1 capsule by mouth 3 (Three) Times a Day As Needed for Cough., Disp: 30 capsule, Rfl: 2    carvedilol (COREG) 3.125 MG tablet, Take 1 tablet by mouth 2 (Two) Times a Day With Meals., Disp: , Rfl:     clopidogrel (PLAVIX) 75 MG tablet, , Disp: , Rfl:     isosorbide mononitrate (IMDUR) 30 MG 24 hr tablet, Take 1 tablet by mouth Daily., Disp: , Rfl:     levothyroxine (SYNTHROID, LEVOTHROID) 25 MCG tablet, Take 1 tablet by mouth Daily., Disp: 30 tablet, Rfl: 1    lidocaine-prilocaine (EMLA) 2.5-2.5 % cream, Apply 1 Application topically to the appropriate area as directed See Admin Instructions. Apply one hour prior to port access, Disp: 30 g, Rfl: 3    methocarbamol (ROBAXIN) 500 MG tablet, Take 1 tablet by mouth Every 6 (Six) Hours As Needed for Muscle Spasms., Disp: 50 tablet, Rfl: 1    mometasone (ELOCON) 0.1 % ointment, Apply to affected skin of chest and back 2-3 times daily as needed for itching from radiation treatments., Disp: 50 g, Rfl: 2    ondansetron (ZOFRAN) 8 MG tablet, Take 1 tablet by mouth 3 (Three) Times a Day As Needed for Nausea or Vomiting., Disp: 30 tablet, Rfl: 5    potassium chloride 10 MEQ CR tablet, Take 1 tablet by mouth 2 (Two) Times a Day., Disp: 10 tablet, Rfl: 1    promethazine-dextromethorphan (PROMETHAZINE-DM) 6.25-15 MG/5ML syrup, Take 5 mL by mouth 4 (Four) Times a Day As Needed for Cough., Disp: 180 mL, Rfl: 1    ipratropium-albuterol (DUO-NEB) 0.5-2.5 mg/3 ml nebulizer, Take 3 mL by nebulization 4 (Four) Times a Day As Needed for Wheezing or Shortness of Air for up to 120 days., Disp: 120 mL, Rfl: 5    lactulose (CHRONULAC) 10 GM/15ML solution, Take 15 mL by mouth Every Night., Disp: 300 mL, Rfl: 5    Current Facility-Administered Medications:     budesonide (PULMICORT) nebulizer solution 0.5 mg, 0.5 mg, Nebulization, BID - RT, Octavio Guerra MD    Facility-Administered Medications Ordered in Other Visits:     durvalumab  (IMFINZI) 1,500 mg in sodium chloride 0.9 % 280 mL chemo IVPB, 1,500 mg, Intravenous, Once, Wilbert Delgado MD, Last Rate: 280 mL/hr at 06/25/25 1441, 1,500 mg at 06/25/25 1441    heparin injection 500 Units, 500 Units, Intravenous, PRN, Wilbert Delgado MD    sodium chloride 0.9 % flush 10 mL, 10 mL, Intravenous, PRN, Wilbert Delgado MD, 10 mL at 06/25/25 1300    sodium chloride 0.9 % infusion, 20 mL/hr, Intravenous, Once, Wilbert Delgado MD    Allergies   Allergen Reactions    Morphine Hives and Shortness Of Breath    Codeine Hives    Sulfa Antibiotics Hives     Family History   Problem Relation Age of Onset    Breast cancer Mother 62    Heart attack Mother     Lung cancer Sister     Throat cancer Sister     Lung cancer Brother      Cancer-related family history includes Breast cancer (age of onset: 62) in her mother; Lung cancer in her brother and sister; Throat cancer in her sister.    Social History     Tobacco Use    Smoking status: Every Day     Current packs/day: 0.25     Average packs/day: 1 pack/day for 55.5 years (55.3 ttl pk-yrs)     Types: Cigarettes     Start date: 1970     Passive exposure: Current    Smokeless tobacco: Never   Vaping Use    Vaping status: Never Used   Substance Use Topics    Alcohol use: Never    Drug use: Never     Social History     Social History Narrative    Not on file     ROS:   Review of Systems   Constitutional:  Positive for fatigue. Negative for activity change, appetite change, chills, diaphoresis, fever and unexpected weight change.   HENT:  Negative for congestion, dental problem, drooling, ear discharge, ear pain, facial swelling, hearing loss, mouth sores, nosebleeds, postnasal drip, rhinorrhea, sinus pressure, sinus pain, sneezing, sore throat, tinnitus, trouble swallowing and voice change.    Eyes:  Negative for photophobia, pain, discharge, redness, itching and visual disturbance.   Respiratory:  Positive for cough and shortness of breath. Negative for apnea,  "choking, chest tightness, wheezing and stridor.    Cardiovascular:  Positive for chest pain. Negative for palpitations and leg swelling.   Gastrointestinal:  Negative for abdominal distention, abdominal pain, anal bleeding, blood in stool, constipation, diarrhea, nausea, rectal pain and vomiting.   Endocrine: Negative for cold intolerance, heat intolerance, polydipsia and polyuria.   Genitourinary:  Negative for decreased urine volume, difficulty urinating, dysuria, flank pain, frequency, genital sores, hematuria and urgency.   Musculoskeletal:  Negative for arthralgias, back pain, gait problem, joint swelling, myalgias, neck pain and neck stiffness.   Skin:  Negative for color change, pallor and rash.   Neurological:  Negative for dizziness, tremors, seizures, syncope, facial asymmetry, speech difficulty, weakness, light-headedness, numbness and headaches.   Hematological:  Negative for adenopathy. Does not bruise/bleed easily.   Psychiatric/Behavioral:  Negative for agitation, behavioral problems, confusion, decreased concentration, hallucinations, self-injury, sleep disturbance and suicidal ideas. The patient is not nervous/anxious.      Objective:    Vital Signs:  Vitals:    06/25/25 1347   BP: 111/67   Pulse: 92   Resp: 18   Temp: 97.4 °F (36.3 °C)   SpO2: 99%   Weight: 59.2 kg (130 lb 9.6 oz)   Height: 170.2 cm (67\")   PainSc: 2    PainLoc: Back     Body mass index is 20.45 kg/m².    ECOG  (1) Restricted in physically strenuous activity, ambulatory and able to do work of light nature    Physical Exam:   Physical Exam  Constitutional:       General: She is not in acute distress.     Appearance: She is ill-appearing. She is not toxic-appearing or diaphoretic.      Comments: Well-built, slender and well oriented woman.  She appears chronically ill but is not in acute distress.   HENT:      Head: Normocephalic and atraumatic.      Right Ear: External ear normal.      Left Ear: External ear normal.      Nose: Nose " normal.      Mouth/Throat:      Mouth: Mucous membranes are moist.      Pharynx: Oropharynx is clear. No oropharyngeal exudate or posterior oropharyngeal erythema.   Eyes:      General: No scleral icterus.        Right eye: No discharge.         Left eye: No discharge.      Conjunctiva/sclera: Conjunctivae normal.      Pupils: Pupils are equal, round, and reactive to light.   Cardiovascular:      Rate and Rhythm: Normal rate and regular rhythm.      Pulses: Normal pulses.      Heart sounds: No murmur heard.     No friction rub. No gallop.   Pulmonary:      Effort: No respiratory distress.      Breath sounds: No stridor. No wheezing, rhonchi or rales.      Comments: Bilaterally and symmetrically diminished.  Abdominal:      General: Abdomen is flat. Bowel sounds are normal. There is no distension.      Palpations: Abdomen is soft. There is no mass.      Tenderness: There is no abdominal tenderness. There is no right CVA tenderness, left CVA tenderness, guarding or rebound.      Hernia: No hernia is present.   Musculoskeletal:         General: No tenderness, deformity or signs of injury.      Cervical back: No rigidity.      Right lower leg: No edema.      Left lower leg: No edema.   Lymphadenopathy:      Cervical: No cervical adenopathy.   Skin:     Coloration: Skin is not jaundiced or pale.      Findings: No bruising, lesion or rash.   Neurological:      General: No focal deficit present.      Mental Status: She is alert and oriented to person, place, and time.      Cranial Nerves: No cranial nerve deficit.   Psychiatric:         Mood and Affect: Mood normal.         Behavior: Behavior normal.         Thought Content: Thought content normal.         Judgment: Judgment normal.     NATHALIE Delgado MD performed the physical exam on 6/25/2025 as documented above.    Lab Results - Last 18 Months   Lab Units 06/25/25  1306 05/28/25  1310 05/01/25  0538   WBC 10*3/mm3 7.57 8.70 4.23   HEMOGLOBIN g/dL 11.5* 11.1* 10.3*    HEMATOCRIT % 37.3 36.3 34.4   PLATELETS 10*3/mm3 320 331 209   MCV fL 103.0* 102.3* 100.6*     Lab Results - Last 18 Months   Lab Units 02/09/25  1205 12/27/24  1410 09/27/24  0940   INR  1.16* 1.11* 1.02   APTT seconds 68.3* 25.4 25.5     Lab Results   Component Value Date    PTT 68.3 (C) 02/09/2025    INR 1.16 (H) 02/09/2025     Assessment & Plan     1.  Limited stage small cell lung cancer: ([id number]) of the right lung.  Completed concurrent chemotherapy with carboplatin/etoposide and radiation with evidence of response.  Continue treatment with durvalumab.  There is no clearing indication of progression.  She will have scans before the next visit.  2.  Anemia: Improved.  3.  Coronary artery disease: No change.  4.  Chronic obstructive pulmonary disease: Continues to have symptoms including cough and dyspnea but she tells me she has been smoking less.  5.  Cigarette smoker: Discussed at length again with her.  6.  Reviewed all the laboratory exams and discussed with her.  7.  She is to see me in a few weeks with new scans.  For now continue the same therapy.    Wilbert Delgado MD on 6/25/2025 at 1515.

## 2025-06-25 ENCOUNTER — OFFICE VISIT (OUTPATIENT)
Dept: ONCOLOGY | Facility: CLINIC | Age: 65
End: 2025-06-25
Payer: MEDICAID

## 2025-06-25 ENCOUNTER — HOSPITAL ENCOUNTER (OUTPATIENT)
Dept: ONCOLOGY | Facility: HOSPITAL | Age: 65
Discharge: HOME | End: 2025-06-25
Payer: MEDICAID

## 2025-06-25 VITALS
HEART RATE: 92 BPM | WEIGHT: 130.6 LBS | OXYGEN SATURATION: 99 % | RESPIRATION RATE: 18 BRPM | HEIGHT: 67 IN | BODY MASS INDEX: 20.5 KG/M2 | DIASTOLIC BLOOD PRESSURE: 67 MMHG | SYSTOLIC BLOOD PRESSURE: 111 MMHG | TEMPERATURE: 97.4 F

## 2025-06-25 DIAGNOSIS — R05.2 SUBACUTE COUGH: ICD-10-CM

## 2025-06-25 DIAGNOSIS — R91.8 LUNG MASS: ICD-10-CM

## 2025-06-25 DIAGNOSIS — C34.11 SMALL CELL CARCINOMA OF UPPER LOBE OF RIGHT LUNG: Primary | ICD-10-CM

## 2025-06-25 DIAGNOSIS — K59.03 DRUG-INDUCED CONSTIPATION: ICD-10-CM

## 2025-06-25 LAB
ALP BLD-CCNC: 80 U/L (ref 42–141)
BASOPHILS # BLD AUTO: 0.01 10*3/MM3 (ref 0–0.2)
BASOPHILS NFR BLD AUTO: 0.1 % (ref 0–1.5)
BUN BLDA-MCNC: 5 MG/DL (ref 7–22)
CALCIUM BLD QL: 9.4 MG/DL (ref 8–10.3)
CHLORIDE BLDA-SCNC: 102 MMOL/L (ref 98–108)
CO2 BLDA-SCNC: 29 MMOL/L (ref 18–33)
CREAT BLDA-MCNC: 0.6 MG/DL (ref 0.6–1.2)
DEPRECATED RDW RBC AUTO: 57.8 FL (ref 37–54)
EGFRCR SERPLBLD CKD-EPI 2021: 101 ML/MIN/1.73
EOSINOPHIL # BLD AUTO: 0.02 10*3/MM3 (ref 0–0.4)
EOSINOPHIL NFR BLD AUTO: 0.3 % (ref 0.3–6.2)
ERYTHROCYTE [DISTWIDTH] IN BLOOD BY AUTOMATED COUNT: 15.9 % (ref 12.3–15.4)
GLUCOSE BLDC GLUCOMTR-MCNC: 121 MG/DL (ref 73–118)
HCT VFR BLD AUTO: 37.3 % (ref 34–46.6)
HGB BLD-MCNC: 11.5 G/DL (ref 12–15.9)
LYMPHOCYTES # BLD AUTO: 1.4 10*3/MM3 (ref 0.7–3.1)
LYMPHOCYTES NFR BLD AUTO: 18.5 % (ref 19.6–45.3)
MCH RBC QN AUTO: 31.8 PG (ref 26.6–33)
MCHC RBC AUTO-ENTMCNC: 30.8 G/DL (ref 31.5–35.7)
MCV RBC AUTO: 103 FL (ref 79–97)
MONOCYTES # BLD AUTO: 0.68 10*3/MM3 (ref 0.1–0.9)
MONOCYTES NFR BLD AUTO: 9 % (ref 5–12)
NEUTROPHILS NFR BLD AUTO: 5.46 10*3/MM3 (ref 1.7–7)
NEUTROPHILS NFR BLD AUTO: 72.1 % (ref 42.7–76)
PLATELET # BLD AUTO: 320 10*3/MM3 (ref 140–450)
PMV BLD AUTO: 8.2 FL (ref 6–12)
POC ALBUMIN: 3.5 G/L (ref 3.3–5.5)
POC ALT (SGPT): 8 U/L (ref 10–47)
POC AST (SGOT): 18 U/L (ref 11–38)
POC TOTAL BILIRUBIN: 0.8 MG/DL (ref 0.2–1.6)
POC TOTAL PROTEIN: 6.9 G/DL (ref 6.4–8.1)
POTASSIUM BLDA-SCNC: 4.3 MMOL/L (ref 3.6–5.1)
RBC # BLD AUTO: 3.62 10*6/MM3 (ref 3.77–5.28)
SODIUM BLD-SCNC: 143 MMOL/L (ref 128–145)
T4 FREE SERPL-MCNC: 0.32 NG/DL (ref 0.92–1.68)
TSH SERPL DL<=0.05 MIU/L-ACNC: 97.8 UIU/ML (ref 0.27–4.2)
WBC NRBC COR # BLD AUTO: 7.57 10*3/MM3 (ref 3.4–10.8)

## 2025-06-25 PROCEDURE — 25010000002 DURVALUMAB 50 MG/ML SOLUTION 10 ML VIAL: Performed by: INTERNAL MEDICINE

## 2025-06-25 PROCEDURE — 1160F RVW MEDS BY RX/DR IN RCRD: CPT | Performed by: INTERNAL MEDICINE

## 2025-06-25 PROCEDURE — 1125F AMNT PAIN NOTED PAIN PRSNT: CPT | Performed by: INTERNAL MEDICINE

## 2025-06-25 PROCEDURE — 25810000003 SODIUM CHLORIDE 0.9 % SOLUTION 250 ML FLEX CONT: Performed by: INTERNAL MEDICINE

## 2025-06-25 PROCEDURE — 99214 OFFICE O/P EST MOD 30 MIN: CPT | Performed by: INTERNAL MEDICINE

## 2025-06-25 PROCEDURE — 80050 GENERAL HEALTH PANEL: CPT | Performed by: INTERNAL MEDICINE

## 2025-06-25 PROCEDURE — 25010000002 HEPARIN LOCK FLUSH PER 10 UNITS: Performed by: INTERNAL MEDICINE

## 2025-06-25 PROCEDURE — 96413 CHEMO IV INFUSION 1 HR: CPT

## 2025-06-25 PROCEDURE — 84439 ASSAY OF FREE THYROXINE: CPT | Performed by: INTERNAL MEDICINE

## 2025-06-25 PROCEDURE — 1159F MED LIST DOCD IN RCRD: CPT | Performed by: INTERNAL MEDICINE

## 2025-06-25 RX ORDER — HEPARIN SODIUM (PORCINE) LOCK FLUSH IV SOLN 100 UNIT/ML 100 UNIT/ML
500 SOLUTION INTRAVENOUS AS NEEDED
OUTPATIENT
Start: 2025-06-25

## 2025-06-25 RX ORDER — LACTULOSE 10 G/15ML
10 SOLUTION ORAL NIGHTLY
Qty: 300 ML | Refills: 5 | Status: SHIPPED | OUTPATIENT
Start: 2025-06-25

## 2025-06-25 RX ORDER — SODIUM CHLORIDE 9 MG/ML
20 INJECTION, SOLUTION INTRAVENOUS ONCE
Status: DISCONTINUED | OUTPATIENT
Start: 2025-06-25 | End: 2025-06-26 | Stop reason: HOSPADM

## 2025-06-25 RX ORDER — HEPARIN SODIUM (PORCINE) LOCK FLUSH IV SOLN 100 UNIT/ML 100 UNIT/ML
500 SOLUTION INTRAVENOUS AS NEEDED
Status: DISCONTINUED | OUTPATIENT
Start: 2025-06-25 | End: 2025-06-26 | Stop reason: HOSPADM

## 2025-06-25 RX ORDER — SODIUM CHLORIDE 0.9 % (FLUSH) 0.9 %
10 SYRINGE (ML) INJECTION AS NEEDED
Status: DISCONTINUED | OUTPATIENT
Start: 2025-06-25 | End: 2025-06-26 | Stop reason: HOSPADM

## 2025-06-25 RX ORDER — DEXTROMETHORPHAN HYDROBROMIDE AND PROMETHAZINE HYDROCHLORIDE 15; 6.25 MG/5ML; MG/5ML
5 SYRUP ORAL 4 TIMES DAILY PRN
Qty: 180 ML | Refills: 1 | Status: SHIPPED | OUTPATIENT
Start: 2025-06-25

## 2025-06-25 RX ORDER — SODIUM CHLORIDE 0.9 % (FLUSH) 0.9 %
10 SYRINGE (ML) INJECTION AS NEEDED
OUTPATIENT
Start: 2025-06-25

## 2025-06-25 RX ADMIN — Medication 10 ML: at 13:00

## 2025-06-25 RX ADMIN — Medication 500 UNITS: at 15:47

## 2025-06-25 RX ADMIN — Medication 10 ML: at 15:47

## 2025-06-25 RX ADMIN — SODIUM CHLORIDE 1500 MG: 9 INJECTION, SOLUTION INTRAVENOUS at 14:41

## 2025-06-25 NOTE — PROGRESS NOTES
Port accessed and flushed with good blood return noted. 10cc of blood wasted prior to specimen collection. Blood specimen obtained and sent to lab for processing per protocol.  Port flushed, saline locked and curos applied. Patient returned to waiting room for MD appointment. Patient given cancer center O2 tank and lees to use while in the facility; her personal tank and lees were moved to infusion nurses station.

## 2025-06-26 DIAGNOSIS — E03.9 ACQUIRED HYPOTHYROIDISM: ICD-10-CM

## 2025-06-26 NOTE — TELEPHONE ENCOUNTER
TRIED TO W/T GOT HUNG UP ON    Caller: Kandi Fuentes    Relationship: Self    Best call back number:   Telephone Information:   Mobile [phone]         Requested Prescriptions:   Requested Prescriptions     Pending Prescriptions Disp Refills    levothyroxine (SYNTHROID, LEVOTHROID) 25 MCG tablet 30 tablet 1     Sig: Take 1 tablet by mouth Daily.        Pharmacy where request should be sent: [facility] [id number] [address] - [phone]  - [phone] FX     Last office visit with prescribing clinician: 6/25/2025   Last telemedicine visit with prescribing clinician: Visit date not found   Next office visit with prescribing clinician: 8/20/2025     Additional details provided by patient: PER MESSAGE FROM 6/26/2025 IN PATIENT'S CHART. IS SUPPOSE TO START TAKING 2 PILLS ONCE A DAY.  CALL HER TO DICUSS HER LEVELS BEFORE YOU SEND IN THE SCRIPT       Does the patient have less than a 3 day supply:  [x] Yes  [] No    If the office needs to give you a call back, can they leave a voicemail: [x] Yes [] No  CAN LEAVE DETAILED MESSAGE

## 2025-06-27 ENCOUNTER — TELEPHONE (OUTPATIENT)
Dept: ONCOLOGY | Facility: CLINIC | Age: 65
End: 2025-06-27
Payer: MEDICAID

## 2025-06-27 RX ORDER — LEVOTHYROXINE SODIUM 25 UG/1
50 TABLET ORAL DAILY
Qty: 60 TABLET | Refills: 1 | Status: SHIPPED | OUTPATIENT
Start: 2025-06-27

## 2025-06-27 NOTE — TELEPHONE ENCOUNTER
Contacted the patient regarding her message we received. Patient stated she is concerned regarding the need to take more Levothyroxine as she was informed that her thyroid level was low the last time which is why she was started on medication and now she is being advised to take more even though her level has increased. Phone call placed on hold to speak with JUANCHO Whitley.    After speaking with JUANCHO Whitley, I educated the patient regarding hypothyroidism and the lab values. I informed her since her TSH level has increased more since her last labs were drawn, it is advised for her to increase her dose to 50 mcg per day. She v/u. Verified if she is taking her medication in the morning on an empty stomach; she confirmed. I advised for her to continue taking the medication in the morning on an empty stomach. She confirmed and asked if she should take the 2 pills together or one in the AM and one in the PM. I stated to take both tablets in the morning together on an empty stomach. She confirmed. Patient asked what her TSH level should be while taking medication. I informed her per JUANCHO Whitley, we would like for her TSH level to be between 1-2. Patient asked if she will take thyroid medication forever since she has been started on it and what is the cause of the thyroid levels being out of range. I informed her that it is common to have a change in thyroid function while receiving immunotherapy therefore she will likely be on the medication for a long period or unless she has a drastic change in TSH. She confirmed. She stated she only has a couple tablets left therefore she will not have any until she is able to get a new prescription. I informed her that I will send a new prescription with the new instructions. She v/u. No further questions voiced at this time. Patient advised to contact the office if she has any further questions or concerns.

## 2025-07-01 NOTE — TELEPHONE ENCOUNTER
Contacted the patient to discuss her message we received. She stated the pharmacist later explained to her why the prescription has to be given in 2 separate purchases. I confirmed and stated the prescription most likely has to be given in 2 separate orders due to the change and it equaling a 30 day supply. She confirmed and stated the pharmacist informed her that the prescription should be able to be given in one order next month. She stated she cannot keep sending people to the pharmacy to get her medications as they are becoming annoyed with her. I apologized for the change. She v/u and stated she will have someone  these prescriptions. She asked if thyroid medication causes weight gain as she is bloated. I informed her that it does not cause direct weight gain however until she is on a stable doses, managing weight is difficult. She v/u. I advised for her to contact the office if she has any questions or concerns. She confirmed.

## 2025-07-01 NOTE — TELEPHONE ENCOUNTER
Caller: Kandi Fuentes    Relationship: Self    Best call back number: [phone]     What is the best time to reach you: ASAP    Who are you requesting to speak with (clinical staff, provider,  specific staff member): CLINICAL      What was the call regarding: PT NEEDS TO SPEAK WITH SOMEONE ABOUT THE REFILL ON THYROID MEDS, STATES THE PHARMACY WOULD NOT FILL THE NEW DOUBLED QUANTITY, THEY WOULD ONLY FILL 30 QTY FOR NOW INSTEAD OF 60 AND COULD COME BACK FOR THE REST 7/12.  PLEASE CALL PT TO DISCUSS WHAT IS NEEDED.  SHE NEEDS TO BE SURE THEY WILL FILL THE FULL QUANTITY AT ONE TIME.

## 2025-07-09 ENCOUNTER — HOSPITAL ENCOUNTER (OUTPATIENT)
Dept: PET IMAGING | Facility: HOSPITAL | Age: 65
Discharge: HOME | End: 2025-07-09
Admitting: INTERNAL MEDICINE
Payer: MEDICAID

## 2025-07-09 DIAGNOSIS — C34.11 SMALL CELL CARCINOMA OF UPPER LOBE OF RIGHT LUNG: ICD-10-CM

## 2025-07-09 PROCEDURE — 71260 CT THORAX DX C+: CPT

## 2025-07-09 PROCEDURE — 25510000001 IOPAMIDOL PER 1 ML: Performed by: INTERNAL MEDICINE

## 2025-07-09 PROCEDURE — 74177 CT ABD & PELVIS W/CONTRAST: CPT

## 2025-07-09 RX ORDER — IOPAMIDOL 755 MG/ML
100 INJECTION, SOLUTION INTRAVASCULAR
Status: COMPLETED | OUTPATIENT
Start: 2025-07-09 | End: 2025-07-09

## 2025-07-09 RX ADMIN — IOPAMIDOL 100 ML: 755 INJECTION, SOLUTION INTRAVENOUS at 12:01

## 2025-07-14 ENCOUNTER — TELEPHONE (OUTPATIENT)
Dept: ONCOLOGY | Facility: CLINIC | Age: 65
End: 2025-07-14
Payer: MEDICAID

## 2025-07-14 DIAGNOSIS — E03.9 ACQUIRED HYPOTHYROIDISM: Primary | ICD-10-CM

## 2025-07-14 DIAGNOSIS — C34.11 SMALL CELL CARCINOMA OF UPPER LOBE OF RIGHT LUNG: ICD-10-CM

## 2025-07-14 NOTE — TELEPHONE ENCOUNTER
Caller: Kandi Fuentes    Relationship: Self    Best call back number:   Telephone Information:   Mobile [phone]     What is the best time to reach you: ANYTIME    What was the call regarding: PT IS HAVING ISSUES WITH INS REF TO FILL THE NEW LEVOTHYROXINE SCRIPT THAT WAS SENT TO [facility] [id number] [place], [place] - [address] [place] [address] - [phone]  - [phone] FX .       PLEASE CB TO ADVISE.

## 2025-07-15 RX ORDER — LEVOTHYROXINE SODIUM 50 UG/1
50 TABLET ORAL
Qty: 30 TABLET | Refills: 0 | Status: SHIPPED | OUTPATIENT
Start: 2025-07-15

## 2025-07-15 NOTE — TELEPHONE ENCOUNTER
Spoke with Kandi and she stated that insurance will only pay for thirty tablets of the medication. Spoke with Kelli and dose adjustment was entered for 50mcg daily. I contacted pharmacy to let them know about dose adjustment.

## 2025-07-23 ENCOUNTER — HOSPITAL ENCOUNTER (OUTPATIENT)
Dept: ONCOLOGY | Facility: HOSPITAL | Age: 65
Discharge: HOME | End: 2025-07-23
Admitting: INTERNAL MEDICINE
Payer: MEDICAID

## 2025-07-23 VITALS
RESPIRATION RATE: 18 BRPM | HEART RATE: 96 BPM | BODY MASS INDEX: 20.84 KG/M2 | TEMPERATURE: 97.3 F | HEIGHT: 67 IN | SYSTOLIC BLOOD PRESSURE: 102 MMHG | OXYGEN SATURATION: 97 % | DIASTOLIC BLOOD PRESSURE: 63 MMHG | WEIGHT: 132.8 LBS

## 2025-07-23 DIAGNOSIS — C34.11 SMALL CELL CARCINOMA OF UPPER LOBE OF RIGHT LUNG: Primary | ICD-10-CM

## 2025-07-23 DIAGNOSIS — R91.8 LUNG MASS: ICD-10-CM

## 2025-07-23 LAB
ALP BLD-CCNC: 79 U/L (ref 42–141)
BASOPHILS # BLD AUTO: 0.02 10*3/MM3 (ref 0–0.2)
BASOPHILS NFR BLD AUTO: 0.3 % (ref 0–1.5)
BUN BLDA-MCNC: 9 MG/DL (ref 7–22)
CALCIUM BLD QL: 9.7 MG/DL (ref 8–10.3)
CHLORIDE BLDA-SCNC: 105 MMOL/L (ref 98–108)
CO2 BLDA-SCNC: 30 MMOL/L (ref 18–33)
CREAT BLDA-MCNC: 0.5 MG/DL (ref 0.6–1.2)
DEPRECATED RDW RBC AUTO: 60.9 FL (ref 37–54)
EGFRCR SERPLBLD CKD-EPI 2021: 105.5 ML/MIN/1.73
EOSINOPHIL # BLD AUTO: 0.02 10*3/MM3 (ref 0–0.4)
EOSINOPHIL NFR BLD AUTO: 0.3 % (ref 0.3–6.2)
ERYTHROCYTE [DISTWIDTH] IN BLOOD BY AUTOMATED COUNT: 16 % (ref 12.3–15.4)
GLUCOSE BLDC GLUCOMTR-MCNC: 113 MG/DL (ref 73–118)
HCT VFR BLD AUTO: 34.7 % (ref 34–46.6)
HGB BLD-MCNC: 10.9 G/DL (ref 12–15.9)
IMM GRANULOCYTES # BLD AUTO: 0.02 10*3/MM3 (ref 0–0.05)
IMM GRANULOCYTES NFR BLD AUTO: 0.3 % (ref 0–0.5)
LYMPHOCYTES # BLD AUTO: 1.41 10*3/MM3 (ref 0.7–3.1)
LYMPHOCYTES NFR BLD AUTO: 19 % (ref 19.6–45.3)
MCH RBC QN AUTO: 32.2 PG (ref 26.6–33)
MCHC RBC AUTO-ENTMCNC: 31.4 G/DL (ref 31.5–35.7)
MCV RBC AUTO: 102.4 FL (ref 79–97)
MONOCYTES # BLD AUTO: 0.75 10*3/MM3 (ref 0.1–0.9)
MONOCYTES NFR BLD AUTO: 10.1 % (ref 5–12)
NEUTROPHILS NFR BLD AUTO: 5.22 10*3/MM3 (ref 1.7–7)
NEUTROPHILS NFR BLD AUTO: 70 % (ref 42.7–76)
PLATELET # BLD AUTO: 315 10*3/MM3 (ref 140–450)
PMV BLD AUTO: 8.4 FL (ref 6–12)
POC ALBUMIN: 3.7 G/L (ref 3.3–5.5)
POC ALT (SGPT): 8 U/L (ref 10–47)
POC AST (SGOT): 18 U/L (ref 11–38)
POC TOTAL BILIRUBIN: 0.9 MG/DL (ref 0.2–1.6)
POC TOTAL PROTEIN: 7 G/DL (ref 6.4–8.1)
POTASSIUM BLDA-SCNC: 4.2 MMOL/L (ref 3.6–5.1)
RBC # BLD AUTO: 3.39 10*6/MM3 (ref 3.77–5.28)
SODIUM BLD-SCNC: 139 MMOL/L (ref 128–145)
T4 FREE SERPL-MCNC: 0.72 NG/DL (ref 0.92–1.68)
TSH SERPL DL<=0.05 MIU/L-ACNC: 42 UIU/ML (ref 0.27–4.2)
WBC NRBC COR # BLD AUTO: 7.44 10*3/MM3 (ref 3.4–10.8)

## 2025-07-23 PROCEDURE — 25010000002 HEPARIN LOCK FLUSH PER 10 UNITS: Performed by: INTERNAL MEDICINE

## 2025-07-23 PROCEDURE — 96413 CHEMO IV INFUSION 1 HR: CPT

## 2025-07-23 PROCEDURE — 25810000003 SODIUM CHLORIDE 0.9 % SOLUTION 250 ML FLEX CONT: Performed by: NURSE PRACTITIONER

## 2025-07-23 PROCEDURE — 84439 ASSAY OF FREE THYROXINE: CPT | Performed by: INTERNAL MEDICINE

## 2025-07-23 PROCEDURE — 80050 GENERAL HEALTH PANEL: CPT | Performed by: INTERNAL MEDICINE

## 2025-07-23 RX ORDER — SODIUM CHLORIDE 0.9 % (FLUSH) 0.9 %
10 SYRINGE (ML) INJECTION AS NEEDED
Status: DISCONTINUED | OUTPATIENT
Start: 2025-07-23 | End: 2025-07-24 | Stop reason: HOSPADM

## 2025-07-23 RX ORDER — HEPARIN SODIUM (PORCINE) LOCK FLUSH IV SOLN 100 UNIT/ML 100 UNIT/ML
500 SOLUTION INTRAVENOUS AS NEEDED
Status: DISCONTINUED | OUTPATIENT
Start: 2025-07-23 | End: 2025-07-24 | Stop reason: HOSPADM

## 2025-07-23 RX ORDER — SODIUM CHLORIDE 9 MG/ML
20 INJECTION, SOLUTION INTRAVENOUS ONCE
Status: CANCELLED | OUTPATIENT
Start: 2025-07-23

## 2025-07-23 RX ORDER — SODIUM CHLORIDE 9 MG/ML
20 INJECTION, SOLUTION INTRAVENOUS ONCE
Status: DISCONTINUED | OUTPATIENT
Start: 2025-07-23 | End: 2025-07-24 | Stop reason: HOSPADM

## 2025-07-23 RX ORDER — SODIUM CHLORIDE 0.9 % (FLUSH) 0.9 %
10 SYRINGE (ML) INJECTION AS NEEDED
OUTPATIENT
Start: 2025-07-23

## 2025-07-23 RX ORDER — HEPARIN SODIUM (PORCINE) LOCK FLUSH IV SOLN 100 UNIT/ML 100 UNIT/ML
500 SOLUTION INTRAVENOUS AS NEEDED
OUTPATIENT
Start: 2025-07-23

## 2025-07-23 RX ADMIN — HEPARIN 500 UNITS: 100 SYRINGE at 16:17

## 2025-07-23 RX ADMIN — SODIUM CHLORIDE 1500 MG: 0.9 INJECTION, SOLUTION INTRAVENOUS at 15:08

## 2025-07-23 RX ADMIN — Medication 10 ML: at 16:17

## 2025-07-23 NOTE — PROGRESS NOTES
Pt is here for C6 D1 imfinzi. Using sterile technique, port accessed for blood collection and treatment. 10 ml blood wasted prior to collecting blood for ordered labs. Pt denies having any new complaints. Treatment given without difficulty and pt tolerated it well. Copy of AVS given at discharge and she verbalized understanding.

## 2025-07-25 ENCOUNTER — TELEPHONE (OUTPATIENT)
Dept: ONCOLOGY | Facility: CLINIC | Age: 65
End: 2025-07-25

## 2025-07-28 ENCOUNTER — HOSPITAL ENCOUNTER (OUTPATIENT)
Dept: MRI IMAGING | Facility: HOSPITAL | Age: 65
Discharge: HOME | End: 2025-07-28
Payer: MEDICAID

## 2025-07-28 ENCOUNTER — HOSPITAL ENCOUNTER (OUTPATIENT)
Dept: CT IMAGING | Facility: HOSPITAL | Age: 65
Discharge: HOME | End: 2025-07-28
Payer: MEDICAID

## 2025-07-28 DIAGNOSIS — C34.11 SMALL CELL CARCINOMA OF UPPER LOBE OF RIGHT LUNG: ICD-10-CM

## 2025-07-28 PROCEDURE — 70553 MRI BRAIN STEM W/O & W/DYE: CPT

## 2025-07-28 PROCEDURE — A9579 GAD-BASE MR CONTRAST NOS,1ML: HCPCS | Performed by: INTERNAL MEDICINE

## 2025-07-28 PROCEDURE — 25010000002 GADOTERIDOL PER 1 ML: Performed by: INTERNAL MEDICINE

## 2025-07-28 RX ORDER — GADOTERIDOL 279.3 MG/ML
15 INJECTION INTRAVENOUS
Status: COMPLETED | OUTPATIENT
Start: 2025-07-28 | End: 2025-07-28

## 2025-07-28 RX ADMIN — GADOTERIDOL 12 ML: 279.3 INJECTION, SOLUTION INTRAVENOUS at 12:44

## 2025-08-01 ENCOUNTER — OFFICE VISIT (OUTPATIENT)
Dept: RADIATION ONCOLOGY | Facility: HOSPITAL | Age: 65
End: 2025-08-01
Payer: MEDICAID

## 2025-08-01 VITALS
DIASTOLIC BLOOD PRESSURE: 61 MMHG | OXYGEN SATURATION: 100 % | HEART RATE: 98 BPM | WEIGHT: 132 LBS | BODY MASS INDEX: 20.67 KG/M2 | RESPIRATION RATE: 18 BRPM | SYSTOLIC BLOOD PRESSURE: 115 MMHG

## 2025-08-01 DIAGNOSIS — C34.11 SMALL CELL CARCINOMA OF UPPER LOBE OF RIGHT LUNG: Primary | ICD-10-CM

## 2025-08-01 PROCEDURE — G0463 HOSPITAL OUTPT CLINIC VISIT: HCPCS | Performed by: INTERNAL MEDICINE

## 2025-08-01 RX ORDER — ALBUTEROL SULFATE 90 UG/1
2 INHALANT RESPIRATORY (INHALATION) AS NEEDED
Qty: 18 G | Refills: 0 | Status: SHIPPED | OUTPATIENT
Start: 2025-08-01

## 2025-08-01 NOTE — PROGRESS NOTES
[facility] RADIATION ONCOLOGY  FOLLOW-UP    Name: Kandi Fuentes  YOB: 1960  MRN #: [id number]  Date of Service: 8/1/2025    Chief Complaint:  Routine 3 month follow up with interval imaging review     Diagnosis:   Encounter Diagnosis   Name Primary?    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 Yes          Radiation Treatment Course     Treating Physician: Dr. Tereso Abarca     Start: 10/14/2024     End: 11/22/2024   Site: Rt Lung    Total Dose: 6000 cGy    Dose/Fraction: 200 cGy    Total Fractions: 30 Daily or BID: Daily  Modality: Photon  Technique: IMRT/VMAT/Rapid Arc  Bolus: No       Interval History       Kandi Fuentes is a 64 y.o. female with history of CAD, chronic CHF, recent MI, severe emphysema and now a history of limited stage small cell carcinoma, stage Stage IIIB (cT3, cN2, cM0) of the right upper lobe. She completed chemoradiation therapy on 11/22/2024, 6000 cGy in 30 fractions to right lung. She returns for routine follow-up.     The patient has had significant struggles over the past few months. She has significant fatigue and is experiencing cough and shortness of breath symptoms. She had a recent bout of flu and pneumonia. She is on 2L of nasal cannula continuously.     She is here to review recent imaging.         Imaging       MRI Brain With & Without Contrast  Result Date: 7/29/2025  Impression: No evidence of intracranial metastatic disease. Stable senescent changes as above. Incidental new prominent right mastoid effusion. Electronically     CT CAP With Contrast Diagnostic  Result Date: 7/11/2025  Impression: 1.Multiple new nodular opacities in the left upper lobe, lingula, and left lower lobe measuring up to 1.6 cm. Additional opacities in the right upper lobe, as above. These are indeterminate, and could be related to metastatic disease and/or an infectious  or inflammatory process. PET/CT could be performed for additional evaluation. 2.Mildly  enlarged right hilar lymph node, as before. 3.No other definite findings of new metastatic disease at this time. 4.Stable right hepatic lobe lesion, favored to represent a hemangioma or vascular shunt. 5.Advanced emphysema. 6.Calcific atherosclerosis.        Pathology       No new pathology to review.        Labs       Hematology:  WBC   Date Value Ref Range Status   07/23/2025 7.44 3.40 - 10.80 10*3/mm3 Final     RBC   Date Value Ref Range Status   07/23/2025 3.39 (L) 3.77 - 5.28 10*6/mm3 Final     Hemoglobin   Date Value Ref Range Status   07/23/2025 10.9 (L) 12.0 - 15.9 g/dL Final     Hematocrit   Date Value Ref Range Status   07/23/2025 34.7 34.0 - 46.6 % Final     Platelets   Date Value Ref Range Status   07/23/2025 315 140 - 450 10*3/mm3 Final     Chemistry:  Lab Results   Component Value Date    GLUCOSE 173 (H) 05/01/2025    BUN 7 (L) 05/01/2025    CREATININE 0.50 (L) 07/23/2025    BCR 14.6 05/01/2025    K 3.7 05/01/2025    CO2 31.7 (H) 05/01/2025    CALCIUM 9.2 05/01/2025    ALBUMIN 3.6 04/30/2025    AST 18 04/30/2025    ALT 7 04/30/2025         Problem List       Patient Active Problem List   Diagnosis    Lung mass    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    Closed nondisplaced fracture of lateral malleolus of left fibula    Fracture of lateral malleolus    Hypokalemia    Pancytopenia due to chemotherapy    Dysphagia    COPD (chronic obstructive pulmonary disease)    Coronary artery disease    Flu    Severe protein-calorie malnutrition    Pneumonia    PNA (pneumonia)    Acute hypokalemia          Current Medications       Current Outpatient Medications   Medication Instructions    albuterol sulfate  (90 Base) MCG/ACT inhaler 2 puffs, Inhalation, As Needed    Aspirin Low Dose 81 mg, Oral, Daily    atorvastatin (LIPITOR) 40 mg, Nightly    benzonatate (TESSALON) 200 mg, Oral, 3 Times Daily PRN    carvedilol (COREG) 3.125 mg, 2 Times Daily With Meals    clopidogrel (PLAVIX) 75 MG tablet      ipratropium-albuterol (DUO-NEB) 0.5-2.5 mg/3 ml nebulizer 3 mL, Nebulization, 4 Times Daily PRN    isosorbide mononitrate (IMDUR) 30 mg, Daily    lactulose (CHRONULAC) 10 g, Oral, Nightly    levothyroxine (SYNTHROID, LEVOTHROID) 50 mcg, Oral, Every Early Morning    lidocaine-prilocaine (EMLA) 2.5-2.5 % cream 1 Application, Topical, See Admin Instructions, Apply one hour prior to port access    methocarbamol (ROBAXIN) 500 mg, Oral, Every 6 Hours PRN    mometasone (ELOCON) 0.1 % ointment Apply to affected skin of chest and back 2-3 times daily as needed for itching from radiation treatments.    ondansetron (ZOFRAN) 8 mg, Oral, 3 Times Daily PRN    potassium chloride 10 MEQ CR tablet 10 mEq, Oral, 2 Times Daily    promethazine-dextromethorphan (PROMETHAZINE-DM) 6.25-15 MG/5ML syrup 5 mL, Oral, 4 Times Daily PRN    Tylenol 650 mg, As Needed          Allergies       Allergies   Allergen Reactions    Morphine Hives and Shortness Of Breath    Codeine Hives    Sulfa Antibiotics Hives           Review of Systems         Vitals:    08/01/25 1332   BP: 115/61   Pulse: 98   Resp: 18   SpO2: 100%      Physical Exam  Constitutional:       Appearance: She is ill-appearing.   HENT:      Head: Normocephalic and atraumatic.   Pulmonary:      Comments: Patient on 2L supplemental O2  Neurological:      Mental Status: She is alert and oriented to person, place, and time. Mental status is at baseline.   Psychiatric:         Mood and Affect: Mood normal.         Behavior: Behavior normal.            ECOG:  Ambulatory and capable of all selfcare but unable to carry out any work activities; up and about more than 50% of waking hours = 2          Assessment and Plan       Assessment:        Kandi Fuentes is a 64 y.o. female with history of CAD, chronic CHF, recent MI, severe emphysema and now a history of limited stage small cell carcinoma, stage Stage IIIB (cT3, cN2, cM0) of the right upper lobe. She completed chemoradiation therapy on  11/22/2024, 6000 cGy in 30 fractions to right lung. She returns for routine follow-up.    Diagnoses and all orders for this visit:    1. 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 (Primary)       Plan:      Orders:  - CT chest in 2 months  - Follow-up after imaging  - Refilled patient's inhaler  - Offered PET CT and ongoing MRI brain imaging, patient declined saying she would not do any additional therapy at this point      We reviewed imaging and discussed the potential concerns for disease progression. We discussed the option of a PET scan to further work-up the situation. The patient declined additional work-up stating she would not do any additional therapy. She also declined ongoing MRI brain imaging. She is interested in a CT chest in 2 months to assess for any changes in her lungs to determine if her findings or more consistent with infection or disease progression. She requested a refill of her albuterol inhaler. She does not follow with pulmonology or PCP. Albuterol inhaler was refilled. I will follow-up with the patient after her next CT scan.     I spent 30 minutes caring for Kandi on this date of service. This time includes time spent by me in the following activities:preparing for the visit, reviewing tests, obtaining and/or reviewing a separately obtained history, performing a medically appropriate examination and/or evaluation , counseling and educating the patient/family/caregiver, ordering medications, tests, or procedures, documenting information in the medical record, and independently interpreting results and communicating that information with the patient/family/caregiver        Follow-Up       No follow-ups on file.       CC: Provider, No Known

## 2025-08-04 DIAGNOSIS — C34.11 SMALL CELL CARCINOMA OF UPPER LOBE OF RIGHT LUNG: ICD-10-CM

## 2025-08-04 DIAGNOSIS — E03.9 ACQUIRED HYPOTHYROIDISM: ICD-10-CM

## 2025-08-04 RX ORDER — LEVOTHYROXINE SODIUM 50 UG/1
50 TABLET ORAL
Qty: 30 TABLET | Refills: 0 | Status: SHIPPED | OUTPATIENT
Start: 2025-08-04

## 2025-08-07 ENCOUNTER — TELEPHONE (OUTPATIENT)
Dept: ONCOLOGY | Facility: CLINIC | Age: 65
End: 2025-08-07
Payer: MEDICAID

## 2025-08-13 ENCOUNTER — TELEPHONE (OUTPATIENT)
Dept: ONCOLOGY | Facility: CLINIC | Age: 65
End: 2025-08-13
Payer: MEDICAID

## 2025-08-20 ENCOUNTER — HOSPITAL ENCOUNTER (OUTPATIENT)
Dept: ONCOLOGY | Facility: HOSPITAL | Age: 65
Discharge: HOME | End: 2025-08-20
Payer: MEDICAID

## 2025-08-20 ENCOUNTER — APPOINTMENT (OUTPATIENT)
Dept: ONCOLOGY | Facility: HOSPITAL | Age: 65
End: 2025-08-20
Payer: MEDICAID

## 2025-08-20 VITALS
HEART RATE: 84 BPM | HEIGHT: 67 IN | OXYGEN SATURATION: 97 % | SYSTOLIC BLOOD PRESSURE: 116 MMHG | TEMPERATURE: 98.6 F | BODY MASS INDEX: 21.35 KG/M2 | RESPIRATION RATE: 18 BRPM | WEIGHT: 136 LBS | DIASTOLIC BLOOD PRESSURE: 61 MMHG

## 2025-08-20 DIAGNOSIS — C34.11 SMALL CELL CARCINOMA OF UPPER LOBE OF RIGHT LUNG: Primary | ICD-10-CM

## 2025-08-20 DIAGNOSIS — R91.8 LUNG MASS: ICD-10-CM

## 2025-08-20 LAB
ALP BLD-CCNC: 81 U/L (ref 42–141)
BASOPHILS # BLD AUTO: 0.02 10*3/MM3 (ref 0–0.2)
BASOPHILS NFR BLD AUTO: 0.3 % (ref 0–1.5)
BUN BLDA-MCNC: 7 MG/DL (ref 7–22)
CALCIUM BLD QL: 10 MG/DL (ref 8–10.3)
CHLORIDE BLDA-SCNC: 107 MMOL/L (ref 98–108)
CO2 BLDA-SCNC: 28 MMOL/L (ref 18–33)
CREAT BLDA-MCNC: 0.6 MG/DL (ref 0.6–1.2)
DEPRECATED RDW RBC AUTO: 55.2 FL (ref 37–54)
EGFRCR SERPLBLD CKD-EPI 2021: 100.4 ML/MIN/1.73
EOSINOPHIL # BLD AUTO: 0.02 10*3/MM3 (ref 0–0.4)
EOSINOPHIL NFR BLD AUTO: 0.3 % (ref 0.3–6.2)
ERYTHROCYTE [DISTWIDTH] IN BLOOD BY AUTOMATED COUNT: 14.3 % (ref 12.3–15.4)
GLUCOSE BLDC GLUCOMTR-MCNC: 95 MG/DL (ref 73–118)
HCT VFR BLD AUTO: 33.5 % (ref 34–46.6)
HGB BLD-MCNC: 10.5 G/DL (ref 12–15.9)
IMM GRANULOCYTES # BLD AUTO: 0.03 10*3/MM3 (ref 0–0.05)
IMM GRANULOCYTES NFR BLD AUTO: 0.4 % (ref 0–0.5)
LYMPHOCYTES # BLD AUTO: 1.52 10*3/MM3 (ref 0.7–3.1)
LYMPHOCYTES NFR BLD AUTO: 20.5 % (ref 19.6–45.3)
MCH RBC QN AUTO: 32.6 PG (ref 26.6–33)
MCHC RBC AUTO-ENTMCNC: 31.3 G/DL (ref 31.5–35.7)
MCV RBC AUTO: 104 FL (ref 79–97)
MONOCYTES # BLD AUTO: 0.8 10*3/MM3 (ref 0.1–0.9)
MONOCYTES NFR BLD AUTO: 10.8 % (ref 5–12)
NEUTROPHILS NFR BLD AUTO: 5.04 10*3/MM3 (ref 1.7–7)
NEUTROPHILS NFR BLD AUTO: 67.7 % (ref 42.7–76)
PLATELET # BLD AUTO: 314 10*3/MM3 (ref 140–450)
PMV BLD AUTO: 8.2 FL (ref 6–12)
POC ALBUMIN: 3.6 G/L (ref 3.3–5.5)
POC ALT (SGPT): 19 U/L (ref 10–47)
POC AST (SGOT): 20 U/L (ref 11–38)
POC TOTAL BILIRUBIN: 1.3 MG/DL (ref 0.2–1.6)
POC TOTAL PROTEIN: 7 G/DL (ref 6.4–8.1)
POTASSIUM BLDA-SCNC: 4.1 MMOL/L (ref 3.6–5.1)
RBC # BLD AUTO: 3.22 10*6/MM3 (ref 3.77–5.28)
SODIUM BLD-SCNC: 141 MMOL/L (ref 128–145)
T4 FREE SERPL-MCNC: 0.79 NG/DL (ref 0.92–1.68)
TSH SERPL DL<=0.05 MIU/L-ACNC: 31.4 UIU/ML (ref 0.27–4.2)
WBC NRBC COR # BLD AUTO: 7.43 10*3/MM3 (ref 3.4–10.8)

## 2025-08-20 PROCEDURE — 80050 GENERAL HEALTH PANEL: CPT | Performed by: INTERNAL MEDICINE

## 2025-08-20 PROCEDURE — 84439 ASSAY OF FREE THYROXINE: CPT | Performed by: INTERNAL MEDICINE

## 2025-08-20 PROCEDURE — 25810000003 SODIUM CHLORIDE 0.9 % SOLUTION 250 ML FLEX CONT: Performed by: PHYSICIAN ASSISTANT

## 2025-08-20 PROCEDURE — 96413 CHEMO IV INFUSION 1 HR: CPT

## 2025-08-20 PROCEDURE — 25010000002 HEPARIN LOCK FLUSH PER 10 UNITS: Performed by: INTERNAL MEDICINE

## 2025-08-20 PROCEDURE — 25010000002 DURVALUMAB 50 MG/ML SOLUTION 10 ML VIAL: Performed by: PHYSICIAN ASSISTANT

## 2025-08-20 RX ORDER — SODIUM CHLORIDE 9 MG/ML
20 INJECTION, SOLUTION INTRAVENOUS ONCE
Status: CANCELLED | OUTPATIENT
Start: 2025-08-20

## 2025-08-20 RX ORDER — SODIUM CHLORIDE 0.9 % (FLUSH) 0.9 %
10 SYRINGE (ML) INJECTION AS NEEDED
Status: DISCONTINUED | OUTPATIENT
Start: 2025-08-20 | End: 2025-08-21 | Stop reason: HOSPADM

## 2025-08-20 RX ORDER — HEPARIN SODIUM (PORCINE) LOCK FLUSH IV SOLN 100 UNIT/ML 100 UNIT/ML
500 SOLUTION INTRAVENOUS AS NEEDED
OUTPATIENT
Start: 2025-08-20

## 2025-08-20 RX ORDER — HEPARIN SODIUM (PORCINE) LOCK FLUSH IV SOLN 100 UNIT/ML 100 UNIT/ML
500 SOLUTION INTRAVENOUS AS NEEDED
Status: CANCELLED | OUTPATIENT
Start: 2025-08-20

## 2025-08-20 RX ORDER — SODIUM CHLORIDE 9 MG/ML
20 INJECTION, SOLUTION INTRAVENOUS ONCE
Status: DISCONTINUED | OUTPATIENT
Start: 2025-08-20 | End: 2025-08-21 | Stop reason: HOSPADM

## 2025-08-20 RX ORDER — SODIUM CHLORIDE 0.9 % (FLUSH) 0.9 %
10 SYRINGE (ML) INJECTION AS NEEDED
Status: CANCELLED | OUTPATIENT
Start: 2025-08-20

## 2025-08-20 RX ORDER — HEPARIN SODIUM (PORCINE) LOCK FLUSH IV SOLN 100 UNIT/ML 100 UNIT/ML
500 SOLUTION INTRAVENOUS AS NEEDED
Status: DISCONTINUED | OUTPATIENT
Start: 2025-08-20 | End: 2025-08-21 | Stop reason: HOSPADM

## 2025-08-20 RX ORDER — SODIUM CHLORIDE 0.9 % (FLUSH) 0.9 %
10 SYRINGE (ML) INJECTION AS NEEDED
OUTPATIENT
Start: 2025-08-20

## 2025-08-20 RX ADMIN — HEPARIN 500 UNITS: 100 SYRINGE at 15:42

## 2025-08-20 RX ADMIN — Medication 10 ML: at 12:56

## 2025-08-20 RX ADMIN — SODIUM CHLORIDE 1500 MG: 9 INJECTION, SOLUTION INTRAVENOUS at 14:34

## 2025-08-20 RX ADMIN — Medication 10 ML: at 15:42

## 2025-08-29 DIAGNOSIS — C34.11 SMALL CELL CARCINOMA OF UPPER LOBE OF RIGHT LUNG: ICD-10-CM

## 2025-08-29 DIAGNOSIS — E03.9 ACQUIRED HYPOTHYROIDISM: ICD-10-CM

## 2025-08-29 DIAGNOSIS — E87.6 HYPOKALEMIA: ICD-10-CM

## 2025-08-29 RX ORDER — LEVOTHYROXINE SODIUM 50 UG/1
50 TABLET ORAL
Qty: 30 TABLET | Refills: 0 | Status: SHIPPED | OUTPATIENT
Start: 2025-08-29

## 2025-08-29 RX ORDER — POTASSIUM CHLORIDE 1500 MG/1
TABLET, EXTENDED RELEASE ORAL
Qty: 4 TABLET | Refills: 0 | OUTPATIENT
Start: 2025-08-29

## (undated) DEVICE — SUT MONOCRYL 4/0 PS2 27IN Y426H ETY426H

## (undated) DEVICE — SYR ANGIO FNGR FIX CONTRL MLL 10ML

## (undated) DEVICE — VISION PROBE ADAPTER AND SUCTION ADAPTER

## (undated) DEVICE — PK ENDO GI 50

## (undated) DEVICE — ANTIBACTERIAL UNDYED BRAIDED (POLYGLACTIN 910), SYNTHETIC ABSORBABLE SUTURE: Brand: COATED VICRYL

## (undated) DEVICE — BIOPSY NEEDLE, 23G: Brand: FLEXISION

## (undated) DEVICE — SYR LUERLOK 30CC

## (undated) DEVICE — GOWN,REINF,POLY,ECL,PP SLV,XXL: Brand: MEDLINE

## (undated) DEVICE — Device

## (undated) DEVICE — CATHETER: Brand: ION

## (undated) DEVICE — CATHETER GUIDE

## (undated) DEVICE — CVR PROB 96IN LF STRL

## (undated) DEVICE — SUT VIC 2/0 SH 27IN

## (undated) DEVICE — PENCL HND ROCKRSWTCH HOLSTR EZ CLEAN TP CRD 10FT

## (undated) DEVICE — GLV SURG PREMIERPRO ORTHO LTX PF SZ8 BRN

## (undated) DEVICE — Device: Brand: ERBE

## (undated) DEVICE — SUT SILK 2/0 SH CR8 18IN CR8 C012D

## (undated) DEVICE — VISION PROBE: Brand: ION

## (undated) DEVICE — BAPTIST FLOYD BRONCHOSCOPY: Brand: MEDLINE INDUSTRIES, INC.

## (undated) DEVICE — DECANTER: Brand: UNBRANDED

## (undated) DEVICE — SYR LUERLOK 20CC BX/50

## (undated) DEVICE — DECANT BG O JET

## (undated) DEVICE — SUT MNCRYL 4/0 PS2 27IN UD MCP426H

## (undated) DEVICE — PK MINOR LAPAROTOMY 50

## (undated) DEVICE — INTENDED FOR TISSUE SEPARATION, AND OTHER PROCEDURES THAT REQUIRE A SHARP SURGICAL BLADE TO PUNCTURE OR CUT.: Brand: BARD-PARKER ® CARBON RIB-BACK BLADES

## (undated) DEVICE — ELECTRD BLD EZ CLN MOD XLNG 2.75IN

## (undated) DEVICE — PROVE COVER: Brand: UNBRANDED

## (undated) DEVICE — 3M™ IOBAN™ 2 ANTIMICROBIAL INCISE DRAPE 6650EZ: Brand: IOBAN™ 2

## (undated) DEVICE — SYR LL TP 10ML STRL

## (undated) DEVICE — C-ARM: Brand: UNBRANDED

## (undated) DEVICE — INTENDED FOR TISSUE SEPARATION, AND OTHER PROCEDURES THAT REQUIRE A SHARP SURGICAL BLADE TO PUNCTURE OR CUT.: Brand: BARD-PARKER ® STAINLESS STEEL BLADES

## (undated) DEVICE — NDL HYPO PRECISIONGLIDE REG 25G 1 1/2

## (undated) DEVICE — Device: Brand: ION

## (undated) DEVICE — DBD-DRAPE,LAP,CHOLE,W/TROUGHS,STERILE: Brand: MEDLINE

## (undated) DEVICE — BITEBLOCK ENDO W/STRAP 60F A/ LF DISP

## (undated) DEVICE — ADHS SKIN SURG TISS VISC PREMIERPRO EXOFIN HI/VISC FAST/DRY

## (undated) DEVICE — SWIVEL CONNECTOR

## (undated) DEVICE — SYR PREFILL SALINE POSIFLUSH 10ML STRL